# Patient Record
Sex: FEMALE | Race: BLACK OR AFRICAN AMERICAN | Employment: FULL TIME | URBAN - METROPOLITAN AREA
[De-identification: names, ages, dates, MRNs, and addresses within clinical notes are randomized per-mention and may not be internally consistent; named-entity substitution may affect disease eponyms.]

---

## 2017-01-23 ENCOUNTER — TELEPHONE (OUTPATIENT)
Dept: FAMILY MEDICINE CLINIC | Facility: CLINIC | Age: 52
End: 2017-01-23

## 2017-01-23 DIAGNOSIS — J45.909 UNCOMPLICATED ASTHMA, UNSPECIFIED ASTHMA SEVERITY: Primary | ICD-10-CM

## 2017-01-23 DIAGNOSIS — E11.9 TYPE 2 DIABETES MELLITUS WITHOUT COMPLICATION, UNSPECIFIED LONG TERM INSULIN USE STATUS: Primary | ICD-10-CM

## 2017-01-23 RX ORDER — MONTELUKAST SODIUM 10 MG/1
TABLET ORAL
Qty: 90 TABLET | Refills: 0 | Status: SHIPPED | OUTPATIENT
Start: 2017-01-23 | End: 2017-07-26

## 2017-01-23 RX ORDER — FLUTICASONE PROPIONATE AND SALMETEROL 50; 250 UG/1; UG/1
POWDER RESPIRATORY (INHALATION)
Qty: 2 EACH | Refills: 0 | Status: SHIPPED | OUTPATIENT
Start: 2017-01-23 | End: 2017-04-03

## 2017-02-03 ENCOUNTER — OFFICE VISIT (OUTPATIENT)
Dept: HEMATOLOGY/ONCOLOGY | Facility: HOSPITAL | Age: 52
End: 2017-02-03
Attending: SPECIALIST
Payer: COMMERCIAL

## 2017-02-03 VITALS
TEMPERATURE: 99 F | OXYGEN SATURATION: 100 % | SYSTOLIC BLOOD PRESSURE: 128 MMHG | WEIGHT: 173.38 LBS | RESPIRATION RATE: 18 BRPM | BODY MASS INDEX: 30.72 KG/M2 | HEART RATE: 86 BPM | HEIGHT: 62.99 IN | DIASTOLIC BLOOD PRESSURE: 76 MMHG

## 2017-02-03 DIAGNOSIS — Z91.89 AT HIGH RISK FOR BREAST CANCER: ICD-10-CM

## 2017-02-03 DIAGNOSIS — Z15.09 BRCA2 GENETIC CARRIER: ICD-10-CM

## 2017-02-03 DIAGNOSIS — C77.3 SECONDARY MALIGNANT NEOPLASM OF AXILLARY LYMPH NODES (HCC): ICD-10-CM

## 2017-02-03 DIAGNOSIS — R92.8 ABNORMAL MRI, BREAST: ICD-10-CM

## 2017-02-03 DIAGNOSIS — Z90.11 ACQUIRED ABSENCE OF RIGHT BREAST AND NIPPLE: ICD-10-CM

## 2017-02-03 DIAGNOSIS — C50.811 CANCER OF OVERLAPPING SITES OF RIGHT BREAST (HCC): ICD-10-CM

## 2017-02-03 DIAGNOSIS — D39.12: Primary | ICD-10-CM

## 2017-02-03 DIAGNOSIS — C50.811 MALIGNANT NEOPLASM OF OVERLAPPING SITES OF RIGHT FEMALE BREAST (HCC): ICD-10-CM

## 2017-02-03 DIAGNOSIS — Z15.01 BRCA2 GENETIC CARRIER: ICD-10-CM

## 2017-02-03 LAB
BREAST CARCINOMA AG (CA2729): 22.7 U/ML (ref ?–38)
CANCER AG 125 (CA125): 3.1 U/ML (ref ?–35)

## 2017-02-03 PROCEDURE — 99215 OFFICE O/P EST HI 40 MIN: CPT | Performed by: SPECIALIST

## 2017-02-03 NOTE — PROGRESS NOTES
Patient is here today for follow up with Alessandro Del Rio for breast cancer. Patient denies pain. Stated still has residual neuropathy. Feels good. Medication list and medical history were reviewed and updated.     Education Record    Learner:  Patient    Disease

## 2017-02-03 NOTE — PROGRESS NOTES
[facility] Progress Note      Patient Name: Mamie Current   YOB: 1965  Medical Record Number: [id number]  Attending Physician: Cyrus Hart M.D.      Date of Visit: 2/3/2017c      Chief Complaint  Breast and ovarian cancer an thrombocytopenia resulting in delay of C5D1 by one week. CT scans after cycle 4 showed a marked improvement in the breast/chest wall mass and axillary adenopathy. There was no significant change in the pelvic mass. Tumor markers markedly improved.     Cy All lymph nodes and pelvic washings were negative for malignancy. Patient's post-surgery course was complicated by severe peripheral neuropathy. In 07/2014 she began adjuvant right chest wall/axilla radiation.     She began endocrine therapy with anastr Take 10 mg by mouth daily. Disp:  Rfl:    insulin aspart (NOVOLOG FLEXPEN) 100 UNIT/ML Subcutaneous Solution Pen-injector Inject 1-68 Units into the skin 3 (three) times daily before meals.  Inject 1 unit of insulin for every 30 points blood glucose is grea Pulse 86  Temp(Src) 98.7 °F (37.1 °C) (Tympanic)  Resp 18  Ht 1.6 m (5' 2.99\")  Wt 78.654 kg (173 lb 6.4 oz)  BMI 30.72 kg/m2  SpO2 100%  LMP 09/01/2013 (LMP Unknown)    Physical Examination  Constitutional  Well developed and well nourished; in no appare Collection Time: 02/04/17 10:02 AM   Result Value Ref Range   Glucose 76 70-99 mg/dL   BUN 8 8-20 mg/dL   Creatinine 0.80 0.55-1.02 mg/dL   GFR 97 >=60   Calcium, Total 8.9 8.3-10.3 mg/dL   Alkaline Phosphatase 112 (H)  U/L   AST 16 15-41 U/L   Alt Planned Follow Up   Patient will return for follow up at the beginning of 05/2017. Risk Level: High - breast cancer; BRCA2 mutation carrier; ovarian cancer. Electronically signed by:    Carlos Swann M.D.   [facility]  [facility]

## 2017-02-04 ENCOUNTER — APPOINTMENT (OUTPATIENT)
Dept: LAB | Age: 52
End: 2017-02-04
Attending: FAMILY MEDICINE
Payer: COMMERCIAL

## 2017-02-04 DIAGNOSIS — IMO0001 UNCONTROLLED TYPE 2 DIABETES MELLITUS WITHOUT COMPLICATION, WITH LONG-TERM CURRENT USE OF INSULIN: ICD-10-CM

## 2017-02-04 LAB
ALBUMIN SERPL-MCNC: 3.4 G/DL (ref 3.5–4.8)
ALP LIVER SERPL-CCNC: 112 U/L (ref 41–108)
ALT SERPL-CCNC: 18 U/L (ref 14–54)
AST SERPL-CCNC: 16 U/L (ref 15–41)
BILIRUB SERPL-MCNC: 0.3 MG/DL (ref 0.1–2)
BUN BLD-MCNC: 8 MG/DL (ref 8–20)
CALCIUM BLD-MCNC: 8.9 MG/DL (ref 8.3–10.3)
CHLORIDE: 103 MMOL/L (ref 101–111)
CHOLEST SMN-MCNC: 160 MG/DL (ref ?–200)
CO2: 32 MMOL/L (ref 22–32)
CREAT BLD-MCNC: 0.8 MG/DL (ref 0.55–1.02)
CREAT UR-SCNC: 171 MG/DL
EST. AVERAGE GLUCOSE BLD GHB EST-MCNC: 166 MG/DL (ref 68–126)
GLUCOSE BLD-MCNC: 76 MG/DL (ref 70–99)
HBA1C MFR BLD HPLC: 7.4 % (ref ?–5.7)
HDLC SERPL-MCNC: 63 MG/DL (ref 45–?)
HDLC SERPL: 2.54 {RATIO} (ref ?–4.44)
LDLC SERPL CALC-MCNC: 86 MG/DL (ref ?–130)
M PROTEIN MFR SERPL ELPH: 8.3 G/DL (ref 6.1–8.3)
MICROALBUMIN UR-MCNC: <0.5 MG/DL
NONHDLC SERPL-MCNC: 97 MG/DL (ref ?–130)
POTASSIUM SERPL-SCNC: 3.9 MMOL/L (ref 3.6–5.1)
SODIUM SERPL-SCNC: 140 MMOL/L (ref 136–144)
TRIGLYCERIDES: 53 MG/DL (ref ?–150)
VLDL: 11 MG/DL (ref 5–40)

## 2017-02-04 PROCEDURE — 80053 COMPREHEN METABOLIC PANEL: CPT

## 2017-02-04 PROCEDURE — 82043 UR ALBUMIN QUANTITATIVE: CPT

## 2017-02-04 PROCEDURE — 82570 ASSAY OF URINE CREATININE: CPT

## 2017-02-04 PROCEDURE — 80061 LIPID PANEL: CPT

## 2017-02-04 PROCEDURE — 83036 HEMOGLOBIN GLYCOSYLATED A1C: CPT

## 2017-02-04 PROCEDURE — 36415 COLL VENOUS BLD VENIPUNCTURE: CPT

## 2017-02-05 PROBLEM — Z90.11 ACQUIRED ABSENCE OF RIGHT BREAST AND NIPPLE: Status: ACTIVE | Noted: 2017-02-05

## 2017-02-06 ENCOUNTER — TELEPHONE (OUTPATIENT)
Dept: HEMATOLOGY/ONCOLOGY | Facility: HOSPITAL | Age: 52
End: 2017-02-06

## 2017-02-07 ENCOUNTER — OFFICE VISIT (OUTPATIENT)
Dept: FAMILY MEDICINE CLINIC | Facility: CLINIC | Age: 52
End: 2017-02-07

## 2017-02-07 VITALS
BODY MASS INDEX: 31 KG/M2 | DIASTOLIC BLOOD PRESSURE: 84 MMHG | RESPIRATION RATE: 20 BRPM | HEART RATE: 78 BPM | TEMPERATURE: 98 F | SYSTOLIC BLOOD PRESSURE: 120 MMHG | WEIGHT: 173 LBS | OXYGEN SATURATION: 98 %

## 2017-02-07 DIAGNOSIS — J45.20 MILD INTERMITTENT ASTHMA WITHOUT COMPLICATION: ICD-10-CM

## 2017-02-07 DIAGNOSIS — Z85.3 HISTORY OF BREAST CANCER: ICD-10-CM

## 2017-02-07 DIAGNOSIS — Z79.4 TYPE 2 DIABETES MELLITUS WITHOUT COMPLICATION, WITH LONG-TERM CURRENT USE OF INSULIN (HCC): Primary | ICD-10-CM

## 2017-02-07 DIAGNOSIS — Z87.01 HISTORY OF PNEUMONIA: ICD-10-CM

## 2017-02-07 DIAGNOSIS — E11.9 TYPE 2 DIABETES MELLITUS WITHOUT COMPLICATION, WITH LONG-TERM CURRENT USE OF INSULIN (HCC): Primary | ICD-10-CM

## 2017-02-07 PROCEDURE — 90471 IMMUNIZATION ADMIN: CPT | Performed by: FAMILY MEDICINE

## 2017-02-07 PROCEDURE — 99214 OFFICE O/P EST MOD 30 MIN: CPT | Performed by: FAMILY MEDICINE

## 2017-02-07 PROCEDURE — 90670 PCV13 VACCINE IM: CPT | Performed by: FAMILY MEDICINE

## 2017-02-07 NOTE — PROGRESS NOTES
HPI:   Adina Russell is a 45year old female who presents for recheck of her diabetes and asthma       Patient’s FBS have been in the 80's   Working out regularly   Eating healthy   Taking insulin.     Sen Robles has been checking her feet on a regular basis Tab Take 10 mg by mouth daily. Disp:  Rfl:    insulin aspart (NOVOLOG FLEXPEN) 100 UNIT/ML Subcutaneous Solution Pen-injector Inject 1-68 Units into the skin 3 (three) times daily before meals.  Inject 1 unit of insulin for every 30 points blood glucose is • Asthma    • History of blood transfusion    • Pneumonia, organism unspecified           Past Surgical History    D & C      BREAST BIOPSY  9/12/2013    Comment Procedure: BREAST BIOPSY;  Surgeon: Wilfredo Wilkinson MD;  Location: [address] [place] [address] OR    [facility] intact to monofilament bilaterally   PSYCH: judgement and insight are appropriate & intact    ASSESSMENT AND PLAN:   Lino Ayoub is a 45year old female who presents for a recheck of her diabetes.    Discussed importance of medication, diet adherence, s

## 2017-03-22 ENCOUNTER — TELEPHONE (OUTPATIENT)
Dept: FAMILY MEDICINE CLINIC | Facility: CLINIC | Age: 52
End: 2017-03-22

## 2017-03-22 DIAGNOSIS — IMO0001 UNCONTROLLED TYPE 2 DIABETES MELLITUS WITHOUT COMPLICATION, WITH LONG-TERM CURRENT USE OF INSULIN: Primary | ICD-10-CM

## 2017-03-23 ENCOUNTER — TELEPHONE (OUTPATIENT)
Dept: FAMILY MEDICINE CLINIC | Facility: CLINIC | Age: 52
End: 2017-03-23

## 2017-04-03 RX ORDER — FLUTICASONE PROPIONATE AND SALMETEROL 50; 250 UG/1; UG/1
POWDER RESPIRATORY (INHALATION)
Qty: 120 EACH | Refills: 0 | Status: ON HOLD | OUTPATIENT
Start: 2017-04-03 | End: 2018-06-05

## 2017-04-07 ENCOUNTER — TELEPHONE (OUTPATIENT)
Dept: FAMILY MEDICINE CLINIC | Facility: CLINIC | Age: 52
End: 2017-04-07

## 2017-04-21 ENCOUNTER — OFFICE VISIT (OUTPATIENT)
Dept: NEUROLOGY | Facility: CLINIC | Age: 52
End: 2017-04-21

## 2017-04-21 VITALS
RESPIRATION RATE: 18 BRPM | WEIGHT: 166 LBS | BODY MASS INDEX: 29 KG/M2 | HEART RATE: 72 BPM | DIASTOLIC BLOOD PRESSURE: 66 MMHG | SYSTOLIC BLOOD PRESSURE: 134 MMHG

## 2017-04-21 DIAGNOSIS — G62.0 CHEMOTHERAPY-INDUCED PERIPHERAL NEUROPATHY (HCC): Primary | ICD-10-CM

## 2017-04-21 DIAGNOSIS — T45.1X5A CHEMOTHERAPY-INDUCED PERIPHERAL NEUROPATHY (HCC): Primary | ICD-10-CM

## 2017-04-21 PROCEDURE — 99213 OFFICE O/P EST LOW 20 MIN: CPT | Performed by: OTHER

## 2017-04-21 RX ORDER — GABAPENTIN 600 MG/1
TABLET ORAL
Qty: 120 TABLET | Refills: 5 | Status: SHIPPED | OUTPATIENT
Start: 2017-04-21 | End: 2017-06-28

## 2017-04-21 NOTE — PROGRESS NOTES
HPI:    Patient ID: Shanae Mckeon is a 45year old female. HPI    Patient presented for follow-up for chemotherapy induced peripheral neuropathy. Symptoms are stable with Gabapentin. She states as far she takes the medications the pain is tolerable.  Dorian Carney Status: Never Used                        Alcohol Use: No                       Review of Systems   Musculoskeletal: Negative for myalgias and arthralgias. Neurological: Negative for weakness and numbness. All other systems reviewed and are negative. Misc USE AT BEDTIME Disp: 100 each Rfl: 0     Allergies:  Fish                    Anaphylaxis, Hives    Comment: All fish, Wheezing, short of breath  Seasonal                Wheezing, Runny nose    Comment:Ragweed, pollen   PHYSICAL EXAM:   Physical Exam Imaging & Referrals:  None     AD#[id number]

## 2017-04-21 NOTE — PATIENT INSTRUCTIONS
Refill policies:    • Allow 2 business days for refills; controlled substances may take longer.   • Contact your pharmacy at least 5 days prior to running out of medication and have them send an electronic request or submit request through the “request re insurance carrier to obtain pre-certification or prior authorization. Unfortunately, [facility] has seen an increase in denial of payment even though the procedure/test has been pre-certified.   You are strongly encouraged to contact your insurance carrier to v

## 2017-05-25 ENCOUNTER — SOCIAL WORK SERVICES (OUTPATIENT)
Dept: HEMATOLOGY/ONCOLOGY | Facility: HOSPITAL | Age: 52
End: 2017-05-25

## 2017-05-25 NOTE — PROGRESS NOTES
Applications for a permanent and temporary disabled parking placard completed and placed at 2nd floor desk; SW left message with patient's advising.

## 2017-06-16 ENCOUNTER — HOSPITAL ENCOUNTER (OUTPATIENT)
Dept: MAMMOGRAPHY | Facility: HOSPITAL | Age: 52
Discharge: HOME | End: 2017-06-16
Attending: SPECIALIST
Payer: COMMERCIAL

## 2017-06-16 DIAGNOSIS — C50.811 MALIGNANT NEOPLASM OF OVERLAPPING SITES OF RIGHT FEMALE BREAST (HCC): ICD-10-CM

## 2017-06-16 DIAGNOSIS — Z15.01 BRCA2 GENETIC CARRIER: ICD-10-CM

## 2017-06-16 DIAGNOSIS — Z15.09 BRCA2 GENETIC CARRIER: ICD-10-CM

## 2017-06-16 PROCEDURE — 77061 BREAST TOMOSYNTHESIS UNI: CPT | Performed by: SPECIALIST

## 2017-06-16 PROCEDURE — 77065 DX MAMMO INCL CAD UNI: CPT | Performed by: SPECIALIST

## 2017-06-26 ENCOUNTER — TELEPHONE (OUTPATIENT)
Dept: FAMILY MEDICINE CLINIC | Facility: CLINIC | Age: 52
End: 2017-06-26

## 2017-06-26 RX ORDER — PEN NEEDLE, DIABETIC 31 GX5/16"
NEEDLE, DISPOSABLE MISCELLANEOUS
Refills: 0 | OUTPATIENT
Start: 2017-06-26

## 2017-06-26 NOTE — TELEPHONE ENCOUNTER
Please call patient to get copy of recent diabetic eye exam. [employer] message was sent but not read.

## 2017-06-27 DIAGNOSIS — G62.0 DRUG-INDUCED PERIPHERAL NEUROPATHY (HCC): Primary | ICD-10-CM

## 2017-06-27 RX ORDER — GABAPENTIN 600 MG/1
TABLET ORAL
Qty: 120 TABLET | Refills: 0 | OUTPATIENT
Start: 2017-06-27

## 2017-06-28 RX ORDER — GABAPENTIN 600 MG/1
TABLET ORAL
Qty: 120 TABLET | Refills: 3 | Status: SHIPPED | OUTPATIENT
Start: 2017-06-28 | End: 2017-12-13

## 2017-06-28 NOTE — TELEPHONE ENCOUNTER
Medication: Gabapentin 600 mg    Date of last refill: 4/21/17  [place]    Last office visit: 4/21/2017  Due back to clinic per last office note: 6 months   Date next office visit scheduled:  No future appointments.

## 2017-07-26 ENCOUNTER — TELEPHONE (OUTPATIENT)
Dept: FAMILY MEDICINE CLINIC | Facility: CLINIC | Age: 52
End: 2017-07-26

## 2017-07-26 DIAGNOSIS — J45.909 UNCOMPLICATED ASTHMA, UNSPECIFIED ASTHMA SEVERITY: ICD-10-CM

## 2017-07-26 RX ORDER — MONTELUKAST SODIUM 10 MG/1
TABLET ORAL
Qty: 90 TABLET | Refills: 0 | Status: SHIPPED | OUTPATIENT
Start: 2017-07-26 | End: 2017-10-19

## 2017-07-26 NOTE — TELEPHONE ENCOUNTER
Patient needs a new RX called in for Singular to [facility] on [address]. Insurance has changed    Please see attached message     Approve/ deny Singulair ?   Last OV with Philip Hart was 02/07/17  Medication last authorized 01/23/17   #90

## 2017-07-26 NOTE — TELEPHONE ENCOUNTER
Patient needs a new RX called in for Singular to [place] on [address]. Insurance has changed.

## 2017-07-26 NOTE — TELEPHONE ENCOUNTER
Sent RX to [facility] for pt with this information and to get lab for A1C now. Task completed.

## 2017-09-16 DIAGNOSIS — IMO0001 UNCONTROLLED TYPE 2 DIABETES MELLITUS WITHOUT COMPLICATION, WITH LONG-TERM CURRENT USE OF INSULIN: ICD-10-CM

## 2017-09-18 RX ORDER — PEN NEEDLE, DIABETIC 32GX 5/32"
NEEDLE, DISPOSABLE MISCELLANEOUS
Qty: 100 EACH | Refills: 1 | Status: ON HOLD | OUTPATIENT
Start: 2017-09-18 | End: 2018-06-05

## 2017-09-19 ENCOUNTER — TELEPHONE (OUTPATIENT)
Dept: FAMILY MEDICINE CLINIC | Facility: CLINIC | Age: 52
End: 2017-09-19

## 2017-10-19 DIAGNOSIS — J45.909 UNCOMPLICATED ASTHMA: ICD-10-CM

## 2017-10-20 RX ORDER — MONTELUKAST SODIUM 10 MG/1
TABLET ORAL
Qty: 30 TABLET | Refills: 0 | Status: SHIPPED | OUTPATIENT
Start: 2017-10-20 | End: 2017-11-17

## 2017-11-17 ENCOUNTER — LAB ENCOUNTER (OUTPATIENT)
Dept: LAB | Age: 52
End: 2017-11-17
Attending: FAMILY MEDICINE
Payer: COMMERCIAL

## 2017-11-17 ENCOUNTER — OFFICE VISIT (OUTPATIENT)
Dept: FAMILY MEDICINE CLINIC | Facility: CLINIC | Age: 52
End: 2017-11-17

## 2017-11-17 VITALS
BODY MASS INDEX: 29.77 KG/M2 | SYSTOLIC BLOOD PRESSURE: 124 MMHG | WEIGHT: 168 LBS | HEART RATE: 82 BPM | HEIGHT: 63 IN | TEMPERATURE: 99 F | OXYGEN SATURATION: 99 % | DIASTOLIC BLOOD PRESSURE: 80 MMHG | RESPIRATION RATE: 18 BRPM

## 2017-11-17 DIAGNOSIS — Z80.3 FAMILY HISTORY OF BREAST CANCER: ICD-10-CM

## 2017-11-17 DIAGNOSIS — E11.9 DIABETES MELLITUS WITH NO COMPLICATION (HCC): Primary | ICD-10-CM

## 2017-11-17 DIAGNOSIS — J45.20 MILD INTERMITTENT ASTHMA WITHOUT COMPLICATION: ICD-10-CM

## 2017-11-17 DIAGNOSIS — E11.9 DIABETES MELLITUS WITH NO COMPLICATION (HCC): ICD-10-CM

## 2017-11-17 PROCEDURE — 99214 OFFICE O/P EST MOD 30 MIN: CPT | Performed by: FAMILY MEDICINE

## 2017-11-17 PROCEDURE — 85025 COMPLETE CBC W/AUTO DIFF WBC: CPT | Performed by: FAMILY MEDICINE

## 2017-11-17 PROCEDURE — 83036 HEMOGLOBIN GLYCOSYLATED A1C: CPT | Performed by: FAMILY MEDICINE

## 2017-11-17 PROCEDURE — 80061 LIPID PANEL: CPT | Performed by: FAMILY MEDICINE

## 2017-11-17 PROCEDURE — 80053 COMPREHEN METABOLIC PANEL: CPT | Performed by: FAMILY MEDICINE

## 2017-11-17 PROCEDURE — 90686 IIV4 VACC NO PRSV 0.5 ML IM: CPT | Performed by: FAMILY MEDICINE

## 2017-11-17 PROCEDURE — 90471 IMMUNIZATION ADMIN: CPT | Performed by: FAMILY MEDICINE

## 2017-11-17 PROCEDURE — 36415 COLL VENOUS BLD VENIPUNCTURE: CPT | Performed by: FAMILY MEDICINE

## 2017-11-17 RX ORDER — MONTELUKAST SODIUM 10 MG/1
TABLET ORAL
Qty: 90 TABLET | Refills: 1 | Status: ON HOLD | OUTPATIENT
Start: 2017-11-17 | End: 2018-06-05

## 2017-11-17 RX ORDER — INSULIN GLARGINE 100 [IU]/ML
20 INJECTION, SOLUTION SUBCUTANEOUS NIGHTLY
COMMUNITY
Start: 2017-09-14 | End: 2019-02-06

## 2017-11-17 NOTE — PROGRESS NOTES
HPI:   Iwona Knapp is a 45year old female who presents for recheck of her diabetes and asthma     Patient’s FBS have been in the 90's ; occasional high spikes with stress  Working out regularly   Eating healthy   Taking insulin.     Pamella Haskins has been ch Date Value Ref Range Status   02/04/2017  <=30.0 ug/mg Final   Comment:   Unable to calculate due to Urine Microalbumin <0.5 mg/dL       10/29/2016 7.9 <=30.0 ug/mg Final   09/26/2015 11.4 <=30.0 ug/mg Final   ----------      Current Outpatient Prescript bedtime Disp:  Rfl:       Past Medical History:   Diagnosis Date   • Anemia     transfusions 9/13   • Asthma    • Breast cancer [facility])     right breast 9/13   • Cancer [facility])     breast   • Heart murmur    • History of blood transfusion    • Murmur     orly dizziness  PSYCH: denies depression or anxiety  NUTRITION:follows diabetic diet    EXAM:   /80   Pulse 82   Temp 98.7 °F (37.1 °C) (Oral)   Resp 18   Ht 63\"   Wt 168 lb   LMP 09/01/2013 (LMP Unknown)   SpO2 99%   BMI 29.76 kg/m²    Body mass index i

## 2017-11-20 DIAGNOSIS — E78.00 PURE HYPERCHOLESTEROLEMIA: ICD-10-CM

## 2017-11-20 DIAGNOSIS — E11.9 TYPE 2 DIABETES MELLITUS WITHOUT COMPLICATION, UNSPECIFIED LONG TERM INSULIN USE STATUS: Primary | ICD-10-CM

## 2017-11-22 ENCOUNTER — TELEPHONE (OUTPATIENT)
Dept: FAMILY MEDICINE CLINIC | Facility: CLINIC | Age: 52
End: 2017-11-22

## 2017-11-22 NOTE — TELEPHONE ENCOUNTER
----- Message from Kim Bedolla DO sent at 11/22/2017  1:18 PM CST -----  Reduce lantus to 20 ; call next week with glucose readings

## 2017-11-30 ENCOUNTER — SOCIAL WORK SERVICES (OUTPATIENT)
Dept: HEMATOLOGY/ONCOLOGY | Facility: HOSPITAL | Age: 52
End: 2017-11-30

## 2017-11-30 NOTE — PROGRESS NOTES
Application for disabled parking placard completed and placed at 1st floor desk for patient pick-up.

## 2017-12-13 DIAGNOSIS — G62.0 DRUG-INDUCED PERIPHERAL NEUROPATHY (HCC): ICD-10-CM

## 2017-12-14 NOTE — TELEPHONE ENCOUNTER
Medication: Gabapentin 600mg     Date of last refill: 6/28/17  [place]     Last office visit: 4/21/17  Due back to clinic per last office note:  6 months  Date next office visit scheduled:  1/12/18    Last OV note recommendation: per Dr. Kindra Shah- Samy overall  Plan: Continue Gabapentin 600 mg TID. Take extra tablet as needed.   Follow up in 6 months

## 2017-12-15 RX ORDER — GABAPENTIN 600 MG/1
TABLET ORAL
Qty: 120 TABLET | Refills: 0 | Status: SHIPPED | OUTPATIENT
Start: 2017-12-15 | End: 2018-01-12

## 2018-01-12 ENCOUNTER — OFFICE VISIT (OUTPATIENT)
Dept: NEUROLOGY | Facility: CLINIC | Age: 53
End: 2018-01-12

## 2018-01-12 VITALS
BODY MASS INDEX: 31 KG/M2 | RESPIRATION RATE: 16 BRPM | HEART RATE: 70 BPM | SYSTOLIC BLOOD PRESSURE: 128 MMHG | DIASTOLIC BLOOD PRESSURE: 84 MMHG | WEIGHT: 173 LBS

## 2018-01-12 DIAGNOSIS — G62.0 CHEMOTHERAPY-INDUCED PERIPHERAL NEUROPATHY (HCC): Primary | ICD-10-CM

## 2018-01-12 DIAGNOSIS — G62.0 DRUG-INDUCED PERIPHERAL NEUROPATHY (HCC): ICD-10-CM

## 2018-01-12 DIAGNOSIS — T45.1X5A CHEMOTHERAPY-INDUCED PERIPHERAL NEUROPATHY (HCC): Primary | ICD-10-CM

## 2018-01-12 PROCEDURE — 99213 OFFICE O/P EST LOW 20 MIN: CPT | Performed by: OTHER

## 2018-01-12 RX ORDER — GABAPENTIN 600 MG/1
TABLET ORAL
Qty: 120 TABLET | Refills: 5 | Status: ON HOLD | OUTPATIENT
Start: 2018-01-12 | End: 2018-06-05

## 2018-01-12 NOTE — PATIENT INSTRUCTIONS
Refill policies:    • Allow 2-3 business days for refills; controlled substances may take longer.   • Contact your pharmacy at least 5 days prior to running out of medication and have them send an electronic request or submit request through the [facility] recommended that you have a procedure or additional testing performed. [facility]) will contact your insurance carrier to obtain pre-certification or prior authorization.     Unfortunately, [facility] has seen an increase in denial of paym

## 2018-01-12 NOTE — PROGRESS NOTES
HPI:    Patient ID: Allyson Foster is a 52year old female. HPI    Patient is a 52year old female who presented for follow-up for chemotherapy induced peripheral neuropathy. Symptoms are stable with Gabapentin and pain is under control.  She sometime t • Cancer Maternal Aunt      breast cancer   • Breast Cancer Self 49      Smoking status: Never Smoker                                                              Smokeless tobacco: Never Used                      Alcohol use:  No                       Re Does not apply Misc  Disp:  Rfl:    BD PEN NEEDLE SHORT U/F 31G X 8 MM Does not apply Misc USE AT BEDTIME Disp: 100 each Rfl: 0   Respiratory Therapy Supplies (NEBULIZER/ADULT MASK) Does not apply Kit Use with nebulizer machine as directed Disp: 1 kit Rfl: with the plan. No orders of the defined types were placed in this encounter.       Meds This Visit:  Signed Prescriptions Disp Refills    gabapentin 600 MG Oral Tab 120 tablet 5      Sig: TAKE 1 TABLET BY MOUTH THREE TIMES DAILY, TAKE EXTRA TABLET AS N

## 2018-01-12 NOTE — PROGRESS NOTES
Patient here to follow up regarding neuropathy. States she has been doing well, here to discuss the next steps.

## 2018-02-20 ENCOUNTER — SOCIAL WORK SERVICES (OUTPATIENT)
Dept: HEMATOLOGY/ONCOLOGY | Facility: HOSPITAL | Age: 53
End: 2018-02-20

## 2018-02-20 ENCOUNTER — TELEPHONE (OUTPATIENT)
Dept: SURGERY | Facility: CLINIC | Age: 53
End: 2018-02-20

## 2018-02-20 NOTE — PROGRESS NOTES
Medical records form 2/1/2017 to present faxed to [place] [employer], Disability Determination, [phone].

## 2018-02-22 ENCOUNTER — TELEPHONE (OUTPATIENT)
Dept: FAMILY MEDICINE CLINIC | Facility: CLINIC | Age: 53
End: 2018-02-22

## 2018-02-22 NOTE — TELEPHONE ENCOUNTER
Medical Records request for patient.     [address] [facility]    Claim #[id number]    Sent to Medical Records  Sent to Scan

## 2018-04-11 ENCOUNTER — TELEPHONE (OUTPATIENT)
Dept: NEUROLOGY | Facility: CLINIC | Age: 53
End: 2018-04-11

## 2018-04-11 ENCOUNTER — HOSPITAL ENCOUNTER (EMERGENCY)
Facility: HOSPITAL | Age: 53
Discharge: HOME | End: 2018-04-11
Attending: EMERGENCY MEDICINE
Payer: COMMERCIAL

## 2018-04-11 VITALS
RESPIRATION RATE: 18 BRPM | SYSTOLIC BLOOD PRESSURE: 145 MMHG | DIASTOLIC BLOOD PRESSURE: 88 MMHG | WEIGHT: 173 LBS | HEIGHT: 63 IN | OXYGEN SATURATION: 99 % | HEART RATE: 88 BPM | TEMPERATURE: 98 F | BODY MASS INDEX: 30.65 KG/M2

## 2018-04-11 DIAGNOSIS — G51.0 BELL'S PALSY: Primary | ICD-10-CM

## 2018-04-11 PROCEDURE — 99283 EMERGENCY DEPT VISIT LOW MDM: CPT

## 2018-04-11 RX ORDER — PREDNISONE 20 MG/1
60 TABLET ORAL DAILY
Qty: 15 TABLET | Refills: 0 | Status: SHIPPED | OUTPATIENT
Start: 2018-04-11 | End: 2018-04-19

## 2018-04-11 RX ORDER — VALACYCLOVIR HYDROCHLORIDE 1 G/1
1 TABLET, FILM COATED ORAL 3 TIMES DAILY
Qty: 21 TABLET | Refills: 0 | Status: SHIPPED | OUTPATIENT
Start: 2018-04-11 | End: 2018-04-18

## 2018-04-11 NOTE — TELEPHONE ENCOUNTER
Patient calling to report she has some difficulty blinking and closing her eye this morning. No reports of dizziness, headaches or weakness     Also reports her right eye not blinking or closing , right eye very watery. left eye is fine, reports pain behind right ear , she stated her friend noticed right side lip is not moving in symmetry with left side. No reports of weakness in arms or legs. Dr Gretel Irizarry notified of patient symptoms above and advised patient to proceed to the ER for further evaluation and treatment. Understanding verbalized.

## 2018-04-11 NOTE — TELEPHONE ENCOUNTER
Possible Bell's palsy but go to the ED for evaluation given background cancer history  Would need steroids and antivirals once diagnosis is confirmed

## 2018-04-11 NOTE — ED PROVIDER NOTES
Patient Seen in: [facility] Emergency Department    History   Patient presents with:   Eye Visual Problem (opthalmic)    Stated Complaint: unable to close R eye since last noc, denies numbness or weakness anywhere    HPI    Patient is a 57-year-old w RADIATION RIGHT  5/13/2014: TOTAL ABDOMINAL HYSTERECTOMY      Comment: Procedure: ABDOMINAL HYSTERECTOMY TOTAL BSO                STAGING;  Surgeon: Jo Zelaya MD;                 Location: [address] [place] [address] MAIN OR        Smoking status: Never Smoker Discussed with patient. Will start Valtrex/prednisone. Tape her eye shut at night. Artificial tears. Follow-up with neurology. Return if worsening symptoms, new complaints.             Disposition and Plan     Clinical Impression:  Bell's palsy  (prima

## 2018-04-11 NOTE — ED INITIAL ASSESSMENT (HPI)
Pt reports inability to blink right eye since last night. Hx of bells palsy on same side. Denies recent illness. Pt ambulatory and A&OX4.

## 2018-04-12 ENCOUNTER — TELEPHONE (OUTPATIENT)
Dept: NEUROLOGY | Facility: CLINIC | Age: 53
End: 2018-04-12

## 2018-04-18 PROCEDURE — 82043 UR ALBUMIN QUANTITATIVE: CPT | Performed by: FAMILY MEDICINE

## 2018-04-18 PROCEDURE — 82570 ASSAY OF URINE CREATININE: CPT | Performed by: FAMILY MEDICINE

## 2018-04-19 ENCOUNTER — OFFICE VISIT (OUTPATIENT)
Dept: FAMILY MEDICINE CLINIC | Facility: CLINIC | Age: 53
End: 2018-04-19

## 2018-04-19 VITALS
WEIGHT: 172 LBS | BODY MASS INDEX: 30.48 KG/M2 | DIASTOLIC BLOOD PRESSURE: 70 MMHG | OXYGEN SATURATION: 98 % | RESPIRATION RATE: 18 BRPM | HEART RATE: 98 BPM | TEMPERATURE: 99 F | SYSTOLIC BLOOD PRESSURE: 122 MMHG | HEIGHT: 63 IN

## 2018-04-19 DIAGNOSIS — E11.9 TYPE 2 DIABETES MELLITUS WITHOUT COMPLICATION, WITH LONG-TERM CURRENT USE OF INSULIN (HCC): Primary | ICD-10-CM

## 2018-04-19 DIAGNOSIS — Z79.4 TYPE 2 DIABETES MELLITUS WITHOUT COMPLICATION, WITH LONG-TERM CURRENT USE OF INSULIN (HCC): Primary | ICD-10-CM

## 2018-04-19 DIAGNOSIS — J45.20 MILD INTERMITTENT ASTHMA WITHOUT COMPLICATION: ICD-10-CM

## 2018-04-19 DIAGNOSIS — G51.0 BELL'S PALSY: ICD-10-CM

## 2018-04-19 PROCEDURE — 99214 OFFICE O/P EST MOD 30 MIN: CPT | Performed by: FAMILY MEDICINE

## 2018-04-19 NOTE — PROGRESS NOTES
HPI:   William Kramer is a 52year old female who presents for recheck of her diabetes and asthma     Patient’s FBS have been in the 90's ; recent labs looked good   No lows after reducing the lantus   Working out regularly   Eating healthy     Swathi jennings ----------  AST (U/L)   Date Value   04/18/2018 15   11/17/2017 15   02/04/2017 16   10/29/2016 15   07/16/2014 10 (L)   07/07/2014 20   04/17/2014 11 (L)   11/11/2011 16   12/30/2010 24   ----------  ALT (U/L)   Date Value   04/18/2018 21   07/16/2014 1 anastrozole 1 MG Oral Tab tab Take 1 tablet (1 mg total) by mouth daily.  Disp: 90 tablet Rfl: 3   Respiratory Therapy Supplies (NEBULIZER/ADULT MASK) Does not apply Kit Use with nebulizer machine as directed Disp: 1 kit Rfl: 0   Albuterol Sulfate (VENTOL Social History: Smoking status: Never Smoker                                                              Smokeless tobacco: Never Used                      Alcohol use:  No              Exercise: minimal.  Diet: watches sugar closely     REVIEW OF SYSTEM complication, with long-term current use of insulin (HCC)  Repeat labs in 3 months   - HEMOGLOBIN A1C; Future  - LIPID PANEL; Future  - COMP METABOLIC PANEL (14); Future    3.  Bell's palsy  cpm ; monitor; already better       Follow up 3 months after repea

## 2018-05-01 DIAGNOSIS — J45.909 UNCOMPLICATED ASTHMA: ICD-10-CM

## 2018-05-01 RX ORDER — MONTELUKAST SODIUM 10 MG/1
TABLET ORAL
Qty: 90 TABLET | Refills: 0 | Status: ON HOLD | OUTPATIENT
Start: 2018-05-01 | End: 2018-06-05

## 2018-05-29 ENCOUNTER — OFFICE VISIT (OUTPATIENT)
Dept: FAMILY MEDICINE CLINIC | Facility: CLINIC | Age: 53
End: 2018-05-29

## 2018-05-29 VITALS
SYSTOLIC BLOOD PRESSURE: 128 MMHG | HEART RATE: 98 BPM | RESPIRATION RATE: 16 BRPM | TEMPERATURE: 100 F | OXYGEN SATURATION: 98 % | DIASTOLIC BLOOD PRESSURE: 76 MMHG

## 2018-05-29 DIAGNOSIS — J40 BRONCHITIS: Primary | ICD-10-CM

## 2018-05-29 PROCEDURE — 99213 OFFICE O/P EST LOW 20 MIN: CPT | Performed by: PHYSICIAN ASSISTANT

## 2018-05-29 RX ORDER — AZITHROMYCIN 250 MG/1
TABLET, FILM COATED ORAL
Qty: 6 TABLET | Refills: 0 | Status: ON HOLD | OUTPATIENT
Start: 2018-05-29 | End: 2018-06-05

## 2018-05-29 NOTE — PROGRESS NOTES
CHIEF COMPLAINT:   Patient presents with:  URI: cough, low grade fever 100.1  2 days duration        HPI:   Jerzy Connors is a 52year old female who presents for cough for  2  days.    Due to the fevers and achy feeling along with cough she was worried s BEDTIME Disp: 100 each Rfl: 0   MONTELUKAST SODIUM 10 MG Oral Tab TAKE 1 TABLET(10 MG) BY MOUTH DAILY Disp: 90 tablet Rfl: 0   ADVAIR DISKUS 250-50 MCG/DOSE Inhalation Aerosol Powder, Breath Activated INHALE 1 PUFF INTO THE LUNGS 2 (TWO) TIMES DAILY Disp: normocephalic. TM's clear bilaterally. Nares patent, nasal mucosa pink mildly congested. Throat non erythematous. NECK: supple, non-tender. LUNGS: sporatic expiratory wheezes, no ronchi, no crackles noted.   CARDIO: S1/S2  RRR   LYMPH: No cervical or s

## 2018-06-04 ENCOUNTER — HOSPITAL ENCOUNTER (INPATIENT)
Facility: HOSPITAL | Age: 53
LOS: 3 days | Discharge: HOME | DRG: 202 | End: 2018-06-07
Attending: EMERGENCY MEDICINE | Admitting: INTERNAL MEDICINE
Payer: COMMERCIAL

## 2018-06-04 ENCOUNTER — APPOINTMENT (OUTPATIENT)
Dept: CT IMAGING | Age: 53
DRG: 202 | End: 2018-06-04
Attending: EMERGENCY MEDICINE
Payer: COMMERCIAL

## 2018-06-04 ENCOUNTER — APPOINTMENT (OUTPATIENT)
Dept: GENERAL RADIOLOGY | Age: 53
DRG: 202 | End: 2018-06-04
Attending: EMERGENCY MEDICINE
Payer: COMMERCIAL

## 2018-06-04 DIAGNOSIS — R09.02 HYPOXIA: ICD-10-CM

## 2018-06-04 DIAGNOSIS — J18.9 COMMUNITY ACQUIRED PNEUMONIA, UNSPECIFIED LATERALITY: Primary | ICD-10-CM

## 2018-06-04 DIAGNOSIS — J45.902 SEVERE ASTHMA WITH STATUS ASTHMATICUS, UNSPECIFIED WHETHER PERSISTENT: ICD-10-CM

## 2018-06-04 PROBLEM — E87.1 HYPONATREMIA: Status: ACTIVE | Noted: 2018-06-04

## 2018-06-04 PROBLEM — R73.9 HYPERGLYCEMIA: Status: ACTIVE | Noted: 2018-06-04

## 2018-06-04 PROBLEM — D64.9 ANEMIA: Status: ACTIVE | Noted: 2018-06-04

## 2018-06-04 PROCEDURE — 71275 CT ANGIOGRAPHY CHEST: CPT | Performed by: EMERGENCY MEDICINE

## 2018-06-04 PROCEDURE — 71045 X-RAY EXAM CHEST 1 VIEW: CPT | Performed by: EMERGENCY MEDICINE

## 2018-06-04 PROCEDURE — 99223 1ST HOSP IP/OBS HIGH 75: CPT | Performed by: HOSPITALIST

## 2018-06-04 RX ORDER — ACETAMINOPHEN 325 MG/1
650 TABLET ORAL EVERY 6 HOURS PRN
Status: DISCONTINUED | OUTPATIENT
Start: 2018-06-04 | End: 2018-06-07

## 2018-06-04 RX ORDER — TRAZODONE HYDROCHLORIDE 50 MG/1
50 TABLET ORAL NIGHTLY PRN
Status: DISCONTINUED | OUTPATIENT
Start: 2018-06-04 | End: 2018-06-07

## 2018-06-04 RX ORDER — CETIRIZINE HYDROCHLORIDE 10 MG/1
10 TABLET ORAL DAILY
Status: DISCONTINUED | OUTPATIENT
Start: 2018-06-04 | End: 2018-06-07

## 2018-06-04 RX ORDER — DEXTROSE MONOHYDRATE 25 G/50ML
50 INJECTION, SOLUTION INTRAVENOUS
Status: DISCONTINUED | OUTPATIENT
Start: 2018-06-04 | End: 2018-06-07

## 2018-06-04 RX ORDER — METHYLPREDNISOLONE SODIUM SUCCINATE 125 MG/2ML
60 INJECTION, POWDER, LYOPHILIZED, FOR SOLUTION INTRAMUSCULAR; INTRAVENOUS EVERY 8 HOURS
Status: DISCONTINUED | OUTPATIENT
Start: 2018-06-05 | End: 2018-06-05

## 2018-06-04 RX ORDER — MONTELUKAST SODIUM 10 MG/1
10 TABLET ORAL NIGHTLY
Status: DISCONTINUED | OUTPATIENT
Start: 2018-06-05 | End: 2018-06-05

## 2018-06-04 RX ORDER — ENOXAPARIN SODIUM 100 MG/ML
40 INJECTION SUBCUTANEOUS DAILY
Status: DISCONTINUED | OUTPATIENT
Start: 2018-06-05 | End: 2018-06-07

## 2018-06-04 RX ORDER — ACETAMINOPHEN 500 MG
1000 TABLET ORAL ONCE
Status: COMPLETED | OUTPATIENT
Start: 2018-06-04 | End: 2018-06-04

## 2018-06-04 RX ORDER — ONDANSETRON 2 MG/ML
4 INJECTION INTRAMUSCULAR; INTRAVENOUS EVERY 6 HOURS PRN
Status: DISCONTINUED | OUTPATIENT
Start: 2018-06-04 | End: 2018-06-07

## 2018-06-04 RX ORDER — ALBUTEROL SULFATE 2.5 MG/3ML
2.5 SOLUTION RESPIRATORY (INHALATION) EVERY 4 HOURS PRN
Status: DISCONTINUED | OUTPATIENT
Start: 2018-06-04 | End: 2018-06-07

## 2018-06-04 RX ORDER — ANASTROZOLE 1 MG/1
1 TABLET ORAL DAILY
Status: DISCONTINUED | OUTPATIENT
Start: 2018-06-04 | End: 2018-06-07

## 2018-06-04 RX ORDER — GABAPENTIN 600 MG/1
600 TABLET ORAL 3 TIMES DAILY
Status: DISCONTINUED | OUTPATIENT
Start: 2018-06-04 | End: 2018-06-07

## 2018-06-04 RX ORDER — LEVOFLOXACIN 5 MG/ML
500 INJECTION, SOLUTION INTRAVENOUS ONCE
Status: COMPLETED | OUTPATIENT
Start: 2018-06-04 | End: 2018-06-04

## 2018-06-04 RX ORDER — IPRATROPIUM BROMIDE AND ALBUTEROL SULFATE 2.5; .5 MG/3ML; MG/3ML
3 SOLUTION RESPIRATORY (INHALATION) ONCE
Status: COMPLETED | OUTPATIENT
Start: 2018-06-04 | End: 2018-06-04

## 2018-06-04 RX ORDER — METHYLPREDNISOLONE SODIUM SUCCINATE 125 MG/2ML
125 INJECTION, POWDER, LYOPHILIZED, FOR SOLUTION INTRAMUSCULAR; INTRAVENOUS ONCE
Status: COMPLETED | OUTPATIENT
Start: 2018-06-04 | End: 2018-06-04

## 2018-06-04 NOTE — ED PROVIDER NOTES
Patient Seen in: [facility] Emergency Department In [place]    History   Patient presents with:  Dyspnea NICK SOB (respiratory)    Stated Complaint: sob, cough, back pain    HPI    Patient presents with a cough and fever.   The patient states that she start MASTECTOMY MODIFIED RADICAL      Comment: Procedure: BREAST MODIFIED RADICAL MASTECTOMY;               Surgeon: Jason Altman MD;  Location: [facility] MAIN                OR  No date: MASTECTOMY RIGHT      Comment: 3/18/14  No date: NEEDLE BIOPSY RIGHT  4/30/201 Protein 8.4 (*)     Sodium 134 (*)     All other components within normal limits   URINALYSIS WITH CULTURE REFLEX - Abnormal; Notable for the following:     Blood Urine Trace-lysed (*)     All other components within normal limits   D-DIMER - Abnormal; Not PORTABLE  (CPT=71010), 10/09/2013, 23:15. [facility], XR CHEST PA + LAT CHEST (CPT=71020), 2/28/2016, 11:47. [facility] , XR CHEST PA + LAT CHEST (CPT=71020), 2/29/2016, 11:14.   [facility], XR CHEST PA + LAT CHEST (CPT=71020), 6/28/2016, 1 WALL:    Changes of previous right mastectomy and right axillary dissection. LIMITED ABDOMEN:    Stable left adrenal  nodule measuring up to 0.9 cm that could represent an adenoma.  BONES:    Degenerative changes in the spine OTHER:    None      CONCLUSION: Meeta Gordon MD on 6/04/2018 at 18:03     Approved by: Gustavo Fregoso MD          ED Course as of Jun 04 2108  ------------------------------------------------------------  Medications   sodium chloride 0.9% IV bolus 1,572 mL (1,000 mL Intravenous New Bag 6 agreement with the plan.     Disposition and Plan     Clinical Impression:  Community acquired pneumonia, unspecified laterality  (primary encounter diagnosis)  Hypoxia  Severe asthma with status asthmaticus, unspecified whether persistent    Disposition:

## 2018-06-04 NOTE — ED INITIAL ASSESSMENT (HPI)
PT STARTED HAVING SYMPTOMS OF COUGH FEVER THAT STARTED ON Wednesday. PT IS FEELING SOB, HAVING LEFT BACK PAIN.

## 2018-06-05 PROCEDURE — 99232 SBSQ HOSP IP/OBS MODERATE 35: CPT | Performed by: HOSPITALIST

## 2018-06-05 RX ORDER — GABAPENTIN 600 MG/1
600 TABLET ORAL 3 TIMES DAILY
COMMUNITY
End: 2018-09-25

## 2018-06-05 RX ORDER — METHYLPREDNISOLONE SODIUM SUCCINATE 40 MG/ML
40 INJECTION, POWDER, LYOPHILIZED, FOR SOLUTION INTRAMUSCULAR; INTRAVENOUS EVERY 12 HOURS
Status: DISCONTINUED | OUTPATIENT
Start: 2018-06-05 | End: 2018-06-06

## 2018-06-05 RX ORDER — METHYLPREDNISOLONE SODIUM SUCCINATE 125 MG/2ML
60 INJECTION, POWDER, LYOPHILIZED, FOR SOLUTION INTRAMUSCULAR; INTRAVENOUS EVERY 8 HOURS
Status: DISCONTINUED | OUTPATIENT
Start: 2018-06-05 | End: 2018-06-05

## 2018-06-05 RX ORDER — MONTELUKAST SODIUM 10 MG/1
10 TABLET ORAL NIGHTLY
Status: DISCONTINUED | OUTPATIENT
Start: 2018-06-05 | End: 2018-06-07

## 2018-06-05 RX ORDER — FLUTICASONE PROPIONATE AND SALMETEROL 250; 50 UG/1; UG/1
1 POWDER RESPIRATORY (INHALATION) 2 TIMES DAILY
COMMUNITY
End: 2020-05-27

## 2018-06-05 RX ORDER — MONTELUKAST SODIUM 10 MG/1
10 TABLET ORAL NIGHTLY
COMMUNITY
End: 2018-08-16

## 2018-06-05 RX ORDER — ALBUTEROL SULFATE 90 UG/1
1 AEROSOL, METERED RESPIRATORY (INHALATION) EVERY 4 HOURS PRN
COMMUNITY
End: 2018-12-13

## 2018-06-05 NOTE — CM/SW NOTE
06/05/18 1000   CM/SW Screening   Referral Source    Information Source Chart review;Nursing rounds   Patient's Mental Status Alert;Oriented   Patient's [address]   Patient lives with Alone   Patient Status Prior to Admission

## 2018-06-05 NOTE — PROGRESS NOTES
Blood sugar of 258. Dr Ezequiel Davis aware and ordered one time NPH 10 units. And will cover carb count.  Will continue to monitor

## 2018-06-05 NOTE — PLAN OF CARE
Diabetes/Glucose Control    • Glucose maintained within prescribed range Progressing        Patient/Family Goals    • Patient/Family Long Term Goal Progressing    • Patient/Family Short Term Goal Progressing        Pt alert and oriented. On 2 lilters o2.  P

## 2018-06-05 NOTE — PROGRESS NOTES
[facility] H[facility]PITAL[facility]  Progress Note     Anettematilde Rubalcavalillian Patient Status:  Inpatient    1965 MRN [id number]   [facility] 5NW-A Attending Severino Carver MD   Hosp Day # 1 PCP Emile Sher DO     Chief Complaint: cough and SOB    S: Patien Intravenous Q24H   • azithromycin (ZITHROMAX) IVPB  500 mg Intravenous Q24H   • enoxaparin  40 mg Subcutaneous Daily   • Insulin Aspart Pen  1-68 Units Subcutaneous TID CC   • Insulin Aspart Pen  2-10 Units Subcutaneous TID CC and HS       ASSESSMENT / KENRICK

## 2018-06-05 NOTE — H&P
[facility] HOSPITALIST  History and Physical     Jake Duty Patient Status:  Emergency    1965 MRN [id number]   Location [facility] Attending No att. providers found   Hosp Day # 0 PCP Yu Woodward DO     Chief Com Surgeon: Amanda Dubois MD;                 Location: [address] [place] [address] MAIN OR    Social History:  reports that she has never smoked. She has never used smokeless tobacco. She reports that she does not drink alcohol or use drugs.     Family History:   Family History   Pr Inhalation Aero Soln INHALE 1 PUFF INTO THE LUNGS EVERY 4 (FOUR) HOURS AS NEEDED FOR WHEEZING. Disp: 8.5 Inhaler Rfl: 0   anastrozole 1 MG Oral Tab tab Take 1 tablet (1 mg total) by mouth daily.  Disp: 90 tablet Rfl: 3   Albuterol Sulfate (VENTOLIN) (2.5 MG affect.       Diagnostic Data:      Labs:  Recent Labs   Lab  06/04/18 1902   WBC  7.0   HGB  10.9*   MCV  85.9   PLT  241.0       Recent Labs   Lab  06/04/18 1902   GLU  236*   BUN  6*   CREATSERUM  0.87   GFRAA  87   GFRNAA  75   CA  8.4   ALB  3.5

## 2018-06-05 NOTE — PROGRESS NOTES
NURSING ADMISSION NOTE      Patient admitted via Ambulance  Oriented to room. Safety precautions initiated. Bed in low position. Call light in reach.     Navigator complete  Pt resting comfortably  Admission orders followed thru

## 2018-06-06 PROCEDURE — 99232 SBSQ HOSP IP/OBS MODERATE 35: CPT | Performed by: INTERNAL MEDICINE

## 2018-06-06 RX ORDER — PREDNISONE 20 MG/1
40 TABLET ORAL
Status: DISCONTINUED | OUTPATIENT
Start: 2018-06-06 | End: 2018-06-07

## 2018-06-06 NOTE — PROGRESS NOTES
[facility] HOSPITAL[facility]  Progress Note     Avery Little Patient Status:  Inpatient    1965 MRN [id number]   [facility] 5NW-A Attending Penny Zabala MD   [facility] Day # 2 PCP Jahaira Leone DO     Chief Complaint: cough and SOB    S: Patien Intravenous Q24H   • azithromycin (ZITHROMAX) IVPB  500 mg Intravenous Q24H   • enoxaparin  40 mg Subcutaneous Daily   • Insulin Aspart Pen  1-68 Units Subcutaneous TID CC   • Insulin Aspart Pen  2-10 Units Subcutaneous TID CC and HS       ASSESSMENT / KENRICK

## 2018-06-06 NOTE — PAYOR COMM NOTE
--------------  CONTINUED STAY REVIEW    Payor: [facility] PPO  Subscriber #:  [id number]  Authorization Number: [id number]    Admit date: 6/4/18  Admit time: 2336    Admitting Physician: Jhoan Glass MD  Attending Physician:  Deana Fam MD  Primary Care Марина Castro, RN    6/5/2018 1853 Given 3 Units Subcutaneous (Left Upper Arm) Miki Griffin RN      insulin glargine (BASAGLAR) 100 UNIT/ML injection 25 Units     Date Action Dose Route User    6/5/2018 2215 Given 25 Units Subcutaneous (Left Upper A Clearance: 60.4 mL/min (based on SCr of 0.87 mg/dL).     No results for input(s): PTP, INR in the last 168 hours.     No results for input(s): TROP, CK in the last 168 hours.        Imaging: Imaging data reviewed in Epic.     Medications:   • Montelukast S Left arm)   Pulse 58   Temp 98.2 °F (36.8 °C) (Oral)   Resp 18   Ht 160 cm (5' 3\")   Wt 173 lb 6.4 oz (78.7 kg)   LMP 09/01/2013 (LMP Unknown)   SpO2 98%   BMI 30.72 kg/m²   CV: RRR  PULM: + wheezes                        Electronically signed by Akin Tobias

## 2018-06-06 NOTE — PLAN OF CARE
Diabetes/Glucose Control    • Glucose maintained within prescribed range Progressing        Patient/Family Goals    • Patient/Family Short Term Goal Progressing            Patient received alert and oriented x4, vss, denies any pain.   Breath sounds are dim

## 2018-06-06 NOTE — PLAN OF CARE
Diabetes/Glucose Control    • Glucose maintained within prescribed range Progressing        METABOLIC/FLUID AND ELECTROLYTES - ADULT    • Glucose maintained within prescribed range Progressing        Patient/Family Goals    • Patient/Family Long Term Goal discharge date:  Tomorrow    Current discharge plan: Home    Back up discharge plan: Home

## 2018-06-07 VITALS
HEART RATE: 62 BPM | HEIGHT: 63 IN | OXYGEN SATURATION: 93 % | SYSTOLIC BLOOD PRESSURE: 118 MMHG | DIASTOLIC BLOOD PRESSURE: 71 MMHG | BODY MASS INDEX: 30.72 KG/M2 | TEMPERATURE: 98 F | RESPIRATION RATE: 16 BRPM | WEIGHT: 173.38 LBS

## 2018-06-07 PROCEDURE — 99239 HOSP IP/OBS DSCHRG MGMT >30: CPT | Performed by: INTERNAL MEDICINE

## 2018-06-07 RX ORDER — PREDNISONE 20 MG/1
TABLET ORAL
Qty: 10 TABLET | Refills: 0 | Status: SHIPPED | OUTPATIENT
Start: 2018-06-07 | End: 2018-08-16

## 2018-06-07 NOTE — PAYOR COMM NOTE
--------------  CONTINUED STAY REVIEW    Payor: [facility] PPO  Subscriber #:  [id number]  Authorization Number: [id number]    Admit date: 6/4/18  Admit time: 2336    Admitting Physician: Jeromy Mina MD  Attending Physician:  Grace Burk MD  Primary Care User    6/6/2018 2113 Given 10 mg Oral Karley Corado, RN      predniSONE (DELTASONE) tab 40 mg     Date Action Dose Route User    6/7/2018 0840 Given 40 mg Oral Yanet Red RN    6/6/2018 1358 Given 40 mg Oral Rusty Quiroz, DILAN        Chi Inhalation Daily   • gabapentin  600 mg Oral TID   • enoxaparin  40 mg Subcutaneous Daily   • Insulin Aspart Pen  1-68 Units Subcutaneous TID CC   • Insulin Aspart Pen  2-10 Units Subcutaneous TID CC and HS         ASSESSMENT / PLAN:      1.  Acute bronchit

## 2018-06-07 NOTE — PROGRESS NOTES
NURSING DISCHARGE NOTE    Discharged Home via Ambulatory. Accompanied by Family member  Belongings Taken by patient/family. Patient discharged home with daughter.  All discharge instructions, medications and follow up care discussed in detail, jv

## 2018-06-07 NOTE — PROGRESS NOTES
Pharmacy Progress Note: Discharge Medication Counseling    Willy Griffin has been counseled on 10 medications. Of these medications, the following are new to the patient:    1.  Prednisone taper      The indication and common side effects of the dischar

## 2018-06-07 NOTE — PROGRESS NOTES
SUBH[facility] HOSPITALIST  Progress Note     Rowdyrobinmarck Richey Patient Status:  Inpatient    1965 MRN [id number]   [facility] 5NW-A Attending Cem Escalante MD   [address] Day # 3 PCP Dinah Aviles DO     Chief Complaint: cough and SOB    S: Patien enoxaparin  40 mg Subcutaneous Daily   • Insulin Aspart Pen  1-68 Units Subcutaneous TID CC   • Insulin Aspart Pen  2-10 Units Subcutaneous TID CC and HS       ASSESSMENT / PLAN:     1. Acute bronchitis and bronchopneumonia-   Improving   1. RVP  Neg   2.

## 2018-06-07 NOTE — PLAN OF CARE
Diabetes/Glucose Control    • Glucose maintained within prescribed range Progressing        METABOLIC/FLUID AND ELECTROLYTES - ADULT    • Glucose maintained within prescribed range Progressing        Patient/Family Goals    • Patient/Family Long Term Goal

## 2018-06-08 ENCOUNTER — PATIENT OUTREACH (OUTPATIENT)
Dept: CASE MANAGEMENT | Age: 53
End: 2018-06-08

## 2018-06-08 NOTE — DISCHARGE SUMMARY
[facility] [place] HOSPITAL[facility]  DISCHARGE SUMMARY     Yudith Elizabeth Patient Status:  Inpatient    1965 MRN [id number]   [facility] 5NW-A Attending Lucas Jorge MD   [address] PCP Mar Michaels DO     Date of Admission: 2018  Date of Richard Simpson initially on oxygen. Had transition to oral steroids wheezing is significantly decreased hypoxia is resolved patient is tolerating ambulation without need for oxygen is feeling better cultures were negative respiratory swab was negative for virus.   At [facility] · None    Consultants:  • None    Discharge Medication List:     Discharge Medications      START taking these medications      Instructions Prescription details   predniSONE 20 MG Tabs  Commonly known as:  DELTASONE      40 MG X2 DAYS THEN 30 MG X2 DAYS mg by mouth nightly. Refills:  0     Montelukast Sodium 10 MG Tabs  Commonly known as:  SINGULAIR      Take 10 mg by mouth nightly.    Refills:  0           Where to Get Your Medications      Please  your prescriptions at the location directed by nely

## 2018-08-01 ENCOUNTER — TELEPHONE (OUTPATIENT)
Dept: FAMILY MEDICINE CLINIC | Facility: CLINIC | Age: 53
End: 2018-08-01

## 2018-08-16 ENCOUNTER — OFFICE VISIT (OUTPATIENT)
Dept: FAMILY MEDICINE CLINIC | Facility: CLINIC | Age: 53
End: 2018-08-16
Payer: COMMERCIAL

## 2018-08-16 VITALS
RESPIRATION RATE: 18 BRPM | SYSTOLIC BLOOD PRESSURE: 136 MMHG | HEIGHT: 63 IN | OXYGEN SATURATION: 98 % | BODY MASS INDEX: 30.65 KG/M2 | HEART RATE: 87 BPM | DIASTOLIC BLOOD PRESSURE: 84 MMHG | WEIGHT: 173 LBS

## 2018-08-16 DIAGNOSIS — E11.9 TYPE 2 DIABETES MELLITUS WITHOUT COMPLICATION, WITH LONG-TERM CURRENT USE OF INSULIN (HCC): Primary | ICD-10-CM

## 2018-08-16 DIAGNOSIS — Z12.31 ENCOUNTER FOR SCREENING MAMMOGRAM FOR HIGH-RISK PATIENT: ICD-10-CM

## 2018-08-16 DIAGNOSIS — J45.909 UNCOMPLICATED ASTHMA: ICD-10-CM

## 2018-08-16 DIAGNOSIS — Z85.3 HISTORY OF BREAST CANCER: ICD-10-CM

## 2018-08-16 DIAGNOSIS — Z12.11 COLON CANCER SCREENING: ICD-10-CM

## 2018-08-16 DIAGNOSIS — Z79.4 TYPE 2 DIABETES MELLITUS WITHOUT COMPLICATION, WITH LONG-TERM CURRENT USE OF INSULIN (HCC): Primary | ICD-10-CM

## 2018-08-16 PROCEDURE — 99214 OFFICE O/P EST MOD 30 MIN: CPT | Performed by: FAMILY MEDICINE

## 2018-08-16 RX ORDER — MONTELUKAST SODIUM 10 MG/1
10 TABLET ORAL NIGHTLY
Qty: 90 TABLET | Refills: 1 | Status: SHIPPED | OUTPATIENT
Start: 2018-08-16 | End: 2019-02-22

## 2018-08-16 RX ORDER — MONTELUKAST SODIUM 10 MG/1
TABLET ORAL
Qty: 90 TABLET | Refills: 0 | OUTPATIENT
Start: 2018-08-16

## 2018-09-25 ENCOUNTER — OFFICE VISIT (OUTPATIENT)
Dept: NEUROLOGY | Facility: CLINIC | Age: 53
End: 2018-09-25
Payer: COMMERCIAL

## 2018-09-25 VITALS
HEIGHT: 63 IN | RESPIRATION RATE: 12 BRPM | WEIGHT: 176 LBS | BODY MASS INDEX: 31.18 KG/M2 | DIASTOLIC BLOOD PRESSURE: 78 MMHG | HEART RATE: 70 BPM | SYSTOLIC BLOOD PRESSURE: 116 MMHG

## 2018-09-25 DIAGNOSIS — G62.0 CHEMOTHERAPY-INDUCED PERIPHERAL NEUROPATHY (HCC): Primary | ICD-10-CM

## 2018-09-25 DIAGNOSIS — T45.1X5A CHEMOTHERAPY-INDUCED PERIPHERAL NEUROPATHY (HCC): Primary | ICD-10-CM

## 2018-09-25 PROCEDURE — 99213 OFFICE O/P EST LOW 20 MIN: CPT | Performed by: OTHER

## 2018-09-25 RX ORDER — GABAPENTIN 600 MG/1
600 TABLET ORAL 3 TIMES DAILY
Qty: 90 TABLET | Refills: 5 | Status: SHIPPED | OUTPATIENT
Start: 2018-09-25 | End: 2018-09-27

## 2018-09-25 NOTE — PROGRESS NOTES
HPI:    Patient ID: Willy Griffin is a 52year old female. HPI    Patient is a 52year old female who presented for follow-up for chemotherapy induced peripheral neuropathy. She also has a history of diabetes.  Symptoms are stable with Gabapentin and p Comment:  Procedure: BREAST MODIFIED RADICAL MASTECTOMY;  Surgeon:               Hetal Monk MD;  Location: [facility] MAIN OR  No date: MASTECTOMY RIGHT      Comment:  3/18/14  No date: NEEDLE BIOPSY RIGHT  4/30/2015: OTHER SURGICAL HISTORY      Comment:  medi nightly. Disp:  Rfl:    insulin aspart (NOVOLOG FLEXPEN) 100 UNIT/ML Subcutaneous Solution Pen-injector Inject 1-68 Units into the skin 3 (three) times daily before meals.  Inject 1 unit of insulin for every 30 points blood glucose is greater than 140 mg/ Chemotherapy-induced peripheral neuropathy (hcc)  (primary encounter diagnosis)    Stable overall   Continue Gabapentin 600 mg TID. Take extra tablet as needed. Advised gait exercises.  May repeat EMG/NCS to assess for interval changes if symptoms worsen

## 2018-09-25 NOTE — PATIENT INSTRUCTIONS
Refill policies:    • Allow 2-3 business days for refills; controlled substances may take longer.   • Contact your pharmacy at least 5 days prior to running out of medication and have them send an electronic request or submit request through the “request re entire amount billed. Precertification and Prior Authorizations: If your physician has recommended that you have a procedure or additional testing performed.   [facility]) will contact your insurance carrier to obtain pre-certi

## 2018-09-27 DIAGNOSIS — M79.2 NEUROPATHIC PAIN: Primary | ICD-10-CM

## 2018-09-27 DIAGNOSIS — G62.0 DRUG-INDUCED PERIPHERAL NEUROPATHY (HCC): ICD-10-CM

## 2018-09-27 RX ORDER — GABAPENTIN 600 MG/1
600 TABLET ORAL 3 TIMES DAILY
Qty: 120 TABLET | Refills: 5 | Status: SHIPPED | OUTPATIENT
Start: 2018-09-27 | End: 2019-05-29

## 2018-09-27 NOTE — TELEPHONE ENCOUNTER
Received fax from [address] requesting #120 tabs for Rx Gabapentin 600 mg. Per directions on Rx- Take 1 tablet (600 mg total) by mouth 3 (three) times daily.  Take an extra 600mg as needed for severe pain    New Rx with updated quantity of #120

## 2018-10-10 ENCOUNTER — TELEPHONE (OUTPATIENT)
Dept: FAMILY MEDICINE CLINIC | Facility: CLINIC | Age: 53
End: 2018-10-10

## 2018-10-10 NOTE — TELEPHONE ENCOUNTER
NEEDS INSULIN SAMPLES  LANTUS    NOVALOG    STATES DR CERVANTES JUST TOLD HER TO STOP IN    SHE IS WAITING.

## 2018-11-13 ENCOUNTER — APPOINTMENT (OUTPATIENT)
Dept: LAB | Age: 53
End: 2018-11-13
Attending: FAMILY MEDICINE
Payer: COMMERCIAL

## 2018-11-13 DIAGNOSIS — Z79.4 TYPE 2 DIABETES MELLITUS WITHOUT COMPLICATION, WITH LONG-TERM CURRENT USE OF INSULIN (HCC): ICD-10-CM

## 2018-11-13 DIAGNOSIS — E11.9 TYPE 2 DIABETES MELLITUS WITHOUT COMPLICATION, WITH LONG-TERM CURRENT USE OF INSULIN (HCC): ICD-10-CM

## 2018-11-13 PROCEDURE — 80061 LIPID PANEL: CPT | Performed by: FAMILY MEDICINE

## 2018-11-13 PROCEDURE — 80053 COMPREHEN METABOLIC PANEL: CPT | Performed by: FAMILY MEDICINE

## 2018-11-13 PROCEDURE — 83036 HEMOGLOBIN GLYCOSYLATED A1C: CPT | Performed by: FAMILY MEDICINE

## 2018-11-13 PROCEDURE — 36415 COLL VENOUS BLD VENIPUNCTURE: CPT | Performed by: FAMILY MEDICINE

## 2018-11-16 ENCOUNTER — TELEPHONE (OUTPATIENT)
Dept: FAMILY MEDICINE CLINIC | Facility: CLINIC | Age: 53
End: 2018-11-16

## 2018-12-13 ENCOUNTER — HOSPITAL ENCOUNTER (OUTPATIENT)
Age: 53
Discharge: HOME | End: 2018-12-13
Attending: FAMILY MEDICINE
Payer: COMMERCIAL

## 2018-12-13 VITALS
HEART RATE: 109 BPM | OXYGEN SATURATION: 98 % | BODY MASS INDEX: 29.41 KG/M2 | RESPIRATION RATE: 20 BRPM | DIASTOLIC BLOOD PRESSURE: 98 MMHG | HEIGHT: 63 IN | TEMPERATURE: 100 F | WEIGHT: 166 LBS | SYSTOLIC BLOOD PRESSURE: 132 MMHG

## 2018-12-13 DIAGNOSIS — J45.41 MODERATE PERSISTENT ASTHMA WITH EXACERBATION: Primary | ICD-10-CM

## 2018-12-13 PROCEDURE — 94640 AIRWAY INHALATION TREATMENT: CPT

## 2018-12-13 PROCEDURE — 99214 OFFICE O/P EST MOD 30 MIN: CPT

## 2018-12-13 RX ORDER — AZITHROMYCIN 250 MG/1
TABLET, FILM COATED ORAL
Qty: 1 PACKAGE | Refills: 0 | Status: SHIPPED | OUTPATIENT
Start: 2018-12-13 | End: 2019-03-25 | Stop reason: ALTCHOICE

## 2018-12-13 RX ORDER — ALBUTEROL SULFATE 2.5 MG/3ML
2.5 SOLUTION RESPIRATORY (INHALATION) EVERY 4 HOURS PRN
Qty: 30 AMPULE | Refills: 0 | Status: SHIPPED | OUTPATIENT
Start: 2018-12-13 | End: 2019-10-26

## 2018-12-13 RX ORDER — PREDNISONE 20 MG/1
60 TABLET ORAL ONCE
Status: COMPLETED | OUTPATIENT
Start: 2018-12-13 | End: 2018-12-13

## 2018-12-13 RX ORDER — ALBUTEROL SULFATE 90 UG/1
1 AEROSOL, METERED RESPIRATORY (INHALATION) EVERY 4 HOURS PRN
Qty: 1 INHALER | Refills: 2 | Status: SHIPPED | OUTPATIENT
Start: 2018-12-13 | End: 2019-12-16

## 2018-12-13 RX ORDER — PREDNISONE 20 MG/1
40 TABLET ORAL DAILY
Qty: 12 TABLET | Refills: 0 | Status: SHIPPED | OUTPATIENT
Start: 2018-12-14 | End: 2018-12-20

## 2018-12-13 RX ORDER — IPRATROPIUM BROMIDE AND ALBUTEROL SULFATE 2.5; .5 MG/3ML; MG/3ML
3 SOLUTION RESPIRATORY (INHALATION) ONCE
Status: COMPLETED | OUTPATIENT
Start: 2018-12-13 | End: 2018-12-13

## 2018-12-13 NOTE — TELEPHONE ENCOUNTER
Having some wheezing - requesting albuterol refill for her inhaler.   Pharmacy is [facility]

## 2018-12-14 NOTE — ED PROVIDER NOTES
Patient Seen in: [facility]    History   Patient presents with:  Cough    Stated Complaint: cough and sob    HPI    49-year-old female with a history of anemia, asthma, breast CA, diabetes type 2 presents the IC secondary to ast RIGHT     • OTHER SURGICAL HISTORY  4/30/2015    mediport removal   • RADIATION RIGHT         Family history reviewed and is not pertinent to presenting problem.     Social History    Tobacco Use      Smoking status: Never Smoker      Smokeless tobacco: Nev provided. Also continue prednisone. Azithromycin was also provided secondary to anti-inflammatory/history of this helping with her exacerbation the past.  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             Disposition and Plan     Cli

## 2019-01-07 ENCOUNTER — PATIENT OUTREACH (OUTPATIENT)
Dept: FAMILY MEDICINE CLINIC | Facility: CLINIC | Age: 54
End: 2019-01-07

## 2019-02-06 ENCOUNTER — TELEPHONE (OUTPATIENT)
Dept: FAMILY MEDICINE CLINIC | Facility: CLINIC | Age: 54
End: 2019-02-06

## 2019-02-06 RX ORDER — INSULIN GLARGINE 100 [IU]/ML
20 INJECTION, SOLUTION SUBCUTANEOUS NIGHTLY
Qty: 10 PEN | Refills: 0 | Status: SHIPPED | OUTPATIENT
Start: 2019-02-06 | End: 2019-02-08

## 2019-02-07 ENCOUNTER — TELEPHONE (OUTPATIENT)
Dept: FAMILY MEDICINE CLINIC | Facility: CLINIC | Age: 54
End: 2019-02-07

## 2019-02-07 NOTE — TELEPHONE ENCOUNTER
Called pt, Informed that mail order needed verification on insulin dosage. I sent fax back to Mailorder verifying dosage. Also informed patient to schedule follow up after getting labs done.

## 2019-02-07 NOTE — TELEPHONE ENCOUNTER
Pt stated the [facility] mail order stated there was something wrong with the way the script or prescription for the lantus was sent/written.  Pt wants to know if this was corrected and or wants to know what is the problem, the pharmacy did not elaborate to the pt

## 2019-02-08 ENCOUNTER — TELEPHONE (OUTPATIENT)
Dept: FAMILY MEDICINE CLINIC | Facility: CLINIC | Age: 54
End: 2019-02-08

## 2019-02-08 RX ORDER — INSULIN GLARGINE 100 [IU]/ML
20 INJECTION, SOLUTION SUBCUTANEOUS NIGHTLY
Qty: 1 PEN | Refills: 0 | Status: SHIPPED | OUTPATIENT
Start: 2019-02-08

## 2019-02-08 NOTE — TELEPHONE ENCOUNTER
PT IS GETTING AN RX OF HER INSULIN SENT THROUGH THE MAIL ORDER  THERE IS GOING TO BE A DELAY SO SHE WANTS TO KNOW IF SHE CAN GET SOME SENT TO [facility] ON [address] UNTIL SHE GETS IT  PLEASE ADVISE

## 2019-02-08 NOTE — TELEPHONE ENCOUNTER
Fax sent 2-7-19 to Mail Order clarified dose at 20Units nightly confirmed by fax saved by KAREEN PEÑA. Sent 1 pen to local pharmacy. Task completed.

## 2019-02-18 ENCOUNTER — LAB ENCOUNTER (OUTPATIENT)
Dept: LAB | Age: 54
End: 2019-02-18
Attending: FAMILY MEDICINE
Payer: COMMERCIAL

## 2019-02-18 DIAGNOSIS — Z79.4 INSULIN-REQUIRING OR DEPENDENT TYPE II DIABETES MELLITUS (HCC): ICD-10-CM

## 2019-02-18 DIAGNOSIS — E11.9 INSULIN-REQUIRING OR DEPENDENT TYPE II DIABETES MELLITUS (HCC): ICD-10-CM

## 2019-02-18 DIAGNOSIS — E78.00 ELEVATED CHOLESTEROL: ICD-10-CM

## 2019-02-23 RX ORDER — MONTELUKAST SODIUM 10 MG/1
TABLET ORAL
Qty: 90 TABLET | Refills: 0 | Status: SHIPPED | OUTPATIENT
Start: 2019-02-23 | End: 2019-05-27

## 2019-02-25 NOTE — TELEPHONE ENCOUNTER
Patient attempted labs last week and [facility] could not take her.   She will do labs and then schedule with LE>

## 2019-02-27 ENCOUNTER — PATIENT OUTREACH (OUTPATIENT)
Dept: FAMILY MEDICINE CLINIC | Facility: CLINIC | Age: 54
End: 2019-02-27

## 2019-02-28 ENCOUNTER — TELEPHONE (OUTPATIENT)
Dept: FAMILY MEDICINE CLINIC | Facility: CLINIC | Age: 54
End: 2019-02-28

## 2019-02-28 NOTE — TELEPHONE ENCOUNTER
Pt returned call for results - nurse was not available. Message that pt needed appt to discuss results was relayed. Pt said she will call back to schedule appt as she was not able to at this time.

## 2019-03-25 ENCOUNTER — OFFICE VISIT (OUTPATIENT)
Dept: FAMILY MEDICINE CLINIC | Facility: CLINIC | Age: 54
End: 2019-03-25
Payer: COMMERCIAL

## 2019-03-25 VITALS
OXYGEN SATURATION: 99 % | DIASTOLIC BLOOD PRESSURE: 72 MMHG | BODY MASS INDEX: 30.68 KG/M2 | SYSTOLIC BLOOD PRESSURE: 132 MMHG | HEART RATE: 92 BPM | RESPIRATION RATE: 18 BRPM | HEIGHT: 63 IN | TEMPERATURE: 98 F | WEIGHT: 173.13 LBS

## 2019-03-25 DIAGNOSIS — IMO0001 UNCONTROLLED TYPE 2 DIABETES MELLITUS WITHOUT COMPLICATION, WITH LONG-TERM CURRENT USE OF INSULIN: Primary | ICD-10-CM

## 2019-03-25 DIAGNOSIS — J45.20 MILD INTERMITTENT ASTHMA WITHOUT COMPLICATION: ICD-10-CM

## 2019-03-25 DIAGNOSIS — Z12.11 COLON CANCER SCREENING: ICD-10-CM

## 2019-03-25 PROCEDURE — 99214 OFFICE O/P EST MOD 30 MIN: CPT | Performed by: FAMILY MEDICINE

## 2019-03-25 NOTE — PROGRESS NOTES
HPI:   Dylon Richey is a 62year old female who presents for recheck of her diabetes     Patient’s FBS have been in the 80-90's   Labs - stable    No low blood sugars  Working out regularly   Eating healthy     Satya Garcia has been checking her feet on a re 07/07/2014 23   04/17/2014 16   11/11/2011 12   12/30/2010 33      Malb/Cre Calc   Date Value Ref Range Status   02/26/2019  <=30.0 ug/mg Final     Comment:       Unable to calculate due to Urine Microalbumin <0.5 mg/dL     04/18/2018  <=30.0 ug/mg Final transfusion    • Murmur     benign murmur, unknown what type   • Neuropathy    • Personal history of antineoplastic chemotherapy 2/21/14    last chemo treatment   • Pneumonia, organism unspecified(486)    • Seasonal allergies    • Type II or unspecified ty Ht 63\"   Wt 173 lb 2 oz   LMP 09/01/2013 (LMP Unknown)   SpO2 99%   BMI 30.67 kg/m²    Body mass index is 30.67 kg/m².   GENERAL: well developed, well nourished, in no apparent distress  SKIN: no rashes,no suspicious lesions  NECK: supple,no adenopathy, no

## 2019-05-28 RX ORDER — MONTELUKAST SODIUM 10 MG/1
TABLET ORAL
Qty: 90 TABLET | Refills: 0 | Status: SHIPPED | OUTPATIENT
Start: 2019-05-28 | End: 2019-08-27

## 2019-05-29 ENCOUNTER — TELEPHONE (OUTPATIENT)
Dept: FAMILY MEDICINE CLINIC | Facility: CLINIC | Age: 54
End: 2019-05-29

## 2019-05-29 DIAGNOSIS — G62.0 DRUG-INDUCED PERIPHERAL NEUROPATHY (HCC): ICD-10-CM

## 2019-05-29 DIAGNOSIS — M79.2 NEUROPATHIC PAIN: ICD-10-CM

## 2019-05-29 RX ORDER — GABAPENTIN 600 MG/1
600 TABLET ORAL 3 TIMES DAILY
Qty: 270 TABLET | Refills: 0 | Status: SHIPPED | OUTPATIENT
Start: 2019-05-29 | End: 2019-08-27

## 2019-05-29 NOTE — TELEPHONE ENCOUNTER
Pt would like to discuss her gabapentin with a nurse  Her neurologist currently prescribes it to her   They told her Dr. Gerard Sites can follow her for that and prescribe it  Please advise

## 2019-08-08 ENCOUNTER — TELEPHONE (OUTPATIENT)
Dept: FAMILY MEDICINE CLINIC | Facility: CLINIC | Age: 54
End: 2019-08-08

## 2019-08-09 RX ORDER — INSULIN GLARGINE 100 [IU]/ML
INJECTION, SOLUTION SUBCUTANEOUS
Qty: 30 ML | Refills: 0 | Status: SHIPPED | OUTPATIENT
Start: 2019-08-09 | End: 2019-12-23

## 2019-08-09 NOTE — TELEPHONE ENCOUNTER
Pt checking status on request - if no samples she would like a short script for local pharmacy.   pls advise today

## 2019-08-09 NOTE — TELEPHONE ENCOUNTER
Spoke with patient, she plans on getting it done within the next couple weeks. Lantus sample being held in fridge for pt to hold her until mail order arrives.

## 2019-08-09 NOTE — TELEPHONE ENCOUNTER
Left VM for pt to call back - how often is she testing? How long of a supply does she need? What pharmacy to send to?

## 2019-08-09 NOTE — TELEPHONE ENCOUNTER
Pt called back. Tests 2 times a day. She gets 3 month supply from [facility]. She needs sample pens or a 30 day script sent to [facility] also of Lantus pens. She is completely out. [employer] has tried to fax us 3 times.

## 2019-08-09 NOTE — TELEPHONE ENCOUNTER
Rx Request  LANTUS SOLOSTAR 100 UNIT/ML Subcutaneous Solution Pen-injector    Disp:     30 mL              R: 0    Last Visit: 03/25/2019    Last Refilled: 02/08/2019

## 2019-08-27 DIAGNOSIS — M79.2 NEUROPATHIC PAIN: ICD-10-CM

## 2019-08-27 DIAGNOSIS — G62.0 DRUG-INDUCED PERIPHERAL NEUROPATHY (HCC): ICD-10-CM

## 2019-08-27 NOTE — TELEPHONE ENCOUNTER
Last Visit: 03/25/2019    Rx Request  - Gabapentin 600 MG Oral Tab   -Disp:   270                 R: 0   -Last Refilled: 05/29/2019   -Associated Dx: Drug-induced  peripheral neuropathy ([facility]  Neuropathic pain    Rx Request  MONTELUKAST SODIUM 10 MG Oral

## 2019-08-28 RX ORDER — MONTELUKAST SODIUM 10 MG/1
TABLET ORAL
Qty: 90 TABLET | Refills: 0 | Status: SHIPPED | OUTPATIENT
Start: 2019-08-28 | End: 2019-11-29

## 2019-08-28 RX ORDER — GABAPENTIN 600 MG/1
TABLET ORAL
Qty: 270 TABLET | Refills: 0 | Status: SHIPPED | OUTPATIENT
Start: 2019-08-28 | End: 2019-11-29

## 2019-10-25 NOTE — TELEPHONE ENCOUNTER
Pt requesting refill of Albuterol Neb solution. Pt states allergies are flared up. Pt c/o intermittent coughing episodes. Please advise.

## 2019-10-25 NOTE — TELEPHONE ENCOUNTER
PT IS RETURNING PHONE CALL.  THE MEDICATION IS THE DUO NEB, THAT SHE SAID THAT HER AND DR VILLALOBOS HAD DISCUSSED LE TAKING OVER

## 2019-10-25 NOTE — TELEPHONE ENCOUNTER
PT NEEDS TO HAVE NEBULIZER TREATMENT SENT TO [facility] PHARMACY IN [place] ON FILE. THEY HAD DISCUSSED DR VILLALOBOS  TAKING OVER.  ONCE MEDICATION IS SENT TO PHARMACY, OR ANY QUESTIONS, FEEL FREE TO CALL PT .

## 2019-10-26 RX ORDER — ALBUTEROL SULFATE 2.5 MG/3ML
2.5 SOLUTION RESPIRATORY (INHALATION) EVERY 4 HOURS PRN
Qty: 30 AMPULE | Refills: 0 | Status: SHIPPED | OUTPATIENT
Start: 2019-10-26 | End: 2020-01-29

## 2019-11-06 NOTE — TELEPHONE ENCOUNTER
Left detailed voicemail for pt informing her she is overdue for appt with LE and lab work. Pt advised to call back to schedule. Labs have previously been ordered.

## 2019-11-29 DIAGNOSIS — M79.2 NEUROPATHIC PAIN: ICD-10-CM

## 2019-11-29 DIAGNOSIS — G62.0 DRUG-INDUCED PERIPHERAL NEUROPATHY (HCC): ICD-10-CM

## 2019-11-29 RX ORDER — MONTELUKAST SODIUM 10 MG/1
TABLET ORAL
Qty: 90 TABLET | Refills: 0 | Status: SHIPPED | OUTPATIENT
Start: 2019-11-29 | End: 2020-02-26

## 2019-11-29 RX ORDER — GABAPENTIN 600 MG/1
TABLET ORAL
Qty: 270 TABLET | Refills: 0 | Status: SHIPPED | OUTPATIENT
Start: 2019-11-29 | End: 2020-02-26

## 2019-11-29 NOTE — TELEPHONE ENCOUNTER
Rx Request  GABAPENTIN 600 MG Oral Tab   Disp:      270              R: 0  MONTELUKAST SODIUM 10 MG Oral Tab   Disp:      90              R: 0    Last Visit: 03/25/2019    Last Refilled: 08/28/2019    Protocol Passed?  Yes[ x ]       No[  ]

## 2019-12-16 ENCOUNTER — OFFICE VISIT (OUTPATIENT)
Dept: FAMILY MEDICINE CLINIC | Facility: CLINIC | Age: 54
End: 2019-12-16
Payer: COMMERCIAL

## 2019-12-16 VITALS
HEIGHT: 63 IN | WEIGHT: 172 LBS | TEMPERATURE: 98 F | BODY MASS INDEX: 30.48 KG/M2 | RESPIRATION RATE: 18 BRPM | DIASTOLIC BLOOD PRESSURE: 74 MMHG | SYSTOLIC BLOOD PRESSURE: 120 MMHG | OXYGEN SATURATION: 98 % | HEART RATE: 70 BPM

## 2019-12-16 DIAGNOSIS — J45.20 MILD INTERMITTENT ASTHMA WITHOUT COMPLICATION: ICD-10-CM

## 2019-12-16 DIAGNOSIS — Z85.3 HISTORY OF BREAST CANCER: ICD-10-CM

## 2019-12-16 DIAGNOSIS — Z12.11 COLON CANCER SCREENING: ICD-10-CM

## 2019-12-16 DIAGNOSIS — Z00.00 GENERAL MEDICAL EXAM: ICD-10-CM

## 2019-12-16 DIAGNOSIS — G62.0 DRUG-INDUCED PERIPHERAL NEUROPATHY (HCC): ICD-10-CM

## 2019-12-16 DIAGNOSIS — Z79.4 TYPE 2 DIABETES MELLITUS WITHOUT COMPLICATION, WITH LONG-TERM CURRENT USE OF INSULIN (HCC): Primary | ICD-10-CM

## 2019-12-16 DIAGNOSIS — E11.9 TYPE 2 DIABETES MELLITUS WITHOUT COMPLICATION, WITH LONG-TERM CURRENT USE OF INSULIN (HCC): Primary | ICD-10-CM

## 2019-12-16 DIAGNOSIS — Z12.31 ENCOUNTER FOR SCREENING MAMMOGRAM FOR HIGH-RISK PATIENT: ICD-10-CM

## 2019-12-16 PROCEDURE — 99214 OFFICE O/P EST MOD 30 MIN: CPT | Performed by: FAMILY MEDICINE

## 2019-12-16 RX ORDER — ALBUTEROL SULFATE 90 UG/1
1 AEROSOL, METERED RESPIRATORY (INHALATION) EVERY 4 HOURS PRN
Qty: 1 INHALER | Refills: 2 | Status: SHIPPED | OUTPATIENT
Start: 2019-12-16 | End: 2020-04-03

## 2019-12-16 NOTE — PROGRESS NOTES
HPI:   Dylon Richey is a 62year old female who presents for recheck of her diabetes     Patient’s FBS have been in the 's   2 hour PP- 220   Labs -  Labs pending, done at [facility]   No low blood sugars  Working out regularly   Eating healthy     [place] 06/04/2018 20   04/18/2018 21   07/16/2014 18   07/07/2014 23   04/17/2014 16   11/11/2011 12   12/30/2010 33      Malb/Cre Calc   Date Value Ref Range Status   02/26/2019  <=30.0 ug/mg Final     Comment:       Unable to calculate due to Urine Microalbum cancer [facility])     right breast 9/13   • Cancer [facility])     breast   • Heart murmur    • History of blood transfusion    • Murmur     benign murmur, unknown what type   • Neuropathy    • Personal history of antineoplastic chemotherapy 2/21/14    last chemo avel anxiety  NUTRITION:follows diabetic diet    EXAM:   /74   Pulse 70   Temp 98.3 °F (36.8 °C) (Oral)   Resp 18   Ht 63\"   Wt 172 lb (78 kg)   LMP 09/01/2013 (LMP Unknown)   SpO2 98%   BMI 30.47 kg/m²    Body mass index is 30.47 kg/m².   GENERAL: well d Future  - CBC WITH DIFFERENTIAL WITH PLATELET; Future  - VITAMIN B12; Future  - VITAMIN D, 25-HYDROXY; Future  - COMP METABOLIC PANEL (14); Future  - MICROALB/CREAT RATIO, RANDOM URINE;  Future  - HEMOGLOBIN A1C; Future        Pt given reminders to call onc

## 2019-12-23 RX ORDER — INSULIN GLARGINE 100 [IU]/ML
INJECTION, SOLUTION SUBCUTANEOUS
Qty: 30 ML | Refills: 0 | Status: SHIPPED | OUTPATIENT
Start: 2019-12-23 | End: 2020-05-20

## 2019-12-23 NOTE — TELEPHONE ENCOUNTER
Leaving [facility] requesting immidiate    Rx Request  LANTUS SOLOSTAR 100 UNIT/ML Subcutaneous Solution Pen-injector    Disp:     30               R:  0    Last Visit: 12/16/2019    Last Refilled: 08/09/2019

## 2020-01-08 ENCOUNTER — TELEPHONE (OUTPATIENT)
Dept: FAMILY MEDICINE CLINIC | Facility: CLINIC | Age: 55
End: 2020-01-08

## 2020-01-08 DIAGNOSIS — Z79.4 TYPE 2 DIABETES MELLITUS WITHOUT COMPLICATION, WITH LONG-TERM CURRENT USE OF INSULIN (HCC): Primary | ICD-10-CM

## 2020-01-08 DIAGNOSIS — E11.9 TYPE 2 DIABETES MELLITUS WITHOUT COMPLICATION, WITH LONG-TERM CURRENT USE OF INSULIN (HCC): Primary | ICD-10-CM

## 2020-01-29 ENCOUNTER — TELEPHONE (OUTPATIENT)
Dept: FAMILY MEDICINE CLINIC | Facility: CLINIC | Age: 55
End: 2020-01-29

## 2020-01-29 RX ORDER — ALBUTEROL SULFATE 2.5 MG/3ML
SOLUTION RESPIRATORY (INHALATION)
Qty: 90 ML | Refills: 0 | Status: SHIPPED | OUTPATIENT
Start: 2020-01-29

## 2020-01-29 NOTE — TELEPHONE ENCOUNTER
Needs albuterol for nebulizer (Vials). [facility]  On file. They are faxing the request,but told patient to call us so we can expedite this for her.

## 2020-02-09 ENCOUNTER — APPOINTMENT (OUTPATIENT)
Dept: GENERAL RADIOLOGY | Age: 55
End: 2020-02-09
Attending: FAMILY MEDICINE
Payer: COMMERCIAL

## 2020-02-09 ENCOUNTER — HOSPITAL ENCOUNTER (OUTPATIENT)
Age: 55
Discharge: HOME | End: 2020-02-09
Attending: FAMILY MEDICINE
Payer: COMMERCIAL

## 2020-02-09 ENCOUNTER — HOSPITAL ENCOUNTER (EMERGENCY)
Age: 55
Discharge: LEFT BEFORE DISCHARGE | End: 2020-02-09
Payer: COMMERCIAL

## 2020-02-09 VITALS
BODY MASS INDEX: 26.22 KG/M2 | RESPIRATION RATE: 18 BRPM | WEIGHT: 148 LBS | SYSTOLIC BLOOD PRESSURE: 129 MMHG | HEART RATE: 74 BPM | OXYGEN SATURATION: 98 % | HEIGHT: 63 IN | DIASTOLIC BLOOD PRESSURE: 88 MMHG | TEMPERATURE: 98 F

## 2020-02-09 DIAGNOSIS — J45.901 EXACERBATION OF ASTHMA, UNSPECIFIED ASTHMA SEVERITY, UNSPECIFIED WHETHER PERSISTENT: Primary | ICD-10-CM

## 2020-02-09 LAB — GLUCOSE BLD-MCNC: 114 MG/DL (ref 70–99)

## 2020-02-09 PROCEDURE — 82962 GLUCOSE BLOOD TEST: CPT

## 2020-02-09 PROCEDURE — 94640 AIRWAY INHALATION TREATMENT: CPT

## 2020-02-09 PROCEDURE — 71046 X-RAY EXAM CHEST 2 VIEWS: CPT | Performed by: FAMILY MEDICINE

## 2020-02-09 PROCEDURE — 99214 OFFICE O/P EST MOD 30 MIN: CPT

## 2020-02-09 RX ORDER — IPRATROPIUM BROMIDE AND ALBUTEROL SULFATE 2.5; .5 MG/3ML; MG/3ML
3 SOLUTION RESPIRATORY (INHALATION) ONCE
Status: COMPLETED | OUTPATIENT
Start: 2020-02-09 | End: 2020-02-09

## 2020-02-09 RX ORDER — PREDNISONE 20 MG/1
40 TABLET ORAL DAILY
Qty: 8 TABLET | Refills: 0 | Status: SHIPPED | OUTPATIENT
Start: 2020-02-09 | End: 2020-02-13

## 2020-02-09 RX ORDER — IPRATROPIUM BROMIDE AND ALBUTEROL SULFATE 2.5; .5 MG/3ML; MG/3ML
3 SOLUTION RESPIRATORY (INHALATION) EVERY 6 HOURS PRN
Qty: 1 CONTAINER | Refills: 0 | Status: SHIPPED | OUTPATIENT
Start: 2020-02-09 | End: 2020-02-14

## 2020-02-09 RX ORDER — PREDNISONE 20 MG/1
40 TABLET ORAL ONCE
Status: COMPLETED | OUTPATIENT
Start: 2020-02-09 | End: 2020-02-09

## 2020-02-09 NOTE — ED PROVIDER NOTES
Patient Seen in: [facility]      History   Patient presents with:  Dyspnea NICK SOB    Stated Complaint: wheezing x1 week    HPI    69-year-old female presents with complaints of wheezing for the past 1 week.   Patient states Maldonado Tyler MD at [facility] MAIN OR   • MASTECTOMY RIGHT      3/18/14   • NEEDLE BIOPSY RIGHT     • OTHER SURGICAL HISTORY  4/30/2015    mediport removal   • RADIATION RIGHT                  Family history reviewed with patient/caregiver and is not pertinent t PORTABLE  (CPT=71045), 6/04/2018, 17:50.  TECHNIQUE:  PA and lateral chest radiographs were obtained.   PATIENT STATED HISTORY: (As transcribed by Technologist)  Pt c/o asthma exacerbation over the past week and states she isn't able to keep it under [employer] soon as possible for a visit in 3 days          Medications Prescribed:  Discharge Medication List as of 2/9/2020  1:41 PM    START taking these medications    predniSONE 20 MG Oral Tab  Take 2 tablets (40 mg total) by mouth daily for 4 days. , Normal, Disp

## 2020-02-09 NOTE — ED INITIAL ASSESSMENT (HPI)
Pt c/o asthma exacerbation over the past week and states she isn't able to keep it under control with her rescue inhaler and evening neb treatments at home. Denies other s/s.

## 2020-02-10 ENCOUNTER — TELEPHONE (OUTPATIENT)
Dept: FAMILY MEDICINE CLINIC | Facility: CLINIC | Age: 55
End: 2020-02-10

## 2020-02-25 DIAGNOSIS — R92.8 FOLLOW-UP EXAMINATION OF ABNORMAL MAMMOGRAM: Primary | ICD-10-CM

## 2020-02-25 DIAGNOSIS — E78.00 PURE HYPERCHOLESTEROLEMIA: ICD-10-CM

## 2020-02-25 DIAGNOSIS — M79.2 NEUROPATHIC PAIN: ICD-10-CM

## 2020-02-25 DIAGNOSIS — Z12.11 SCREENING FOR COLON CANCER: ICD-10-CM

## 2020-02-25 DIAGNOSIS — E11.9 TYPE 2 DIABETES MELLITUS WITHOUT COMPLICATION, UNSPECIFIED WHETHER LONG TERM INSULIN USE (HCC): ICD-10-CM

## 2020-02-25 DIAGNOSIS — G62.0 DRUG-INDUCED PERIPHERAL NEUROPATHY (HCC): ICD-10-CM

## 2020-02-26 RX ORDER — GABAPENTIN 600 MG/1
TABLET ORAL
Qty: 270 TABLET | Refills: 0 | Status: SHIPPED | OUTPATIENT
Start: 2020-02-26 | End: 2020-05-27

## 2020-02-26 RX ORDER — MONTELUKAST SODIUM 10 MG/1
TABLET ORAL
Qty: 90 TABLET | Refills: 0 | Status: SHIPPED | OUTPATIENT
Start: 2020-02-26 | End: 2020-05-27

## 2020-02-27 NOTE — TELEPHONE ENCOUNTER
Pt needs labs and f/u in April (cmp lipids HgA1c and urine micro)  Pt needs left sided mammogram   Pt needs to do stool cards

## 2020-04-02 RX ORDER — ALBUTEROL SULFATE 90 UG/1
1 AEROSOL, METERED RESPIRATORY (INHALATION) EVERY 4 HOURS PRN
Qty: 1 INHALER | Refills: 2 | Status: CANCELLED | OUTPATIENT
Start: 2020-04-02

## 2020-04-03 RX ORDER — ALBUTEROL SULFATE 90 UG/1
1 AEROSOL, METERED RESPIRATORY (INHALATION) EVERY 4 HOURS PRN
Qty: 1 INHALER | Refills: 2 | Status: SHIPPED | OUTPATIENT
Start: 2020-04-03 | End: 2021-03-30

## 2020-05-20 ENCOUNTER — TELEPHONE (OUTPATIENT)
Dept: FAMILY MEDICINE CLINIC | Facility: CLINIC | Age: 55
End: 2020-05-20

## 2020-05-20 RX ORDER — INSULIN GLARGINE 100 [IU]/ML
20 INJECTION, SOLUTION SUBCUTANEOUS NIGHTLY
Qty: 30 ML | Refills: 0 | Status: SHIPPED | OUTPATIENT
Start: 2020-05-20 | End: 2020-05-20

## 2020-05-20 RX ORDER — INSULIN GLARGINE 100 [IU]/ML
20 INJECTION, SOLUTION SUBCUTANEOUS NIGHTLY
Qty: 30 ML | Refills: 0 | Status: SHIPPED | OUTPATIENT
Start: 2020-05-20 | End: 2020-10-02

## 2020-05-20 NOTE — TELEPHONE ENCOUNTER
Need to refill Lantus Pen till her mail order comes. Send 1 mth to [facility] .

## 2020-05-20 NOTE — TELEPHONE ENCOUNTER
The amount of lantus ordered to local pharmacy is too much and ins won't pay.   Pls call to discuss with patient

## 2020-05-20 NOTE — TELEPHONE ENCOUNTER
Last Office Visit: 12-16-19 with Gildardo Greenwood for medication follow up   Last Rx Filled: 12-23-19 30ml with no refills   Last Labs: 2-9-20 glucose.  12-14-19 cmp/lipid/hga1c  Future Appointment: none    Per protocol to provider

## 2020-05-21 NOTE — TELEPHONE ENCOUNTER
Pt states lantus was ordered thru mailorder and unable to get thru local pharmacy,   Pt states her mail order should come in today, no need to resent to local pharmacy.

## 2020-05-22 ENCOUNTER — LAB ENCOUNTER (OUTPATIENT)
Dept: LAB | Age: 55
End: 2020-05-22
Attending: FAMILY MEDICINE
Payer: COMMERCIAL

## 2020-05-22 DIAGNOSIS — E78.00 PURE HYPERCHOLESTEROLEMIA: ICD-10-CM

## 2020-05-22 DIAGNOSIS — E11.9 TYPE 2 DIABETES MELLITUS WITHOUT COMPLICATION, WITH LONG-TERM CURRENT USE OF INSULIN (HCC): ICD-10-CM

## 2020-05-22 DIAGNOSIS — Z00.00 GENERAL MEDICAL EXAM: ICD-10-CM

## 2020-05-22 DIAGNOSIS — Z79.4 TYPE 2 DIABETES MELLITUS WITHOUT COMPLICATION, WITH LONG-TERM CURRENT USE OF INSULIN (HCC): ICD-10-CM

## 2020-05-22 DIAGNOSIS — E11.9 TYPE 2 DIABETES MELLITUS WITHOUT COMPLICATION, UNSPECIFIED WHETHER LONG TERM INSULIN USE (HCC): ICD-10-CM

## 2020-05-22 PROCEDURE — 82570 ASSAY OF URINE CREATININE: CPT

## 2020-05-22 PROCEDURE — 83036 HEMOGLOBIN GLYCOSYLATED A1C: CPT

## 2020-05-22 PROCEDURE — 36415 COLL VENOUS BLD VENIPUNCTURE: CPT

## 2020-05-22 PROCEDURE — 82306 VITAMIN D 25 HYDROXY: CPT

## 2020-05-22 PROCEDURE — 84443 ASSAY THYROID STIM HORMONE: CPT

## 2020-05-22 PROCEDURE — 80053 COMPREHEN METABOLIC PANEL: CPT

## 2020-05-22 PROCEDURE — 82607 VITAMIN B-12: CPT

## 2020-05-22 PROCEDURE — 85025 COMPLETE CBC W/AUTO DIFF WBC: CPT

## 2020-05-22 PROCEDURE — 80061 LIPID PANEL: CPT

## 2020-05-22 PROCEDURE — 82043 UR ALBUMIN QUANTITATIVE: CPT

## 2020-05-22 PROCEDURE — 84439 ASSAY OF FREE THYROXINE: CPT

## 2020-05-27 ENCOUNTER — TELEPHONE (OUTPATIENT)
Dept: FAMILY MEDICINE CLINIC | Facility: CLINIC | Age: 55
End: 2020-05-27

## 2020-05-27 ENCOUNTER — VIRTUAL PHONE E/M (OUTPATIENT)
Dept: FAMILY MEDICINE CLINIC | Facility: CLINIC | Age: 55
End: 2020-05-27
Payer: COMMERCIAL

## 2020-05-27 VITALS — SYSTOLIC BLOOD PRESSURE: 122 MMHG | DIASTOLIC BLOOD PRESSURE: 80 MMHG

## 2020-05-27 DIAGNOSIS — D72.819 LEUKOPENIA, UNSPECIFIED TYPE: ICD-10-CM

## 2020-05-27 DIAGNOSIS — E11.9 TYPE 2 DIABETES MELLITUS WITHOUT COMPLICATION, UNSPECIFIED WHETHER LONG TERM INSULIN USE (HCC): Primary | ICD-10-CM

## 2020-05-27 DIAGNOSIS — G62.0 DRUG-INDUCED PERIPHERAL NEUROPATHY (HCC): ICD-10-CM

## 2020-05-27 DIAGNOSIS — M79.2 NEUROPATHIC PAIN: ICD-10-CM

## 2020-05-27 DIAGNOSIS — J45.20 MILD INTERMITTENT ASTHMA WITHOUT COMPLICATION: ICD-10-CM

## 2020-05-27 PROCEDURE — 99214 OFFICE O/P EST MOD 30 MIN: CPT | Performed by: FAMILY MEDICINE

## 2020-05-27 RX ORDER — BUDESONIDE AND FORMOTEROL FUMARATE DIHYDRATE 160; 4.5 UG/1; UG/1
2 AEROSOL RESPIRATORY (INHALATION) 2 TIMES DAILY
Qty: 3 INHALER | Refills: 0 | Status: SHIPPED | OUTPATIENT
Start: 2020-05-27 | End: 2020-08-26

## 2020-05-27 RX ORDER — GABAPENTIN 600 MG/1
TABLET ORAL
Qty: 270 TABLET | Refills: 0 | Status: SHIPPED | OUTPATIENT
Start: 2020-05-27 | End: 2020-10-02

## 2020-05-27 RX ORDER — MONTELUKAST SODIUM 10 MG/1
10 TABLET ORAL NIGHTLY
Qty: 90 TABLET | Refills: 0 | Status: SHIPPED | OUTPATIENT
Start: 2020-05-27 | End: 2020-08-26

## 2020-05-27 NOTE — PROGRESS NOTES
Virtual Telephone Check-In    Misa Fritz verbally consents to a Virtual/Telephone Check-In visit on 05/27/20. Patient has been referred to the [facility] website at [email or web address] to review the yearly Consent to Treat document.     Patient underst controlled     Last eye exam: [facility] [place] / report needed    Low WBC - pt feels she is struggling with allergies and asthma   + anemia - colon cancer screening overdue   Low vit d - discussed supplement      HEMOGLOBIN A1c (% of total Hgb)   Date Value Comment:     <30 ug/mg creatinine       Normal     ug/mg creatinine   Microalbuminuria   >300 ug/mg creatinine      Albuminuria       02/26/2019  <=30.0 ug/mg Final     Comment:       Unable to calculate due to Urine Microalbumin <0.5 mg/dL     04/1 Asthma    • Breast cancer [facility])     right breast 9/13   • Cancer [facility])     breast   • Heart murmur    • History of blood transfusion    • Murmur     benign murmur, unknown what type   • Neuropathy    • Personal history of antineoplastic chemotherapy 2/21/1 depression or anxiety  NUTRITION:follows diabetic diet    EXAM:   EXAM:   GENERAL: clear speech / no increased work of breathing / speaking in full sentences/ alert and oriented X 3  PSYCH: judgement and insight are appropriate & intact

## 2020-06-02 DIAGNOSIS — E11.9 TYPE 2 DIABETES MELLITUS WITHOUT COMPLICATION, UNSPECIFIED WHETHER LONG TERM INSULIN USE (HCC): Primary | ICD-10-CM

## 2020-06-02 DIAGNOSIS — IMO0001 UNCONTROLLED TYPE 2 DIABETES MELLITUS WITHOUT COMPLICATION, WITH LONG-TERM CURRENT USE OF INSULIN: ICD-10-CM

## 2020-08-26 DIAGNOSIS — J45.20 MILD INTERMITTENT ASTHMA WITHOUT COMPLICATION: ICD-10-CM

## 2020-08-26 RX ORDER — BUDESONIDE AND FORMOTEROL FUMARATE DIHYDRATE 160; 4.5 UG/1; UG/1
2 AEROSOL RESPIRATORY (INHALATION) 2 TIMES DAILY
Qty: 3 INHALER | Refills: 0 | Status: SHIPPED | OUTPATIENT
Start: 2020-08-26 | End: 2020-11-25

## 2020-08-26 RX ORDER — MONTELUKAST SODIUM 10 MG/1
10 TABLET ORAL NIGHTLY
Qty: 90 TABLET | Refills: 0 | Status: SHIPPED | OUTPATIENT
Start: 2020-08-26 | End: 2020-11-25

## 2020-10-02 DIAGNOSIS — G62.0 DRUG-INDUCED PERIPHERAL NEUROPATHY (HCC): ICD-10-CM

## 2020-10-02 DIAGNOSIS — M79.2 NEUROPATHIC PAIN: ICD-10-CM

## 2020-10-02 DIAGNOSIS — E11.9 TYPE 2 DIABETES MELLITUS WITHOUT COMPLICATION, UNSPECIFIED WHETHER LONG TERM INSULIN USE (HCC): ICD-10-CM

## 2020-10-02 RX ORDER — INSULIN GLARGINE 100 [IU]/ML
20 INJECTION, SOLUTION SUBCUTANEOUS NIGHTLY
Qty: 30 ML | Refills: 0 | Status: SHIPPED | OUTPATIENT
Start: 2020-10-02

## 2020-10-02 RX ORDER — PEN NEEDLE, DIABETIC 32GX 5/32"
NEEDLE, DISPOSABLE MISCELLANEOUS
Qty: 200 EACH | Refills: 1 | Status: CANCELLED | OUTPATIENT
Start: 2020-10-02

## 2020-10-02 RX ORDER — INSULIN GLARGINE 100 [IU]/ML
INJECTION, SOLUTION SUBCUTANEOUS
Qty: 30 ML | Refills: 0 | Status: SHIPPED | OUTPATIENT
Start: 2020-10-02 | End: 2021-02-26

## 2020-10-02 RX ORDER — GABAPENTIN 600 MG/1
TABLET ORAL
Qty: 270 TABLET | Refills: 0 | Status: SHIPPED | OUTPATIENT
Start: 2020-10-02 | End: 2020-12-22

## 2020-10-02 NOTE — TELEPHONE ENCOUNTER
Rx Request  LANTUS SOLOSTAR 100 UNIT/ML Subcutaneous Solution Pen-injector    Disp:     30 ml               R: 0    Last Visit: 05/27/2020    Last Refilled: 05/20/2020

## 2020-10-02 NOTE — TELEPHONE ENCOUNTER
Patient is needing refill of gabapentin. She also requesting if one insulin pen can be sent to her local pharmacy until her order from the mail order comes in. She had accidentally rolled over the pen.     Anu [address] [facility]

## 2020-11-02 LAB — AMB EXT COVID-19 RESULT: DETECTED

## 2020-11-13 ENCOUNTER — TELEPHONE (OUTPATIENT)
Dept: FAMILY MEDICINE CLINIC | Facility: CLINIC | Age: 55
End: 2020-11-13

## 2020-11-13 NOTE — TELEPHONE ENCOUNTER
Pt COVID + - she has a virtual appt sched for Monday but requesting nurse to call with advise on sx relief

## 2020-11-13 NOTE — TELEPHONE ENCOUNTER
Called and spoke to patient regarding self care. Encouraged rest, fluids, tylenol, check temp. patient is asthmatic - if she becomes short of breath that is not manageable instructed to go to nearest ER. Has video visit Monday with NEENA.

## 2020-11-16 ENCOUNTER — TELEMEDICINE (OUTPATIENT)
Dept: FAMILY MEDICINE CLINIC | Facility: CLINIC | Age: 55
End: 2020-11-16

## 2020-11-16 DIAGNOSIS — U07.1 COVID-19: Primary | ICD-10-CM

## 2020-11-16 PROCEDURE — 99213 OFFICE O/P EST LOW 20 MIN: CPT | Performed by: INTERNAL MEDICINE

## 2020-11-16 NOTE — PROGRESS NOTES
Video Visit  Adina Russell is a 60year old female. Patient presents with:  Sick Call:  video visit      HPI:   Pt requests a virtual visit due to the Covid pandemic to discuss covid-19 infection. Fever of 99s last Thursday Nov 5th.   Tested + Covid on 100 UNIT/ML Subcutaneous Solution Pen-injector Inject 1-68 Units into the skin 3 (three) times daily before meals.  Inject 1 unit of insulin for every 30 points blood glucose is greater than 140 mg/dL qac 5 pen 3   • anastrozole 1 MG Oral Tab tab Take 1 tab had a fever for the past 48 hours  HEENT: Denies vision changes, eye redness, itching, or drainage; denies hearing loss, ear pain, nasal congestion, sinus pain, or sore throat; + intact smell and taste  SKIN: Denies rash  LUNGS: Denies shortness of breath, time. This billing was spent on reviewing labs, medications, radiology tests and decision making. Appropriate medical decision-making and tests are ordered as detailed in the plan of care above.

## 2020-11-17 ENCOUNTER — VIRTUAL PHONE E/M (OUTPATIENT)
Dept: FAMILY MEDICINE CLINIC | Facility: CLINIC | Age: 55
End: 2020-11-17
Payer: COMMERCIAL

## 2020-11-17 DIAGNOSIS — U07.1 COVID-19: Primary | ICD-10-CM

## 2020-11-17 PROCEDURE — 99213 OFFICE O/P EST LOW 20 MIN: CPT | Performed by: INTERNAL MEDICINE

## 2020-11-17 NOTE — PROGRESS NOTES
Virtual Telephone Check-In    Alireza Pruitt verbally consents to a Virtual/Telephone Check-In visit on 11/17/20. Patient has been referred to the [facility] website at [email or web address] to review the yearly Consent to Treat document.     Patient underst Units into the skin nightly. 1 pen 0   • loratadine 10 MG Oral Tab Take 10 mg by mouth nightly. • insulin aspart (NOVOLOG FLEXPEN) 100 UNIT/ML Subcutaneous Solution Pen-injector Inject 1-68 Units into the skin 3 (three) times daily before meals.  [facility] work note for this week off to rest, I don't feel Ondina Timo has the energy yet to complete a full day of work  - tentative plan to RTW next week Monday    The patient indicates understanding of these issues and agrees to the plan.   Follow up Monday with any

## 2020-11-23 ENCOUNTER — TELEPHONE (OUTPATIENT)
Dept: FAMILY MEDICINE CLINIC | Facility: CLINIC | Age: 55
End: 2020-11-23

## 2020-11-23 NOTE — TELEPHONE ENCOUNTER
Lucy Gonzalez,   Would you like standard letter sent? Does not give actual authorization to go back to work. Please advise. Thank you.  Sheba KONG

## 2020-11-23 NOTE — TELEPHONE ENCOUNTER
Pt calling for return to work note  Last time she spoke to [employer] she was going to put a note in my chart to clear her to go back to work today    Please advise

## 2020-11-24 DIAGNOSIS — E11.9 TYPE 2 DIABETES MELLITUS WITHOUT COMPLICATION, WITH LONG-TERM CURRENT USE OF INSULIN (HCC): Primary | ICD-10-CM

## 2020-11-24 DIAGNOSIS — J45.20 MILD INTERMITTENT ASTHMA WITHOUT COMPLICATION: ICD-10-CM

## 2020-11-24 DIAGNOSIS — Z79.4 TYPE 2 DIABETES MELLITUS WITHOUT COMPLICATION, WITH LONG-TERM CURRENT USE OF INSULIN (HCC): Primary | ICD-10-CM

## 2020-11-25 RX ORDER — MONTELUKAST SODIUM 10 MG/1
TABLET ORAL
Qty: 90 TABLET | Refills: 0 | Status: SHIPPED | OUTPATIENT
Start: 2020-11-25 | End: 2021-05-25

## 2020-11-25 RX ORDER — BUDESONIDE AND FORMOTEROL FUMARATE DIHYDRATE 160; 4.5 UG/1; UG/1
2 AEROSOL RESPIRATORY (INHALATION) 2 TIMES DAILY
Qty: 1 INHALER | Refills: 0 | Status: SHIPPED
Start: 2020-11-25 | End: 2021-02-02

## 2020-12-21 ENCOUNTER — PATIENT MESSAGE (OUTPATIENT)
Dept: FAMILY MEDICINE CLINIC | Facility: CLINIC | Age: 55
End: 2020-12-21

## 2020-12-21 DIAGNOSIS — E11.9 TYPE 2 DIABETES MELLITUS WITHOUT COMPLICATION, UNSPECIFIED WHETHER LONG TERM INSULIN USE (HCC): ICD-10-CM

## 2020-12-21 DIAGNOSIS — M79.2 NEUROPATHIC PAIN: ICD-10-CM

## 2020-12-21 DIAGNOSIS — G62.0 DRUG-INDUCED PERIPHERAL NEUROPATHY (HCC): ICD-10-CM

## 2020-12-22 ENCOUNTER — TELEPHONE (OUTPATIENT)
Dept: FAMILY MEDICINE CLINIC | Facility: CLINIC | Age: 55
End: 2020-12-22

## 2020-12-22 RX ORDER — GABAPENTIN 600 MG/1
TABLET ORAL
Qty: 270 TABLET | Refills: 0 | Status: SHIPPED | OUTPATIENT
Start: 2020-12-22 | End: 2021-03-23

## 2020-12-22 RX ORDER — PEN NEEDLE, DIABETIC 32GX 5/32"
NEEDLE, DISPOSABLE MISCELLANEOUS
Qty: 200 EACH | Refills: 1 | Status: SHIPPED | OUTPATIENT
Start: 2020-12-22 | End: 2021-01-15

## 2020-12-22 NOTE — TELEPHONE ENCOUNTER
From: Lino Ayoub  To: Cynthia Israel DO  Sent: 12/21/2020 7:58 PM CST  Subject: Prescription Question    Hi Dr. Maribel Ward,    I have requested a refill for Gabapentin and 32G insulin pen needles via [facility] [place] on [facility].   They have faxed the re

## 2021-01-15 ENCOUNTER — TELEPHONE (OUTPATIENT)
Dept: FAMILY MEDICINE CLINIC | Facility: CLINIC | Age: 56
End: 2021-01-15

## 2021-01-15 DIAGNOSIS — E11.9 TYPE 2 DIABETES MELLITUS WITHOUT COMPLICATION, UNSPECIFIED WHETHER LONG TERM INSULIN USE (HCC): ICD-10-CM

## 2021-01-15 RX ORDER — PEN NEEDLE, DIABETIC 32GX 5/32"
NEEDLE, DISPOSABLE MISCELLANEOUS
Qty: 200 EACH | Refills: 1 | Status: SHIPPED | OUTPATIENT
Start: 2021-01-15

## 2021-02-02 DIAGNOSIS — J45.20 MILD INTERMITTENT ASTHMA WITHOUT COMPLICATION: ICD-10-CM

## 2021-02-02 RX ORDER — BUDESONIDE AND FORMOTEROL FUMARATE DIHYDRATE 160; 4.5 UG/1; UG/1
2 AEROSOL RESPIRATORY (INHALATION) 2 TIMES DAILY
Qty: 1 INHALER | Refills: 0 | Status: SHIPPED | OUTPATIENT
Start: 2021-02-02 | End: 2021-03-04

## 2021-02-26 RX ORDER — INSULIN GLARGINE 100 [IU]/ML
20 INJECTION, SOLUTION SUBCUTANEOUS NIGHTLY
Qty: 3 ML | Refills: 0 | Status: SHIPPED | OUTPATIENT
Start: 2021-02-26 | End: 2022-01-12

## 2021-02-26 NOTE — TELEPHONE ENCOUNTER
Patient is wondering if we have any sample lancets. She had left her behind on a trip. Please advise asap so she could have some one .

## 2021-02-26 NOTE — TELEPHONE ENCOUNTER
Pt looking for lantus insulin samples, not lancets    Approve/deny?  Last filled 10/2/20  Last ov 11/17/20- covid  5/27/20-DM

## 2021-03-04 DIAGNOSIS — J45.20 MILD INTERMITTENT ASTHMA WITHOUT COMPLICATION: ICD-10-CM

## 2021-03-04 RX ORDER — BUDESONIDE AND FORMOTEROL FUMARATE DIHYDRATE 160; 4.5 UG/1; UG/1
2 AEROSOL RESPIRATORY (INHALATION) 2 TIMES DAILY
Qty: 1 INHALER | Refills: 0 | Status: SHIPPED | OUTPATIENT
Start: 2021-03-04 | End: 2021-03-30

## 2021-03-06 ENCOUNTER — LAB ENCOUNTER (OUTPATIENT)
Dept: LAB | Facility: HOSPITAL | Age: 56
End: 2021-03-06
Attending: FAMILY MEDICINE
Payer: COMMERCIAL

## 2021-03-06 DIAGNOSIS — Z79.4 TYPE 2 DIABETES MELLITUS WITHOUT COMPLICATION, WITH LONG-TERM CURRENT USE OF INSULIN (HCC): ICD-10-CM

## 2021-03-06 DIAGNOSIS — E11.9 TYPE 2 DIABETES MELLITUS WITHOUT COMPLICATION, WITH LONG-TERM CURRENT USE OF INSULIN (HCC): ICD-10-CM

## 2021-03-06 DIAGNOSIS — R73.09 ELEVATED GLUCOSE: ICD-10-CM

## 2021-03-06 DIAGNOSIS — D72.819 LEUKOPENIA, UNSPECIFIED TYPE: ICD-10-CM

## 2021-03-06 LAB
ALBUMIN SERPL-MCNC: 3.5 G/DL (ref 3.4–5)
ALBUMIN/GLOB SERPL: 0.8 {RATIO} (ref 1–2)
ALP LIVER SERPL-CCNC: 114 U/L
ALT SERPL-CCNC: 24 U/L
ANION GAP SERPL CALC-SCNC: 4 MMOL/L (ref 0–18)
AST SERPL-CCNC: 16 U/L (ref 15–37)
BASOPHILS # BLD AUTO: 0.01 X10(3) UL (ref 0–0.2)
BASOPHILS NFR BLD AUTO: 0.3 %
BILIRUB SERPL-MCNC: 0.4 MG/DL (ref 0.1–2)
BUN BLD-MCNC: 12 MG/DL (ref 7–18)
BUN/CREAT SERPL: 18.8 (ref 10–20)
CALCIUM BLD-MCNC: 9.4 MG/DL (ref 8.5–10.1)
CHLORIDE SERPL-SCNC: 103 MMOL/L (ref 98–112)
CHOLEST SMN-MCNC: 183 MG/DL (ref ?–200)
CO2 SERPL-SCNC: 31 MMOL/L (ref 21–32)
CREAT BLD-MCNC: 0.64 MG/DL
DEPRECATED RDW RBC AUTO: 46.2 FL (ref 35.1–46.3)
EOSINOPHIL # BLD AUTO: 0.06 X10(3) UL (ref 0–0.7)
EOSINOPHIL NFR BLD AUTO: 1.7 %
ERYTHROCYTE [DISTWIDTH] IN BLOOD BY AUTOMATED COUNT: 14.3 % (ref 11–15)
GLOBULIN PLAS-MCNC: 4.4 G/DL (ref 2.8–4.4)
GLUCOSE BLD-MCNC: 79 MG/DL (ref 70–99)
HCT VFR BLD AUTO: 36.3 %
HDLC SERPL-MCNC: 79 MG/DL (ref 40–59)
HGB BLD-MCNC: 11.6 G/DL
IMM GRANULOCYTES # BLD AUTO: 0.01 X10(3) UL (ref 0–1)
IMM GRANULOCYTES NFR BLD: 0.3 %
LDLC SERPL CALC-MCNC: 92 MG/DL (ref ?–100)
LYMPHOCYTES # BLD AUTO: 0.92 X10(3) UL (ref 1–4)
LYMPHOCYTES NFR BLD AUTO: 26.7 %
M PROTEIN MFR SERPL ELPH: 7.9 G/DL (ref 6.4–8.2)
MCH RBC QN AUTO: 28.4 PG (ref 26–34)
MCHC RBC AUTO-ENTMCNC: 32 G/DL (ref 31–37)
MCV RBC AUTO: 89 FL
MONOCYTES # BLD AUTO: 0.31 X10(3) UL (ref 0.1–1)
MONOCYTES NFR BLD AUTO: 9 %
NEUTROPHILS # BLD AUTO: 2.14 X10 (3) UL (ref 1.5–7.7)
NEUTROPHILS # BLD AUTO: 2.14 X10(3) UL (ref 1.5–7.7)
NEUTROPHILS NFR BLD AUTO: 62 %
NONHDLC SERPL-MCNC: 104 MG/DL (ref ?–130)
OSMOLALITY SERPL CALC.SUM OF ELEC: 285 MOSM/KG (ref 275–295)
PATIENT FASTING Y/N/NP: YES
PATIENT FASTING Y/N/NP: YES
PLATELET # BLD AUTO: 192 10(3)UL (ref 150–450)
POTASSIUM SERPL-SCNC: 3.9 MMOL/L (ref 3.5–5.1)
RBC # BLD AUTO: 4.08 X10(6)UL
SODIUM SERPL-SCNC: 138 MMOL/L (ref 136–145)
TRIGL SERPL-MCNC: 61 MG/DL (ref 30–149)
VLDLC SERPL CALC-MCNC: 12 MG/DL (ref 0–30)
WBC # BLD AUTO: 3.5 X10(3) UL (ref 4–11)

## 2021-03-06 PROCEDURE — 80061 LIPID PANEL: CPT

## 2021-03-06 PROCEDURE — 83036 HEMOGLOBIN GLYCOSYLATED A1C: CPT

## 2021-03-06 PROCEDURE — 80053 COMPREHEN METABOLIC PANEL: CPT

## 2021-03-06 PROCEDURE — 85025 COMPLETE CBC W/AUTO DIFF WBC: CPT

## 2021-03-06 PROCEDURE — 36415 COLL VENOUS BLD VENIPUNCTURE: CPT

## 2021-03-08 LAB
EST. AVERAGE GLUCOSE BLD GHB EST-MCNC: 157 MG/DL (ref 68–126)
HBA1C MFR BLD HPLC: 7.1 % (ref ?–5.7)

## 2021-03-10 RX ORDER — INSULIN GLARGINE 100 [IU]/ML
INJECTION, SOLUTION SUBCUTANEOUS
Qty: 30 ML | Refills: 0 | Status: SHIPPED | OUTPATIENT
Start: 2021-03-10 | End: 2021-07-15

## 2021-03-10 NOTE — TELEPHONE ENCOUNTER
Next Appt:    With [facility] Dinah Aviles DO)  03/30/2021 at 5:00 PM     11-17-20    LR 2-26-21

## 2021-03-18 DIAGNOSIS — Z23 NEED FOR VACCINATION: ICD-10-CM

## 2021-03-23 DIAGNOSIS — G62.0 DRUG-INDUCED PERIPHERAL NEUROPATHY (HCC): ICD-10-CM

## 2021-03-23 DIAGNOSIS — M79.2 NEUROPATHIC PAIN: ICD-10-CM

## 2021-03-23 RX ORDER — GABAPENTIN 600 MG/1
TABLET ORAL
Qty: 270 TABLET | Refills: 0 | Status: SHIPPED | OUTPATIENT
Start: 2021-03-23 | End: 2021-07-06

## 2021-03-30 ENCOUNTER — OFFICE VISIT (OUTPATIENT)
Dept: FAMILY MEDICINE CLINIC | Facility: CLINIC | Age: 56
End: 2021-03-30
Payer: COMMERCIAL

## 2021-03-30 VITALS
WEIGHT: 172 LBS | BODY MASS INDEX: 30.48 KG/M2 | TEMPERATURE: 98 F | HEART RATE: 80 BPM | RESPIRATION RATE: 18 BRPM | OXYGEN SATURATION: 98 % | SYSTOLIC BLOOD PRESSURE: 126 MMHG | HEIGHT: 63 IN | DIASTOLIC BLOOD PRESSURE: 76 MMHG

## 2021-03-30 DIAGNOSIS — J45.20 MILD INTERMITTENT ASTHMA WITHOUT COMPLICATION: ICD-10-CM

## 2021-03-30 DIAGNOSIS — G62.0 DRUG-INDUCED PERIPHERAL NEUROPATHY (HCC): ICD-10-CM

## 2021-03-30 DIAGNOSIS — Z13.820 SCREENING FOR OSTEOPOROSIS: ICD-10-CM

## 2021-03-30 DIAGNOSIS — E04.9 GOITER: ICD-10-CM

## 2021-03-30 DIAGNOSIS — E11.9 TYPE 2 DIABETES MELLITUS WITHOUT COMPLICATION, UNSPECIFIED WHETHER LONG TERM INSULIN USE (HCC): ICD-10-CM

## 2021-03-30 DIAGNOSIS — Z00.00 ANNUAL PHYSICAL EXAM: Primary | ICD-10-CM

## 2021-03-30 DIAGNOSIS — Z85.3 HISTORY OF BREAST CANCER: ICD-10-CM

## 2021-03-30 DIAGNOSIS — Z12.11 COLON CANCER SCREENING: ICD-10-CM

## 2021-03-30 LAB
CREAT UR-SCNC: 197 MG/DL
MICROALBUMIN UR-MCNC: 1.12 MG/DL
MICROALBUMIN/CREAT 24H UR-RTO: 5.7 UG/MG (ref ?–30)

## 2021-03-30 PROCEDURE — 3078F DIAST BP <80 MM HG: CPT | Performed by: FAMILY MEDICINE

## 2021-03-30 PROCEDURE — 3008F BODY MASS INDEX DOCD: CPT | Performed by: FAMILY MEDICINE

## 2021-03-30 PROCEDURE — 82043 UR ALBUMIN QUANTITATIVE: CPT | Performed by: FAMILY MEDICINE

## 2021-03-30 PROCEDURE — 99396 PREV VISIT EST AGE 40-64: CPT | Performed by: FAMILY MEDICINE

## 2021-03-30 PROCEDURE — 3074F SYST BP LT 130 MM HG: CPT | Performed by: FAMILY MEDICINE

## 2021-03-30 PROCEDURE — 82570 ASSAY OF URINE CREATININE: CPT | Performed by: FAMILY MEDICINE

## 2021-03-30 PROCEDURE — 99213 OFFICE O/P EST LOW 20 MIN: CPT | Performed by: FAMILY MEDICINE

## 2021-03-30 RX ORDER — BUDESONIDE AND FORMOTEROL FUMARATE DIHYDRATE 160; 4.5 UG/1; UG/1
2 AEROSOL RESPIRATORY (INHALATION) 2 TIMES DAILY
Qty: 3 INHALER | Refills: 1 | Status: SHIPPED | OUTPATIENT
Start: 2021-03-30

## 2021-03-30 RX ORDER — BUDESONIDE AND FORMOTEROL FUMARATE DIHYDRATE 160; 4.5 UG/1; UG/1
2 AEROSOL RESPIRATORY (INHALATION) 2 TIMES DAILY
Qty: 1 INHALER | Refills: 3 | Status: SHIPPED | OUTPATIENT
Start: 2021-03-30 | End: 2021-03-30

## 2021-03-30 RX ORDER — ALBUTEROL SULFATE 90 UG/1
1 AEROSOL, METERED RESPIRATORY (INHALATION) EVERY 4 HOURS PRN
Qty: 3 INHALER | Refills: 1 | Status: SHIPPED | OUTPATIENT
Start: 2021-03-30

## 2021-03-30 NOTE — PROGRESS NOTES
HPI:   Gorge Kidd is a 60year old female who presents for a complete physical exam.       Wt Readings from Last 6 Encounters:  03/30/21 : 172 lb (78 kg)  02/09/20 : 148 lb (67.1 kg)  12/16/19 : 172 lb (78 kg)  03/25/19 : 173 lb 2 oz (78.5 kg)  12/13/ density  Never had - last colonoscopy - willing to do stool cards   No calcium and vit D supplementation  No family history of breast colon or ovarian cancer  + exercise    + covid in November     Current Outpatient Medications   Medication Sig Dispense Re Past Medical History:   Diagnosis Date   • Anemia     transfusions 9/13   • Asthma    • Breast cancer [facility])     right breast 9/13   • Cancer [facility])     breast   • COVID-19 11/16/2020    Tested 11/9/20   • Heart murmur    • History of blood transfusion Never Used    Vaping Use      Vaping Use: Never used    Alcohol use: No      Alcohol/week: 0.0 standard drinks    Drug use: No    Occ:  . Children:   Working again as RN.    Exercise: minimal.  Diet: watches minimally     REVIEW OF SYSTEMS:   GENERAL are grossly intact    ASSESSMENT AND PLAN:   Iwona Knapp is a 60year old female who presents for annual physical.      1. Mild intermittent asthma without complication    - Budesonide-Formoterol Fumarate 160-4.5 MCG/ACT Inhalation Aerosol;  Inhale 2 pu 12/30/2010 11.5 (H)     HGBA1C (%)   Date Value   07/16/2014 6.1 (H)   11/24/2013 7.3 (H)   09/10/2013 6.6 (H)     HbA1c (%)   Date Value   04/18/2018 6.5 (H)     HgbA1C (%)   Date Value   03/06/2021 7.1 (H)   05/22/2020 7.4 (H)   12/14/2019 6.8   12/13/ Final     Comment:       Unable to calculate due to Urine Microalbumin <0.5 mg/dL         Current Outpatient Medications   Medication Sig Dispense Refill   • Budesonide-Formoterol Fumarate 160-4.5 MCG/ACT Inhalation Aerosol Inhale 2 puffs into the lungs 2 right breast 9/13   • Cancer [facility])     breast   • COVID-19 11/16/2020    Tested 11/9/20   • Heart murmur    • History of blood transfusion    • Murmur     benign murmur, unknown what type   • Neuropathy    • Personal history of antineoplastic chemothera constipation  NEURO: denies headaches or dizziness  PSYCH: denies depression or anxiety  NUTRITION:follows diabetic diet    EXAM:   /76   Pulse 80   Temp 98.1 °F (36.7 °C) (Temporal)   Resp 18   Ht 5' 3\" (1.6 m)   Wt 172 lb (78 kg)   LMP 09/01/2013

## 2021-05-13 NOTE — TELEPHONE ENCOUNTER
Lantus    [facility] Mailorder on file    Phone or Fax    Please let patient know when it is sent as she would like to follow up on it    Mary [phone] No

## 2021-05-25 DIAGNOSIS — J45.20 MILD INTERMITTENT ASTHMA WITHOUT COMPLICATION: ICD-10-CM

## 2021-05-25 RX ORDER — MONTELUKAST SODIUM 10 MG/1
10 TABLET ORAL NIGHTLY
Qty: 90 TABLET | Refills: 0 | Status: SHIPPED | OUTPATIENT
Start: 2021-05-25 | End: 2021-08-26

## 2021-07-05 DIAGNOSIS — G62.0 DRUG-INDUCED PERIPHERAL NEUROPATHY (HCC): ICD-10-CM

## 2021-07-05 DIAGNOSIS — M79.2 NEUROPATHIC PAIN: ICD-10-CM

## 2021-07-06 RX ORDER — GABAPENTIN 600 MG/1
TABLET ORAL
Qty: 270 TABLET | Refills: 0 | Status: SHIPPED | OUTPATIENT
Start: 2021-07-06 | End: 2021-10-29

## 2021-07-15 ENCOUNTER — PATIENT MESSAGE (OUTPATIENT)
Dept: FAMILY MEDICINE CLINIC | Facility: CLINIC | Age: 56
End: 2021-07-15

## 2021-07-15 NOTE — TELEPHONE ENCOUNTER
From: Sharen Fleischer  To: Stephanie Payment, DO  Sent: 7/15/2021 10:05 AM CDT  Subject: Other    Good morning,    Please send a prescription request to [facility] , for a partial supply of Lantus insulin.  My mail order supply will not arrive before

## 2021-07-16 RX ORDER — INSULIN GLARGINE 100 [IU]/ML
20 INJECTION, SOLUTION SUBCUTANEOUS NIGHTLY
Qty: 10 ML | Refills: 0 | Status: SHIPPED | OUTPATIENT
Start: 2021-07-16 | End: 2022-01-03

## 2021-07-28 ENCOUNTER — PATIENT MESSAGE (OUTPATIENT)
Dept: FAMILY MEDICINE CLINIC | Facility: CLINIC | Age: 56
End: 2021-07-28

## 2021-07-29 NOTE — TELEPHONE ENCOUNTER
From: Jamal Blanco  To: Meme Perez DO  Sent: 7/28/2021 7:18 PM CDT  Subject: Other    Please be on the look out for a request for a   new script for my mail order prescription of Lantus insulin. It will come from [address] [place] [address].     Thank you

## 2021-07-30 RX ORDER — INSULIN GLARGINE 100 [IU]/ML
INJECTION, SOLUTION SUBCUTANEOUS
Qty: 30 ML | Refills: 0 | Status: SHIPPED | OUTPATIENT
Start: 2021-07-30

## 2021-08-26 DIAGNOSIS — J45.20 MILD INTERMITTENT ASTHMA WITHOUT COMPLICATION: ICD-10-CM

## 2021-08-26 RX ORDER — MONTELUKAST SODIUM 10 MG/1
TABLET ORAL
Qty: 90 TABLET | Refills: 0 | Status: SHIPPED | OUTPATIENT
Start: 2021-08-26 | End: 2021-11-22

## 2021-10-29 DIAGNOSIS — G62.0 DRUG-INDUCED PERIPHERAL NEUROPATHY (HCC): ICD-10-CM

## 2021-10-29 DIAGNOSIS — M79.2 NEUROPATHIC PAIN: ICD-10-CM

## 2021-10-29 RX ORDER — GABAPENTIN 600 MG/1
TABLET ORAL
Qty: 270 TABLET | Refills: 0 | Status: SHIPPED | OUTPATIENT
Start: 2021-10-29 | End: 2022-01-12

## 2021-11-22 DIAGNOSIS — J45.20 MILD INTERMITTENT ASTHMA WITHOUT COMPLICATION: ICD-10-CM

## 2021-11-22 RX ORDER — MONTELUKAST SODIUM 10 MG/1
TABLET ORAL
Qty: 90 TABLET | Refills: 0 | Status: SHIPPED | OUTPATIENT
Start: 2021-11-22 | End: 2022-01-12

## 2021-11-22 NOTE — TELEPHONE ENCOUNTER
Rx Request  MONTELUKAST 10 MG Oral Tab    Disp:     90               R: 0    Associated Dx: MIld asthma    Last Refilled: 08/26/2021    Last Visit: 03/30/2021    Protocol Passed?  Yes[  ]       No[ x ]

## 2022-01-03 ENCOUNTER — LAB ENCOUNTER (OUTPATIENT)
Dept: LAB | Age: 57
End: 2022-01-03
Attending: FAMILY MEDICINE
Payer: COMMERCIAL

## 2022-01-03 DIAGNOSIS — Z00.00 ANNUAL PHYSICAL EXAM: ICD-10-CM

## 2022-01-03 LAB
ALBUMIN SERPL-MCNC: 3.6 G/DL (ref 3.4–5)
ALBUMIN/GLOB SERPL: 1 {RATIO} (ref 1–2)
ALP LIVER SERPL-CCNC: 118 U/L
ALT SERPL-CCNC: 32 U/L
ANION GAP SERPL CALC-SCNC: 5 MMOL/L (ref 0–18)
AST SERPL-CCNC: 23 U/L (ref 15–37)
BASOPHILS # BLD AUTO: 0.02 X10(3) UL (ref 0–0.2)
BASOPHILS NFR BLD AUTO: 0.5 %
BILIRUB SERPL-MCNC: 0.4 MG/DL (ref 0.1–2)
BUN BLD-MCNC: 7 MG/DL (ref 7–18)
CALCIUM BLD-MCNC: 9.3 MG/DL (ref 8.5–10.1)
CHLORIDE SERPL-SCNC: 106 MMOL/L (ref 98–112)
CHOLEST SERPL-MCNC: 193 MG/DL (ref ?–200)
CO2 SERPL-SCNC: 28 MMOL/L (ref 21–32)
CREAT BLD-MCNC: 0.79 MG/DL
EOSINOPHIL # BLD AUTO: 0.06 X10(3) UL (ref 0–0.7)
EOSINOPHIL NFR BLD AUTO: 1.6 %
ERYTHROCYTE [DISTWIDTH] IN BLOOD BY AUTOMATED COUNT: 13.8 %
EST. AVERAGE GLUCOSE BLD GHB EST-MCNC: 171 MG/DL (ref 68–126)
FASTING PATIENT LIPID ANSWER: YES
FASTING STATUS PATIENT QL REPORTED: YES
GLOBULIN PLAS-MCNC: 3.7 G/DL (ref 2.8–4.4)
GLUCOSE BLD-MCNC: 125 MG/DL (ref 70–99)
HBA1C MFR BLD: 7.6 % (ref ?–5.7)
HCT VFR BLD AUTO: 40.4 %
HDLC SERPL-MCNC: 58 MG/DL (ref 40–59)
HGB BLD-MCNC: 12.3 G/DL
IMM GRANULOCYTES # BLD AUTO: 0.01 X10(3) UL (ref 0–1)
IMM GRANULOCYTES NFR BLD: 0.3 %
LDLC SERPL CALC-MCNC: 118 MG/DL (ref ?–100)
LYMPHOCYTES # BLD AUTO: 1.04 X10(3) UL (ref 1–4)
LYMPHOCYTES NFR BLD AUTO: 27.1 %
MCH RBC QN AUTO: 28 PG (ref 26–34)
MCHC RBC AUTO-ENTMCNC: 30.4 G/DL (ref 31–37)
MCV RBC AUTO: 91.8 FL
MONOCYTES # BLD AUTO: 0.28 X10(3) UL (ref 0.1–1)
MONOCYTES NFR BLD AUTO: 7.3 %
NEUTROPHILS # BLD AUTO: 2.43 X10 (3) UL (ref 1.5–7.7)
NEUTROPHILS # BLD AUTO: 2.43 X10(3) UL (ref 1.5–7.7)
NEUTROPHILS NFR BLD AUTO: 63.2 %
NONHDLC SERPL-MCNC: 135 MG/DL (ref ?–130)
OSMOLALITY SERPL CALC.SUM OF ELEC: 287 MOSM/KG (ref 275–295)
PLATELET # BLD AUTO: 206 10(3)UL (ref 150–450)
POTASSIUM SERPL-SCNC: 3.9 MMOL/L (ref 3.5–5.1)
PROT SERPL-MCNC: 7.3 G/DL (ref 6.4–8.2)
RBC # BLD AUTO: 4.4 X10(6)UL
SODIUM SERPL-SCNC: 139 MMOL/L (ref 136–145)
T4 FREE SERPL-MCNC: 1.2 NG/DL (ref 0.8–1.7)
TRIGL SERPL-MCNC: 96 MG/DL (ref 30–149)
TSI SER-ACNC: 0.83 MIU/ML (ref 0.36–3.74)
VIT B12 SERPL-MCNC: >2000 PG/ML (ref 193–986)
VIT D+METAB SERPL-MCNC: 22.1 NG/ML (ref 30–100)
VLDLC SERPL CALC-MCNC: 17 MG/DL (ref 0–30)
WBC # BLD AUTO: 3.8 X10(3) UL (ref 4–11)

## 2022-01-03 PROCEDURE — 3051F HG A1C>EQUAL 7.0%<8.0%: CPT | Performed by: FAMILY MEDICINE

## 2022-01-03 PROCEDURE — 80061 LIPID PANEL: CPT | Performed by: FAMILY MEDICINE

## 2022-01-03 PROCEDURE — 83036 HEMOGLOBIN GLYCOSYLATED A1C: CPT | Performed by: FAMILY MEDICINE

## 2022-01-03 PROCEDURE — 84439 ASSAY OF FREE THYROXINE: CPT | Performed by: FAMILY MEDICINE

## 2022-01-03 PROCEDURE — 82306 VITAMIN D 25 HYDROXY: CPT | Performed by: FAMILY MEDICINE

## 2022-01-03 PROCEDURE — 80050 GENERAL HEALTH PANEL: CPT | Performed by: FAMILY MEDICINE

## 2022-01-03 PROCEDURE — 82607 VITAMIN B-12: CPT | Performed by: FAMILY MEDICINE

## 2022-01-03 RX ORDER — INSULIN GLARGINE 100 [IU]/ML
20 INJECTION, SOLUTION SUBCUTANEOUS NIGHTLY
Qty: 10 ML | Refills: 0 | Status: SHIPPED | OUTPATIENT
Start: 2022-01-03

## 2022-01-03 NOTE — TELEPHONE ENCOUNTER
Pt requesting temporary supply of lantus    [facility] on [address] in [place]  Pt is out of medication   appt 01/12/22

## 2022-01-12 ENCOUNTER — OFFICE VISIT (OUTPATIENT)
Dept: FAMILY MEDICINE CLINIC | Facility: CLINIC | Age: 57
End: 2022-01-12
Payer: COMMERCIAL

## 2022-01-12 VITALS
HEIGHT: 63 IN | SYSTOLIC BLOOD PRESSURE: 132 MMHG | WEIGHT: 176 LBS | HEART RATE: 88 BPM | OXYGEN SATURATION: 99 % | BODY MASS INDEX: 31.18 KG/M2 | DIASTOLIC BLOOD PRESSURE: 84 MMHG | RESPIRATION RATE: 16 BRPM

## 2022-01-12 DIAGNOSIS — M79.2 NEUROPATHIC PAIN: ICD-10-CM

## 2022-01-12 DIAGNOSIS — E04.9 GOITER: ICD-10-CM

## 2022-01-12 DIAGNOSIS — G62.0 DRUG-INDUCED PERIPHERAL NEUROPATHY (HCC): ICD-10-CM

## 2022-01-12 DIAGNOSIS — J45.20 MILD INTERMITTENT ASTHMA WITHOUT COMPLICATION: ICD-10-CM

## 2022-01-12 DIAGNOSIS — E11.9 TYPE 2 DIABETES MELLITUS WITHOUT COMPLICATION, UNSPECIFIED WHETHER LONG TERM INSULIN USE (HCC): Primary | ICD-10-CM

## 2022-01-12 DIAGNOSIS — E78.00 PURE HYPERCHOLESTEROLEMIA: ICD-10-CM

## 2022-01-12 PROBLEM — E11.40 TYPE 2 DIABETES MELLITUS WITH DIABETIC NEUROPATHY (HCC): Status: ACTIVE | Noted: 2022-01-12

## 2022-01-12 LAB
CREAT UR-SCNC: 54.9 MG/DL
MICROALBUMIN UR-MCNC: 0.6 MG/DL
MICROALBUMIN/CREAT 24H UR-RTO: 10.9 UG/MG (ref ?–30)

## 2022-01-12 PROCEDURE — 99214 OFFICE O/P EST MOD 30 MIN: CPT | Performed by: FAMILY MEDICINE

## 2022-01-12 PROCEDURE — 3079F DIAST BP 80-89 MM HG: CPT | Performed by: FAMILY MEDICINE

## 2022-01-12 PROCEDURE — 3075F SYST BP GE 130 - 139MM HG: CPT | Performed by: FAMILY MEDICINE

## 2022-01-12 PROCEDURE — 82570 ASSAY OF URINE CREATININE: CPT | Performed by: FAMILY MEDICINE

## 2022-01-12 PROCEDURE — 3061F NEG MICROALBUMINURIA REV: CPT | Performed by: FAMILY MEDICINE

## 2022-01-12 PROCEDURE — 3008F BODY MASS INDEX DOCD: CPT | Performed by: FAMILY MEDICINE

## 2022-01-12 PROCEDURE — 82043 UR ALBUMIN QUANTITATIVE: CPT | Performed by: FAMILY MEDICINE

## 2022-01-12 RX ORDER — GABAPENTIN 600 MG/1
TABLET ORAL
Qty: 270 TABLET | Refills: 1 | Status: SHIPPED | OUTPATIENT
Start: 2022-01-12 | End: 2022-01-12

## 2022-01-12 RX ORDER — BLOOD-GLUCOSE SENSOR
1 EACH MISCELLANEOUS
Qty: 9 EACH | Refills: 3 | Status: SHIPPED | OUTPATIENT
Start: 2022-01-12

## 2022-01-12 RX ORDER — MONTELUKAST SODIUM 10 MG/1
10 TABLET ORAL NIGHTLY
Qty: 90 TABLET | Refills: 1 | Status: SHIPPED | OUTPATIENT
Start: 2022-01-12 | End: 2022-01-24

## 2022-01-12 RX ORDER — BLOOD-GLUCOSE TRANSMITTER
1 EACH MISCELLANEOUS
Qty: 1 EACH | Refills: 3 | Status: SHIPPED | OUTPATIENT
Start: 2022-01-12

## 2022-01-12 RX ORDER — BLOOD-GLUCOSE,RECEIVER,CONT
1 EACH MISCELLANEOUS ONCE
Qty: 1 EACH | Refills: 0 | Status: SHIPPED | OUTPATIENT
Start: 2022-01-12 | End: 2022-01-12

## 2022-01-12 RX ORDER — BLOOD-GLUCOSE SENSOR
1 EACH MISCELLANEOUS
Qty: 9 EACH | Refills: 3 | Status: SHIPPED | OUTPATIENT
Start: 2022-01-12 | End: 2022-01-12

## 2022-01-12 RX ORDER — BLOOD-GLUCOSE TRANSMITTER
1 EACH MISCELLANEOUS
Qty: 1 EACH | Refills: 3 | Status: SHIPPED | OUTPATIENT
Start: 2022-01-12 | End: 2022-01-12

## 2022-01-12 RX ORDER — INSULIN GLARGINE 100 [IU]/ML
20 INJECTION, SOLUTION SUBCUTANEOUS NIGHTLY
Qty: 30 ML | Refills: 0 | Status: SHIPPED | OUTPATIENT
Start: 2022-01-12

## 2022-01-12 RX ORDER — GABAPENTIN 600 MG/1
TABLET ORAL
Qty: 270 TABLET | Refills: 1 | Status: SHIPPED | OUTPATIENT
Start: 2022-01-12 | End: 2022-01-24

## 2022-01-12 RX ORDER — MONTELUKAST SODIUM 10 MG/1
10 TABLET ORAL NIGHTLY
Qty: 90 TABLET | Refills: 1 | Status: SHIPPED | OUTPATIENT
Start: 2022-01-12 | End: 2022-01-12

## 2022-01-12 NOTE — PROGRESS NOTES
HPI:   Alicia Sheikh is a 59year old female who presents for recheck of her diabetes and asthma and neuropathy     Pt has LE neuropathy  - EMG 2014   Needs handicapped placard     Patient’s FBS have been in the 90's ; occasional high spikes with stress 56 U/L 32   ALKALINE PHOSPHATASE      46 - 118 U/L 118   Total Bilirubin      0.1 - 2.0 mg/dL 0.4   PROTEIN, TOTAL      6.4 - 8.2 g/dL 7.3   Albumin      3.4 - 5.0 g/dL 3.6   Globulin      2.8 - 4.4 g/dL 3.7   A/G Ratio      1.0 - 2.0 1.0   Patient Fasting (calc))   Date Value   11/11/2011 113   12/30/2010 121     LDL CHOLESTROL (mg/dL)   Date Value   07/16/2014 152 (H)   04/10/2013 109     Calculated LDL (mg/dL)   Date Value   04/18/2018 115     AST (U/L)   Date Value   01/03/2022 23   03/06/2021 16   05/22 Inject 20 Units into the skin nightly. 3 mL 0   • Insulin Pen Needle (BD PEN NEEDLE DANIELA U/F) 32G X 4 MM Does not apply Misc Up to  each 1   • LANTUS SOLOSTAR 100 UNIT/ML Subcutaneous Solution Pen-injector Inject 20 Units into the skin nightly.  30 m HISTORY  4/30/2015    mediport removal   • RADIATION RIGHT     • TOTAL ABDOMINAL HYSTERECTOMY  5/13/2014    Procedure: ABDOMINAL HYSTERECTOMY TOTAL BSO STAGING;  Surgeon: Coleen Okeefe MD;  Location: [address] [place] [address] MAIN OR      Social History: Social History    Tobac indicates understanding of these issues and agrees to the plan. 1. Mild intermittent asthma without complication    - montelukast 10 MG Oral Tab; Take 1 tablet (10 mg total) by mouth nightly. Dispense: 90 tablet; Refill: 1    2.  Neuropathic pain    - edie

## 2022-01-24 ENCOUNTER — TELEPHONE (OUTPATIENT)
Dept: FAMILY MEDICINE CLINIC | Facility: CLINIC | Age: 57
End: 2022-01-24

## 2022-01-24 DIAGNOSIS — J45.20 MILD INTERMITTENT ASTHMA WITHOUT COMPLICATION: ICD-10-CM

## 2022-01-24 DIAGNOSIS — M79.2 NEUROPATHIC PAIN: ICD-10-CM

## 2022-01-24 DIAGNOSIS — G62.0 DRUG-INDUCED PERIPHERAL NEUROPATHY (HCC): ICD-10-CM

## 2022-01-24 RX ORDER — MONTELUKAST SODIUM 10 MG/1
10 TABLET ORAL NIGHTLY
Qty: 90 TABLET | Refills: 1 | Status: SHIPPED | OUTPATIENT
Start: 2022-01-24

## 2022-01-24 RX ORDER — GABAPENTIN 600 MG/1
TABLET ORAL
Qty: 270 TABLET | Refills: 1 | Status: SHIPPED | OUTPATIENT
Start: 2022-01-24

## 2022-01-24 NOTE — TELEPHONE ENCOUNTER
Patient calling in regards to her gabapentin 600 MG Oral Tab and montelukast 10 MG Oral Tab were sent to mail order pharmacy. She only needs these two medications sent to the local pharmacy.     Requesting that gabapentin be sent for future refill [facility]

## 2022-02-18 RX ORDER — MONTELUKAST SODIUM 10 MG/1
TABLET ORAL
Qty: 90 TABLET | Refills: 1 | Status: SHIPPED | OUTPATIENT
Start: 2022-02-18

## 2022-02-21 RX ORDER — BUDESONIDE AND FORMOTEROL FUMARATE DIHYDRATE 160; 4.5 UG/1; UG/1
2 AEROSOL RESPIRATORY (INHALATION) 2 TIMES DAILY
Qty: 3 EACH | Refills: 1 | Status: SHIPPED | OUTPATIENT
Start: 2022-02-21

## 2022-06-14 ENCOUNTER — PATIENT MESSAGE (OUTPATIENT)
Dept: FAMILY MEDICINE CLINIC | Facility: CLINIC | Age: 57
End: 2022-06-14

## 2022-06-14 DIAGNOSIS — E11.9 TYPE 2 DIABETES MELLITUS WITHOUT COMPLICATION, UNSPECIFIED WHETHER LONG TERM INSULIN USE (HCC): ICD-10-CM

## 2022-06-14 RX ORDER — INSULIN GLARGINE 100 [IU]/ML
20 INJECTION, SOLUTION SUBCUTANEOUS NIGHTLY
Qty: 10 ML | Refills: 0 | Status: SHIPPED | OUTPATIENT
Start: 2022-06-14

## 2022-06-14 NOTE — TELEPHONE ENCOUNTER
From: Vilma Prasad  To: Lui Nicholas DO  Sent: 6/14/2022 2:27 PM CDT  Subject: Requesting a partial supply of Lantus insulin     Good afternoon,      Please send a prescription request to [facility] , for a partial supply of Lantus insulin. My mail order supply will not arrive before I run out. Thank you.

## 2022-09-12 DIAGNOSIS — J45.20 MILD INTERMITTENT ASTHMA WITHOUT COMPLICATION: ICD-10-CM

## 2022-09-12 DIAGNOSIS — E11.9 TYPE 2 DIABETES MELLITUS WITHOUT COMPLICATION, UNSPECIFIED WHETHER LONG TERM INSULIN USE (HCC): ICD-10-CM

## 2022-09-14 RX ORDER — INSULIN GLARGINE 100 [IU]/ML
INJECTION, SOLUTION SUBCUTANEOUS
Qty: 9 ML | Refills: 0 | Status: SHIPPED | OUTPATIENT
Start: 2022-09-14

## 2022-09-14 RX ORDER — BUDESONIDE AND FORMOTEROL FUMARATE DIHYDRATE 160; 4.5 UG/1; UG/1
AEROSOL RESPIRATORY (INHALATION)
Qty: 30.6 G | Refills: 0 | Status: SHIPPED | OUTPATIENT
Start: 2022-09-14

## 2022-10-06 NOTE — PROGRESS NOTES
HPI:   Aidan Fernandez is a 52year old female who presents for recheck of her diabetes     Pt out of the lantus for a couple of days due to cost.  Almost out of novolog     Patient’s FBS have been in the 120s  Labs due in sept / October   No low blood sug Tried to reach patient, [facility] about medication and lab results. ----------  AST (U/L)   Date Value   06/04/2018 22   04/18/2018 15   11/17/2017 15   02/04/2017 16   07/16/2014 10 (L)   07/07/2014 20   04/17/2014 11 (L)   11/11/2011 16   12/30/2010 24   ----------  ALT (U/L)   Date Value   04/18/2018 21   07/16/2014 1 Take 3 mL by nebulization every 4 (four) hours as needed for Wheezing.  Disp: 1 Box Rfl: 3      Past Medical History:   Diagnosis Date   • Anemia     transfusions 9/13   • Asthma    • Breast cancer [facility])     right breast 9/13   • Cancer [facility])     breast   • diarrhea or constipation  NEURO: denies headaches or dizziness  PSYCH: denies depression or anxiety  NUTRITION:follows diabetic diet    EXAM:   /84   Pulse 87   Resp 18   Ht 63\"   Wt 173 lb   LMP 09/01/2013 (LMP Unknown)   SpO2 98%   BMI 30.65 kg/m²

## 2022-10-08 ENCOUNTER — TELEPHONE (OUTPATIENT)
Dept: FAMILY MEDICINE CLINIC | Facility: CLINIC | Age: 57
End: 2022-10-08

## 2022-11-03 DIAGNOSIS — E11.9 TYPE 2 DIABETES MELLITUS WITHOUT COMPLICATION, UNSPECIFIED WHETHER LONG TERM INSULIN USE (HCC): ICD-10-CM

## 2022-11-04 RX ORDER — INSULIN GLARGINE 100 [IU]/ML
INJECTION, SOLUTION SUBCUTANEOUS
Qty: 9 ML | Refills: 0 | Status: SHIPPED | OUTPATIENT
Start: 2022-11-04

## 2022-11-08 DIAGNOSIS — E11.9 TYPE 2 DIABETES MELLITUS WITHOUT COMPLICATION, UNSPECIFIED WHETHER LONG TERM INSULIN USE (HCC): ICD-10-CM

## 2022-11-08 RX ORDER — PEN NEEDLE, DIABETIC 32GX 5/32"
NEEDLE, DISPOSABLE MISCELLANEOUS
Qty: 200 EACH | Refills: 2 | Status: SHIPPED | OUTPATIENT
Start: 2022-11-08

## 2022-12-18 DIAGNOSIS — J45.20 MILD INTERMITTENT ASTHMA WITHOUT COMPLICATION: ICD-10-CM

## 2022-12-19 RX ORDER — BUDESONIDE AND FORMOTEROL FUMARATE DIHYDRATE 160; 4.5 UG/1; UG/1
AEROSOL RESPIRATORY (INHALATION)
Qty: 30.6 G | Refills: 0 | Status: SHIPPED | OUTPATIENT
Start: 2022-12-19

## 2022-12-21 ENCOUNTER — PATIENT MESSAGE (OUTPATIENT)
Dept: FAMILY MEDICINE CLINIC | Facility: CLINIC | Age: 57
End: 2022-12-21

## 2022-12-21 DIAGNOSIS — E11.9 TYPE 2 DIABETES MELLITUS WITHOUT COMPLICATION, UNSPECIFIED WHETHER LONG TERM INSULIN USE (HCC): ICD-10-CM

## 2022-12-22 RX ORDER — INSULIN GLARGINE 100 [IU]/ML
20 INJECTION, SOLUTION SUBCUTANEOUS NIGHTLY
Qty: 9 ML | Refills: 0 | Status: SHIPPED | OUTPATIENT
Start: 2022-12-22

## 2022-12-22 NOTE — TELEPHONE ENCOUNTER
From: Duncan Mensah  To: Vikki Linares DO  Sent: 12/21/2022 8:56 PM CST  Subject: Lantus insulin renewal     Please renew my prescription for Lantus Insulin, I believe [facility] on [address] sent the request a few days ago. Thank you.

## 2023-04-24 ENCOUNTER — PATIENT MESSAGE (OUTPATIENT)
Dept: FAMILY MEDICINE CLINIC | Facility: CLINIC | Age: 58
End: 2023-04-24

## 2023-04-24 DIAGNOSIS — J45.20 MILD INTERMITTENT ASTHMA WITHOUT COMPLICATION: ICD-10-CM

## 2023-04-24 RX ORDER — BUDESONIDE AND FORMOTEROL FUMARATE DIHYDRATE 160; 4.5 UG/1; UG/1
AEROSOL RESPIRATORY (INHALATION)
Qty: 30.6 G | Refills: 0 | Status: SHIPPED | OUTPATIENT
Start: 2023-04-24

## 2023-04-24 RX ORDER — BUDESONIDE AND FORMOTEROL FUMARATE DIHYDRATE 160; 4.5 UG/1; UG/1
2 AEROSOL RESPIRATORY (INHALATION) 2 TIMES DAILY
Qty: 30.6 G | Refills: 0 | Status: SHIPPED | OUTPATIENT
Start: 2023-04-24

## 2023-04-24 NOTE — TELEPHONE ENCOUNTER
From: Alison Watson  To: Chelsey Back DO  Sent: 4/24/2023 2:24 PM CDT  Subject: Budesonide inhaler    Good afternoon, I am having trouble with this inhaler and went to the pharmacy to notify them of a malfunction but they refused to replace it opting instead to run hot water on it. They tested it and discharged a number of doses, but when I arrived home to take my scheduled doses ,again it did not work. I went to the pharmacy again where they stated I could not receive a replacement for this malfunctioning one even though it indicates there are 120 doses left. I have requested a refill.     Thank you,    Swathi

## 2023-04-28 DIAGNOSIS — E11.9 TYPE 2 DIABETES MELLITUS WITHOUT COMPLICATION, UNSPECIFIED WHETHER LONG TERM INSULIN USE (HCC): ICD-10-CM

## 2023-04-28 RX ORDER — INSULIN GLARGINE-YFGN 100 [IU]/ML
INJECTION, SOLUTION SUBCUTANEOUS
Qty: 21 ML | Refills: 0 | Status: SHIPPED | OUTPATIENT
Start: 2023-04-28

## 2023-05-20 ENCOUNTER — TELEMEDICINE (OUTPATIENT)
Dept: FAMILY MEDICINE CLINIC | Facility: CLINIC | Age: 58
End: 2023-05-20

## 2023-05-20 DIAGNOSIS — J45.20 MILD INTERMITTENT ASTHMA WITHOUT COMPLICATION: ICD-10-CM

## 2023-05-20 DIAGNOSIS — E78.00 PURE HYPERCHOLESTEROLEMIA: ICD-10-CM

## 2023-05-20 DIAGNOSIS — G62.0 DRUG-INDUCED PERIPHERAL NEUROPATHY (HCC): ICD-10-CM

## 2023-05-20 DIAGNOSIS — E11.9 TYPE 2 DIABETES MELLITUS WITHOUT COMPLICATION, UNSPECIFIED WHETHER LONG TERM INSULIN USE (HCC): Primary | ICD-10-CM

## 2023-05-20 DIAGNOSIS — Z00.00 GENERAL MEDICAL EXAM: ICD-10-CM

## 2023-05-20 DIAGNOSIS — M79.2 NEUROPATHIC PAIN: ICD-10-CM

## 2023-05-20 PROCEDURE — 99214 OFFICE O/P EST MOD 30 MIN: CPT | Performed by: FAMILY MEDICINE

## 2023-05-20 RX ORDER — MONTELUKAST SODIUM 10 MG/1
10 TABLET ORAL NIGHTLY
Qty: 90 TABLET | Refills: 1 | Status: SHIPPED | OUTPATIENT
Start: 2023-05-20

## 2023-05-20 RX ORDER — BUDESONIDE AND FORMOTEROL FUMARATE DIHYDRATE 160; 4.5 UG/1; UG/1
2 AEROSOL RESPIRATORY (INHALATION) 2 TIMES DAILY
Qty: 30.6 G | Refills: 1 | Status: SHIPPED | OUTPATIENT
Start: 2023-05-20

## 2023-05-20 RX ORDER — GABAPENTIN 600 MG/1
TABLET ORAL
Qty: 270 TABLET | Refills: 1 | Status: SHIPPED | OUTPATIENT
Start: 2023-05-20

## 2023-05-20 RX ORDER — PEN NEEDLE, DIABETIC 32GX 5/32"
NEEDLE, DISPOSABLE MISCELLANEOUS
Qty: 200 EACH | Refills: 2 | Status: SHIPPED | OUTPATIENT
Start: 2023-05-20

## 2023-05-20 RX ORDER — ALBUTEROL SULFATE 2.5 MG/3ML
2.5 SOLUTION RESPIRATORY (INHALATION) EVERY 4 HOURS PRN
Qty: 90 ML | Refills: 0 | Status: SHIPPED | OUTPATIENT
Start: 2023-05-20

## 2023-07-10 NOTE — TELEPHONE ENCOUNTER
Overdue for DM and labs Patients last appointment 6/14/2023.   Patients next scheduled appointment   Future Appointments   Date Time Provider [address]   7/21/2023 10:40 AM Walker Sanchez MD [facility]   9/1/2023 10:50 AM [facility]

## 2023-10-12 DIAGNOSIS — E11.9 TYPE 2 DIABETES MELLITUS WITHOUT COMPLICATION, UNSPECIFIED WHETHER LONG TERM INSULIN USE (HCC): ICD-10-CM

## 2023-10-13 RX ORDER — INSULIN GLARGINE-YFGN 100 [IU]/ML
20 INJECTION, SOLUTION SUBCUTANEOUS NIGHTLY
Qty: 21 ML | Refills: 0 | Status: SHIPPED | OUTPATIENT
Start: 2023-10-13

## 2023-12-21 ENCOUNTER — LAB ENCOUNTER (OUTPATIENT)
Dept: LAB | Facility: HOSPITAL | Age: 58
End: 2023-12-21
Attending: FAMILY MEDICINE
Payer: COMMERCIAL

## 2023-12-21 DIAGNOSIS — Z00.00 GENERAL MEDICAL EXAM: ICD-10-CM

## 2023-12-21 DIAGNOSIS — E11.9 TYPE 2 DIABETES MELLITUS WITHOUT COMPLICATION, UNSPECIFIED WHETHER LONG TERM INSULIN USE (HCC): ICD-10-CM

## 2023-12-21 LAB
ALBUMIN SERPL-MCNC: 3.7 G/DL (ref 3.4–5)
ALBUMIN/GLOB SERPL: 1 {RATIO} (ref 1–2)
ALP LIVER SERPL-CCNC: 126 U/L
ALT SERPL-CCNC: 22 U/L
ANION GAP SERPL CALC-SCNC: 1 MMOL/L (ref 0–18)
AST SERPL-CCNC: 34 U/L (ref 15–37)
BASOPHILS # BLD AUTO: 0.01 X10(3) UL (ref 0–0.2)
BASOPHILS NFR BLD AUTO: 0.3 %
BILIRUB SERPL-MCNC: 0.4 MG/DL (ref 0.1–2)
BUN BLD-MCNC: 10 MG/DL (ref 9–23)
CALCIUM BLD-MCNC: 9.6 MG/DL (ref 8.5–10.1)
CHLORIDE SERPL-SCNC: 106 MMOL/L (ref 98–112)
CHOLEST SERPL-MCNC: 176 MG/DL (ref ?–200)
CO2 SERPL-SCNC: 32 MMOL/L (ref 21–32)
CREAT BLD-MCNC: 0.81 MG/DL
CREAT UR-SCNC: 117 MG/DL
EGFRCR SERPLBLD CKD-EPI 2021: 83 ML/MIN/1.73M2 (ref 60–?)
EOSINOPHIL # BLD AUTO: 0.04 X10(3) UL (ref 0–0.7)
EOSINOPHIL NFR BLD AUTO: 1.2 %
ERYTHROCYTE [DISTWIDTH] IN BLOOD BY AUTOMATED COUNT: 14.3 %
EST. AVERAGE GLUCOSE BLD GHB EST-MCNC: 143 MG/DL (ref 68–126)
FASTING PATIENT LIPID ANSWER: YES
FASTING STATUS PATIENT QL REPORTED: YES
GLOBULIN PLAS-MCNC: 3.8 G/DL (ref 2.8–4.4)
GLUCOSE BLD-MCNC: 97 MG/DL (ref 70–99)
HBA1C MFR BLD: 6.6 % (ref ?–5.7)
HCT VFR BLD AUTO: 36.3 %
HDLC SERPL-MCNC: 66 MG/DL (ref 40–59)
HGB BLD-MCNC: 11.9 G/DL
IMM GRANULOCYTES # BLD AUTO: 0.01 X10(3) UL (ref 0–1)
IMM GRANULOCYTES NFR BLD: 0.3 %
LDLC SERPL CALC-MCNC: 96 MG/DL (ref ?–100)
LYMPHOCYTES # BLD AUTO: 1.33 X10(3) UL (ref 1–4)
LYMPHOCYTES NFR BLD AUTO: 39.8 %
MCH RBC QN AUTO: 28.7 PG (ref 26–34)
MCHC RBC AUTO-ENTMCNC: 32.8 G/DL (ref 31–37)
MCV RBC AUTO: 87.7 FL
MICROALBUMIN UR-MCNC: 0.67 MG/DL
MICROALBUMIN/CREAT 24H UR-RTO: 5.7 UG/MG (ref ?–30)
MONOCYTES # BLD AUTO: 0.35 X10(3) UL (ref 0.1–1)
MONOCYTES NFR BLD AUTO: 10.5 %
NEUTROPHILS # BLD AUTO: 1.6 X10 (3) UL (ref 1.5–7.7)
NEUTROPHILS # BLD AUTO: 1.6 X10(3) UL (ref 1.5–7.7)
NEUTROPHILS NFR BLD AUTO: 47.9 %
NONHDLC SERPL-MCNC: 110 MG/DL (ref ?–130)
OSMOLALITY SERPL CALC.SUM OF ELEC: 287 MOSM/KG (ref 275–295)
PLATELET # BLD AUTO: 194 10(3)UL (ref 150–450)
POTASSIUM SERPL-SCNC: 3.7 MMOL/L (ref 3.5–5.1)
PROT SERPL-MCNC: 7.5 G/DL (ref 6.4–8.2)
RBC # BLD AUTO: 4.14 X10(6)UL
SODIUM SERPL-SCNC: 139 MMOL/L (ref 136–145)
T4 FREE SERPL-MCNC: 1.1 NG/DL (ref 0.8–1.7)
TRIGL SERPL-MCNC: 75 MG/DL (ref 30–149)
TSI SER-ACNC: 3.66 MIU/ML (ref 0.36–3.74)
VIT B12 SERPL-MCNC: 503 PG/ML (ref 193–986)
VIT D+METAB SERPL-MCNC: 26 NG/ML (ref 30–100)
VLDLC SERPL CALC-MCNC: 12 MG/DL (ref 0–30)
WBC # BLD AUTO: 3.3 X10(3) UL (ref 4–11)

## 2023-12-21 PROCEDURE — 84439 ASSAY OF FREE THYROXINE: CPT

## 2023-12-21 PROCEDURE — 85025 COMPLETE CBC W/AUTO DIFF WBC: CPT

## 2023-12-21 PROCEDURE — 80061 LIPID PANEL: CPT

## 2023-12-21 PROCEDURE — 82570 ASSAY OF URINE CREATININE: CPT

## 2023-12-21 PROCEDURE — 82043 UR ALBUMIN QUANTITATIVE: CPT

## 2023-12-21 PROCEDURE — 80053 COMPREHEN METABOLIC PANEL: CPT

## 2023-12-21 PROCEDURE — 83036 HEMOGLOBIN GLYCOSYLATED A1C: CPT

## 2023-12-21 PROCEDURE — 82306 VITAMIN D 25 HYDROXY: CPT

## 2023-12-21 PROCEDURE — 36415 COLL VENOUS BLD VENIPUNCTURE: CPT

## 2023-12-21 PROCEDURE — 84443 ASSAY THYROID STIM HORMONE: CPT

## 2023-12-21 PROCEDURE — 82607 VITAMIN B-12: CPT

## 2023-12-22 ENCOUNTER — OFFICE VISIT (OUTPATIENT)
Dept: FAMILY MEDICINE CLINIC | Facility: CLINIC | Age: 58
End: 2023-12-22
Payer: COMMERCIAL

## 2023-12-22 VITALS
WEIGHT: 182 LBS | SYSTOLIC BLOOD PRESSURE: 130 MMHG | BODY MASS INDEX: 32 KG/M2 | RESPIRATION RATE: 16 BRPM | DIASTOLIC BLOOD PRESSURE: 84 MMHG | HEART RATE: 80 BPM | OXYGEN SATURATION: 98 %

## 2023-12-22 DIAGNOSIS — E11.9 TYPE 2 DIABETES MELLITUS WITHOUT COMPLICATION, UNSPECIFIED WHETHER LONG TERM INSULIN USE (HCC): Primary | ICD-10-CM

## 2023-12-22 DIAGNOSIS — J45.20 MILD INTERMITTENT ASTHMA WITHOUT COMPLICATION: ICD-10-CM

## 2023-12-22 DIAGNOSIS — E04.9 GOITER: ICD-10-CM

## 2023-12-22 DIAGNOSIS — E78.00 PURE HYPERCHOLESTEROLEMIA: ICD-10-CM

## 2023-12-22 DIAGNOSIS — G62.0 DRUG-INDUCED PERIPHERAL NEUROPATHY (HCC): ICD-10-CM

## 2023-12-22 DIAGNOSIS — Z12.11 COLON CANCER SCREENING: ICD-10-CM

## 2023-12-22 DIAGNOSIS — D50.9 IRON DEFICIENCY ANEMIA, UNSPECIFIED IRON DEFICIENCY ANEMIA TYPE: ICD-10-CM

## 2023-12-22 DIAGNOSIS — M79.2 NEUROPATHIC PAIN: ICD-10-CM

## 2023-12-22 RX ORDER — PEN NEEDLE, DIABETIC 32GX 5/32"
NEEDLE, DISPOSABLE MISCELLANEOUS
Qty: 200 EACH | Refills: 2 | Status: SHIPPED | OUTPATIENT
Start: 2023-12-22

## 2023-12-22 RX ORDER — BUDESONIDE AND FORMOTEROL FUMARATE DIHYDRATE 160; 4.5 UG/1; UG/1
2 AEROSOL RESPIRATORY (INHALATION) 2 TIMES DAILY
Qty: 3 EACH | Refills: 1 | Status: SHIPPED | OUTPATIENT
Start: 2023-12-22

## 2023-12-22 RX ORDER — ALBUTEROL SULFATE 90 UG/1
1 AEROSOL, METERED RESPIRATORY (INHALATION) EVERY 4 HOURS PRN
Qty: 3 EACH | Refills: 1 | Status: SHIPPED | OUTPATIENT
Start: 2023-12-22

## 2023-12-22 RX ORDER — ALBUTEROL SULFATE 2.5 MG/3ML
2.5 SOLUTION RESPIRATORY (INHALATION) EVERY 4 HOURS PRN
Qty: 90 ML | Refills: 0 | Status: SHIPPED | OUTPATIENT
Start: 2023-12-22

## 2023-12-22 RX ORDER — GABAPENTIN 600 MG/1
TABLET ORAL
Qty: 270 TABLET | Refills: 1 | Status: SHIPPED | OUTPATIENT
Start: 2023-12-22

## 2023-12-22 RX ORDER — INSULIN GLARGINE-YFGN 100 [IU]/ML
20 INJECTION, SOLUTION SUBCUTANEOUS NIGHTLY
Qty: 21 ML | Refills: 0 | Status: SHIPPED | OUTPATIENT
Start: 2023-12-22

## 2023-12-22 RX ORDER — MONTELUKAST SODIUM 10 MG/1
10 TABLET ORAL NIGHTLY
Qty: 90 TABLET | Refills: 1 | Status: SHIPPED | OUTPATIENT
Start: 2023-12-22

## 2024-02-20 DIAGNOSIS — J45.20 MILD INTERMITTENT ASTHMA WITHOUT COMPLICATION (HCC): ICD-10-CM

## 2024-02-20 RX ORDER — BUDESONIDE AND FORMOTEROL FUMARATE DIHYDRATE 160; 4.5 UG/1; UG/1
2 AEROSOL RESPIRATORY (INHALATION) 2 TIMES DAILY
Qty: 3 EACH | Refills: 1 | Status: SHIPPED | OUTPATIENT
Start: 2024-02-20

## 2024-05-14 DIAGNOSIS — E11.9 TYPE 2 DIABETES MELLITUS WITHOUT COMPLICATION, UNSPECIFIED WHETHER LONG TERM INSULIN USE (HCC): ICD-10-CM

## 2024-05-15 RX ORDER — INSULIN GLARGINE-YFGN 100 [IU]/ML
20 INJECTION, SOLUTION SUBCUTANEOUS NIGHTLY
Qty: 21 ML | Refills: 0 | Status: SHIPPED | OUTPATIENT
Start: 2024-05-15

## 2024-05-20 ENCOUNTER — HOSPITAL ENCOUNTER (OUTPATIENT)
Age: 59
Discharge: HOME | End: 2024-05-20

## 2024-05-20 VITALS
TEMPERATURE: 98 F | HEART RATE: 99 BPM | DIASTOLIC BLOOD PRESSURE: 72 MMHG | OXYGEN SATURATION: 94 % | RESPIRATION RATE: 16 BRPM | SYSTOLIC BLOOD PRESSURE: 137 MMHG

## 2024-05-20 DIAGNOSIS — J45.901 MILD ASTHMA WITH EXACERBATION, UNSPECIFIED WHETHER PERSISTENT (HCC): Primary | ICD-10-CM

## 2024-05-20 PROCEDURE — 99204 OFFICE O/P NEW MOD 45 MIN: CPT | Performed by: NURSE PRACTITIONER

## 2024-05-20 PROCEDURE — 94640 AIRWAY INHALATION TREATMENT: CPT | Performed by: NURSE PRACTITIONER

## 2024-05-20 RX ORDER — PREDNISONE 20 MG/1
40 TABLET ORAL DAILY
Qty: 8 TABLET | Refills: 0 | Status: SHIPPED | OUTPATIENT
Start: 2024-05-21 | End: 2024-05-25

## 2024-05-20 RX ORDER — IPRATROPIUM BROMIDE AND ALBUTEROL SULFATE 2.5; .5 MG/3ML; MG/3ML
3 SOLUTION RESPIRATORY (INHALATION) ONCE
Status: COMPLETED | OUTPATIENT
Start: 2024-05-20 | End: 2024-05-20

## 2024-05-20 RX ORDER — PREDNISONE 20 MG/1
60 TABLET ORAL ONCE
Status: COMPLETED | OUTPATIENT
Start: 2024-05-20 | End: 2024-05-20

## 2024-05-20 NOTE — DISCHARGE INSTRUCTIONS
Use your albuterol inhaler as needed.  Take the prednisone as prescribed, start the first dose tomorrow.  Follow-up with your doctor.  Go to the ER for new or worsening symptoms.

## 2024-05-20 NOTE — ED PROVIDER NOTES
Patient Seen in: [facility]      History     Chief Complaint   Patient presents with    Shortness Of Breath     Stated Complaint: asthma symptoms    Subjective:   59-year-old female, who presents with chest noises, wheezing, cough that started yesterday.  No known fevers.  No hemoptysis.            Objective:   Past Medical History:    Anemia    transfusions 9/13    Asthma (HCC)    Breast cancer (HCC)    right breast 9/13    Cancer (HCC)    breast    COVID-19    Tested 11/9/20    Heart murmur    History of blood transfusion    Murmur    benign murmur, unknown what type    Neuropathy    Personal history of antineoplastic chemotherapy    last chemo treatment    Pneumonia, organism unspecified(486)    Seasonal allergies    Type II or unspecified type diabetes mellitus without mention of complication, not stated as uncontrolled    Wheezing    related seasonal and fish allergies, takes inhalers              Past Surgical History:   Procedure Laterality Date    Access port      left chest port a cath    Breast biopsy  9/12/2013    Procedure: BREAST BIOPSY;  Surgeon: Yasmany Goode MD;  Location:  MAIN OR    D & c      Mastectomy modified radical  3/18/2014    Procedure: BREAST MODIFIED RADICAL MASTECTOMY;  Surgeon: Jose Luis Adam MD;  Location:  MAIN OR    Mastectomy right      3/18/14    Needle biopsy right      Other surgical history  4/30/2015    mediport removal    Radiation right      Total abdominal hysterectomy  5/13/2014    Procedure: ABDOMINAL HYSTERECTOMY TOTAL BSO STAGING;  Surgeon: Jose Luis Adam MD;  Location:  MAIN OR                Social History     Socioeconomic History    Marital status: Single    Number of children: 1   Occupational History    Occupation: RN , on leave of absence     Employer: [employer]   Tobacco Use    Smoking status: Never    Smokeless tobacco: Never   Vaping Use    Vaping status: Never Used   Substance and Sexual Activity    Alcohol use: No      Alcohol/week: 0.0 standard drinks of alcohol    Drug use: No   Other Topics Concern    Blood Transfusions Yes    Caffeine Concern No     Comment: occassional    Exercise Yes     Comment: bike riding, walking, active   Social History Narrative    Lives in [place] with daughter and 3  grandchildren.               Review of Systems   Constitutional: Negative.    Respiratory:  Positive for cough, chest tightness, shortness of breath and wheezing.    All other systems reviewed and are negative.      Positive for stated complaint: asthma symptoms  Other systems are as noted in HPI.  Constitutional and vital signs reviewed.      All other systems reviewed and negative except as noted above.    Physical Exam     ED Triage Vitals [05/20/24 1758]   /82   Pulse 103   Resp 16   Temp 97.6 °F (36.4 °C)   Temp src Temporal   SpO2 94 %   O2 Device None (Room air)       Current Vitals:   Vital Signs  BP: 137/72  Pulse: 99  Resp: 16  Temp: 97.6 °F (36.4 °C)  Temp src: Temporal    Oxygen Therapy  SpO2: 94 %  O2 Device: None (Room air)            Physical Exam  Vitals and nursing note reviewed.   Constitutional:       Appearance: She is well-developed. She is not ill-appearing, toxic-appearing or diaphoretic.   HENT:      Mouth/Throat:      Lips: Pink.      Mouth: Mucous membranes are moist. No angioedema.      Pharynx: Oropharynx is clear. Uvula midline. No pharyngeal swelling or posterior oropharyngeal erythema.   Cardiovascular:      Rate and Rhythm: Normal rate and regular rhythm.      Pulses: Normal pulses.      Heart sounds: Normal heart sounds.   Pulmonary:      Breath sounds: Decreased air movement present.   Skin:     General: Skin is warm and dry.      Capillary Refill: Capillary refill takes less than 2 seconds.      Coloration: Skin is not pale.   Neurological:      General: No focal deficit present.      Mental Status: She is alert.   Psychiatric:         Mood and Affect: Mood normal.               ED Course   Labs  Reviewed - No data to display                   [facility]                                         Medical Decision Making  Differential diagnosis initially included but was not limited to: Pneumonia, asthma exacerbation    Nontoxic-appearing 59-year-old asthmatic female, with chest tightness, wheezing, cough, shortness of breath that started yesterday.  No systemic symptoms.  Not tachypneic or hypoxic.  No conversational dyspnea.  No hemoptysis.  Unlikely to be pneumonia.  Will give DuoNeb, and first dose of prednisone.  Post neb treatment, better aeration.    Supportive/home management of diagnosis/illness/injury discussed. Red flag symptoms discussed.  Signs and symptoms/criteria that would necessitate reevaluation, including ER evaluation discussed.  Patient and/or responsible adult verbalize and agree with management and plan of care.    Speech recognition software was used during this dictation.  There may be minor errors in transcription.      Amount and/or Complexity of Data Reviewed  ECG/medicine tests: ordered. Decision-making details documented in ED Course.    Risk  Prescription drug management.        Disposition and Plan     Clinical Impression:  1. Mild asthma with exacerbation, unspecified whether persistent ([facility])         Disposition:  Discharge  5/20/2024  6:35 pm    Follow-up:  Erendira Caceres DO  [address] [place] [id number]  [phone]    Call in 2 days            Medications Prescribed:  Current Discharge Medication List        START taking these medications    Details   predniSONE 20 MG Oral Tab Take 2 tablets (40 mg total) by mouth daily for 4 days.  Qty: 8 tablet, Refills: 0

## 2024-05-21 ENCOUNTER — TELEPHONE (OUTPATIENT)
Dept: FAMILY MEDICINE CLINIC | Facility: CLINIC | Age: 59
End: 2024-05-21

## 2024-05-21 RX ORDER — INSULIN LISPRO 100 [IU]/ML
10 INJECTION, SOLUTION INTRAVENOUS; SUBCUTANEOUS 3 TIMES DAILY
Qty: 3 ML | Refills: 0 | Status: SHIPPED | OUTPATIENT
Start: 2024-05-21

## 2024-05-21 NOTE — TELEPHONE ENCOUNTER
Blood sugar 217 this morning and has not taken the prednisone she was prescribed by IC as she is already starting out at a high number.    She asked at the IC if they could give her something (insulin) to help her through the spikes while she is taking the medication.    IC did not think she needed it.    Would Dr. Caceres be able to prescribe this for her.

## 2024-05-21 NOTE — TELEPHONE ENCOUNTER
Spoke to pt, takes lantus 20u at night, however looking for temp insulin during day to help control sugars while on prednisone.   Was given first dose of prednisone yesterday in UC, has not taken any today.  Still some SOB.   this morning.   Okay for insulin?

## 2024-05-21 NOTE — TELEPHONE ENCOUNTER
Pt given prednisone in ER for SOB, however causing her blood sugars to be elevated.  On med list: insulin Glargine 20u nightly,     LM to return call. Is pt already taking insulin?   How are her symptoms? Still SOB?

## 2024-06-12 ENCOUNTER — LAB ENCOUNTER (OUTPATIENT)
Dept: LAB | Facility: HOSPITAL | Age: 59
End: 2024-06-12
Attending: FAMILY MEDICINE
Payer: COMMERCIAL

## 2024-06-12 DIAGNOSIS — E11.9 TYPE 2 DIABETES MELLITUS WITHOUT COMPLICATION, UNSPECIFIED WHETHER LONG TERM INSULIN USE (HCC): ICD-10-CM

## 2024-06-12 DIAGNOSIS — D50.9 IRON DEFICIENCY ANEMIA, UNSPECIFIED IRON DEFICIENCY ANEMIA TYPE: ICD-10-CM

## 2024-06-12 LAB
ALBUMIN SERPL-MCNC: 3.4 G/DL (ref 3.4–5)
ALBUMIN/GLOB SERPL: 0.8 {RATIO} (ref 1–2)
ALP LIVER SERPL-CCNC: 126 U/L
ALT SERPL-CCNC: 19 U/L
ANION GAP SERPL CALC-SCNC: 5 MMOL/L (ref 0–18)
AST SERPL-CCNC: 50 U/L (ref 15–37)
BASOPHILS # BLD AUTO: 0.01 X10(3) UL (ref 0–0.2)
BASOPHILS NFR BLD AUTO: 0.3 %
BILIRUB SERPL-MCNC: 0.2 MG/DL (ref 0.1–2)
BUN BLD-MCNC: 9 MG/DL (ref 9–23)
CALCIUM BLD-MCNC: 9.4 MG/DL (ref 8.5–10.1)
CHLORIDE SERPL-SCNC: 108 MMOL/L (ref 98–112)
CHOLEST SERPL-MCNC: 176 MG/DL (ref ?–200)
CO2 SERPL-SCNC: 29 MMOL/L (ref 21–32)
CREAT BLD-MCNC: 0.74 MG/DL
CREAT UR-SCNC: 176 MG/DL
DEPRECATED HBV CORE AB SER IA-ACNC: 139.4 NG/ML
EGFRCR SERPLBLD CKD-EPI 2021: 92 ML/MIN/1.73M2 (ref 60–?)
EOSINOPHIL # BLD AUTO: 0.07 X10(3) UL (ref 0–0.7)
EOSINOPHIL NFR BLD AUTO: 1.9 %
ERYTHROCYTE [DISTWIDTH] IN BLOOD BY AUTOMATED COUNT: 13.6 %
EST. AVERAGE GLUCOSE BLD GHB EST-MCNC: 157 MG/DL (ref 68–126)
FASTING PATIENT LIPID ANSWER: YES
FASTING STATUS PATIENT QL REPORTED: YES
GLOBULIN PLAS-MCNC: 4.2 G/DL (ref 2.8–4.4)
GLUCOSE BLD-MCNC: 105 MG/DL (ref 70–99)
HBA1C MFR BLD: 7.1 % (ref ?–5.7)
HCT VFR BLD AUTO: 34.9 %
HDLC SERPL-MCNC: 56 MG/DL (ref 40–59)
HGB BLD-MCNC: 11.3 G/DL
IMM GRANULOCYTES # BLD AUTO: 0.01 X10(3) UL (ref 0–1)
IMM GRANULOCYTES NFR BLD: 0.3 %
LDLC SERPL CALC-MCNC: 106 MG/DL (ref ?–100)
LYMPHOCYTES # BLD AUTO: 1.42 X10(3) UL (ref 1–4)
LYMPHOCYTES NFR BLD AUTO: 38.5 %
MCH RBC QN AUTO: 28.7 PG (ref 26–34)
MCHC RBC AUTO-ENTMCNC: 32.4 G/DL (ref 31–37)
MCV RBC AUTO: 88.6 FL
MICROALBUMIN UR-MCNC: 1.26 MG/DL
MICROALBUMIN/CREAT 24H UR-RTO: 7.2 UG/MG (ref ?–30)
MONOCYTES # BLD AUTO: 0.45 X10(3) UL (ref 0.1–1)
MONOCYTES NFR BLD AUTO: 12.2 %
NEUTROPHILS # BLD AUTO: 1.73 X10 (3) UL (ref 1.5–7.7)
NEUTROPHILS # BLD AUTO: 1.73 X10(3) UL (ref 1.5–7.7)
NEUTROPHILS NFR BLD AUTO: 46.8 %
NONHDLC SERPL-MCNC: 120 MG/DL (ref ?–130)
OSMOLALITY SERPL CALC.SUM OF ELEC: 293 MOSM/KG (ref 275–295)
PLATELET # BLD AUTO: 221 10(3)UL (ref 150–450)
POTASSIUM SERPL-SCNC: 3.9 MMOL/L (ref 3.5–5.1)
PROT SERPL-MCNC: 7.6 G/DL (ref 6.4–8.2)
RBC # BLD AUTO: 3.94 X10(6)UL
SODIUM SERPL-SCNC: 142 MMOL/L (ref 136–145)
TRIGL SERPL-MCNC: 72 MG/DL (ref 30–149)
VLDLC SERPL CALC-MCNC: 12 MG/DL (ref 0–30)
WBC # BLD AUTO: 3.7 X10(3) UL (ref 4–11)

## 2024-06-12 PROCEDURE — 82570 ASSAY OF URINE CREATININE: CPT

## 2024-06-12 PROCEDURE — 82728 ASSAY OF FERRITIN: CPT

## 2024-06-12 PROCEDURE — 80061 LIPID PANEL: CPT

## 2024-06-12 PROCEDURE — 82043 UR ALBUMIN QUANTITATIVE: CPT

## 2024-06-12 PROCEDURE — 85025 COMPLETE CBC W/AUTO DIFF WBC: CPT

## 2024-06-12 PROCEDURE — 83036 HEMOGLOBIN GLYCOSYLATED A1C: CPT

## 2024-06-12 PROCEDURE — 80053 COMPREHEN METABOLIC PANEL: CPT

## 2024-06-12 PROCEDURE — 36415 COLL VENOUS BLD VENIPUNCTURE: CPT

## 2024-06-14 ENCOUNTER — OFFICE VISIT (OUTPATIENT)
Dept: FAMILY MEDICINE CLINIC | Facility: CLINIC | Age: 59
End: 2024-06-14
Payer: COMMERCIAL

## 2024-06-14 VITALS
OXYGEN SATURATION: 98 % | RESPIRATION RATE: 16 BRPM | BODY MASS INDEX: 29.59 KG/M2 | SYSTOLIC BLOOD PRESSURE: 136 MMHG | HEART RATE: 80 BPM | HEIGHT: 63 IN | DIASTOLIC BLOOD PRESSURE: 76 MMHG | WEIGHT: 167 LBS

## 2024-06-14 DIAGNOSIS — D50.9 IRON DEFICIENCY ANEMIA, UNSPECIFIED IRON DEFICIENCY ANEMIA TYPE: ICD-10-CM

## 2024-06-14 DIAGNOSIS — E11.9 TYPE 2 DIABETES MELLITUS WITHOUT COMPLICATION, UNSPECIFIED WHETHER LONG TERM INSULIN USE (HCC): Primary | ICD-10-CM

## 2024-06-14 DIAGNOSIS — Z12.11 COLON CANCER SCREENING: ICD-10-CM

## 2024-06-14 DIAGNOSIS — Z85.3 HISTORY OF BREAST CANCER: ICD-10-CM

## 2024-06-14 PROCEDURE — 3078F DIAST BP <80 MM HG: CPT | Performed by: FAMILY MEDICINE

## 2024-06-14 PROCEDURE — 3061F NEG MICROALBUMINURIA REV: CPT | Performed by: FAMILY MEDICINE

## 2024-06-14 PROCEDURE — 3075F SYST BP GE 130 - 139MM HG: CPT | Performed by: FAMILY MEDICINE

## 2024-06-14 PROCEDURE — 3008F BODY MASS INDEX DOCD: CPT | Performed by: FAMILY MEDICINE

## 2024-06-14 PROCEDURE — 99214 OFFICE O/P EST MOD 30 MIN: CPT | Performed by: FAMILY MEDICINE

## 2024-06-14 PROCEDURE — 3051F HG A1C>EQUAL 7.0%<8.0%: CPT | Performed by: FAMILY MEDICINE

## 2024-06-14 NOTE — PROGRESS NOTES
HPI:   Swathi Arguelles is a 59 year old female who presents for recheck of her diabetes and asthma and neuropathy     Pt has LE neuropathy  - EMG 2014     Patient’s FBS were high on prednisone now off and back to baseline / only briefly added short acting insulin  On lantus    Working out regularly   Eating healthy     Swathi has been checking her feet on a regular basis  Swathi denies any tingling of the feet. Pt complains of recent pneumonia.    Asthma well controlled     Last eye exam: due / Marga     [facility]     History of [facility] cancer - sees oncology at [facility]   Discussed anemia and colon cancer screening     Not seeing any specialists         Component      Latest Ref Rng 1/3/2022 1/12/2022 12/21/2023 6/12/2024   WBC      4.0 - 11.0 x10(3) uL 3.8 (L)   3.3 (L)  3.7 (L)    RBC      3.80 - 5.30 x10(6)uL 4.40   4.14  3.94    Hemoglobin      12.0 - 16.0 g/dL 12.3   11.9 (L)  11.3 (L)    Hematocrit      35.0 - 48.0 % 40.4   36.3  34.9 (L)    Platelet Count      150.0 - 450.0 10(3)uL 206.0   194.0  221.0    MCV      80.0 - 100.0 fL 91.8   87.7  88.6    MCH      26.0 - 34.0 pg 28.0   28.7  28.7    MCHC      31.0 - 37.0 g/dL 30.4 (L)   32.8  32.4    RDW      % 13.8   14.3  13.6    Prelim Neutrophil Abs      1.50 - 7.70 x10 (3) uL 2.43   1.60  1.73    Neutrophils Absolute      1.50 - 7.70 x10(3) uL 2.43   1.60  1.73    Lymphocytes Absolute      1.00 - 4.00 x10(3) uL 1.04   1.33  1.42    Monocytes Absolute      0.10 - 1.00 x10(3) uL 0.28   0.35  0.45    Eosinophils Absolute      0.00 - 0.70 x10(3) uL 0.06   0.04  0.07    Basophils Absolute      0.00 - 0.20 x10(3) uL 0.02   0.01  0.01    Immature Granulocyte Absolute      0.00 - 1.00 x10(3) uL 0.01   0.01  0.01    Neutrophils %      % 63.2   47.9  46.8    Lymphocytes %      % 27.1   39.8  38.5    Monocytes %      % 7.3   10.5  12.2    Eosinophils %      % 1.6   1.2  1.9    Basophils %      % 0.5   0.3  0.3    Immature Granulocyte %      % 0.3    0.3  0.3    Glucose      70 - 99 mg/dL 125 (H)   97  105 (H)    Sodium      136 - 145 mmol/L 139   139  142    Potassium      3.5 - 5.1 mmol/L 3.9   3.7  3.9    Chloride      98 - 112 mmol/L 106   106  108    Carbon Dioxide, Total      21.0 - 32.0 mmol/L 28.0   32.0  29.0    ANION GAP      0 - 18 mmol/L 5   1  5    BUN      9 - 23 mg/dL 7   10  9    CREATININE      0.55 - 1.02 mg/dL 0.79   0.81  0.74    CALCIUM      8.5 - 10.1 mg/dL 9.3   9.6  9.4    CALCULATED OSMOLALITY      275 - 295 mOsm/kg 287   287  293    eGFR NON-AFR. AMERICAN      >=60  83       eGFR       >=60  95       AST (SGOT)      15 - 37 U/L 23   34  50 (H)    ALT (SGPT)      13 - 56 U/L 32   22  19    ALKALINE PHOSPHATASE      50 - 130 U/L 118   126 (H)  126    Total Bilirubin      0.1 - 2.0 mg/dL 0.4   0.4  0.2    PROTEIN, TOTAL      6.4 - 8.2 g/dL 7.3   7.5  7.6    Albumin      3.4 - 5.0 g/dL 3.6   3.7  3.4    Globulin      2.8 - 4.4 g/dL 3.7   3.8  4.2    A/G Ratio      1.0 - 2.0  1.0   1.0  0.8 (L)    Patient Fasting for CMP? Yes   Yes  Yes    EGFR      >=60 mL/min/1.73m2   83  92    Cholesterol, Total      <200 mg/dL 193   176  176    HDL Cholesterol      40 - 59 mg/dL 58   66 (H)  56    Triglycerides      30 - 149 mg/dL 96   75  72    LDL Cholesterol Calc      <100 mg/dL 118 (H)   96  106 (H)    VLDL      0 - 30 mg/dL 17   12  12    NON-HDL CHOLESTEROL      <130 mg/dL 135 (H)   110  120    Patient Fasting for Lipid? Yes   Yes  Yes    MALB URINE      mg/dL  0.60  0.67  1.26    CREATININE UR RANDOM      mg/dL  54.90  117.00  176.00    MALB/CRE CALC      <=30.0 ug/mg  10.9  5.7  7.2    HEMOGLOBIN A1c      <5.7 % 7.6 (H)   6.6 (H)  7.1 (H)    ESTIMATED AVERAGE GLUCOSE      68 - 126 mg/dL 171 (H)   143 (H)  157 (H)    T4,Free (Direct)      0.8 - 1.7 ng/dL 1.2   1.1     TSH      0.358 - 3.740 mIU/mL 0.835   3.660     Vitamin B12      193 - 986 pg/mL >2,000 (H)   503     VITAMIN D, 25-OH, TOTAL      30.0 - 100.0 ng/mL 22.1 (L)   26.0  (L)     FERRITIN      18.0 - 340.0 ng/mL    139.4       Legend:  (L) Low  (H) High    Current Outpatient Medications   Medication Sig Dispense Refill    Insulin Lispro, 1 Unit Dial, 100 UNIT/ML Subcutaneous Solution Pen-injector Inject 10 Units into the skin in the morning, at noon, and at bedtime. 3 mL 0    Insulin Glargine-yfgn 100 UNIT/ML Subcutaneous Solution Pen-injector Inject 20 Units into the skin nightly. 21 mL 0    Budesonide-Formoterol Fumarate 160-4.5 MCG/ACT Inhalation Aerosol Inhale 2 puffs into the lungs 2 (two) times daily. 3 each 1    albuterol (2.5 MG/3ML) 0.083% Inhalation Nebu Soln Take 3 mL (2.5 mg total) by nebulization every 4 (four) hours as needed for Wheezing or Shortness of Breath. 90 mL 0    albuterol (PROAIR HFA) 108 (90 Base) MCG/ACT Inhalation Aero Soln Inhale 1 puff into the lungs every 4 (four) hours as needed for Wheezing. 3 each 1    gabapentin 600 MG Oral Tab TAKE 1 TABLET BY MOUTH THREE TIMES DAILY; TAKE AN EXTRA 600MG AS NEEDED FOR SEVERE PAIN 270 tablet 1    Insulin Pen Needle (BD PEN NEEDLE DANIELA U/F) 32G X 4 MM Does not apply Misc Up to  each 2    montelukast 10 MG Oral Tab Take 1 tablet (10 mg total) by mouth nightly. 90 tablet 1    loratadine 10 MG Oral Tab Take 1 tablet (10 mg total) by mouth nightly.        Past Medical History:    Anemia    transfusions 9/13    Asthma (HCC)    Breast cancer (HCC)    right breast 9/13    Cancer (HCC)    breast    COVID-19    Tested 11/9/20    Heart murmur    History of blood transfusion    Murmur    benign murmur, unknown what type    Neuropathy    Personal history of antineoplastic chemotherapy    last chemo treatment    Pneumonia, organism unspecified(486)    Seasonal allergies    Type II or unspecified type diabetes mellitus without mention of complication, not stated as uncontrolled    Wheezing    related seasonal and fish allergies, takes inhalers      Past Surgical History:   Procedure Laterality Date    Access port      left  chest port a cath    Breast biopsy  9/12/2013    Procedure: BREAST BIOPSY;  Surgeon: Yasmany Goode MD;  Location:  MAIN OR    D & c      Mastectomy modified radical  3/18/2014    Procedure: BREAST MODIFIED RADICAL MASTECTOMY;  Surgeon: Jose Luis Adam MD;  Location:  MAIN OR    Mastectomy right      3/18/14    Needle biopsy right      Other surgical history  4/30/2015    mediport removal    Radiation right      Total abdominal hysterectomy  5/13/2014    Procedure: ABDOMINAL HYSTERECTOMY TOTAL BSO STAGING;  Surgeon: Jose Luis Adam MD;  Location:  MAIN OR      Social History:   Social History     Socioeconomic History    Marital status: Single    Number of children: 1   Occupational History    Occupation: RN , on leave of absence     Employer: [employer]   Tobacco Use    Smoking status: Never    Smokeless tobacco: Never   Vaping Use    Vaping status: Never Used   Substance and Sexual Activity    Alcohol use: No     Alcohol/week: 0.0 standard drinks of alcohol    Drug use: No   Other Topics Concern    Blood Transfusions Yes    Caffeine Concern No     Comment: occassional    Exercise Yes     Comment: bike riding, walking, active   Social History Narrative    Lives in [place] with daughter and 3  grandchildren.      Exercise: minimal.  Diet: watches sugar closely     REVIEW OF SYSTEMS:   GENERAL: feels well otherwise  SKIN: denies any unusual skin lesions or rashes  PULMONARY: denies cough or shortness of breath  CV: denies chest pain or palpitations  GI: denies abdominal pain, heartburn, chronic diarrhea or constipation  NEURO: denies headaches or dizziness  PSYCH: denies depression or anxiety  NUTRITION:follows diabetic diet    EXAM:   /84   Pulse 80   Resp 16   Ht 5' 3\" (1.6 m)   Wt 167 lb (75.8 kg)   LMP 09/01/2013 (LMP Unknown)   SpO2 98%   BMI 29.58 kg/m²    Body mass index is 29.58 kg/m².  GENERAL: well developed, well nourished, in no apparent distress  SKIN: no rashes,no suspicious  lesions  NECK: supple,no adenopathy, + thyromegaly  LUNGS: clear to auscultation, easy breathing, no cough  CV: normal S1 S2, RRR without murmur  GI: good BS's, no masses, no HSM or tenderness  EXT: no cyanosis, clubbing or edema, bilateral foot exam unremarkable for open skin or lesions, bilateral 2+ DP, normal visual inspection bilaterally  NEURO: sensation is intact to monofilament bilaterally   PSYCH: judgement and insight are appropriate & intact    ASSESSMENT AND PLAN:   Swathi Arguelles is a 59 year old female who presents for a recheck of her diabetes.   Discussed importance of medication, diet adherence, skin care and routine exercise.   Reviewed good diabetic foot care.   Annual dilated eye exams reinforced.   Routine accuchecks and diabetic labwork as discussed  The patient indicates understanding of these issues and agrees to the plan.    1. Type 2 diabetes mellitus without complication, unspecified whether long term insulin use (HCC)    - Comp Metabolic Panel (14); Future  - Lipid Panel; Future  - Hemoglobin A1C; Future    2. History of breast cancer    - [facility] 2D+3D SCREENING LEFT (CPT=77067-52/56912); Future    3. Iron deficiency anemia, unspecified iron deficiency anemia type    - CBC With Differential With Platelet; Future    4. Colon cancer screening    - COLOGUARD COLON CANCER SCREENING (EXTERNAL)    Follow up 6 months or sooner if needed.

## 2024-08-13 RX ORDER — FLUTICASONE PROPIONATE AND SALMETEROL 100; 50 UG/1; UG/1
1 POWDER RESPIRATORY (INHALATION) 2 TIMES DAILY
Qty: 3 EACH | Refills: 0 | Status: SHIPPED | OUTPATIENT
Start: 2024-08-13

## 2024-08-13 RX ORDER — FLUTICASONE PROPIONATE AND SALMETEROL 250; 50 UG/1; UG/1
1 POWDER RESPIRATORY (INHALATION) 2 TIMES DAILY
Refills: 0 | OUTPATIENT
Start: 2024-08-13

## 2024-08-13 RX ORDER — FLUTICASONE PROPIONATE AND SALMETEROL 500; 50 UG/1; UG/1
1 POWDER RESPIRATORY (INHALATION) 2 TIMES DAILY
Qty: 60 EACH | Refills: 0 | OUTPATIENT
Start: 2024-08-13 | End: 2024-09-12

## 2024-08-13 NOTE — TELEPHONE ENCOUNTER
Pharmacy states insurance will not cover budesonide/form 160/4.5mcg  Budesonide last filled 2/20/2022      Alternative is Wixela

## 2024-09-02 DIAGNOSIS — G62.0 DRUG-INDUCED PERIPHERAL NEUROPATHY (HCC): ICD-10-CM

## 2024-09-02 DIAGNOSIS — M79.2 NEUROPATHIC PAIN: ICD-10-CM

## 2024-09-02 DIAGNOSIS — E11.9 TYPE 2 DIABETES MELLITUS WITHOUT COMPLICATION, UNSPECIFIED WHETHER LONG TERM INSULIN USE (HCC): ICD-10-CM

## 2024-09-03 RX ORDER — INSULIN GLARGINE-YFGN 100 [IU]/ML
20 INJECTION, SOLUTION SUBCUTANEOUS NIGHTLY
Qty: 24 ML | Refills: 0 | Status: SHIPPED | OUTPATIENT
Start: 2024-09-03

## 2024-09-03 RX ORDER — GABAPENTIN 600 MG/1
TABLET ORAL
Qty: 270 TABLET | Refills: 1 | Status: SHIPPED | OUTPATIENT
Start: 2024-09-03

## 2024-09-03 NOTE — TELEPHONE ENCOUNTER
.A refill request was received for:  Requested Prescriptions     Pending Prescriptions Disp Refills    gabapentin 600 MG Oral Tab 270 tablet 1     Sig: TAKE 1 TABLET BY MOUTH THREE TIMES DAILY; TAKE AN EXTRA 600MG AS NEEDED FOR SEVERE PAIN    Insulin Glargine-yfgn 100 UNIT/ML Subcutaneous Solution Pen-injector 21 mL 0     Sig: Inject 20 Units into the skin nightly.       Last refill date:   insulin glargine 5/15/2024  Gabapentin 12/24/2023     Last office visit: 6/14/2024    Follow up due:  Future Appointments   Date Time Provider Department Center   12/6/2024  8:30 AM Erendira Caceres DO [facility] [address]      Patient Comment: Please prescribe 90 day supply, as there was a conflict with the order previously on file allowing for only one insulin pen.

## 2024-11-30 ENCOUNTER — HOSPITAL ENCOUNTER (INPATIENT)
Facility: HOSPITAL | Age: 59
LOS: 3 days | Discharge: HOME | End: 2024-12-03
Attending: EMERGENCY MEDICINE | Admitting: STUDENT IN AN ORGANIZED HEALTH CARE EDUCATION/TRAINING PROGRAM
Payer: COMMERCIAL

## 2024-11-30 ENCOUNTER — APPOINTMENT (OUTPATIENT)
Dept: CT IMAGING | Facility: HOSPITAL | Age: 59
End: 2024-11-30
Attending: EMERGENCY MEDICINE
Payer: COMMERCIAL

## 2024-11-30 ENCOUNTER — APPOINTMENT (OUTPATIENT)
Dept: GENERAL RADIOLOGY | Facility: HOSPITAL | Age: 59
End: 2024-11-30
Attending: EMERGENCY MEDICINE
Payer: COMMERCIAL

## 2024-11-30 DIAGNOSIS — R79.89 ELEVATED LACTIC ACID LEVEL: ICD-10-CM

## 2024-11-30 DIAGNOSIS — N63.20 MASS OF LEFT BREAST, UNSPECIFIED QUADRANT: ICD-10-CM

## 2024-11-30 DIAGNOSIS — N63.0 BREAST MASS IN FEMALE: ICD-10-CM

## 2024-11-30 DIAGNOSIS — R06.00 DYSPNEA, UNSPECIFIED TYPE: Primary | ICD-10-CM

## 2024-11-30 DIAGNOSIS — E86.0 DEHYDRATION: ICD-10-CM

## 2024-11-30 PROBLEM — D64.9 NORMOCYTIC ANEMIA: Status: ACTIVE | Noted: 2024-11-30

## 2024-11-30 PROBLEM — D50.0 IRON DEFICIENCY ANEMIA SECONDARY TO BLOOD LOSS (CHRONIC): Status: ACTIVE | Noted: 2024-11-30

## 2024-11-30 PROBLEM — Z85.3 HISTORY OF CANCER OF RIGHT BREAST: Status: ACTIVE | Noted: 2024-11-30

## 2024-11-30 LAB
ALBUMIN SERPL-MCNC: 3.3 G/DL (ref 3.2–4.8)
ALBUMIN/GLOB SERPL: 0.8 {RATIO} (ref 1–2)
ALP LIVER SERPL-CCNC: 100 U/L
ALT SERPL-CCNC: 22 U/L
ANION GAP SERPL CALC-SCNC: 9 MMOL/L (ref 0–18)
ANTIBODY SCREEN: NEGATIVE
AST SERPL-CCNC: 43 U/L (ref ?–34)
BASOPHILS # BLD AUTO: 0.02 X10(3) UL (ref 0–0.2)
BASOPHILS NFR BLD AUTO: 0.1 %
BILIRUB SERPL-MCNC: 0.4 MG/DL (ref 0.2–1.1)
BILIRUB UR QL CFM: POSITIVE
BUN BLD-MCNC: 19 MG/DL (ref 9–23)
CALCIUM BLD-MCNC: 9.4 MG/DL (ref 8.7–10.4)
CHLORIDE SERPL-SCNC: 96 MMOL/L (ref 98–112)
CLARITY UR REFRACT.AUTO: CLEAR
CO2 SERPL-SCNC: 25 MMOL/L (ref 21–32)
COLOR UR AUTO: YELLOW
CREAT BLD-MCNC: 1.17 MG/DL
D DIMER PPP FEU-MCNC: 2.27 UG/ML FEU (ref ?–0.61)
DEPRECATED HBV CORE AB SER IA-ACNC: 221 NG/ML
EGFRCR SERPLBLD CKD-EPI 2021: 53 ML/MIN/1.73M2 (ref 60–?)
EOSINOPHIL # BLD AUTO: 0 X10(3) UL (ref 0–0.7)
EOSINOPHIL NFR BLD AUTO: 0 %
ERYTHROCYTE [DISTWIDTH] IN BLOOD BY AUTOMATED COUNT: 15.9 %
EST. AVERAGE GLUCOSE BLD GHB EST-MCNC: 140 MG/DL (ref 68–126)
FLUAV + FLUBV RNA SPEC NAA+PROBE: NEGATIVE
FLUAV + FLUBV RNA SPEC NAA+PROBE: NEGATIVE
GLOBULIN PLAS-MCNC: 4.2 G/DL (ref 2–3.5)
GLUCOSE BLD-MCNC: 192 MG/DL (ref 70–99)
GLUCOSE BLD-MCNC: 198 MG/DL (ref 70–99)
GLUCOSE BLD-MCNC: 237 MG/DL (ref 70–99)
GLUCOSE BLD-MCNC: 311 MG/DL (ref 70–99)
GLUCOSE BLD-MCNC: 312 MG/DL (ref 70–99)
GLUCOSE UR STRIP.AUTO-MCNC: NEGATIVE MG/DL
HBA1C MFR BLD: 6.5 % (ref ?–5.7)
HCT VFR BLD AUTO: 26.2 %
HGB BLD-MCNC: 8.1 G/DL
HGB RETIC QN AUTO: 25.5 PG (ref 28.2–36.6)
HYALINE CASTS #/AREA URNS AUTO: PRESENT /LPF
HYALINE CASTS #/AREA URNS AUTO: PRESENT /LPF
IMM GRANULOCYTES # BLD AUTO: 0.15 X10(3) UL (ref 0–1)
IMM GRANULOCYTES NFR BLD: 0.9 %
IMM RETICS NFR: 0.24 RATIO (ref 0.1–0.3)
IRON SATN MFR SERPL: 7 %
IRON SERPL-MCNC: 13 UG/DL
KETONES UR STRIP.AUTO-MCNC: 15 MG/DL
LACTATE SERPL-SCNC: 0.9 MMOL/L (ref 0.5–2)
LACTATE SERPL-SCNC: 2.2 MMOL/L (ref 0.5–2)
LEUKOCYTE ESTERASE UR QL STRIP.AUTO: NEGATIVE
LYMPHOCYTES # BLD AUTO: 0.73 X10(3) UL (ref 1–4)
LYMPHOCYTES NFR BLD AUTO: 4.5 %
MCH RBC QN AUTO: 25.2 PG (ref 26–34)
MCHC RBC AUTO-ENTMCNC: 30.9 G/DL (ref 31–37)
MCV RBC AUTO: 81.6 FL
MONOCYTES # BLD AUTO: 1.04 X10(3) UL (ref 0.1–1)
MONOCYTES NFR BLD AUTO: 6.3 %
NEUTROPHILS # BLD AUTO: 14.46 X10 (3) UL (ref 1.5–7.7)
NEUTROPHILS # BLD AUTO: 14.46 X10(3) UL (ref 1.5–7.7)
NEUTROPHILS NFR BLD AUTO: 88.2 %
NITRITE UR QL STRIP.AUTO: NEGATIVE
NT-PROBNP SERPL-MCNC: 599 PG/ML (ref ?–125)
OSMOLALITY SERPL CALC.SUM OF ELEC: 280 MOSM/KG (ref 275–295)
PH UR STRIP.AUTO: 5.5 [PH] (ref 5–8)
PLATELET # BLD AUTO: 393 10(3)UL (ref 150–450)
POTASSIUM SERPL-SCNC: 5.3 MMOL/L (ref 3.5–5.1)
PROT SERPL-MCNC: 7.5 G/DL (ref 5.7–8.2)
RBC # BLD AUTO: 3.21 X10(6)UL
RBC UR QL AUTO: NEGATIVE
RETICS # AUTO: 59.7 X10(3) UL (ref 22.5–147.5)
RETICS/RBC NFR AUTO: 1.9 %
RH BLOOD TYPE: POSITIVE
RH BLOOD TYPE: POSITIVE
RSV RNA SPEC NAA+PROBE: NEGATIVE
SARS-COV-2 RNA RESP QL NAA+PROBE: NOT DETECTED
SODIUM SERPL-SCNC: 130 MMOL/L (ref 136–145)
SP GR UR STRIP.AUTO: >=1.03 (ref 1–1.03)
TOTAL IRON BINDING CAPACITY: 179 UG/DL (ref 250–425)
TRANSFERRIN SERPL-MCNC: 131 MG/DL (ref 250–380)
TROPONIN I SERPL HS-MCNC: 3 NG/L
TSI SER-ACNC: 2.17 UIU/ML (ref 0.55–4.78)
UROBILINOGEN UR STRIP.AUTO-MCNC: 4 MG/DL
WBC # BLD AUTO: 16.4 X10(3) UL (ref 4–11)

## 2024-11-30 PROCEDURE — 99223 1ST HOSP IP/OBS HIGH 75: CPT | Performed by: STUDENT IN AN ORGANIZED HEALTH CARE EDUCATION/TRAINING PROGRAM

## 2024-11-30 PROCEDURE — 99223 1ST HOSP IP/OBS HIGH 75: CPT | Performed by: INTERNAL MEDICINE

## 2024-11-30 PROCEDURE — 71275 CT ANGIOGRAPHY CHEST: CPT | Performed by: EMERGENCY MEDICINE

## 2024-11-30 RX ORDER — ONDANSETRON 2 MG/ML
4 INJECTION INTRAMUSCULAR; INTRAVENOUS EVERY 6 HOURS PRN
Status: DISCONTINUED | OUTPATIENT
Start: 2024-11-30 | End: 2024-12-03

## 2024-11-30 RX ORDER — SODIUM CHLORIDE 9 MG/ML
INJECTION, SOLUTION INTRAVENOUS CONTINUOUS
Status: DISCONTINUED | OUTPATIENT
Start: 2024-11-30 | End: 2024-12-02

## 2024-11-30 RX ORDER — BENZONATATE 200 MG/1
200 CAPSULE ORAL 3 TIMES DAILY PRN
Status: DISCONTINUED | OUTPATIENT
Start: 2024-11-30 | End: 2024-12-03

## 2024-11-30 RX ORDER — NICOTINE POLACRILEX 4 MG
30 LOZENGE BUCCAL
Status: DISCONTINUED | OUTPATIENT
Start: 2024-11-30 | End: 2024-12-03

## 2024-11-30 RX ORDER — FLUTICASONE PROPIONATE AND SALMETEROL 250; 50 UG/1; UG/1
1 POWDER RESPIRATORY (INHALATION) 2 TIMES DAILY
Status: DISCONTINUED | OUTPATIENT
Start: 2024-11-30 | End: 2024-12-03

## 2024-11-30 RX ORDER — ALBUTEROL SULFATE 0.83 MG/ML
2.5 SOLUTION RESPIRATORY (INHALATION) EVERY 4 HOURS PRN
Status: DISCONTINUED | OUTPATIENT
Start: 2024-11-30 | End: 2024-12-03

## 2024-11-30 RX ORDER — ACETAMINOPHEN 500 MG
500 TABLET ORAL EVERY 4 HOURS PRN
Status: DISCONTINUED | OUTPATIENT
Start: 2024-11-30 | End: 2024-12-03

## 2024-11-30 RX ORDER — PROCHLORPERAZINE EDISYLATE 5 MG/ML
5 INJECTION INTRAMUSCULAR; INTRAVENOUS EVERY 8 HOURS PRN
Status: DISCONTINUED | OUTPATIENT
Start: 2024-11-30 | End: 2024-12-03

## 2024-11-30 RX ORDER — ENOXAPARIN SODIUM 100 MG/ML
40 INJECTION SUBCUTANEOUS DAILY
Status: DISCONTINUED | OUTPATIENT
Start: 2024-11-30 | End: 2024-12-03

## 2024-11-30 RX ORDER — PREDNISONE 20 MG/1
40 TABLET ORAL
Status: DISCONTINUED | OUTPATIENT
Start: 2024-11-30 | End: 2024-11-30

## 2024-11-30 RX ORDER — SODIUM PHOSPHATE, DIBASIC AND SODIUM PHOSPHATE, MONOBASIC 7; 19 G/230ML; G/230ML
1 ENEMA RECTAL ONCE AS NEEDED
Status: DISCONTINUED | OUTPATIENT
Start: 2024-11-30 | End: 2024-12-03

## 2024-11-30 RX ORDER — NICOTINE POLACRILEX 4 MG
15 LOZENGE BUCCAL
Status: DISCONTINUED | OUTPATIENT
Start: 2024-11-30 | End: 2024-12-03

## 2024-11-30 RX ORDER — MONTELUKAST SODIUM 10 MG/1
10 TABLET ORAL NIGHTLY
Status: DISCONTINUED | OUTPATIENT
Start: 2024-11-30 | End: 2024-12-03

## 2024-11-30 RX ORDER — SENNOSIDES 8.6 MG
17.2 TABLET ORAL NIGHTLY PRN
Status: DISCONTINUED | OUTPATIENT
Start: 2024-11-30 | End: 2024-12-03

## 2024-11-30 RX ORDER — POLYETHYLENE GLYCOL 3350 17 G/17G
17 POWDER, FOR SOLUTION ORAL DAILY PRN
Status: DISCONTINUED | OUTPATIENT
Start: 2024-11-30 | End: 2024-12-03

## 2024-11-30 RX ORDER — ALBUTEROL SULFATE 0.83 MG/ML
2.5 SOLUTION RESPIRATORY (INHALATION) EVERY 4 HOURS PRN
Status: DISCONTINUED | OUTPATIENT
Start: 2024-11-30 | End: 2024-11-30

## 2024-11-30 RX ORDER — ECHINACEA PURPUREA EXTRACT 125 MG
1 TABLET ORAL
Status: DISCONTINUED | OUTPATIENT
Start: 2024-11-30 | End: 2024-12-03

## 2024-11-30 RX ORDER — INSULIN DEGLUDEC 100 U/ML
18 INJECTION, SOLUTION SUBCUTANEOUS NIGHTLY
Status: DISCONTINUED | OUTPATIENT
Start: 2024-11-30 | End: 2024-12-01

## 2024-11-30 RX ORDER — DEXTROSE MONOHYDRATE 25 G/50ML
50 INJECTION, SOLUTION INTRAVENOUS
Status: DISCONTINUED | OUTPATIENT
Start: 2024-11-30 | End: 2024-12-03

## 2024-11-30 RX ORDER — BISACODYL 10 MG
10 SUPPOSITORY, RECTAL RECTAL
Status: DISCONTINUED | OUTPATIENT
Start: 2024-11-30 | End: 2024-12-03

## 2024-11-30 RX ORDER — GABAPENTIN 600 MG/1
600 TABLET ORAL 3 TIMES DAILY
Status: DISCONTINUED | OUTPATIENT
Start: 2024-11-30 | End: 2024-12-03

## 2024-11-30 NOTE — PROGRESS NOTES
NURSING ADMISSION NOTE      Patient admitted via Cart  Oriented to room.  Safety precautions initiated.  Bed in low position.  Call light in reach.    Pt received A&Ox4. Afebrile. VSS. Sepsis BPA firing - bolus given in ED. Repeat lactic 0.9. C/o 4/10 left breast pain, large mass w/ copious malodorous drainage. Dressing c/d/i - wound consult placed. Heme/onc to see. Admission navigator and med rec complete. Dr. Huffman notified. IVF infusing @ 80ml/hr. Call light in reach.

## 2024-11-30 NOTE — H&P
[facility]     Swathi Arguelles Patient Status:  Emergency    1965 MRN [id number]   Location [facility] EMERGENCY DEPARTMENT Attending Christopher Resendiz MD   Hosp Day # 0 PCP Erendira Caceres DO     Chief Complaint: weakness, exertional dyspnea    Subjective:    History of Present Illness:     Swathi Arguelles is a 59 year old female with PMHx Breast ca (BRCA2 +, s/p mastectomy, chemo)/ asthma/ HLD/ DM who presented to the hospital for multiple symptoms. She reports fatigue for the past 1.5 months and then over the past 2 weeks she developed night sweats with weight loss and new exertional dyspnea when going up and down steps with palpitations. She noticed inreased swelling of her left breast with a malodorous drainage and was covering it with a dressing for the past 6 weeks.  She denied any fever, cough, chest pain.    History/Other:    Past Medical History:  Past Medical History:    Anemia    transfusions     Asthma (HCC)    Breast cancer (HCC)    right breast     Cancer (HCC)    breast    COVID-19    Tested 20    Heart murmur    History of blood transfusion    Murmur    benign murmur, unknown what type    Neuropathy    Personal history of antineoplastic chemotherapy    last chemo treatment    Pneumonia, organism unspecified(486)    Seasonal allergies    Type II or unspecified type diabetes mellitus without mention of complication, not stated as uncontrolled    Wheezing    related seasonal and fish allergies, takes inhalers     Past Surgical History:   Past Surgical History:   Procedure Laterality Date    Access port      left chest port a cath    Breast biopsy  2013    Procedure: BREAST BIOPSY;  Surgeon: Yasmany Goode MD;  Location:  MAIN OR    D & c      Mastectomy modified radical  3/18/2014    Procedure: BREAST MODIFIED RADICAL MASTECTOMY;  Surgeon: Jose Luis Adam MD;  Location:  MAIN OR    Mastectomy right      3/18/14    Needle biopsy right       Other surgical history  4/30/2015    mediport removal    Radiation right      Total abdominal hysterectomy  5/13/2014    Procedure: ABDOMINAL HYSTERECTOMY TOTAL BSO STAGING;  Surgeon: Jose Luis Adam MD;  Location:  MAIN OR      Family History:   Family History   Problem Relation Age of Onset    Cancer Father     Heart Attack Other         fam hx    Other (COPD) Other         fam hx    Other (CHF) Other         fam hx    Diabetes Other         fam hx    Other (leukemia) Other         fam hx    Cancer Maternal Aunt         breast cancer    Breast Cancer Self 49     Social History:    reports that she has never smoked. She has never used smokeless tobacco. She reports that she does not drink alcohol and does not use drugs.     Allergies: Allergies[1]    Medications:  Medications Ordered Prior to Encounter[2]    Review of Systems:   A comprehensive review of systems was completed.    Pertinent positives and negatives noted in the HPI.    Objective:   Physical Exam:    /58   Pulse 109   Temp 98.9 °F (37.2 °C) (Oral)   Resp 23   Ht 5' 3\" (1.6 m)   Wt 146 lb (66.2 kg)   LMP 09/01/2013 (LMP Unknown)   SpO2 98%   BMI 25.86 kg/m²   General: No acute distress, Alert  Respiratory: No rhonchi, no wheezes, decreased breath sounds on left  Cardiovascular: S1, S2. Regular rate and rhythm, tachycardic  Abdomen: Soft, Non-tender, non-distended, positive bowel sounds  Neuro: No new focal deficits  Extremities: No edema  Skin: left breast enlarged in size and firm with open area that is draining on lateral edge with serosanguinous/ maldorodous drainage    Results:    Labs:      Labs Last 24 Hours:    Recent Labs   Lab 11/30/24  0314   RBC 3.21*   HGB 8.1*   HCT 26.2*   MCV 81.6   MCH 25.2*   MCHC 30.9*   RDW 15.9   NEPRELIM 14.46*   WBC 16.4*   .0       Recent Labs   Lab 11/30/24  0314   *   BUN 19   CREATSERUM 1.17*   EGFRCR 53*   CA 9.4   ALB 3.3   *   K 5.3*   CL 96*   CO2 25.0   ALKPHO 100   AST  43*   ALT 22   BILT 0.4   TP 7.5       Lab Results   Component Value Date    PT 14.4 09/11/2013    PT 15.2 (H) 09/10/2013    INR 1.08 12/04/2014    INR 1.16 09/11/2013    INR 1.24 (H) 09/10/2013       Recent Labs   Lab 11/30/24  0314   TROPHS 3       Recent Labs   Lab 11/30/24  0314   PBNP 599*       No results for input(s): \"PCT\" in the last 168 hours.    Imaging: Imaging data reviewed in Epic.    Assessment & Plan:      #fungating breast mass  #weakness  #HX breast ca, BRCA2 mutation  -CT showing large fungating left breast mass 22.3 x14.7 cm extending to level of intercostal muscle with suspected invasion, prominent axillary lymph nodes  -likely recurrence of breast ca  -PT/OT  -oncology c/s    #Dyspnea  -CTA chest negative for PE, consolidation long anterior right pleural surface and lung apex likely from prior radiation  -suspect dyspnea 2/2 mild asthma ex  -prednisone 40 mg daily, albuterol q4H prn  -cont home montelukast    #Hyponatremia  -sodium corrects to 133 accounting for glucose  -can allow to correct to normal over next 24 hrs  -cont to monitor BMP    #NILO  #hyperkalemia  #Lactic acidosis  -Cr 1.17 with BUN 19: baseline 0.74 with GFR 92  -K 5.3  -lactate 2.2  -BUN: Cr ratio <20 suggestive of intra-renal vs post-renal, may be 2/2 low BP at home as BP here is borderline low  -NSS @80 mls/h  -cont to monitor BMP, lactate  -strict I/o, avoid nephrotoxins  -if no improvement send urine studies and obtain renal US    #normocytic anemia  -hgb 8.1: baseline around 11-12  -no signs active bleeding  -send iron studies, retic count  -cont to monitor CBC and transfuse for hgb <7    #leukocytosis  -WBC 16.4  -cont to monitor CBC    #contaminated UA  -UA with moderate epithelial cells  -no urinary symptoms    #DM with hyperglycemia  -glucose 237  -SSI, QID checks, hypoglycemia protocol  -sub home glargine with tresiba  -cont home gabapentin      Plan of care discussed with ED physician    Kelsie Estrada,  DO    Supplementary Documentation:     The 21st Century Cures Act makes medical notes like these available to patients in the interest of transparency. Please be advised this is a medical document. Medical documents are intended to carry relevant information, facts as evident, and the clinical opinion of the practitioner. The medical note is intended as peer to peer communication and may appear blunt or direct. It is written in medical language and may contain abbreviations or verbiage that are unfamiliar.                                       [1]   Allergies  Allergen Reactions    Fish ANAPHYLAXIS and HIVES     All fish, Wheezing, short of breath    Levemir HIVES and ITCHING    Seasonal WHEEZING and Runny nose     Ragweed, pollen   [2]   No current facility-administered medications on file prior to encounter.     Current Outpatient Medications on File Prior to Encounter   Medication Sig Dispense Refill    gabapentin 600 MG Oral Tab TAKE 1 TABLET BY MOUTH THREE TIMES DAILY; TAKE AN EXTRA 600MG AS NEEDED FOR SEVERE PAIN 270 tablet 1    Insulin Glargine-yfgn 100 UNIT/ML Subcutaneous Solution Pen-injector Inject 20 Units into the skin nightly. 24 mL 0    fluticasone-salmeterol (WIXELA INHUB) 100-50 MCG/ACT Inhalation Aerosol Powder, Breath Activated Inhale 1 puff into the lungs 2 (two) times daily. 3 each 0    Insulin Lispro, 1 Unit Dial, 100 UNIT/ML Subcutaneous Solution Pen-injector Inject 10 Units into the skin in the morning, at noon, and at bedtime. 3 mL 0    Budesonide-Formoterol Fumarate 160-4.5 MCG/ACT Inhalation Aerosol Inhale 2 puffs into the lungs 2 (two) times daily. 3 each 1    albuterol (2.5 MG/3ML) 0.083% Inhalation Nebu Soln Take 3 mL (2.5 mg total) by nebulization every 4 (four) hours as needed for Wheezing or Shortness of Breath. 90 mL 0    Insulin Pen Needle (BD PEN NEEDLE DANIELA U/F) 32G X 4 MM Does not apply Misc Up to  each 2    montelukast 10 MG Oral Tab Take 1 tablet (10 mg total) by mouth  nightly. 90 tablet 1    loratadine 10 MG Oral Tab Take 1 tablet (10 mg total) by mouth nightly.

## 2024-11-30 NOTE — ED QUICK NOTES
Orders for admission, patient is aware of plan and ready to go upstairs. Any questions, please call ED RN Radha at extension [phone].     Patient Covid vaccination status: Unvaccinated     COVID Test Ordered in ED: SARS-CoV-2/Flu A and B/RSV by PCR (GeneXpert)    COVID Suspicion at Admission: N/A    Running Infusions:    sodium chloride 1,000 mL (11/30/24 0527)        Mental Status/LOC at time of transport: A&OX3, large mass/wound noted to left breast.    Other pertinent information:   CIWA score: N/A   NIH score:  N/A

## 2024-11-30 NOTE — ED INITIAL ASSESSMENT (HPI)
Pt c/o generalized weakness for the last month. Pt states she can't can't walk far without getting winded. Pt reports decreased appetite. Denies fevers, N/V.

## 2024-11-30 NOTE — CONSULTS
Hematology/Oncology Initial Consultation Note    Patient Name: Swathi Arguelles  Medical Record Number: [id number]    YOB: 1965   Date of Consultation: 11/30/2024   Physician requesting consultation: Kelsie Estrada DO     Reason for Consultation:  Swathi Arguelles was seen today for the diagnosis of breast cancer    History of Present Illness: 59-year-old female with history of BRCA2 mutation carrier and prior history of right-sided breast cancer presents with a fungating LEFT neglected breast mass.  Left breast changes started multiple months ago but became more apparent about a month ago per patient.    She previously followed with Dr. Lopez for a previously diagnosed RIGHT breast cancer that was diagnosed at clinical stage T4N2M0 in 9/2013 (ER+/PRneg/TDS8dkm) s/p neoadjuvant ddAC->T followed by right mastectomy with ALND on 3/18/14.  1.4 cm residual cancer noted in within the breast but 0/12 lymph nodes involved.  She completed adjuvant chest wall/axillary radiation summer 2014.  Started endocrine therapy with anastrozole 8/2014.  Of note she also underwent BSO, bilateral pelvic lymphadenectomy omentectomy, appendectomy on 5/13/14 which showed serous borderline tumor.    She last saw Dr. Lopez 2/3/2017, at that time she was still taking anastrozole and undergoing routine monitoring but was then lost to follow-up.  Today she states that she thought that she was done with further follow-up and did not need to keep coming.  It is unclear when she stopped taking anastrozole.  She did not undergo any further screening left mammograms since she was lost to follow-up in 2017.    She reports having some left sided breast mass pain.  She denies any other new aches or pains.  No abdominal or pelvic changes.  She denies any vaginal discharge.    Past Medical History:  Past Medical History:    Anemia    transfusions 9/13    Asthma (HCC)    Breast cancer (HCC)    right breast 9/13    Cancer (HCC)    breast     COVID-19    Tested 11/9/20    Heart murmur    History of blood transfusion    Murmur    benign murmur, unknown what type    Neuropathy    Personal history of antineoplastic chemotherapy    last chemo treatment    Pneumonia, organism unspecified(486)    Seasonal allergies    Type II or unspecified type diabetes mellitus without mention of complication, not stated as uncontrolled    Wheezing    related seasonal and fish allergies, takes inhalers     Past Surgical History:   Procedure Laterality Date    Access port      left chest port a cath    Breast biopsy  9/12/2013    Procedure: BREAST BIOPSY;  Surgeon: Yasmany Goode MD;  Location:  MAIN OR    D & c      Mastectomy modified radical  3/18/2014    Procedure: BREAST MODIFIED RADICAL MASTECTOMY;  Surgeon: Jose Luis Adam MD;  Location:  MAIN OR    Mastectomy right      3/18/14    Needle biopsy right      Other surgical history  4/30/2015    mediport removal    Radiation right      Total abdominal hysterectomy  5/13/2014    Procedure: ABDOMINAL HYSTERECTOMY TOTAL BSO STAGING;  Surgeon: Jose Luis Adam MD;  Location:  MAIN OR     Home Medications:  Medications Ordered Prior to Encounter[1]  Current Inpatient Medications:  Inpatient Meds:   enoxaparin  40 mg Subcutaneous Daily    gabapentin  600 mg Oral TID    piperacillin-tazobactam  4.5 g Intravenous Q8H    insulin degludec  18 Units Subcutaneous Nightly    fluticasone-salmeterol  1 puff Inhalation BID    montelukast  10 mg Oral Nightly    insulin aspart  1-68 Units Subcutaneous TID CC    insulin aspart  1-10 Units Subcutaneous TID AC and HS      sodium chloride 100 mL/hr at 11/30/24 1303     PRN Meds:    glucose **OR** glucose **OR** glucose-vitamin C **OR** dextrose **OR** glucose **OR** glucose **OR** glucose-vitamin C    acetaminophen    melatonin    polyethylene glycol (PEG 3350)    sennosides    bisacodyl    fleet enema    ondansetron    prochlorperazine    benzonatate    glycerin-hypromellose-     sodium chloride    albuterol    Allergies:   Allergies[2]    Psychosocial History:  Social History     Social History Narrative    Lives alone in [place]     Social History     Socioeconomic History    Marital status:     Number of children: 1   Occupational History    Occupation: RN , on leave of absence     Employer: [employer]   Tobacco Use    Smoking status: Never    Smokeless tobacco: Never   Vaping Use    Vaping status: Never Used   Substance and Sexual Activity    Alcohol use: No     Alcohol/week: 0.0 standard drinks of alcohol    Drug use: No   Other Topics Concern    Blood Transfusions Yes    Caffeine Concern No     Comment: occassional    Exercise Yes     Comment: bike riding, walking, active   Social History Narrative    Lives alone in [place]     Social Drivers of Health     Food Insecurity: No Food Insecurity (11/30/2024)    Food Insecurity     Food Insecurity: Never true   Transportation Needs: No Transportation Needs (11/30/2024)    Transportation Needs     Lack of Transportation: No   Housing Stability: Low Risk  (11/30/2024)    Housing Stability     Housing Instability: No     Family Medical History:  Family History   Problem Relation Age of Onset    Cancer Father     Heart Attack Other         fam hx    Other (COPD) Other         fam hx    Other (CHF) Other         fam hx    Diabetes Other         fam hx    Other (leukemia) Other         fam hx    Cancer Maternal Aunt         breast cancer    Breast Cancer Self 49     Review of Systems:  A 10-point ROS was done with pertinent positives and negative per the HPI    Vital Signs:  Height: 160 cm (5' 3\") (11/30 0133)  Weight: 66.5 kg (146 lb 9.6 oz) (11/30 0812)  BSA (Calculated - sq m): 1.69 sq meters (11/30 0133)  Pulse: 93 (11/30 1127)  BP: 118/46 (11/30 1127)  Temp: 98.1 °F (36.7 °C) (11/30 1127)  Do Not Use - Resp Rate: --  SpO2: 97 % (11/30 1127)    Wt Readings from Last 6 Encounters:   11/30/24 66.5 kg (146 lb 9.6 oz)   06/14/24 75.8  kg (167 lb)   12/22/23 82.6 kg (182 lb)   01/12/22 79.8 kg (176 lb)   03/30/21 78 kg (172 lb)   02/09/20 67.1 kg (148 lb)     Physical Examination:  General: Patient is alert and oriented, not in acute distress  Psych: Mood and affect are appropriate  Eyes: EOMI  ENT: Oropharynx is clear  CV: Regular rate and rhythm, no murmurs  Respiratory: Lungs clear to auscultation bilaterally  GI/Abd: Soft, non-tender with normoactive bowel sounds, no hepatosplenomegaly  Neurological: Grossly intact   Lymphatics: No palpable lymphadenopathy  Skin: no rashes or petechiae  Breast: Large firm left breast mass with hardened skin changes.  Dressing in place, patient did not want me to remove dressing for a complete breast exam at this time. S/p right mastectomy.  Ext: Mild symmetric BLE edema    Laboratory:  Recent Labs   Lab 11/30/24 0314   WBC 16.4*   HGB 8.1*   HCT 26.2*   .0   MCV 81.6   RDW 15.9   NEPRELIM 14.46*     Recent Labs   Lab 11/30/24 0314   *   K 5.3*   CL 96*   CO2 25.0   BUN 19   CREATSERUM 1.17*   *   CA 9.4   TP 7.5   ALB 3.3   ALKPHO 100   AST 43*   ALT 22   BILT 0.4     No results for input(s): \"PT\", \"INR\", \"PTT\", \"FIB\" in the last 168 hours.    Lab Results   Component Value Date    REITCPERCENT 1.9 11/30/2024     Lab Results   Component Value Date    RETHE 25.5 (L) 11/30/2024     Lab Results   Component Value Date    GALI 221 11/30/2024    GALI 139.4 06/12/2024    GALI 81.4 06/04/2018    GALI 300.6 (H) 04/17/2014    GALI 49.0 09/10/2013     Lab Results   Component Value Date    SAT 7 (L) 11/30/2024    SAT 7 (L) 06/04/2018     Soluble transferrin receptor  No results found for: \"STFR\"  No results found for: \"STFRNR\"  Lab Results   Component Value Date    B12 503 12/21/2023    B12 >2,000 (H) 01/03/2022    B12 909 05/22/2020    B12 1,155 (H) 09/26/2015    B12 600 07/16/2014    B12 602 04/17/2014     No results found for: \"MMA\"  Lab Results   Component Value Date    FOLIC 13.1 04/17/2014     No  results found for: \"COPPER\"  No results found for: \"ESRML\"  No results found for: \"CRP\"  No results found for: \"LDH\"  No results found for: \"HAPT\"  No results found for: \"ELECTMS1\"  No results found for: \"KAPPALTCHN\"   No results found for: \"LAMBDALTCH\"  No results found for: \"KAPLAMRATIO\"  Lab Results   Component Value Date    TSH 2.169 11/30/2024    TSH 3.660 12/21/2023    TSH 0.835 01/03/2022    TSH 1.280 05/22/2020    TSH 0.769 09/26/2015    TSH 0.957 11/24/2013    TSH 1.240 10/04/2013    TSH 1.490 04/10/2013    TSH 1.40 12/30/2010     Lab Results   Component Value Date    T4F 1.1 12/21/2023    T4F 1.2 01/03/2022    T4F 1.0 05/22/2020    T4F 1.1 09/26/2015    T4F 1.1 12/30/2010     No results found for: \"T3TOT\"      Imaging:    CTA chest 11/30/24: CONCLUSION:     Very large fungating mass left breast/chest wall.  In this patient with previous right mastectomy, most likely a very large malignant breast lesion, differential also includes sarcoma.  Concern for chest muscular invasion.  Suspicious left axillary lymph nodes. No sign of acute pulmonary embolism.  Post radiation parenchymal changes are stable right apex.     Impression & Plan:     *suspected left breast cancer/neglected breast mass, +known BRCA2 carrier  -Mass is apparently fungating and infected  -Will need biopsy, surgery consultation, and further imaging for staging.  Already had CTA chest, will obtain CT a/p now.  Will likely also need a PET scan or bone scan.  -Will check CA 27.29 and   -Wound care and antibiotics for suspected infection     *hx of right breast cancer  -dx'd in 9/2013 [id number] (ER+/PRneg/MDA3ioq) s/p neoadjuvant ddAC->T followed by right mastectomy with ALND on 3/18/14.  Completed adjuvant chest wall/axillary radiation summer 2014.  Started endocrine therapy with anastrozole 8/2014, though discontinued sometime after 2/2017 when patient was lost to follow-up.  She previously followed with Dr. Lopez    *hx of serous  borderline ovarian tumor  -s/p BSO, bilateral pelvic lymphadenectomy omentectomy, appendectomy on 5/13/14.  CT a/p planned as above.  -Will check CA 27.29 and     *Normocytic anemia   -Has a component of iron deficiency anemia  -Start Ferrlecit 125mg IV daily  -Check B12, folic acid, LDH, ESR, CRP  -Follow CBC    Min Henry MD  Hematology/Medical Oncology  [facility]           [1]   No current facility-administered medications on file prior to encounter.     Current Outpatient Medications on File Prior to Encounter   Medication Sig Dispense Refill    gabapentin 600 MG Oral Tab TAKE 1 TABLET BY MOUTH THREE TIMES DAILY; TAKE AN EXTRA 600MG AS NEEDED FOR SEVERE PAIN 270 tablet 1    Insulin Glargine-yfgn 100 UNIT/ML Subcutaneous Solution Pen-injector Inject 20 Units into the skin nightly. 24 mL 0    fluticasone-salmeterol (WIXELA INHUB) 100-50 MCG/ACT Inhalation Aerosol Powder, Breath Activated Inhale 1 puff into the lungs 2 (two) times daily. 3 each 0    albuterol (2.5 MG/3ML) 0.083% Inhalation Nebu Soln Take 3 mL (2.5 mg total) by nebulization every 4 (four) hours as needed for Wheezing or Shortness of Breath. 90 mL 0    Insulin Pen Needle (BD PEN NEEDLE DANIELA U/F) 32G X 4 MM Does not apply Misc Up to  each 2    montelukast 10 MG Oral Tab Take 1 tablet (10 mg total) by mouth nightly. 90 tablet 1    Insulin Lispro, 1 Unit Dial, 100 UNIT/ML Subcutaneous Solution Pen-injector Inject 10 Units into the skin in the morning, at noon, and at bedtime. (Patient not taking: Reported on 11/30/2024) 3 mL 0    Budesonide-Formoterol Fumarate 160-4.5 MCG/ACT Inhalation Aerosol Inhale 2 puffs into the lungs 2 (two) times daily. (Patient taking differently: Inhale 2 puffs into the lungs 2 (two) times daily. Pt states she would like to go back to budesonide) 3 each 1    loratadine 10 MG Oral Tab Take 1 tablet (10 mg total) by mouth nightly. (Patient not taking: Reported on 11/30/2024)     [2]    Allergies  Allergen Reactions    Fish ANAPHYLAXIS and HIVES     All fish, Wheezing, short of breath    Levemir HIVES and ITCHING    Seasonal WHEEZING and Runny nose     Ragweed, pollen

## 2024-11-30 NOTE — PROGRESS NOTES
[facility]   part of [facility]     Hospitalist Progress Note     Swathi Arguelles Patient Status:  Emergency    1965 MRN [id number]   Location [facility] EMERGENCY DEPARTMENT Attending Christopher Resendiz MD   Hosp Day # 0 PCP Erendira Caceres DO     Chief Complaint: Breast mass    Subjective:   Patient with left breast mass, draining. Dyspnea on exertion. No chest pain. No nausea, vomiting, diarrhea.     Current medications:   enoxaparin  40 mg Subcutaneous Daily    gabapentin  600 mg Oral TID    piperacillin-tazobactam  4.5 g Intravenous Q8H    insulin degludec  18 Units Subcutaneous Nightly    fluticasone-salmeterol  1 puff Inhalation BID    montelukast  10 mg Oral Nightly    insulin aspart  1-68 Units Subcutaneous TID CC    insulin aspart  1-10 Units Subcutaneous TID AC and HS       Objective:    Review of Systems:   10 point ROS completed and was negative, except for pertinent positive and negatives stated in subjective.    Vital signs:  Temp:  [98.1 °F (36.7 °C)-98.9 °F (37.2 °C)] 98.1 °F (36.7 °C)  Pulse:  [] 93  Resp:  [18-25] 18  BP: ()/(46-70) 118/46  SpO2:  [95 %-98 %] 97 %  Patient Weight for the past 72 hrs:   Weight   24 0133 146 lb (66.2 kg)     Physical Exam:    General: No acute distress.   Respiratory: Diminished  Chest: Fungating left breast mass, malodorous  Cardiovascular: S1, S2. Regular rate and rhythm.   Abdomen: Soft, nontender, nondistended.  Positive bowel sounds.  Extremities: No edema.  Neuro: AAOx3    Diagnostic Data:    Labs:  Recent Labs   Lab 24   WBC 16.4*   HGB 8.1*   MCV 81.6   .0       Recent Labs   Lab 24   *   BUN 19   CREATSERUM 1.17*   CA 9.4   ALB 3.3   *   K 5.3*   CL 96*   CO2 25.0   ALKPHO 100   AST 43*   ALT 22   BILT 0.4   TP 7.5       Estimated Creatinine Clearance: 41.8 mL/min (A) (based on SCr of 1.17 mg/dL (H)).    No results for input(s): \"PTP\", \"INR\" in the last 168 hours.    Lab  Results   Component Value Date    TSH 2.169 11/30/2024       COVID-19 Lab Results    COVID-19  Lab Results   Component Value Date    COVID19 Not Detected 11/30/2024    COVID19 Detected (A) 11/02/2020       Pro-Calcitonin  No results for input(s): \"PCT\" in the last 168 hours.    Cardiac  Recent Labs   Lab 11/30/24 0314   PBNP 599*       Creatinine Kinase  No results for input(s): \"CK\" in the last 168 hours.    Inflammatory Markers  Recent Labs   Lab 11/30/24 0314   GALI 221   DDIMER 2.27*       Recent Labs   Lab 11/30/24 0314   TROPHS 3       Imaging: Imaging data reviewed in Epic.    Medications:    enoxaparin  40 mg Subcutaneous Daily    gabapentin  600 mg Oral TID    piperacillin-tazobactam  4.5 g Intravenous Q8H    insulin degludec  18 Units Subcutaneous Nightly    fluticasone-salmeterol  1 puff Inhalation BID    montelukast  10 mg Oral Nightly    insulin aspart  1-68 Units Subcutaneous TID CC    insulin aspart  1-10 Units Subcutaneous TID AC and HS       Assessment & Plan:    Fungating breast mass with possible superimposed infection   History of breast cancer sp mastectomy and chemo  IV abx  Check cultures  SurgOnc consult - patient seen by Dr. Adam in the past for mastectomy, but wishes for another surgeon > will consult Dr. Moyer  Medical Oncology consult   Dyspnea on exertion  Check ECHO  Asthma exacerbation ruled out  Stop Prednisone  Resume BD  Nebs PRN  Hyponatremia  Hyperkalemia  IVF increased   Monitor UOP, creatinine and electrolytes  Leukocytosis  Monitor   Anemia  Check iron, ferritin, retic   Elevated AST  Monitor LFTs  Diabetes mellitus  Tresiba  Correctional  Carb  Lactic acidosis, resolved   Elevated d-dimer, CTA neg for PE  Dyslipidemia  Abnormal UA    DVT Px: Lovenox     At this point Ms. Arguelles is expected to be discharge to: Home    Plan of care discussed with patient and RN.    Hua Huffman MD        Supplementary Documentation:   DVT Mechanical Prophylaxis:   SCDs,    DVT  Pharmacologic Prophylaxis   Medication    enoxaparin (Lovenox) 40 MG/0.4ML SUBQ injection 40 mg                Code Status: Full Code  Bonner: No urinary catheter in place  Bonner Duration (in days):   Central line:    ABDELRAHMAN:

## 2024-11-30 NOTE — ED PROVIDER NOTES
Patient Seen in: [facility] Emergency Department      History     Chief Complaint   Patient presents with    Fatigue    Weight Loss Gain     Weight loss (unintentional)     Stated Complaint: Increased weakness within the last week, unintentional weight loss, unable to e*    Subjective:   HPI      Patient is a 59-year-old female history of breast cancer in 2013 presents to ED for evaluation of shortness of breath.  Patient complains of shortness of breath for couple weeks with exertion.  Denies chest pain.  She complains of decreased oral intake.  She complains of a breast mass for the last 6 weeks.  She has seen  in the past.  Patient denies fever, cough.  She denies urinary symptoms such as frequency, hematuria or dysuria.  She denies abdominal pain.    Objective:     Past Medical History:    Anemia    transfusions 9/13    Asthma (HCC)    Breast cancer (HCC)    right breast 9/13    Cancer (HCC)    breast    COVID-19    Tested 11/9/20    Heart murmur    History of blood transfusion    Murmur    benign murmur, unknown what type    Neuropathy    Personal history of antineoplastic chemotherapy    last chemo treatment    Pneumonia, organism unspecified(486)    Seasonal allergies    Type II or unspecified type diabetes mellitus without mention of complication, not stated as uncontrolled    Wheezing    related seasonal and fish allergies, takes inhalers              Past Surgical History:   Procedure Laterality Date    Access port      left chest port a cath    Breast biopsy  9/12/2013    Procedure: BREAST BIOPSY;  Surgeon: Yasmany Goode MD;  Location:  MAIN OR    D & c      Mastectomy modified radical  3/18/2014    Procedure: BREAST MODIFIED RADICAL MASTECTOMY;  Surgeon: Jose Luis Adam MD;  Location:  MAIN OR    Mastectomy right      3/18/14    Needle biopsy right      Other surgical history  4/30/2015    mediport removal    Radiation right      Total abdominal hysterectomy  5/13/2014     Procedure: ABDOMINAL HYSTERECTOMY TOTAL BSO STAGING;  Surgeon: Jose Luis Adam MD;  Location:  MAIN OR                Social History     Socioeconomic History    Marital status:     Number of children: 1   Occupational History    Occupation: RN , on leave of absence     Employer: [employer]   Tobacco Use    Smoking status: Never    Smokeless tobacco: Never   Vaping Use    Vaping status: Never Used   Substance and Sexual Activity    Alcohol use: No     Alcohol/week: 0.0 standard drinks of alcohol    Drug use: No   Other Topics Concern    Blood Transfusions Yes    Caffeine Concern No     Comment: occassional    Exercise Yes     Comment: bike riding, walking, active   Social History Narrative    Lives in [place] with daughter and 3  grandchildren.                   Physical Exam     ED Triage Vitals   BP 11/30/24 0133 98/62   Pulse 11/30/24 0133 (!) 128   Resp 11/30/24 0133 20   Temp 11/30/24 0133 98.9 °F (37.2 °C)   Temp src 11/30/24 0133 Oral   SpO2 11/30/24 0133 96 %   O2 Device 11/30/24 0327 None (Room air)       Current Vitals:   Vital Signs  BP: 107/58  Pulse: 105  Resp: 21  Temp: 98.9 °F (37.2 °C)  Temp src: Oral  MAP (mmHg): 74    Oxygen Therapy  SpO2: 95 %  O2 Device: None (Room air)        Physical Exam  GENERAL: No acute distress, well appearing and non-toxic, Alert and oriented X 3   HEENT: Normocephalic, atraumatic.  Moist mucous membranes.  Pupils equal round reactive to light and accommodation, extraocular motion is intact, sclerae white, conjunctiva is pink.  Oropharynx is unremarkable, no exudate.  NECK: Supple, trachea midline, no lymphadenopathy.   LUNG:  Lung sounds diminished.  No wheezing, rales or rhonchi  CARDIOVASCULAR: Tachycardic.  Regular rate and rhythm.  Normal S1S2.  No S3S4 or murmur.  Breast: Patient has a large fungating breast mass within extremely swollen left breast.  ABDOMEN: Bowel sounds are present. Soft. nondistended, no pulsatile masses.  nontender  MUSCULOSKELETAL: No calf tenderness.  Dorsalis and Posterior Tibial pulses present. No clubbing. No cyanosis.  No edema.   SKIN EXAMINATIoN: Warm and dry with normal appearance.  No rashes or lesions.  NEUROLOGICAL:  Motor strength intact all groups.  normal sensation, speech intact    ED Course     Labs Reviewed   COMP METABOLIC PANEL (14) - Abnormal; Notable for the following components:       Result Value    Glucose 237 (*)     Sodium 130 (*)     Potassium 5.3 (*)     Chloride 96 (*)     Creatinine 1.17 (*)     eGFR-Cr 53 (*)     AST 43 (*)     Globulin  4.2 (*)     A/G Ratio 0.8 (*)     All other components within normal limits   CBC WITH DIFFERENTIAL WITH PLATELET - Abnormal; Notable for the following components:    WBC 16.4 (*)     RBC 3.21 (*)     HGB 8.1 (*)     HCT 26.2 (*)     MCH 25.2 (*)     MCHC 30.9 (*)     Neutrophil Absolute Prelim 14.46 (*)     Neutrophil Absolute 14.46 (*)     Lymphocyte Absolute 0.73 (*)     Monocyte Absolute 1.04 (*)     All other components within normal limits   PRO BETA NATRIURETIC PEPTIDE - Abnormal; Notable for the following components:    Pro-Beta Natriuretic Peptide 599 (*)     All other components within normal limits   D-DIMER - Abnormal; Notable for the following components:    D-Dimer 2.27 (*)     All other components within normal limits   URINALYSIS WITH CULTURE REFLEX - Abnormal; Notable for the following components:    Ketones Urine 15 (*)     Protein Urine 100 mg/dL (*)     Urobilinogen Urine 4.0 (*)     WBC Urine 6-10 (*)     RBC Urine 3-5 (*)     Bacteria Urine 1+ (*)     Squamous Epi. Cells Moderate (*)     Hyaline Casts Present (*)     All other components within normal limits   LACTIC ACID, PLASMA - Abnormal; Notable for the following components:    Lactic Acid 2.2 (*)     All other components within normal limits   UA MICROSCOPIC ONLY, URINE - Abnormal; Notable for the following components:    WBC Urine 6-10 (*)     RBC Urine 3-5 (*)     Bacteria  Urine 1+ (*)     Squamous Epi. Cells Moderate (*)     Hyaline Casts Present (*)     All other components within normal limits   ICTOTEST - Abnormal; Notable for the following components:    Ictotest Positive (*)     All other components within normal limits    Narrative:     LOT #: [id number] EXP: 2025-09-30   TROPONIN I HIGH SENSITIVITY - Normal   TSH W REFLEX TO FREE T4 - Normal   SARS-COV-2/FLU A AND B/RSV BY PCR (GENEXPERT) - Normal    Narrative:     This test is intended for the qualitative detection and differentiation of SARS-CoV-2, influenza A, influenza B, and respiratory syncytial virus (RSV) viral RNA in nasopharyngeal or nares swabs from individuals suspected of respiratory viral infection consistent with COVID-19 by their healthcare provider. Signs and symptoms of respiratory viral infection due to SARS-CoV-2, influenza, and RSV can be similar.    Test performed using the Xpert Xpress SARS-CoV-2/FLU/RSV (real time RT-PCR)  assay on the [employer]tagpert instrument, [employer], [place] [address].   This test is being used under the Food and Drug Administration's Emergency Use Authorization.    The authorized Fact Sheet for Healthcare Providers for this assay is available upon request from the laboratory.   LACTIC ACID REFLEX POST POSTIVE   TYPE AND SCREEN    Narrative:     The following orders were created for panel order Type and screen.  Procedure                               Abnormality         Status                     ---------                               -----------         ------                     ABORH (Blood Type)[[id number]]                               Final result               Antibody Screen[[id number]]                                  Final result                 Please view results for these tests on the individual orders.   ABORH (BLOOD TYPE)   ANTIBODY SCREEN   ABORH CONFIRMATION   BLOOD CULTURE   BLOOD CULTURE   Sodium 130.  Potassium 5.3.  White blood cell count 16.4.  Urinalysis 6-10 white  cells.  D-dimer elevated.  Lactic acid 2.2.  EKG    Rate, intervals and axes as noted on EKG Report.  Rate: 116  Rhythm: Sinus Rhythm  Reading: Sinus tachycardia.  No acute changes                I personally reviewd CT images of chest and independent interpretation shows no obvious pulmonary embolism.  Large left breast mass.  I also viewed formal radiology report as read by radiology with findings below:    CT of chest read by [facility] rad radiology shows large fungating left breast mass.  Measures 22.3 x 14.7 cm.  No evidence for pulmonary embolism.    Medications   sodium chloride 0.9 % IV bolus 1,986 mL (1,986 mL Intravenous Handoff 11/30/24 0549)   sodium chloride 0.9 % IV bolus 1,000 mL (0 mL Intravenous Stopped 11/30/24 0512)   iopamidol 76% (ISOVUE-370) injection for power injector (100 mL Intravenous Given 11/30/24 2166)   piperacillin-tazobactam (Zosyn) 4.5 g in dextrose 5% 100 mL IVPB-ADDV (0 g Intravenous Stopped 11/30/24 0527)          MDM      Patient is a 59-year-old female presents to ED for evaluation of shortness of breath, breast mass.  Patient hypotensive, tachycardic.  Differential pneumonia, pulmonary embolism, pericardial effusion, new breast mass, sepsis.  Patient underwent laboratory testing.  Blood and urine culture sent.  Lactic acid 2.2 which could be consistent with infectious process.  Hemoglobin 8.1 down from hemoglobin 11.  Patient denies black or bloody stool.  Follow hemoglobin.  Normal saline 30 cc/kg bolus was ordered.  Patient given IV Zosyn.  Urinalysis is not convincing for infection.  Patient with hyponatremia, hyperkalemia, elevated creatinine which could be consistent with dehydration.  Given shortness of breath, elevated D-dimer, CTA of the chest was obtained showing no evidence for pulmonary embolism or pneumonia.  EKG showing sinus tachycardia.  Normal troponin.  TSH normal.  Patient blood pressure improved status post IV fluids but still tachycardic.  Patient case was  discussed with hospitalist.  Patient will be admitted to medical telemetry.  Will need oncology to see.  Critical care time was 45 minutes. This critical care time is exclusive of procedures critical care time includes monitoring of patient's cardiopulmonary and hemodynamic status, interpretation of laboratory values, and discussion of case with physician and consultants.    Admission disposition: 11/30/2024  4:46 AM           Medical Decision Making      Disposition and Plan     Clinical Impression:  1. Dyspnea, unspecified type    2. Elevated lactic acid level    3. Mass of left breast, unspecified quadrant    4. Dehydration    5.  Anemia    Disposition:  Admit  11/30/2024  4:46 am    Follow-up:  No follow-up provider specified.        Medications Prescribed:  Current Discharge Medication List              Supplementary Documentation:     [facility]   part of [facility]      Sepsis Reassessment Note    /58   Pulse 105   Temp 98.9 °F (37.2 °C) (Oral)   Resp 21   Ht 160 cm (5' 3\")   Wt 66.2 kg   LMP 09/01/2013 (LMP Unknown)   SpO2 95%   BMI 25.86 kg/m²      I completed the sepsis reassessment at 5 AM    Cardiac:  Regularity: Regular  Rate: Tachycardic  Heart Sounds: No adventitious heart sounds    Lungs:   Right: Diminished  Left: Diminished    Peripheral Pulses:  Radial: Left 1+      Capillary Refill:  <3 Secs    Skin:  Temp/Moisture: Dry  Color: Normal       Christopher Resendiz MD  11/30/2024  5:38 AM             Hospital Problems       Present on Admission  Date Reviewed: 6/16/2024            ICD-10-CM Noted POA    * (Principal) Dyspnea, unspecified type R06.00 11/30/2024 Unknown

## 2024-12-01 ENCOUNTER — APPOINTMENT (OUTPATIENT)
Dept: CT IMAGING | Facility: HOSPITAL | Age: 59
End: 2024-12-01
Attending: INTERNAL MEDICINE
Payer: COMMERCIAL

## 2024-12-01 ENCOUNTER — APPOINTMENT (OUTPATIENT)
Dept: CV DIAGNOSTICS | Facility: HOSPITAL | Age: 59
End: 2024-12-01
Attending: HOSPITALIST
Payer: COMMERCIAL

## 2024-12-01 LAB
ALBUMIN SERPL-MCNC: 2.5 G/DL (ref 3.2–4.8)
ALBUMIN/GLOB SERPL: 0.9 {RATIO} (ref 1–2)
ALP LIVER SERPL-CCNC: 62 U/L
ALT SERPL-CCNC: 12 U/L
ANION GAP SERPL CALC-SCNC: 3 MMOL/L (ref 0–18)
AST SERPL-CCNC: 21 U/L (ref ?–34)
BASOPHILS # BLD AUTO: 0.03 X10(3) UL (ref 0–0.2)
BASOPHILS NFR BLD AUTO: 0.2 %
BILIRUB SERPL-MCNC: 0.2 MG/DL (ref 0.2–1.1)
BRCA1 C.185DELAG BLD/T QL: 25.4 U/ML (ref ?–38)
BUN BLD-MCNC: 12 MG/DL (ref 9–23)
CALCIUM BLD-MCNC: 8.2 MG/DL (ref 8.7–10.4)
CANCER AG125 SERPL-ACNC: 11 U/ML (ref ?–30.2)
CHLORIDE SERPL-SCNC: 110 MMOL/L (ref 98–112)
CO2 SERPL-SCNC: 25 MMOL/L (ref 21–32)
CREAT BLD-MCNC: 0.72 MG/DL
CRP SERPL-MCNC: 14.1 MG/DL (ref ?–0.5)
EGFRCR SERPLBLD CKD-EPI 2021: 95 ML/MIN/1.73M2 (ref 60–?)
EOSINOPHIL # BLD AUTO: 0.08 X10(3) UL (ref 0–0.7)
EOSINOPHIL NFR BLD AUTO: 0.6 %
ERYTHROCYTE [DISTWIDTH] IN BLOOD BY AUTOMATED COUNT: 16.1 %
ERYTHROCYTE [SEDIMENTATION RATE] IN BLOOD: 61 MM/HR
FOLATE SERPL-MCNC: 7.8 NG/ML (ref 5.4–?)
GLOBULIN PLAS-MCNC: 2.7 G/DL (ref 2–3.5)
GLUCOSE BLD-MCNC: 139 MG/DL (ref 70–99)
GLUCOSE BLD-MCNC: 201 MG/DL (ref 70–99)
GLUCOSE BLD-MCNC: 221 MG/DL (ref 70–99)
GLUCOSE BLD-MCNC: 242 MG/DL (ref 70–99)
GLUCOSE BLD-MCNC: 272 MG/DL (ref 70–99)
HAPTOGLOB SERPL-MCNC: 286 MG/DL (ref 30–200)
HCT VFR BLD AUTO: 19.2 %
HGB BLD-MCNC: 6 G/DL
HGB BLD-MCNC: 8.4 G/DL
IMM GRANULOCYTES # BLD AUTO: 0.11 X10(3) UL (ref 0–1)
IMM GRANULOCYTES NFR BLD: 0.8 %
LDH SERPL L TO P-CCNC: 317 U/L
LYMPHOCYTES # BLD AUTO: 0.64 X10(3) UL (ref 1–4)
LYMPHOCYTES NFR BLD AUTO: 4.6 %
MCH RBC QN AUTO: 25.4 PG (ref 26–34)
MCHC RBC AUTO-ENTMCNC: 31.3 G/DL (ref 31–37)
MCV RBC AUTO: 81.4 FL
MONOCYTES # BLD AUTO: 0.69 X10(3) UL (ref 0.1–1)
MONOCYTES NFR BLD AUTO: 5 %
NEUTROPHILS # BLD AUTO: 12.25 X10 (3) UL (ref 1.5–7.7)
NEUTROPHILS # BLD AUTO: 12.25 X10(3) UL (ref 1.5–7.7)
NEUTROPHILS NFR BLD AUTO: 88.8 %
OSMOLALITY SERPL CALC.SUM OF ELEC: 294 MOSM/KG (ref 275–295)
PLATELET # BLD AUTO: 291 10(3)UL (ref 150–450)
POTASSIUM SERPL-SCNC: 4 MMOL/L (ref 3.5–5.1)
PROT SERPL-MCNC: 5.2 G/DL (ref 5.7–8.2)
RBC # BLD AUTO: 2.36 X10(6)UL
SODIUM SERPL-SCNC: 138 MMOL/L (ref 136–145)
VIT B12 SERPL-MCNC: 555 PG/ML (ref 211–911)
WBC # BLD AUTO: 13.8 X10(3) UL (ref 4–11)

## 2024-12-01 PROCEDURE — 93306 TTE W/DOPPLER COMPLETE: CPT | Performed by: HOSPITALIST

## 2024-12-01 PROCEDURE — 74177 CT ABD & PELVIS W/CONTRAST: CPT | Performed by: INTERNAL MEDICINE

## 2024-12-01 PROCEDURE — 99232 SBSQ HOSP IP/OBS MODERATE 35: CPT | Performed by: INTERNAL MEDICINE

## 2024-12-01 PROCEDURE — 30233N1 TRANSFUSION OF NONAUTOLOGOUS RED BLOOD CELLS INTO PERIPHERAL VEIN, PERCUTANEOUS APPROACH: ICD-10-PCS | Performed by: INTERNAL MEDICINE

## 2024-12-01 PROCEDURE — 99233 SBSQ HOSP IP/OBS HIGH 50: CPT | Performed by: HOSPITALIST

## 2024-12-01 RX ORDER — INSULIN DEGLUDEC 100 U/ML
20 INJECTION, SOLUTION SUBCUTANEOUS NIGHTLY
Status: DISCONTINUED | OUTPATIENT
Start: 2024-12-01 | End: 2024-12-03

## 2024-12-01 RX ORDER — DOXEPIN HYDROCHLORIDE 50 MG/1
1 CAPSULE ORAL DAILY
Status: DISCONTINUED | OUTPATIENT
Start: 2024-12-02 | End: 2024-12-03

## 2024-12-01 RX ORDER — SODIUM CHLORIDE 9 MG/ML
INJECTION, SOLUTION INTRAVENOUS ONCE
Status: COMPLETED | OUTPATIENT
Start: 2024-12-01 | End: 2024-12-01

## 2024-12-01 NOTE — PROGRESS NOTES
[facility]   part of [facility]     Hospitalist Progress Note     Sawthi Arguelles Patient Status:  Emergency    1965 MRN [id number]   Location [facility] EMERGENCY DEPARTMENT Attending Christopher Resendiz MD   Hosp Day # 1 PCP Erendira Caceres DO     Chief Complaint: Breast mass    Subjective:   Patient denies new complaints. No pain. No nausea, vomiting, diarrhea.   On room air.     Current medications:   insulin degludec  20 Units Subcutaneous Nightly    sodium chloride   Intravenous Once    enoxaparin  40 mg Subcutaneous Daily    gabapentin  600 mg Oral TID    piperacillin-tazobactam  4.5 g Intravenous Q8H    fluticasone-salmeterol  1 puff Inhalation BID    montelukast  10 mg Oral Nightly    insulin aspart  1-68 Units Subcutaneous TID CC    insulin aspart  1-10 Units Subcutaneous TID AC and HS    sodium ferric gluconate  125 mg Intravenous Daily       Objective:    Review of Systems:   10 point ROS completed and was negative, except for pertinent positive and negatives stated in subjective.    Vital signs:  Temp:  [97 °F (36.1 °C)-98.7 °F (37.1 °C)] 97.6 °F (36.4 °C)  Pulse:  [76-93] 88  Resp:  [18] 18  BP: ()/(46-66) 101/47  SpO2:  [95 %-100 %] 100 %  Patient Weight for the past 72 hrs:   Weight   24 0133 146 lb (66.2 kg)     Physical Exam:    General: No acute distress.   Respiratory: Diminished  Chest: Fungating left breast mass, malodorous   Cardiovascular: S1, S2. Regular rate and rhythm.   Abdomen: Soft, nontender, nondistended.  Positive bowel sounds.  Extremities: No edema.  Neuro: AAOx3    Diagnostic Data:    Labs:  Recent Labs   Lab 244 24  0722   WBC 16.4* 13.8*   HGB 8.1* 6.0*   MCV 81.6 81.4   .0 291.0       Recent Labs   Lab 244 24  0722   * 242*   BUN 19 12   CREATSERUM 1.17* 0.72   CA 9.4 8.2*   ALB 3.3 2.5*   * 138   K 5.3* 4.0   CL 96* 110   CO2 25.0 25.0   ALKPHO 100 62   AST 43* 21   ALT 22 12   BILT 0.4 0.2    TP 7.5 5.2*       Estimated Creatinine Clearance: 67.9 mL/min (based on SCr of 0.72 mg/dL).    No results for input(s): \"PTP\", \"INR\" in the last 168 hours.           COVID-19 Lab Results    COVID-19  Lab Results   Component Value Date    COVID19 Not Detected 11/30/2024    COVID19 Detected (A) 11/02/2020       Pro-Calcitonin  No results for input(s): \"PCT\" in the last 168 hours.    Cardiac  Recent Labs   Lab 11/30/24 0314   PBNP 599*       Creatinine Kinase  No results for input(s): \"CK\" in the last 168 hours.    Inflammatory Markers  Recent Labs   Lab 11/30/24 0314 12/01/24  0722   CRP  --  14.10*   GALI 221  --    LDH  --  317*   DDIMER 2.27*  --        Recent Labs   Lab 11/30/24 0314   TROPHS 3       Imaging: Imaging data reviewed in Epic.    Medications:    insulin degludec  20 Units Subcutaneous Nightly    sodium chloride   Intravenous Once    enoxaparin  40 mg Subcutaneous Daily    gabapentin  600 mg Oral TID    piperacillin-tazobactam  4.5 g Intravenous Q8H    fluticasone-salmeterol  1 puff Inhalation BID    montelukast  10 mg Oral Nightly    insulin aspart  1-68 Units Subcutaneous TID CC    insulin aspart  1-10 Units Subcutaneous TID AC and HS    sodium ferric gluconate  125 mg Intravenous Daily       Assessment & Plan:    Fungating breast mass with superimposed infection   History of breast cancer sp mastectomy and chemo  IV abx, follow-up cultures, ID consult  SurgOnc consult - patient has seen Dr. Adam in the past for mastectomy, but wishes for another surgeon > consult Dr. Moyer  Medical Oncology consult - CT a/p  Dyspnea on exertion  ECHO   Asthma exacerbation ruled out  BD  Nebs PRN  Leukocytosis, improving   Monitor    Anemia, iron deficiency   IV iron  PRBC x 1  Diabetes mellitus, A1c 6.5  Tresiba - increase to 20/n  Correctional  Carb  Hyponatremia, resolved   Hyperkalemia, resolved   Elevated AST, resolved  History of serous borderline ovarian tumor sp resection  Lactic acidosis, resolved    Elevated d-dimer, CTA neg for PE  Dyslipidemia  Abnormal UA    DVT Px: Lovenox     At this point Ms. Arguelles is expected to be discharge to: Home    Plan of care discussed with patient, RN and oncologist.     Hua Huffman MD        Supplementary Documentation:   DVT Mechanical Prophylaxis:   SCDs,    DVT Pharmacologic Prophylaxis   Medication    enoxaparin (Lovenox) 40 MG/0.4ML SUBQ injection 40 mg                Code Status: Full Code  Bonner: No urinary catheter in place  Bonner Duration (in days):   Central line:    ABDELRAHMAN:

## 2024-12-01 NOTE — DIETARY MALNUTRITION NOTE
[facility]   part of [facility]  NUTRITION ASSESSMENT    Pt meets severe malnutrition criteria at this time.    CRITERIA FOR MALNUTRITION DIAGNOSIS:  Criteria for severe malnutrition diagnosis: chronic illness related to wt loss greater than 10% in 6 months and energy intake less than 75% for greater than 1 month    NUTRITION INTERVENTION:    RD nutrition Care Plan- Initiated ONS (oral nutritional supplements) and Ordered multivitamin  Meal and Snacks - Continue CHO Controlled diet as tolerated; monitor patient po intake. Encourage adequate po of appropriate diet.  Medical Food Supplements - RD added [employer] Standard 1.0 chocolate daily. Rationale/use for oral supplements discussed.  Vitamin and Mineral Supplements - Recommend adding Multivitamin with minerals    PATIENT STATUS: 59 year old female admitted on 11/30 presents with new L breast mass/infection concerning for breast cancer. Pt screened d/t MST score 3. Visited pt at bedside. Pt reports decreased appetite/PO intake over the past 1.5 months from unknown reason. Does report appetite during this admit has been good and eating 3 meals/day. Denies GI symptoms at this time with last BM today. No chewing or swallowing difficulties. Confirms allergy to fish and also reports not eating meat. She reports her UBW ~3 months ago was 163 lbs and currently weighs 146 lbs. Makes green smoothies at home but mostly fruits/vegetables with no protein source. Discussed importance of protein to preserve LBM and encouraged adding protein into smoothies or buying ONS at home. She reports not liking eggs or dairy either. Offered ONS during admit and pt agreeable to try chocolate [employer]. All questions answered at this time.    PMH:  Breast ca (BRCA2 +, s/p mastectomy, chemo), asthma, HLD, DM     ANTHROPOMETRICS:  Ht: 160 cm (5' 3\")  Wt: 66.5 kg (146 lb 9.6 oz).   BMI: Body mass index is 25.97 kg/m².  IBW: 52.3 kg    WEIGHT HISTORY: Per chart, pt with ~21 lb wt  loss x 5 months (12.5%, significant per standards).  Patient Weight(s) for the past 336 hrs:   Weight   11/30/24 0812 66.5 kg (146 lb 9.6 oz)   11/30/24 0133 66.2 kg (146 lb)       Wt Readings from Last 10 Encounters:   11/30/24 66.5 kg (146 lb 9.6 oz)   06/14/24 75.8 kg (167 lb)   12/22/23 82.6 kg (182 lb)   01/12/22 79.8 kg (176 lb)   03/30/21 78 kg (172 lb)   02/09/20 67.1 kg (148 lb)   12/16/19 78 kg (172 lb)   03/25/19 78.5 kg (173 lb 2 oz)   12/13/18 75.3 kg (166 lb)   09/25/18 79.8 kg (176 lb)        NUTRITION:  Diet:       Procedures    Carbohydrate controlled diet 1800 kcal/60 grams; Is Patient on Accuchecks? No      Food Allergies:  fish  Cultural/Ethnic/Jewish Preferences Addressed: Yes    Percent Meals Eaten (last 3 days)       None            GI SYSTEM REVIEW: WNL; last BM 11/30  Skin/Wounds: L breast wound    NUTRITION RELATED PHYSICAL FINDINGS:     1. Body Fat/Muscle Mass: no wasting noted / well nourished     2. Fluid Accumulation: lower extremity edema 2+    NUTRITION PRESCRIPTION:  66.5 kg Actual Body Weight  Calories: 1646-4573 calories/day (25-30 kcal/kg)  Protein:  grams protein/day (1.0-1.5 gm/kg)  Fluid: ~1 ml/kcal or per MD discretion    NUTRITION DIAGNOSIS/PROBLEM:  Malnutrition related to physiological causes and insufficient appetite resulting in inadequate nutrition intake as evidenced by documented/reported insufficient oral intake and documented/reported unintentional weight loss    MONITOR AND EVALUATE/NUTRITION GOALS:  PO intake of 75% of meals TID - New  PO intake of 75% of oral nutrition supplement/s - New  Weight stable within 1 to 2 lbs during admission - New      MEDICATIONS:  Novolog, Tresiba 20 units, abx, ferrlecit  Gtt: NS at 100 ml/hr    LABS:  , POC glucose: 192-312 mg/dl, A1c = 6.5%    Pt is at High nutrition risk    Maria Dee, RD, LDN, [facility]  Clinical Dietitian  Spectra: [phone]

## 2024-12-01 NOTE — CONSULTS
INFECTIOUS DISEASE CONSULTATION    Swathi Arguelles Patient Status:  Inpatient    1965 MRN [id number]   Location [facility] 4NW-A Attending Hua Huffman MD   Hosp Day # 1 PCP Erendira Caceres DO       Requested by Dr. Huffman    Reason for Consultation:  Antibiotics for necrotic tumor infection    History of Present Illness:  Swathi Arguelles is a a(n) 59 year old female with history of right sided breast cancer admitted with fungating left breast mass, and drainage.  Oncology has evaluated and recommended surgery consultation.       History:  Past Medical History:    Anemia    transfusions     Asthma (HCC)    Breast cancer (HCC)    right breast     Cancer (HCC)    breast    COVID-19    Tested 20    Heart murmur    History of blood transfusion    Murmur    benign murmur, unknown what type    Neuropathy    Personal history of antineoplastic chemotherapy    last chemo treatment    Pneumonia, organism unspecified(486)    Seasonal allergies    Type II or unspecified type diabetes mellitus without mention of complication, not stated as uncontrolled    Wheezing    related seasonal and fish allergies, takes inhalers     Past Surgical History:   Procedure Laterality Date    Access port      left chest port a cath    Breast biopsy  2013    Procedure: BREAST BIOPSY;  Surgeon: Yasmany Goode MD;  Location:  MAIN OR    D & c      Mastectomy modified radical  3/18/2014    Procedure: BREAST MODIFIED RADICAL MASTECTOMY;  Surgeon: Jose Luis Adam MD;  Location:  MAIN OR    Mastectomy right      3/18/14    Needle biopsy right      Other surgical history  2015    mediport removal    Radiation right      Total abdominal hysterectomy  2014    Procedure: ABDOMINAL HYSTERECTOMY TOTAL BSO STAGING;  Surgeon: Jose Luis Adam MD;  Location:  MAIN OR     Family History   Problem Relation Age of Onset    Cancer Father     Heart Attack Other          fam hx    Other (COPD) Other         fam hx    Other (CHF) Other         fam hx    Diabetes Other         fam hx    Other (leukemia) Other         fam hx    Cancer Maternal Aunt         breast cancer    Breast Cancer Self 49      reports that she has never smoked. She has never used smokeless tobacco. She reports that she does not drink alcohol and does not use drugs.      Allergies:  Allergies[1]    Medications:    Current Facility-Administered Medications:     insulin degludec (Tresiba) 100 units/mL flextouch 20 Units, 20 Units, Subcutaneous, Nightly    glucose (Dex4) 15 GM/59ML oral liquid 15 g, 15 g, Oral, Q15 Min PRN **OR** glucose (Glutose) 40% oral gel 15 g, 15 g, Oral, Q15 Min PRN **OR** glucose-vitamin C (Dex-4) chewable tab 4 tablet, 4 tablet, Oral, Q15 Min PRN **OR** dextrose 50% injection 50 mL, 50 mL, Intravenous, Q15 Min PRN **OR** glucose (Dex4) 15 GM/59ML oral liquid 30 g, 30 g, Oral, Q15 Min PRN **OR** glucose (Glutose) 40% oral gel 30 g, 30 g, Oral, Q15 Min PRN **OR** glucose-vitamin C (Dex-4) chewable tab 8 tablet, 8 tablet, Oral, Q15 Min PRN    sodium chloride 0.9% infusion, , Intravenous, Continuous    enoxaparin (Lovenox) 40 MG/0.4ML SUBQ injection 40 mg, 40 mg, Subcutaneous, Daily    acetaminophen (Tylenol Extra Strength) tab 500 mg, 500 mg, Oral, Q4H PRN    melatonin tab 3 mg, 3 mg, Oral, Nightly PRN    polyethylene glycol (PEG 3350) (Miralax) 17 g oral packet 17 g, 17 g, Oral, Daily PRN    sennosides (Senokot) tab 17.2 mg, 17.2 mg, Oral, Nightly PRN    bisacodyl (Dulcolax) 10 MG rectal suppository 10 mg, 10 mg, Rectal, Daily PRN    fleet enema (Fleet) rectal enema 133 mL, 1 enema, Rectal, Once PRN    ondansetron (Zofran) 4 MG/2ML injection 4 mg, 4 mg, Intravenous, Q6H PRN    prochlorperazine (Compazine) 10 MG/2ML injection 5 mg, 5 mg, Intravenous, Q8H PRN    benzonatate (Tessalon) cap 200 mg, 200 mg, Oral, TID PRN    glycerin-hypromellose- (Artificial Tears) 0.2-0.2-1 % ophthalmic  solution 1 drop, 1 drop, Both Eyes, QID PRN    sodium chloride (Saline Mist) 0.65 % nasal solution 1 spray, 1 spray, Each Nare, Q3H PRN    gabapentin (Neurontin) tab 600 mg, 600 mg, Oral, TID    piperacillin-tazobactam (Zosyn) 4.5 g in dextrose 5% 100 mL IVPB-ADDV, 4.5 g, Intravenous, Q8H    albuterol (Ventolin) (2.5 MG/3ML) 0.083% nebulizer solution 2.5 mg, 2.5 mg, Nebulization, Q4H PRN    fluticasone-salmeterol (Advair Diskus) 250-50 MCG/ACT inhaler 1 puff, 1 puff, Inhalation, BID    montelukast (Singulair) tab 10 mg, 10 mg, Oral, Nightly    insulin aspart (NovoLOG) 100 Units/mL FlexPen 1-68 Units, 1-68 Units, Subcutaneous, TID CC    insulin aspart (NovoLOG) 100 Units/mL FlexPen 1-10 Units, 1-10 Units, Subcutaneous, TID AC and HS    sodium ferric gluconate (Ferrlecit) 125 mg in sodium chloride 0.9% 100mL IVPB premix, 125 mg, Intravenous, Daily  Medications Ordered Prior to Encounter[2]    Review of Systems:    A comprehensive 10 point review of systems was completed.  Pertinent positives and negatives noted in the the HPI.      Physical Exam:    General: No acute distress. Alert and oriented x 3.  Vital signs: Temp:  [97 °F (36.1 °C)-98.7 °F (37.1 °C)] 97.6 °F (36.4 °C)  Pulse:  [76-93] 88  Resp:  [18] 18  BP: ()/(46-66) 101/47  SpO2:  [95 %-100 %] 100 %  Body mass index is 25.97 kg/m².  HEENT: Moist mucous membranes. Extraocular muscles are intact.  Neck: No swelling, no masses  Respiratory: Non labored, symmetric exursion  Left breast dressing in place  Abdomen: Soft, nontender, nondistended.    Musculoskeletal: Full range of motion of all extremities.  No swelling noted.  Joints: no effusions  Skin: No lesions. No erythema, no open wounds    Laboratory Data:  Laboratory data reviewed      Recent Labs   Lab 12/01/24  0722   RBC 2.36*   HGB 6.0*   HCT 19.2*   MCV 81.4   MCH 25.4*   MCHC 31.3   RDW 16.1   NEPRELIM 12.25*   WBC 13.8*   .0       Recent Labs   Lab 11/30/24  0314   *   BUN 19    CREATSERUM 1.17*   CA 9.4   ALB 3.3   *   K 5.3*   CL 96*   CO2 25.0   ALKPHO 100   AST 43*   ALT 22   BILT 0.4   TP 7.5         Lab Results   Component Value Date    PGLU 272 12/01/2024       Established Problem list:  Patient Active Problem List   Diagnosis    Exacerbation of asthma (HCC)    Pure hypercholesterolemia    Goiter, unspecified    DM2 (diabetes mellitus, type 2) (HCC)    Family history of breast cancer    BRCA2 genetic carrier    Neuropathic pain    Left ovarian tumor of borderline malignancy    Cancer of overlapping sites of right breast (HCC)    Drug-induced peripheral neuropathy (HCC)    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)    Community acquired pneumonia    Human metapneumovirus (hMPV) pneumonia    Mild intermittent asthma without complication (HCC)    History of pneumonia    Abnormal MRI, breast    Acquired absence of right breast and nipple    Type 2 diabetes mellitus without complication, with long-term current use of insulin (HCC)    Bell's palsy    Hyponatremia    Anemia    Hyperglycemia    Community acquired pneumonia, unspecified laterality    Hypoxia    Severe asthma with status asthmaticus, unspecified whether persistent (HCC)    COVID-19    Type 2 diabetes mellitus with diabetic neuropathy (HCC)    Dyspnea, unspecified type    Elevated lactic acid level    Mass of left breast, unspecified quadrant    Dehydration    History of cancer of right breast    Iron deficiency anemia secondary to blood loss (chronic)    Normocytic anemia       ASSESSMENT/PLAN:  1.  Fungating left breast mass with secondary infection  History of right sided breast cancer  -oncology following  -surgery consulted  Can continue zosyn pending micro updates          Hans Pepper MD, MD  [facility]  [phone]         [1]   Allergies  Allergen Reactions    Fish ANAPHYLAXIS and HIVES     All fish, Wheezing, short of breath    Levemir HIVES and ITCHING     Seasonal WHEEZING and Runny nose     Ragweed, pollen   [2]   No current facility-administered medications on file prior to encounter.     Current Outpatient Medications on File Prior to Encounter   Medication Sig Dispense Refill    gabapentin 600 MG Oral Tab TAKE 1 TABLET BY MOUTH THREE TIMES DAILY; TAKE AN EXTRA 600MG AS NEEDED FOR SEVERE PAIN 270 tablet 1    Insulin Glargine-yfgn 100 UNIT/ML Subcutaneous Solution Pen-injector Inject 20 Units into the skin nightly. 24 mL 0    fluticasone-salmeterol (WIXELA INHUB) 100-50 MCG/ACT Inhalation Aerosol Powder, Breath Activated Inhale 1 puff into the lungs 2 (two) times daily. 3 each 0    albuterol (2.5 MG/3ML) 0.083% Inhalation Nebu Soln Take 3 mL (2.5 mg total) by nebulization every 4 (four) hours as needed for Wheezing or Shortness of Breath. 90 mL 0    Insulin Pen Needle (BD PEN NEEDLE DANIELA U/F) 32G X 4 MM Does not apply Misc Up to  each 2    montelukast 10 MG Oral Tab Take 1 tablet (10 mg total) by mouth nightly. 90 tablet 1    Insulin Lispro, 1 Unit Dial, 100 UNIT/ML Subcutaneous Solution Pen-injector Inject 10 Units into the skin in the morning, at noon, and at bedtime. (Patient not taking: Reported on 11/30/2024) 3 mL 0    Budesonide-Formoterol Fumarate 160-4.5 MCG/ACT Inhalation Aerosol Inhale 2 puffs into the lungs 2 (two) times daily. (Patient taking differently: Inhale 2 puffs into the lungs 2 (two) times daily. Pt states she would like to go back to budesonide) 3 each 1    loratadine 10 MG Oral Tab Take 1 tablet (10 mg total) by mouth nightly. (Patient not taking: Reported on 11/30/2024)

## 2024-12-01 NOTE — PROGRESS NOTES
Hematology/Oncology Progress Note    Patient Name: Swathi Arguelles  Medical Record Number: [id number]    YOB: 1965     Reason for Consultation:  Swathi Arguelles was seen today for the diagnosis of breast cancer    Interval events: Hemoglobin down to 6.0.  She denies any bleeding or dark stools.    Current Inpatient Medications:  Inpatient Meds:   insulin degludec  20 Units Subcutaneous Nightly    sodium chloride   Intravenous Once    enoxaparin  40 mg Subcutaneous Daily    gabapentin  600 mg Oral TID    piperacillin-tazobactam  4.5 g Intravenous Q8H    fluticasone-salmeterol  1 puff Inhalation BID    montelukast  10 mg Oral Nightly    insulin aspart  1-68 Units Subcutaneous TID CC    insulin aspart  1-10 Units Subcutaneous TID AC and HS    sodium ferric gluconate  125 mg Intravenous Daily      sodium chloride 100 mL/hr at 12/01/24 0459     PRN Meds:    glucose **OR** glucose **OR** glucose-vitamin C **OR** dextrose **OR** glucose **OR** glucose **OR** glucose-vitamin C    acetaminophen    melatonin    polyethylene glycol (PEG 3350)    sennosides    bisacodyl    fleet enema    ondansetron    prochlorperazine    benzonatate    glycerin-hypromellose-    sodium chloride    albuterol    Allergies:   Allergies[1]    Vital Signs:  Height: --  Weight: --  BSA (Calculated - sq m): --  Pulse: 88 (12/01 0428)  BP: 101/47 (12/01 0428)  Temp: 97.6 °F (36.4 °C) (12/01 0428)  Do Not Use - Resp Rate: --  SpO2: 100 % (12/01 0428)    Wt Readings from Last 6 Encounters:   11/30/24 66.5 kg (146 lb 9.6 oz)   06/14/24 75.8 kg (167 lb)   12/22/23 82.6 kg (182 lb)   01/12/22 79.8 kg (176 lb)   03/30/21 78 kg (172 lb)   02/09/20 67.1 kg (148 lb)     Physical Examination:  General: Patient is alert and oriented, not in acute distress  CV: Regular rate and rhythm, no murmurs  Respiratory: Lungs clear to auscultation bilaterally  GI/Abd: Soft, non-tender   Breast: Large firm left breast mass with hardened skin changes.   Dressing in place, patient did not want me to examine  Ext: Mild symmetric BLE edema    Laboratory:  Recent Labs   Lab 11/30/24 0314 12/01/24 0722   WBC 16.4* 13.8*   HGB 8.1* 6.0*   HCT 26.2* 19.2*   .0 291.0   MCV 81.6 81.4   RDW 15.9 16.1   NEPRELIM 14.46* 12.25*     Recent Labs   Lab 11/30/24 0314 12/01/24 0722   * 138   K 5.3* 4.0   CL 96* 110   CO2 25.0 25.0   BUN 19 12   CREATSERUM 1.17* 0.72   * 242*   CA 9.4 8.2*   TP 7.5 5.2*   ALB 3.3 2.5*   ALKPHO 100 62   AST 43* 21   ALT 22 12   BILT 0.4 0.2     No results for input(s): \"PT\", \"INR\", \"PTT\", \"FIB\" in the last 168 hours.    Lab Results   Component Value Date    REITCPERCENT 1.9 11/30/2024     Lab Results   Component Value Date    RETHE 25.5 (L) 11/30/2024     Lab Results   Component Value Date    GALI 221 11/30/2024    GALI 139.4 06/12/2024    GALI 81.4 06/04/2018    GALI 300.6 (H) 04/17/2014    GALI 49.0 09/10/2013     Lab Results   Component Value Date    SAT 7 (L) 11/30/2024    SAT 7 (L) 06/04/2018     Soluble transferrin receptor  No results found for: \"STFR\"  No results found for: \"STFRNR\"  Lab Results   Component Value Date    B12 555 12/01/2024    B12 503 12/21/2023    B12 >2,000 (H) 01/03/2022    B12 909 05/22/2020    B12 1,155 (H) 09/26/2015    B12 600 07/16/2014    B12 602 04/17/2014     No results found for: \"MMA\"  Lab Results   Component Value Date    FOLIC 7.8 12/01/2024    FOLIC 13.1 04/17/2014     No results found for: \"COPPER\"  No results found for: \"ESRML\"  Lab Results   Component Value Date    CRP 14.10 (H) 12/01/2024     Lab Results   Component Value Date     (H) 12/01/2024     No results found for: \"HAPT\"  No results found for: \"ELECTMS1\"  No results found for: \"KAPPALTCHN\"   No results found for: \"LAMBDALTCH\"  No results found for: \"KAPLAMRATIO\"  Lab Results   Component Value Date    TSH 2.169 11/30/2024    TSH 3.660 12/21/2023    TSH 0.835 01/03/2022    TSH 1.280 05/22/2020    TSH 0.769 09/26/2015    TSH  0.957 11/24/2013    TSH 1.240 10/04/2013    TSH 1.490 04/10/2013    TSH 1.40 12/30/2010     Lab Results   Component Value Date    T4F 1.1 12/21/2023    T4F 1.2 01/03/2022    T4F 1.0 05/22/2020    T4F 1.1 09/26/2015    T4F 1.1 12/30/2010     No results found for: \"T3TOT\"    Lab Results   Component Value Date     11.0 12/01/2024     3.1 02/03/2017     3.4 10/31/2016     <2.0 07/18/2016     2.8 04/27/2016     3.5 01/11/2016     <2.0 10/02/2015     3.0 05/29/2015     2.3 01/29/2015     2.2 08/22/2014     3.3 07/07/2014     43.4 (H) 02/07/2014     35.8 (H) 11/15/2013     171.5 (H) 09/20/2013     Lab Results   Component Value Date    UC6360 25.4 12/01/2024    RF2135 22.7 02/03/2017    JB7001 18.2 10/31/2016    [id number] 18.3 07/18/2016    [id number] 12.0 08/22/2014    SD0617 12.5 07/07/2014    QI9610 19.4 02/07/2014    FP7296 23.2 12/27/2013    [id number] 48.0 (H) 11/15/2013    [id number] 91.7 (H) 09/11/2013     Imaging:    CTA chest 11/30/24: CONCLUSION:     Very large fungating mass left breast/chest wall.  In this patient with previous right mastectomy, most likely a very large malignant breast lesion, differential also includes sarcoma.  Concern for chest muscular invasion.  Suspicious left axillary lymph nodes. No sign of acute pulmonary embolism.  Post radiation parenchymal changes are stable right apex.     Impression & Plan:     *suspected left breast cancer/neglected breast mass, +known BRCA2 carrier  -Mass is apparently fungating and infected  -Will need biopsy, surgery consultation, and further imaging for staging.  Already had CTA chest, will obtain CT a/p now.  Will likely also need a PET scan or bone scan.  -CA 27.29 and  are normal but LDH is elevated.  Awaiting haptoglobin  -Wound care and antibiotics for suspected infection     *hx of right breast cancer  -dx'd in 9/2013 [id number] (ER+/PRneg/BLW7myr) s/p neoadjuvant ddAC->T followed by right  mastectomy with ALND on 3/18/14.  Completed adjuvant chest wall/axillary radiation summer 2014.  Started endocrine therapy with anastrozole 8/2014, though discontinued sometime after 2/2017 when patient was lost to follow-up.  She previously followed with Dr. Lopez    *hx of serous borderline ovarian tumor  -s/p BSO, bilateral pelvic lymphadenectomy omentectomy, appendectomy on 5/13/14.  CT a/p planned as above.  -Will check CA 27.29 and     *Normocytic anemia   -Has a component of iron deficiency anemia and AoCD (elevated CRP likely due to infection)  -Continue Ferrlecit 125mg IV daily  -LDH is elevated, this may be related to malignancy versus hemolysis.  Awaiting haptoglobin.  Normal B12 and folic acid.    -Hemoglobin lower today to 6.0, transfusing 1 unit of PRBCs.  -Repeat CBC tomorrow    Dr. Lopez will be back to round tomorrow    Min Henry MD  Hematology/Medical Oncology  [facility]           [1]   Allergies  Allergen Reactions    Fish ANAPHYLAXIS and HIVES     All fish, Wheezing, short of breath    Levemir HIVES and ITCHING    Seasonal WHEEZING and Runny nose     Ragweed, pollen

## 2024-12-01 NOTE — SPIRITUAL CARE NOTE
Spiritual Care Visit Note    Patient Name: Swathi Arguelles Date of Spiritual Care Visit: 24   : 1965 Primary Dx: Dyspnea, unspecified type       Referred By: Referral From: Other (Comment)    Spiritual Care Taxonomy:    Intended Effects: Establish rapport and connectedness    Methods: Offer support    Interventions: Acknowledge current situation;Active listening;Ask guided questions    Visit Type/Summary:     - Spiritual Care: Patient and family expressed appreciation for  visit.  remains available as needed for follow up.    Spiritual Care support can be requested via an Epic consult. For urgent/immediate needs, please contact the On Call  at: [place]: ext [phone]

## 2024-12-01 NOTE — PHYSICAL THERAPY NOTE
PHYSICAL THERAPY EVALUATION - INPATIENT     Room Number: 418/418-A  Evaluation Date: 2024  Type of Evaluation: Initial  Physician Order: PT Eval and Treat    Presenting Problem: decreased appetite, wound on L breast with pain and drainage  Co-Morbidities : R breast CA s/p mastectomy, asthma, HLD, DM  Reason for Therapy: Mobility Dysfunction and Discharge Planning    PHYSICAL THERAPY ASSESSMENT   Patient is a 59 year old female admitted 2024 for decreased appetite, generalized weakness and pain with drainage from L breast wound.   Patient is currently functioning at baseline with bed mobility, transfers, gait, maintaining seated position, and standing prolonged periods. Prior to admission, patient's baseline is independent.     PLAN  Patient has been evaluated and presents with no skilled Physical Therapy needs at this time.  Patient discharged from Physical Therapy services.  Please re-order if a new functional limitation presents during this admission.    PT Device Recommendation: None    GOALS  Patient was able to achieve the following goals ...    Patient was able to transfer At previous, functional level  Safely and independently   Patient able to ambulate on level surfaces At previous, functional level  Safely and independently     HOME SITUATION  Type of Home: Townhouse  Home Layout: Two level  Stairs to Enter : 1   Railing: Yes    Stairs to Bedroom: 12    Railing: Yes    Lives With: Alone    Drives: Yes   Patient Regularly Uses: None     Prior Level of [place]: Pt reports living alone in a 2 story townhouse, driving, and working, fully independent.    SUBJECTIVE  \" I am not dizzy at all\"    OBJECTIVE  Precautions: None  Fall Risk: Standard fall risk    WEIGHT BEARING RESTRICTION     PAIN ASSESSMENT  Ratin  Location: L breast  Management Techniques: Breathing techniques;Relaxation;Repositioning    COGNITION  Overall Cognitive Status:  WFL - within functional limits    RANGE OF MOTION AND  STRENGTH ASSESSMENT  Upper extremity ROM and strength are within functional limits     Lower extremity ROM is within functional limits     Lower extremity strength is within functional limits     BALANCE  Static Sitting: Good  Dynamic Sitting: Good  Static Standing: Fair +  Dynamic Standing: Fair +      AM-PAC '6-Clicks' INPATIENT SHORT FORM - BASIC MOBILITY  How much difficulty does the patient currently have...  Patient Difficulty: Turning over in bed (including adjusting bedclothes, sheets and blankets)?: None   Patient Difficulty: Sitting down on and standing up from a chair with arms (e.g., wheelchair, bedside commode, etc.): None   Patient Difficulty: Moving from lying on back to sitting on the side of the bed?: None   How much help from another person does the patient currently need...   Help from Another: Moving to and from a bed to a chair (including a wheelchair)?: None   Help from Another: Need to walk in hospital room?: None   Help from Another: Climbing 3-5 steps with a railing?: A Little       AM-PAC Score:  Raw Score: 23   Approx Degree of Impairment: 11.2%   Standardized Score (AM-PAC Scale): 56.93   CMS Modifier (G-Code): CI    FUNCTIONAL ABILITY STATUS  Gait Assessment   Functional Mobility/Gait Assessment  Gait Assistance: Supervision  Distance (ft): 50  Assistive Device: None  Pattern: Within Functional Limits    Skilled Therapy Provided     Transfer Mobility:  Sit to stand: supervision   Stand to sit: supervision  Gait = supervision    Therapist's comments:RN in room and cleared pt for session. Pt received seated on bedside chair and reported that she performed bed mobility earlier with no assist needed. Per RN, Hgb is 6.0 and pt will be needing blood transfusion at this time. Pt asymptomatic and reporting no SOB or dizziness. Evaluation performed in room only due to this. Pt performed above functional mobility. Pt performed marching in place while standing x 12 reps and holding on to RW with no  LOB and no SOB. Educated pt that after blood transfusion and lab values normalized, pt can ask PCT or RN to have her ambulate around the hallway, keep sitting up in chair and to ambulate to bathroom for toilet needs. Pt verbalized understanding and agreement. Pt left in chair with all needs met and safety measures in place. RN made aware of recommendations and session.     Exercise/Education Provided:  Energy conservation  Functional activity tolerated  Gait training  ROM  Lower therapeutic exercise:  Alternating marching  Transfer training    Patient End of Session: Up in chair;Needs met;Call light within reach;RN aware of session/findings;All patient questions and concerns addressed;Hospital anti-slip socks    Patient Evaluation Complexity Level:  History Moderate - 1 or 2 personal factors and/or co-morbidities   Examination of body systems Low -  addressing 1-2 elements   Clinical Presentation Low- Stable   Clinical Decision Making Low Complexity       PT Session Time: 25 minutes  Gait Training:  minutes  Therapeutic Activity: 25 minutes  Neuromuscular Re-education:  minutes  Therapeutic Exercise:  minutes

## 2024-12-01 NOTE — SPIRITUAL CARE NOTE
Spiritual Care Visit Note    Patient Name: Swathi Arguelles Date of Spiritual Care Visit: 24   : 1965 Primary Dx: Dyspnea, unspecified type       Referred By: Referral From: Other (Comment)    Spiritual Care Taxonomy:    Intended Effects: Demonstrate caring and concern    Methods: Collaborate with care team member;Explore spiritual/Mosque beliefs    Interventions: Active listening;Ask guided questions;Explain  role    Visit Type/Summary:     - Spiritual Care: Consulted with RN prior to visit. Offered empathic listening and emotional support. Provided information regarding how to contact Spiritual Care and left a Spiritual Care information card.  remains available as needed for follow up.  - PoA: Other: Provided education regarding PoA for Healthcare. Left PoA information with patient for review.  Left PoA information and Spiritual Care contact information. Patient stated that she is devoice and has one adult child. Patient also stated that she prays at home and attend [employer] [place] Jehov[employer]'s witness. Patient is supported by her daughter.     Spiritual Care support can be requested via an Epic consult. For urgent/immediate needs, please contact the On Call  at: [place]: ext [phone]         Chaplain Resident Marguerite Claros MA

## 2024-12-01 NOTE — PLAN OF CARE
Problem: Diabetes/Glucose Control  Goal: Glucose maintained within prescribed range  Description: INTERVENTIONS:  - Monitor Blood Glucose as ordered  - Assess for signs and symptoms of hyperglycemia and hypoglycemia  - Administer ordered medications to maintain glucose within target range  - Assess barriers to adequate nutritional intake and initiate nutrition consult as needed  - Instruct patient on self management of diabetes  Outcome: Progressing     Problem: SKIN/TISSUE INTEGRITY - ADULT  Goal: Skin integrity remains intact  Description: INTERVENTIONS  - Assess and document risk factors for pressure ulcer development  - Assess and document skin integrity  - Monitor for areas of redness and/or skin breakdown  - Initiate interventions, skin care algorithm/standards of care as needed  Outcome: Progressing  Goal: Incision(s), wounds(s) or drain site(s) healing without S/S of infection  Description: INTERVENTIONS:  - Assess and document risk factors for pressure ulcer development  - Assess and document skin integrity  - Assess and document dressing/incision, wound bed, drain sites and surrounding tissue  - Implement wound care per orders  - Initiate isolation precautions as appropriate  - Initiate Pressure Ulcer prevention bundle as indicated  Outcome: Progressing     Problem: SKIN/TISSUE INTEGRITY - ADULT  Goal: Incision(s), wounds(s) or drain site(s) healing without S/S of infection  Description: INTERVENTIONS:  - Assess and document risk factors for pressure ulcer development  - Assess and document skin integrity  - Assess and document dressing/incision, wound bed, drain sites and surrounding tissue  - Implement wound care per orders  - Initiate isolation precautions as appropriate  - Initiate Pressure Ulcer prevention bundle as indicated  Outcome: Progressing     Received patient aox4, sitting up in chair at bedside, left breast wound dressing reinforced per writer. Patient c/o pain 5/10, tylenol given along  with hs meds, ivf and iv abt. Maintained, patient  remains afebrile. Awaiting wound care consult.

## 2024-12-01 NOTE — PLAN OF CARE
Pt received A&Ox4. Afebrile. VSS. Gabapentin and prn tylenol given for c/o left breast pain. ID and surg onc consulted per orders. Anaerobic and aerobic cx of left breast drainage collected. New dressing applied. Seen by heme/onc. Plan for CT a/p. Started on ferrlecit, serum iron level 13. 2D echo pending. IVF infusing @ 100ml/hr. IV zosyn per MAR. Call light in reach.

## 2024-12-02 ENCOUNTER — APPOINTMENT (OUTPATIENT)
Dept: ULTRASOUND IMAGING | Facility: HOSPITAL | Age: 59
End: 2024-12-02
Attending: SPECIALIST
Payer: COMMERCIAL

## 2024-12-02 PROBLEM — N63.0 BREAST MASS IN FEMALE: Status: ACTIVE | Noted: 2024-12-02

## 2024-12-02 LAB
ANION GAP SERPL CALC-SCNC: 10 MMOL/L (ref 0–18)
APTT PPP: 26.9 SECONDS (ref 23–36)
ATRIAL RATE: 116 BPM
BASOPHILS # BLD AUTO: 0.04 X10(3) UL (ref 0–0.2)
BASOPHILS NFR BLD AUTO: 0.2 %
BLOOD TYPE BARCODE: 600
BUN BLD-MCNC: <5 MG/DL (ref 9–23)
C DIFF TOX B STL QL: NEGATIVE
CALCIUM BLD-MCNC: 8.3 MG/DL (ref 8.7–10.4)
CANCER AG15-3 SERPL-ACNC: 18.5 U/ML (ref ?–32.4)
CEA SERPL-MCNC: <0.5 NG/ML (ref ?–5)
CHLORIDE SERPL-SCNC: 111 MMOL/L (ref 98–112)
CO2 SERPL-SCNC: 20 MMOL/L (ref 21–32)
CREAT BLD-MCNC: 0.66 MG/DL
EGFRCR SERPLBLD CKD-EPI 2021: 100 ML/MIN/1.73M2 (ref 60–?)
EOSINOPHIL # BLD AUTO: 0.08 X10(3) UL (ref 0–0.7)
EOSINOPHIL NFR BLD AUTO: 0.4 %
ERYTHROCYTE [DISTWIDTH] IN BLOOD BY AUTOMATED COUNT: 16.7 %
GLUCOSE BLD-MCNC: 111 MG/DL (ref 70–99)
GLUCOSE BLD-MCNC: 150 MG/DL (ref 70–99)
GLUCOSE BLD-MCNC: 157 MG/DL (ref 70–99)
GLUCOSE BLD-MCNC: 178 MG/DL (ref 70–99)
GLUCOSE BLD-MCNC: 204 MG/DL (ref 70–99)
GLUCOSE BLD-MCNC: 70 MG/DL (ref 70–99)
GLUCOSE BLD-MCNC: 71 MG/DL (ref 70–99)
HCT VFR BLD AUTO: 29.1 %
HGB BLD-MCNC: 8.9 G/DL
IMM GRANULOCYTES # BLD AUTO: 0.25 X10(3) UL (ref 0–1)
IMM GRANULOCYTES NFR BLD: 1.3 %
INR BLD: 1.22 (ref 0.8–1.2)
LYMPHOCYTES # BLD AUTO: 1.22 X10(3) UL (ref 1–4)
LYMPHOCYTES NFR BLD AUTO: 6.3 %
MCH RBC QN AUTO: 25.6 PG (ref 26–34)
MCHC RBC AUTO-ENTMCNC: 30.6 G/DL (ref 31–37)
MCV RBC AUTO: 83.9 FL
MONOCYTES # BLD AUTO: 1.11 X10(3) UL (ref 0.1–1)
MONOCYTES NFR BLD AUTO: 5.8 %
NEUTROPHILS # BLD AUTO: 16.52 X10 (3) UL (ref 1.5–7.7)
NEUTROPHILS # BLD AUTO: 16.52 X10(3) UL (ref 1.5–7.7)
NEUTROPHILS NFR BLD AUTO: 86 %
P AXIS: 74 DEGREES
P-R INTERVAL: 130 MS
PLATELET # BLD AUTO: 330 10(3)UL (ref 150–450)
POTASSIUM SERPL-SCNC: 3.6 MMOL/L (ref 3.5–5.1)
PROTHROMBIN TIME: 15.5 SECONDS (ref 11.6–14.8)
Q-T INTERVAL: 304 MS
QRS DURATION: 58 MS
QTC CALCULATION (BEZET): 422 MS
R AXIS: 50 DEGREES
RBC # BLD AUTO: 3.47 X10(6)UL
SODIUM SERPL-SCNC: 141 MMOL/L (ref 136–145)
T AXIS: 45 DEGREES
UNIT VOLUME: 350 ML
VENTRICULAR RATE: 116 BPM
WBC # BLD AUTO: 19.2 X10(3) UL (ref 4–11)

## 2024-12-02 PROCEDURE — 99233 SBSQ HOSP IP/OBS HIGH 50: CPT | Performed by: SPECIALIST

## 2024-12-02 PROCEDURE — 0HBU3ZX EXCISION OF LEFT BREAST, PERCUTANEOUS APPROACH, DIAGNOSTIC: ICD-10-PCS | Performed by: RADIOLOGY

## 2024-12-02 PROCEDURE — 20206 BIOPSY MUSCLE PERQ NEEDLE: CPT | Performed by: SPECIALIST

## 2024-12-02 PROCEDURE — 76942 ECHO GUIDE FOR BIOPSY: CPT | Performed by: SPECIALIST

## 2024-12-02 PROCEDURE — 99233 SBSQ HOSP IP/OBS HIGH 50: CPT | Performed by: HOSPITALIST

## 2024-12-02 RX ORDER — KETOROLAC TROMETHAMINE 15 MG/ML
15 INJECTION, SOLUTION INTRAMUSCULAR; INTRAVENOUS EVERY 6 HOURS PRN
Status: DISCONTINUED | OUTPATIENT
Start: 2024-12-02 | End: 2024-12-03

## 2024-12-02 RX ORDER — KETOROLAC TROMETHAMINE 30 MG/ML
30 INJECTION, SOLUTION INTRAMUSCULAR; INTRAVENOUS EVERY 6 HOURS PRN
Status: DISCONTINUED | OUTPATIENT
Start: 2024-12-02 | End: 2024-12-03

## 2024-12-02 RX ORDER — MORPHINE SULFATE 2 MG/ML
2 INJECTION, SOLUTION INTRAMUSCULAR; INTRAVENOUS EVERY 2 HOUR PRN
Status: DISCONTINUED | OUTPATIENT
Start: 2024-12-02 | End: 2024-12-03

## 2024-12-02 RX ORDER — TRAMADOL HYDROCHLORIDE 50 MG/1
50 TABLET ORAL EVERY 6 HOURS PRN
Status: DISCONTINUED | OUTPATIENT
Start: 2024-12-02 | End: 2024-12-03

## 2024-12-02 RX ORDER — MORPHINE SULFATE 2 MG/ML
1 INJECTION, SOLUTION INTRAMUSCULAR; INTRAVENOUS EVERY 2 HOUR PRN
Status: DISCONTINUED | OUTPATIENT
Start: 2024-12-02 | End: 2024-12-03

## 2024-12-02 RX ORDER — METRONIDAZOLE 500 MG/1
500 TABLET ORAL EVERY 12 HOURS SCHEDULED
Status: DISCONTINUED | OUTPATIENT
Start: 2024-12-03 | End: 2024-12-03

## 2024-12-02 RX ORDER — MORPHINE SULFATE 2 MG/ML
0.5 INJECTION, SOLUTION INTRAMUSCULAR; INTRAVENOUS EVERY 2 HOUR PRN
Status: DISCONTINUED | OUTPATIENT
Start: 2024-12-02 | End: 2024-12-03

## 2024-12-02 NOTE — CM/SW NOTE
SW noted consult order placed for Home Health services due to large fungating breast mass needing dressing changes.     SW sent referral for Home Health services via [employer] and will meet with patient to discuss once choice list is received.     SW will continue to follow for plan of care changes and remain available for any additional DC needs or concerns.     Pooja Posada MSW, LSW  Discharge Planner   [id number]

## 2024-12-02 NOTE — PROGRESS NOTES
INFECTIOUS DISEASE  PROGRESS NOTE            [facility] Patient Status:  Inpatient    1965 MRN [id number]   [facility] 4NW-A Attending Hua Huffman MD   Hosp Day # 2 PCP Erendira Caceres, DO     Antibiotics: zosyn #2    Subjective:  : resting    Objective:  Temp:  [97.8 °F (36.6 °C)-99 °F (37.2 °C)] 98.3 °F (36.8 °C)  Pulse:  [85-99] 92  Resp:  [16-20] 18  BP: (104-121)/(48-62) 121/61  SpO2:  [93 %-99 %] 99 %    General: awake laert  Vital signs:Temp:  [97.8 °F (36.6 °C)-99 °F (37.2 °C)] 98.3 °F (36.8 °C)  Pulse:  [85-99] 92  Resp:  [16-20] 18  BP: (104-121)/(48-62) 121/61  SpO2:  [93 %-99 %] 99 %  HEENT: Moist mucous membranes. Extraocular muscles are intact.  Neck: supple no masses  Respiratory: Non labored, symmetric excursion, normal respirations  Left breast covered  Musculoskeletal: joints: no swelling   Integument: No lesions. No erythema. No open wounds.  Labs:     COVID-19 Lab Results    COVID-19  Lab Results   Component Value Date    COVID19 Not Detected 2024    COVID19 Detected (A) 2020       Pro-Calcitonin  No results for input(s): \"PCT\" in the last 168 hours.    Cardiac  Recent Labs   Lab 24   PBNP 599*       Creatinine Kinase  No results for input(s): \"CK\" in the last 168 hours.    Inflammatory Markers  Recent Labs   Lab 24  0722   CRP  --  14.10*   GALI 221  --    LDH  --  317*   DDIMER 2.27*  --        Recent Labs   Lab 24  0526   RBC 3.47*   HGB 8.9*   HCT 29.1*   MCV 83.9   MCH 25.6*   MCHC 30.6*   RDW 16.7   NEPRELIM 16.52*   WBC 19.2*   .0         Recent Labs   Lab 2424  0722 24  0526   * 242* 70   BUN 19 12 <5*   CREATSERUM 1.17* 0.72 0.66   CA 9.4 8.2* 8.3*   ALB 3.3 2.5*  --    * 138 141   K 5.3* 4.0 3.6   CL 96* 110 111   CO2 25.0 25.0 20.0*   ALKPHO 100 62  --    AST 43* 21  --    ALT 22 12  --    BILT 0.4 0.2  --    TP 7.5 5.2*  --         VANCOMYCIN TROUGH (ug/mL)   Date Value   11/25/2013 16.5     Microbiology    Hospital Encounter on 11/30/24   1. Aerobic Bacterial Culture     Status: Abnormal (Preliminary result)    Collection Time: 11/30/24 12:11 PM    Specimen: Breast, left; Other   Result Value Ref Range    Aerobic Culture Result 2+ growth Enterobacter cloacae (A) N/A    Aerobic Culture Result 2+ growth Staphylococcus lugdunensis (A) N/A    Aerobic Smear No WBCs seen N/A    Aerobic Smear No organisms seen N/A       Susceptibility    Enterobacter cloacae -  (no method available)     Cefazolin  Resistant      Cefepime <=1 Sensitive      Ceftriaxone <=1 Sensitive      Ciprofloxacin <=0.25 Sensitive      Gentamicin <=1 Sensitive      Meropenem <=0.25 Sensitive      Levofloxacin <=0.12 Sensitive      Piperacillin + Tazobactam <=4 Sensitive      Trimethoprim/Sulfa <=20 Sensitive    2. Blood Culture     Status: None (Preliminary result)    Collection Time: 11/30/24  3:45 AM    Specimen: Blood,peripheral   Result Value Ref Range    Blood Culture Result No Growth 2 Days N/A         Problem list reviewed:  Patient Active Problem List   Diagnosis    Exacerbation of asthma (HCC)    Pure hypercholesterolemia    Goiter, unspecified    DM2 (diabetes mellitus, type 2) ([facility])    Family history of breast cancer    BRCA2 genetic carrier    Neuropathic pain    Left ovarian tumor of borderline malignancy    Cancer of overlapping sites of right breast (HCC)    Drug-induced peripheral neuropathy (HCC)    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)    Community acquired pneumonia    Human metapneumovirus (hMPV) pneumonia    Mild intermittent asthma without complication (HCC)    History of pneumonia    Abnormal MRI, breast    Acquired absence of right breast and nipple    Type 2 diabetes mellitus without complication, with long-term current use of insulin (HCC)    Bell's palsy    Hyponatremia    Anemia    Hyperglycemia    Community acquired  pneumonia, unspecified laterality    Hypoxia    Severe asthma with status asthmaticus, unspecified whether persistent (HCC)    COVID-19    Type 2 diabetes mellitus with diabetic neuropathy (HCC)    Dyspnea, unspecified type    Elevated lactic acid level    Mass of left breast, unspecified quadrant    Dehydration    History of cancer of right breast    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 Fungating breast mass with superimposed infection  Wound cultures Enterobacter and staph lug    Oncology following  Biopsy pending  Surgery planned in the future  Can replace zosyn with cefepime flagyl in am, PO when dc      Hans Pepper MD, MD [facility]  [phone]

## 2024-12-02 NOTE — OCCUPATIONAL THERAPY NOTE
OT orders received, pt chart reviewed. Per discussion with pt, no concerns with ADLs at this time, has been up in room IND. OT will sign off at this time. Please re-order if POC changes.

## 2024-12-02 NOTE — CONSULTS
[facility]  Report of Inpatient Wound Care Consultation    Swathi Arguelles Patient Status:  Inpatient    1965 MRN [id number]   Location [facility] 4NW-A Attending Hua Huffman MD   Hosp Day # 2 PCP Erendira Caceres DO     Reason for Consultation:  L breast    History of Present Illness:  Swathi Arguelles is a a(n) 59 year old female.  Patient with multiple comorbidities.    SUBJECTIVE:  No complaints, \"I use telfa at home so it does not stick,\"      History:  Past Medical History:    Anemia    transfusions     Asthma (HCC)    Breast cancer (HCC)    right breast     Cancer (HCC)    breast    COVID-19    Tested 20    Heart murmur    History of blood transfusion    Murmur    benign murmur, unknown what type    Neuropathy    Personal history of antineoplastic chemotherapy    last chemo treatment    Pneumonia, organism unspecified(486)    Seasonal allergies    Type II or unspecified type diabetes mellitus without mention of complication, not stated as uncontrolled    Wheezing    related seasonal and fish allergies, takes inhalers     Past Surgical History:   Procedure Laterality Date    Access port      left chest port a cath    Breast biopsy  2013    Procedure: BREAST BIOPSY;  Surgeon: Yasmany Goode MD;  Location:  MAIN OR    D & c      Mastectomy modified radical  3/18/2014    Procedure: BREAST MODIFIED RADICAL MASTECTOMY;  Surgeon: Jose Luis Adam MD;  Location:  MAIN OR    Mastectomy right      3/18/14    Needle biopsy right      Other surgical history  2015    mediport removal    Radiation right      Total abdominal hysterectomy  2014    Procedure: ABDOMINAL HYSTERECTOMY TOTAL BSO STAGING;  Surgeon: Jose Luis Adam MD;  Location:  MAIN OR      reports that she has never smoked. She has never used smokeless tobacco. She reports that she does not drink alcohol and does not use drugs.      Allergies:  @ALLERGY    Laboratory Data:    Recent Labs   Lab 24  3895  11/30/24  0810 12/01/24  0722 12/01/24  1145 12/01/24  1640 12/01/24  2102 12/02/24  0526 12/02/24  0532 12/02/24  0540 12/02/24  0801 12/02/24  1040   WBC 16.4*  --  13.8*  --   --   --  19.2*  --   --   --   --    HGB 8.1*  --  6.0*  --  8.4*  --  8.9*  --   --   --   --    HCT 26.2*  --  19.2*  --   --   --  29.1*  --   --   --   --    .0  --  291.0  --   --   --  330.0  --   --   --   --    CREATSERUM 1.17*  --  0.72  --   --   --  0.66  --   --   --   --    BUN 19  --  12  --   --   --  <5*  --   --   --   --    *  --  242*  --   --   --  70  --   --   --   --    CA 9.4  --  8.2*  --   --   --  8.3*  --   --   --   --    ALB 3.3  --  2.5*  --   --   --   --   --   --   --   --    TP 7.5  --  5.2*  --   --   --   --   --   --   --   --    PTT  --   --   --   --   --   --   --   --  26.9  --   --    INR  --   --   --   --   --   --   --   --  1.22*  --   --    DDIMER 2.27*  --   --   --   --   --   --   --   --   --   --    ESRML  --   --  61*  --   --   --   --   --   --   --   --    CRP  --   --  14.10*  --   --   --   --   --   --   --   --    B12  --   --  555  --   --   --   --   --   --   --   --    PGLU  --    < >  --    < >  --    < >  --  71  --  111* 178*    < > = values in this interval not displayed.         ASSESSMENT:  Wound 11/30/24 Breast Lateral;Left;Lower (Active)   Date First Assessed/Time First Assessed: 11/30/24 0341   Location: Breast  Wound Location Orientation: Lateral;Left;Lower  Wound Description (Comments): large wound to left lower breast with drainage      Assessments 12/2/2024 10:17 AM   Wound Image                   Extensive atypical appearing tissue on the L breast, high odor, area measures at (12.7cm x 25.6cm), applied silver alginate/border foam.   Recommend daily dressing change, order in computer for such.   Planned biopsy today per patient report.     Wound Cleaning and Dressings:  Showering directions: May shower and/or cleanse wound with mild soap and  water  Wound cleansing:  Cleanse with normal saline or wound cleanser  Wound cleaning frequency: Daily  Wound product: Silver alginate, Bordered foam, ABD pad, and Paper tape  Dressing change frequency:  Change dressing daily and/or PRN  Enzymatic agent:  Not applicable    Care Summary:  Care Summary: Discussed Plan of Care at beside with patient. Patient verbally acknowledges understanding of all instructions and all questions were answered.      Additional Notes:  Planned biopsy today per patient report.       Thank you for this consultation and for allowing me to participate in the care of your patient.      Time Spent 45 Minutes.    Thank you,  Marie Mera, PT, MPT  Wound Care Clinician  [facility] Wound Care Team  12/2/2024

## 2024-12-02 NOTE — PLAN OF CARE
Pt a/o x4. VSS on RA. C/o pain. Meds per MAR. IVF per order. Dressings to L breast reinforced. Wound care to see. Plan for MRI of breast & US bx soft tissue.     Call light within reach.

## 2024-12-02 NOTE — PROGRESS NOTES
[facility]   part of [facility]     Hospitalist Progress Note     Swathi Arguelles Patient Status:  Emergency    1965 MRN [id number]   [facility] EMERGENCY DEPARTMENT Attending Christopher Resendiz MD   Hosp Day # 2 PCP Erendira Caceres DO     Chief Complaint: Breast mass    Subjective:     Patient without complaints of pain. No nausea or vomiting. Soft stool.     Current medications:   insulin degludec  20 Units Subcutaneous Nightly    multivitamin  1 tablet Oral Daily    [Held by provider] enoxaparin  40 mg Subcutaneous Daily    gabapentin  600 mg Oral TID    piperacillin-tazobactam  4.5 g Intravenous Q8H    fluticasone-salmeterol  1 puff Inhalation BID    montelukast  10 mg Oral Nightly    insulin aspart  1-68 Units Subcutaneous TID CC    insulin aspart  1-10 Units Subcutaneous TID AC and HS    sodium ferric gluconate  125 mg Intravenous Daily       Objective:    Review of Systems:   10 point ROS completed and was negative, except for pertinent positive and negatives stated in subjective.    Vital signs:  Temp:  [97.8 °F (36.6 °C)-99 °F (37.2 °C)] 97.8 °F (36.6 °C)  Pulse:  [85-99] 91  Resp:  [16-20] 18  BP: ()/(48-62) 104/48  SpO2:  [93 %-100 %] 95 %  Patient Weight for the past 72 hrs:   Weight   24 0133 146 lb (66.2 kg)     Physical Exam:    General: No acute distress.   Respiratory: Diminished  Chest: Fungating left breast mass, dressing intact  Cardiovascular: S1, S2. Regular rate and rhythm.   Abdomen: Soft, nontender, nondistended.  Positive bowel sounds.  Extremities: + edema.  Neuro: AAOx3    Diagnostic Data:    Labs:  Recent Labs   Lab 24  0722 24  1640 24  0526 24  0540   WBC 16.4* 13.8*  --  19.2*  --    HGB 8.1* 6.0* 8.4* 8.9*  --    MCV 81.6 81.4  --  83.9  --    .0 291.0  --  330.0  --    INR  --   --   --   --  1.22*       Recent Labs   Lab 24  03124  0722 24  0526   * 242* 70   BUN 19 12  <5*   CREATSERUM 1.17* 0.72 0.66   CA 9.4 8.2* 8.3*   ALB 3.3 2.5*  --    * 138 141   K 5.3* 4.0 3.6   CL 96* 110 111   CO2 25.0 25.0 20.0*   ALKPHO 100 62  --    AST 43* 21  --    ALT 22 12  --    BILT 0.4 0.2  --    TP 7.5 5.2*  --        Estimated Creatinine Clearance: 74 mL/min (based on SCr of 0.66 mg/dL).    Recent Labs   Lab 12/02/24  0540   PTP 15.5*   INR 1.22*              COVID-19 Lab Results    COVID-19  Lab Results   Component Value Date    COVID19 Not Detected 11/30/2024    COVID19 Detected (A) 11/02/2020       Pro-Calcitonin  No results for input(s): \"PCT\" in the last 168 hours.    Cardiac  Recent Labs   Lab 11/30/24 0314   PBNP 599*       Creatinine Kinase  No results for input(s): \"CK\" in the last 168 hours.    Inflammatory Markers  Recent Labs   Lab 11/30/24 0314 12/01/24  0722   CRP  --  14.10*   GALI 221  --    LDH  --  317*   DDIMER 2.27*  --        Recent Labs   Lab 11/30/24 0314   TROPHS 3       Imaging: Imaging data reviewed in Epic.    Medications:    insulin degludec  20 Units Subcutaneous Nightly    multivitamin  1 tablet Oral Daily    [Held by provider] enoxaparin  40 mg Subcutaneous Daily    gabapentin  600 mg Oral TID    piperacillin-tazobactam  4.5 g Intravenous Q8H    fluticasone-salmeterol  1 puff Inhalation BID    montelukast  10 mg Oral Nightly    insulin aspart  1-68 Units Subcutaneous TID CC    insulin aspart  1-10 Units Subcutaneous TID AC and HS    sodium ferric gluconate  125 mg Intravenous Daily       Assessment & Plan:    Fungating breast mass (CA 27.29 and  normal) with superimposed infection (Cx Enterobacter cloacae and Staph lugdunensis)  History of breast cancer sp mastectomy and chemo  IV abx, follow-up cultures, ID consult  MRI   US biopsy   SurgOnc consult - patient has seen Dr. Adam in the past for mastectomy, but wishes for another surgeon > consult Dr. Moyer  Medical Oncology consult   Dyspnea on exertion, CTA noted, ECHO with preserved EF, on room  air  Asthma with out exacerbation   BD  Nebs PRN  Incentive spirometry   Monitor respiratory status  Ambulate   Leukocytosis  Monitor    Anemia, iron deficiency sp PRBC  IV iron  Monitor H&H   Diabetes mellitus, A1c 6.5  Tresiba  Correctional  Change Carb 1:12  Hyponatremia, resolved   Hyperkalemia, resolved   Elevated AST, resolved  History of serous borderline ovarian tumor sp resection  Lactic acidosis, resolved   Elevated d-dimer, CTA neg for PE  Dyslipidemia  Abnormal UA    DVT Px: Lovenox     At this point Ms. Arguelles is expected to be discharge to: Home    Plan of care discussed with patient and RN.    Hua Huffman MD        Supplementary Documentation:   DVT Mechanical Prophylaxis:   SCDs,    DVT Pharmacologic Prophylaxis   Medication    [Held by provider] enoxaparin (Lovenox) 40 MG/0.4ML SUBQ injection 40 mg                Code Status: Full Code  Bonner: No urinary catheter in place  Bonner Duration (in days):   Central line:    ABDELRAHMAN:               Dietitian Malnutrition Assessment    Evaluation for Malnutrition: Criteria for severe malnutrition diagnosis- chronic illness related to   Wt loss greater than 10% in 6 months., Energy intake less than 75% for greater than 1 month.               RD Malnutrition Care Plan: Intiated ONS (oral nutritional supplements)., Ordered multivitamin.    Body Fat/Muscle Mass:          Physician Assessment     Patient has a diagnosis of severe malnutrition

## 2024-12-02 NOTE — PLAN OF CARE
Pt A/O x4. VSS. Afebrile. Pt c/o pain during day. PRN toradol admin per MAR w/ relief. Medications admin per MAR. Pt had US guided biopsy of L breast per order, results pending. Pt seen by wound care. Pt ambulated safely in room. Fall and safety precautions in place. Call light within reach.

## 2024-12-02 NOTE — PROGRESS NOTES
[facility] Progress Note       Patient Name: Swathi Arguelles   YOB: 1965  Medical Record Number: [id number]  Attending Physician: Carl Lopez M.D.     The 21st Century Cures Act makes medical notes like these available to patients in the interest of transparency. Please be advised this is a medical document. Medical documents are intended to carry relevant information, facts as evident, and the clinical opinion of the practitioner. The medical note is intended as peer to peer communication and may appear blunt or direct. It is written in medical language and may contain abbreviations or verbiage that are unfamiliar.      Oncologic History  Swathi Arguelles is a 59 year old female admitted to the hospital on 09/10/2013 with symptomatic anemia.  On physical examination revealed a malodorous, draining ulcerating right breast mass.  Upon questioning, she admitted that she first noticed the mass in approximately 01/2013.  She states that her only mammogram was approximately at age 43 years.  Biopsy showed grade 3 infiltrating ductal carcinoma.  The tumor was estrogen receptor positive, progesterone receptor negative, and Her2/alberto negative.  CT scans of the chest, abdomen, pelvis showed multiple right axillary lymph nodes, two micronodules in the lungs of unknown significance, and a left sided large cystic adnexal mass.  At initial diagnosis CA 15-3 was 48.3 U/mL,  CA 27.29 was 91.7 U/mL, and  was 171.5 U/mL.  PET/CT showed abnormal FDG uptake in the ulcerating mass of the right breast/chest wall and in retropectoral and subclavicular lymph nodes.  There was no uptake in the cystic pelvic mass or in 3 subcentimeter pulmonary nodules.  Pelvic ultrasound showed a complex multiloculated left adnexal cyst with no separate identifiable ovarian tissue.  Noted was irregular thickening of one of the cyst walls that was worrisome for a cystic ovarian neoplasm.    Patient was  premenopausal at diagnosis    On 10/04/2013 she began dose dense doxorubicin and cyclophosphamide. Treatment was complicated by neutropenic fever, anemia requiring transfusion, dehydration, and prolonged thrombocytopenia. CT scans after cycle 4 showed a marked improvement in the breast/chest wall mass and axillary adenopathy. Tumor markers markedly improved. She then received weekly paclitaxel. Treatment was complicated by peripheral neuropathy and neutropenia.     During paclitaxel therapy, she consented to BRCA1/2 testing.  Results obtained on 02/11/2014 revealed the deleterious BRCA2 mutation [id number].    On 03/18/2014 she underwent right mastectomy with axillary node dissection.  Pathology showed 1.4 cm grade 3 invasive ductal carcinoma with 12/12 lymph nodes negative for malignancy.  She also underwent diagnostic laparoscopy which showed the cystic left ovarian mass but no evidence of metastatic disease.    On 05/13/2014 she underwent bilateral salpingo-oophorectomy, bilateral pelvic lymphadenectomy, limited periaortic lymphadenectomy, omentectomy, peritoneal washings, and appendectomy.  Pathology, reviewed at the [facility], showed serous borderline tumor, typical type.  All lymph nodes and pelvic washings were negative for malignancy.    In 07/2014 she began adjuvant right chest wall/axilla radiation.    She began endocrine therapy with anastrazole in mid 08/2014.     Patient was last seen on 02/03/2017. On that day, patient was advised to continue anastrozole, continue increased surveillance with alternating mammography and breast MRI, and return for follow up in 05/2017. On that day, she expressed an openness to prophylactic left mastectomy if she was a candidate for breast reconstruction. Patient failed to return for follow up in 05/2017 as recommended. She also failed to return a call from the breast navigator to arrange evaluation by plastic surgery. She did have a left sided mammogram as  previously ordered by me in 06/2017. Patient was on anastrozole at least through 02/2017 but it is not clear when she discontinued therapy.    Patient next presented to the ED on 11/30/2024 with complaints of generalized weakness, poor appetite, and dyspnea with exertion. Laboratory studies showed anemia, hyponatremia, and hypochlorhydria. CTA chest was negative for pulmonary embolism or metastatic disease but did show a 22.3 x 14.6 x 16.0 cm mass arising from the left breast with areas of necrosis, possible extension into the intracostal musculature, and mildly enlarged left axillary lymph nodes. Visualization of left breast showed a large firm breast which an open area laterally with malodorous drainage. CT abdomen/pelvis w contrast on 12/01/2024 was negative for metastatic disease.     Patient reports that she first noticed a \"rash\" 1.5 months prior to presentation. She stated that 3 months prior to presentation, she did not notice any changes in the left breast. Notably, patient's last breast imaging was in 06/2017.    History of Present Illness  Patient states that she feel much than on admission. She complains of only mild discomfort in the left breast. Appetite has improved. No chills. No dyspnea at rest.       Current Medications    insulin degludec (Tresiba) 100 units/mL flextouch 20 Units  20 Units Subcutaneous Nightly    [COMPLETED] sodium chloride 0.9% infusion   Intravenous Once    [COMPLETED] iopamidol 76% (ISOVUE-370) injection for power injector  80 mL Intravenous ONCE PRN    multivitamin (Tab-A-Daron/Beta Carotene) tab 1 tablet  1 tablet Oral Daily    [COMPLETED] sodium chloride 0.9 % IV bolus 1,000 mL  1,000 mL Intravenous Once    [COMPLETED] iopamidol 76% (ISOVUE-370) injection for power injector  100 mL Intravenous ONCE PRN    [COMPLETED] piperacillin-tazobactam (Zosyn) 4.5 g in dextrose 5% 100 mL IVPB-ADDV  4.5 g Intravenous Once    glucose (Dex4) 15 GM/59ML oral liquid 15 g  15 g Oral Q15 Min  PRN    Or    glucose (Glutose) 40% oral gel 15 g  15 g Oral Q15 Min PRN    Or    glucose-vitamin C (Dex-4) chewable tab 4 tablet  4 tablet Oral Q15 Min PRN    Or    dextrose 50% injection 50 mL  50 mL Intravenous Q15 Min PRN    Or    glucose (Dex4) 15 GM/59ML oral liquid 30 g  30 g Oral Q15 Min PRN    Or    glucose (Glutose) 40% oral gel 30 g  30 g Oral Q15 Min PRN    Or    glucose-vitamin C (Dex-4) chewable tab 8 tablet  8 tablet Oral Q15 Min PRN    [Held by provider] enoxaparin (Lovenox) 40 MG/0.4ML SUBQ injection 40 mg  40 mg Subcutaneous Daily    acetaminophen (Tylenol Extra Strength) tab 500 mg  500 mg Oral Q4H PRN    melatonin tab 3 mg  3 mg Oral Nightly PRN    polyethylene glycol (PEG 3350) (Miralax) 17 g oral packet 17 g  17 g Oral Daily PRN    sennosides (Senokot) tab 17.2 mg  17.2 mg Oral Nightly PRN    bisacodyl (Dulcolax) 10 MG rectal suppository 10 mg  10 mg Rectal Daily PRN    fleet enema (Fleet) rectal enema 133 mL  1 enema Rectal Once PRN    ondansetron (Zofran) 4 MG/2ML injection 4 mg  4 mg Intravenous Q6H PRN    prochlorperazine (Compazine) 10 MG/2ML injection 5 mg  5 mg Intravenous Q8H PRN    benzonatate (Tessalon) cap 200 mg  200 mg Oral TID PRN    glycerin-hypromellose- (Artificial Tears) 0.2-0.2-1 % ophthalmic solution 1 drop  1 drop Both Eyes QID PRN    sodium chloride (Saline Mist) 0.65 % nasal solution 1 spray  1 spray Each Nare Q3H PRN    gabapentin (Neurontin) tab 600 mg  600 mg Oral TID    piperacillin-tazobactam (Zosyn) 4.5 g in dextrose 5% 100 mL IVPB-ADDV  4.5 g Intravenous Q8H    albuterol (Ventolin) (2.5 MG/3ML) 0.083% nebulizer solution 2.5 mg  2.5 mg Nebulization Q4H PRN    fluticasone-salmeterol (Advair Diskus) 250-50 MCG/ACT inhaler 1 puff  1 puff Inhalation BID    montelukast (Singulair) tab 10 mg  10 mg Oral Nightly    insulin aspart (NovoLOG) 100 Units/mL FlexPen 1-68 Units  1-68 Units Subcutaneous TID CC    insulin aspart (NovoLOG) 100 Units/mL FlexPen 1-10 Units  1-10  Units Subcutaneous TID AC and HS    sodium ferric gluconate (Ferrlecit) 125 mg in sodium chloride 0.9% 100mL IVPB premix  125 mg Intravenous Daily      Allergies   Ms. Arguelles is allergic to fish, levemir, and seasonal.    Vital Signs   /48 (BP Location: Left arm)   Pulse 91   Temp 97.8 °F (36.6 °C) (Oral)   Resp 18   Ht 1.6 m (5' 3\")   Wt 66.5 kg (146 lb 9.6 oz)   LMP 09/01/2013 (LMP Unknown)   SpO2 95%   BMI 25.97 kg/m²     Physical Examination  Constitutional  Well developed and well nourished; in no apparent distress.  Head   Normocephalic and atraumatic.  Eyes   Conjunctiva clear; sclera anicteric.  ENMT   External nose normal; external ears normal.   Neck   Supple, no masses.  Breasts   Left breast enlarged and firm; nearly the entire breast is bandaged.   Hematologic/Lymphatic No cervical, supraclavicular, or axillary adenopathy.  Respiratory  Normal effort; no respiratory distress.  Cardiovascular  Regular rate and rhythm.  Abdomen  Not distended.  Neurologic  Motor and sensory grossly intact.  Psychiatric  Mood and affect appropriate.    Laboratory  Recent Results (from the past 72 hours)   EKG    Collection Time: 11/30/24  2:28 AM   Result Value Ref Range    Ventricular rate 116 BPM    Atrial rate 116 BPM    P-R Interval 130 ms    QRS Duration 58 ms    Q-T Interval 304 ms    QTC Calculation (Bezet) 422 ms    P Axis 74 degrees    R Axis 50 degrees    T Axis 45 degrees   Urinalysis with Culture Reflex    Collection Time: 11/30/24  2:55 AM    Specimen: Urine, clean catch   Result Value Ref Range    Urine Color Yellow Yellow    Clarity Urine Clear Clear    Spec Gravity >=1.030 1.005 - 1.030    Glucose Urine Negative Negative mg/dL    Bilirubin Urine      Ketones Urine 15 (A) Negative mg/dL    Blood Urine Negative Negative    pH Urine 5.5 5.0 - 8.0    Protein Urine 100 mg/dL (A) Negative mg/dL    Urobilinogen Urine 4.0 (A) <2.0 mg/dL    Nitrite Urine Negative Negative    Leukocyte Esterase Urine  Negative Negative    WBC Urine 6-10 (A) 0 - 5 /HPF    RBC Urine 3-5 (A) 0 - 2 /HPF    Bacteria Urine 1+ (A) None Seen /HPF    Squamous Epi. Cells Moderate (A) None Seen /HPF    Renal Tubular Epithelial Cells None Seen None Seen /HPF    Transitional Cells None Seen None Seen /HPF    Hyaline Casts Present (A) None Seen /LPF    Yeast Urine None Seen None Seen /HPF   SARS-CoV-2/Flu A and B/RSV by PCR (GeneXpert)    Collection Time: 11/30/24  2:55 AM    Specimen: Nares; Other   Result Value Ref Range    SARS-CoV-2 (COVID-19) - (GeneXpert) Not Detected Not Detected    Influenza A by PCR Negative Negative    Influenza B by PCR Negative Negative    RSV by PCR Negative Negative   UA Microscopic only, urine    Collection Time: 11/30/24  2:55 AM   Result Value Ref Range    WBC Urine 6-10 (A) 0 - 5 /HPF    RBC Urine 3-5 (A) 0 - 2 /HPF    Bacteria Urine 1+ (A) None Seen /HPF    Squamous Epi. Cells Moderate (A) None Seen /HPF    Renal Tubular Epithelial Cells None Seen None Seen /HPF    Transitional Cells None Seen None Seen /HPF    Hyaline Casts Present (A) None Seen /LPF    Yeast Urine None Seen None Seen /HPF   Ictotest    Collection Time: 11/30/24  2:55 AM   Result Value Ref Range    Ictotest Positive (A) Negative   Comp Metabolic Panel (14)    Collection Time: 11/30/24  3:14 AM   Result Value Ref Range    Glucose 237 (H) 70 - 99 mg/dL    Sodium 130 (L) 136 - 145 mmol/L    Potassium 5.3 (H) 3.5 - 5.1 mmol/L    Chloride 96 (L) 98 - 112 mmol/L    CO2 25.0 21.0 - 32.0 mmol/L    Anion Gap 9 0 - 18 mmol/L    BUN 19 9 - 23 mg/dL    Creatinine 1.17 (H) 0.55 - 1.02 mg/dL    Calcium, Total 9.4 8.7 - 10.4 mg/dL    Calculated Osmolality 280 275 - 295 mOsm/kg    eGFR-Cr 53 (L) >=60 mL/min/1.73m2    AST 43 (H) <34 U/L    ALT 22 10 - 49 U/L    Alkaline Phosphatase 100 50 - 130 U/L    Bilirubin, Total 0.4 0.2 - 1.1 mg/dL    Total Protein 7.5 5.7 - 8.2 g/dL    Albumin 3.3 3.2 - 4.8 g/dL    Globulin  4.2 (H) 2.0 - 3.5 g/dL    A/G Ratio 0.8  (L) 1.0 - 2.0   CBC With Differential With Platelet    Collection Time: 11/30/24  3:14 AM   Result Value Ref Range    WBC 16.4 (H) 4.0 - 11.0 x10(3) uL    RBC 3.21 (L) 3.80 - 5.30 x10(6)uL    HGB 8.1 (L) 12.0 - 16.0 g/dL    HCT 26.2 (L) 35.0 - 48.0 %    .0 150.0 - 450.0 10(3)uL    MCV 81.6 80.0 - 100.0 fL    MCH 25.2 (L) 26.0 - 34.0 pg    MCHC 30.9 (L) 31.0 - 37.0 g/dL    RDW 15.9 %    Neutrophil Absolute Prelim 14.46 (H) 1.50 - 7.70 x10 (3) uL    Neutrophil Absolute 14.46 (H) 1.50 - 7.70 x10(3) uL    Lymphocyte Absolute 0.73 (L) 1.00 - 4.00 x10(3) uL    Monocyte Absolute 1.04 (H) 0.10 - 1.00 x10(3) uL    Eosinophil Absolute 0.00 0.00 - 0.70 x10(3) uL    Basophil Absolute 0.02 0.00 - 0.20 x10(3) uL    Immature Granulocyte Absolute 0.15 0.00 - 1.00 x10(3) uL    Neutrophil % 88.2 %    Lymphocyte % 4.5 %    Monocyte % 6.3 %    Eosinophil % 0.0 %    Basophil % 0.1 %    Immature Granulocyte % 0.9 %   Troponin I (High Sensitivity)    Collection Time: 11/30/24  3:14 AM   Result Value Ref Range    Troponin I (High Sensitivity) 3 <=34 ng/L   Pro Beta Natriuretic Peptide    Collection Time: 11/30/24  3:14 AM   Result Value Ref Range    Pro-Beta Natriuretic Peptide 599 (H) <125 pg/mL   TSH W Reflex To Free T4    Collection Time: 11/30/24  3:14 AM   Result Value Ref Range    TSH 2.169 0.550 - 4.780 uIU/mL   D-Dimer    Collection Time: 11/30/24  3:14 AM   Result Value Ref Range    D-Dimer 2.27 (H) <0.61 ug/mL FEU   Blood Culture    Collection Time: 11/30/24  3:14 AM    Specimen: Blood,peripheral   Result Value Ref Range    Blood Culture Result No Growth 1 Day    Lactic Acid, Plasma    Collection Time: 11/30/24  3:14 AM   Result Value Ref Range    Lactic Acid 2.2 (H) 0.5 - 2.0 mmol/L   ABORH (Blood Type)    Collection Time: 11/30/24  3:14 AM   Result Value Ref Range    ABO BLOOD TYPE A     RH BLOOD TYPE Positive    Antibody Screen    Collection Time: 11/30/24  3:14 AM   Result Value Ref Range    Antibody Screen Negative     Ferritin    Collection Time: 11/30/24  3:14 AM   Result Value Ref Range    Ferritin 221 50 - 306 ng/mL   Reticulocyte Count    Collection Time: 11/30/24  3:14 AM   Result Value Ref Range    Retic% 1.9 0.5 - 2.5 %    Retic Absolute 59.7 22.5 - 147.5 x10(3) uL    Retic IRF 0.237 0.100 - 0.300 Ratio    Reticulocyte Hemoglobin Equivalent 25.5 (L) 28.2 - 36.6 pg   Hemoglobin A1C    Collection Time: 11/30/24  3:14 AM   Result Value Ref Range    HgbA1C 6.5 (H) <5.7 %    Estimated Average Glucose 140 (H) 68 - 126 mg/dL   Blood Culture    Collection Time: 11/30/24  3:45 AM    Specimen: Blood,peripheral   Result Value Ref Range    Blood Culture Result No Growth 1 Day    ABORH Confirmation    Collection Time: 11/30/24  3:45 AM   Result Value Ref Range    ABO BLOOD TYPE A     RH BLOOD TYPE Positive    Lactic Acid Reflex Post Positive    Collection Time: 11/30/24  6:40 AM   Result Value Ref Range    Lactic Acid 0.9 0.5 - 2.0 mmol/L   POCT Glucose    Collection Time: 11/30/24  8:10 AM   Result Value Ref Range    POC Glucose 198 (H) 70 - 99 mg/dL   Iron And Tibc    Collection Time: 11/30/24  9:19 AM   Result Value Ref Range    Iron 13 (L) 50 - 170 ug/dL    Transferrin 131 (L) 250 - 380 mg/dL    Total Iron Binding Capacity 179 (L) 250 - 425 ug/dL    % Saturation 7 (L) 15 - 50 %   Aerobic Bacterial Culture    Collection Time: 11/30/24 12:11 PM    Specimen: Breast, left; Other   Result Value Ref Range    Aerobic Culture Result 2+ growth Enterobacter cloacae (A)     Aerobic Culture Result 2+ growth Staphylococcus lugdunensis (A)     Aerobic Smear No WBCs seen     Aerobic Smear No organisms seen    POCT Glucose    Collection Time: 11/30/24 12:15 PM   Result Value Ref Range    POC Glucose 312 (H) 70 - 99 mg/dL   POCT Glucose    Collection Time: 11/30/24  4:55 PM   Result Value Ref Range    POC Glucose 192 (H) 70 - 99 mg/dL   POCT Glucose    Collection Time: 11/30/24 11:28 PM   Result Value Ref Range    POC Glucose 311 (H) 70 - 99  mg/dL   POCT Glucose    Collection Time: 12/01/24  6:57 AM   Result Value Ref Range    POC Glucose 272 (H) 70 - 99 mg/dL   CBC With Differential With Platelet    Collection Time: 12/01/24  7:22 AM   Result Value Ref Range    WBC 13.8 (H) 4.0 - 11.0 x10(3) uL    RBC 2.36 (L) 3.80 - 5.30 x10(6)uL    HGB 6.0 (LL) 12.0 - 16.0 g/dL    HCT 19.2 (L) 35.0 - 48.0 %    .0 150.0 - 450.0 10(3)uL    MCV 81.4 80.0 - 100.0 fL    MCH 25.4 (L) 26.0 - 34.0 pg    MCHC 31.3 31.0 - 37.0 g/dL    RDW 16.1 %    Neutrophil Absolute Prelim 12.25 (H) 1.50 - 7.70 x10 (3) uL    Neutrophil Absolute 12.25 (H) 1.50 - 7.70 x10(3) uL    Lymphocyte Absolute 0.64 (L) 1.00 - 4.00 x10(3) uL    Monocyte Absolute 0.69 0.10 - 1.00 x10(3) uL    Eosinophil Absolute 0.08 0.00 - 0.70 x10(3) uL    Basophil Absolute 0.03 0.00 - 0.20 x10(3) uL    Immature Granulocyte Absolute 0.11 0.00 - 1.00 x10(3) uL    Neutrophil % 88.8 %    Lymphocyte % 4.6 %    Monocyte % 5.0 %    Eosinophil % 0.6 %    Basophil % 0.2 %    Immature Granulocyte % 0.8 %   Comp Metabolic Panel (14)    Collection Time: 12/01/24  7:22 AM   Result Value Ref Range    Glucose 242 (H) 70 - 99 mg/dL    Sodium 138 136 - 145 mmol/L    Potassium 4.0 3.5 - 5.1 mmol/L    Chloride 110 98 - 112 mmol/L    CO2 25.0 21.0 - 32.0 mmol/L    Anion Gap 3 0 - 18 mmol/L    BUN 12 9 - 23 mg/dL    Creatinine 0.72 0.55 - 1.02 mg/dL    Calcium, Total 8.2 (L) 8.7 - 10.4 mg/dL    Calculated Osmolality 294 275 - 295 mOsm/kg    eGFR-Cr 95 >=60 mL/min/1.73m2    AST 21 <34 U/L    ALT 12 10 - 49 U/L    Alkaline Phosphatase 62 50 - 130 U/L    Bilirubin, Total 0.2 0.2 - 1.1 mg/dL    Total Protein 5.2 (L) 5.7 - 8.2 g/dL    Albumin 2.5 (L) 3.2 - 4.8 g/dL    Globulin  2.7 2.0 - 3.5 g/dL    A/G Ratio 0.9 (L) 1.0 - 2.0   -II    Collection Time: 12/01/24  7:22 AM   Result Value Ref Range    Cancer Ag 125  11.0 <=30.2 U/mL   CA 27.29    Collection Time: 12/01/24  7:22 AM   Result Value Ref Range    Breast Carcinoma Ag Ny9993  25.4 <38.0 u/mL   LDH    Collection Time: 12/01/24  7:22 AM   Result Value Ref Range     (H) 120 - 246 U/L   Folic Acid Serum (Folate)    Collection Time: 12/01/24  7:22 AM   Result Value Ref Range    Folate (Folic Acid) 7.8 >5.4 ng/mL   Sed Rate, Adryanergren (Automated)    Collection Time: 12/01/24  7:22 AM   Result Value Ref Range    Sed Rate 61 (H) 0 - 30 mm/Hr   C-Reactive Protein    Collection Time: 12/01/24  7:22 AM   Result Value Ref Range    C-Reactive Protein 14.10 (H) <=0.50 mg/dL   Vitamin B12 with reflex to MMA    Collection Time: 12/01/24  7:22 AM   Result Value Ref Range    Vitamin B12 555 211 - 911 pg/mL   HOLD MMA    Collection Time: 12/01/24  7:22 AM   Result Value Ref Range    HOLD MMA Auto Resulted    Haptoglobin    Collection Time: 12/01/24  7:22 AM   Result Value Ref Range    Haptoglobin 286.0 (H) 30.0 - 200.0 mg/dL   POCT Glucose    Collection Time: 12/01/24 11:45 AM   Result Value Ref Range    POC Glucose 221 (H) 70 - 99 mg/dL   POCT Glucose    Collection Time: 12/01/24  4:36 PM   Result Value Ref Range    POC Glucose 139 (H) 70 - 99 mg/dL   Hemoglobin    Collection Time: 12/01/24  4:40 PM   Result Value Ref Range    HGB 8.4 (L) 12.0 - 16.0 g/dL   POCT Glucose    Collection Time: 12/01/24  9:02 PM   Result Value Ref Range    POC Glucose 201 (H) 70 - 99 mg/dL   Prepare RBC Once    Collection Time: 12/02/24 12:30 AM   Result Value Ref Range    Blood Product [id number]     Unit Number [id number]     UNIT ABO A     UNIT RH NEG     Product Status Presumed Transfused     Expiration Date [id number]     Blood Type Barcode 0600     Unit Volume 350 ml   CBC With Differential With Platelet    Collection Time: 12/02/24  5:26 AM   Result Value Ref Range    WBC 19.2 (H) 4.0 - 11.0 x10(3) uL    RBC 3.47 (L) 3.80 - 5.30 x10(6)uL    HGB 8.9 (L) 12.0 - 16.0 g/dL    HCT 29.1 (L) 35.0 - 48.0 %    .0 150.0 - 450.0 10(3)uL    MCV 83.9 80.0 - 100.0 fL    MCH 25.6 (L) 26.0 - 34.0 pg    MCHC 30.6 (L)  31.0 - 37.0 g/dL    RDW 16.7 %    Neutrophil Absolute Prelim 16.52 (H) 1.50 - 7.70 x10 (3) uL    Neutrophil Absolute 16.52 (H) 1.50 - 7.70 x10(3) uL    Lymphocyte Absolute 1.22 1.00 - 4.00 x10(3) uL    Monocyte Absolute 1.11 (H) 0.10 - 1.00 x10(3) uL    Eosinophil Absolute 0.08 0.00 - 0.70 x10(3) uL    Basophil Absolute 0.04 0.00 - 0.20 x10(3) uL    Immature Granulocyte Absolute 0.25 0.00 - 1.00 x10(3) uL    Neutrophil % 86.0 %    Lymphocyte % 6.3 %    Monocyte % 5.8 %    Eosinophil % 0.4 %    Basophil % 0.2 %    Immature Granulocyte % 1.3 %   Basic Metabolic Panel (8)    Collection Time: 24  5:26 AM   Result Value Ref Range    Glucose 70 70 - 99 mg/dL    Sodium 141 136 - 145 mmol/L    Potassium 3.6 3.5 - 5.1 mmol/L    Chloride 111 98 - 112 mmol/L    CO2 20.0 (L) 21.0 - 32.0 mmol/L    Anion Gap 10 0 - 18 mmol/L    BUN <5 (L) 9 - 23 mg/dL    Creatinine 0.66 0.55 - 1.02 mg/dL    Calcium, Total 8.3 (L) 8.7 - 10.4 mg/dL    eGFR-Cr 100 >=60 mL/min/1.73m2   CEA    Collection Time: 24  5:26 AM   Result Value Ref Range    CEA  <0.5 <=5.0 ng/mL   Cancer Antigen (Ca) 15-3    Collection Time: 24  5:26 AM   Result Value Ref Range    Cancer Ag 15-3 18.5 <=32.4 U/mL   POCT Glucose    Collection Time: 24  5:32 AM   Result Value Ref Range    POC Glucose 71 70 - 99 mg/dL   Prothrombin Time (PT)    Collection Time: 24  5:40 AM   Result Value Ref Range    PT 15.5 (H) 11.6 - 14.8 seconds    INR 1.22 (H) 0.80 - 1.20   PTT, Activated    Collection Time: 24  5:40 AM   Result Value Ref Range    PTT 26.9 23.0 - 36.0 seconds      Radiology  [facility]  Department of Radiology  [address] [facility] [address] [facility] [address] [place] [id number] [phone]      Name: Swathi Arguelles Dr: Min Henry MD  : 1965    Age/Sex: 59 year old Female  Pt. Phone: [phone]  Exam Date: 2024  Procedure: CT ABDOMEN+PELVIS(CONTRAST ONLY)(CPT=74177)    -----------------------------------------------------------------------------------------------------------------------------------------------                  Min Henry MD  [address] [address]      Interpretation   PROCEDURE:  CT ABDOMEN+PELVIS (CONTRAST ONLY) (CPT=74177)     COMPARISON:  [facility], XR, XR CHEST PA + LAT CHEST (CPT=71020), 2/28/2016, 11:47 AM.  [place] , XR, XR CHEST PA + LAT CHEST (CPT=71020), 12/05/2014, 7:17 AM.  [place] , CT, CT CHEST+ABDOMEN+PELVIS(ALL CNTRST ONLY)(CPT=71260/26021),   2/18/2014, 4:32 PM.  [facility], XR, XR CHEST PA + LAT CHEST (CPT=71046), 2/09/2020, 12:24 PM.  [place], CT, CT ANGIOGRAPHY, CHEST (CPT=71275), 6/04/2018, 8:05 PM.  [place] , CT, CT ANGIOGRAPHY, CHEST (CPT=71275), 11/30/2024, 4:04 AM.  [facility] ,   CT, CT ABDOMEN+PELVIS(CONTRAST ONLY)(CPT=74177), 1/21/2015, 5:08 PM.     INDICATIONS:  Suspected breast cancer, staging imaging     TECHNIQUE:  CT scanning was performed from the dome of the diaphragm to the pubic symphysis with non-ionic intravenous contrast material. Post contrast coronal MPR imaging was performed.  Dose reduction techniques were used. Dose information is   transmitted to the ACR (American College of Radiology) NRDR (National Radiology Data Registry) which includes the Dose Index Registry.     PATIENT STATED HISTORY:(As transcribed by Technologist)  Finding on previous imaging. Largeleft breast wound. Staging.      CONTRAST USED:  80cc of Isovue 370     FINDINGS:    LIVER:  No enlargement, atrophy, abnormal density, or significant focal lesion.    BILIARY:  No visible dilatation or calcification.    PANCREAS:  No lesion, fluid collection, ductal dilatation, or atrophy.    SPLEEN:  No enlargement or focal lesion.    KIDNEYS:  No mass, obstruction, or calcification.  Minimal scarring posterior inferior left kidney.  Normal enhancement.  ADRENALS:  No mass or enlargement.     AORTA/VASCULAR:  No aneurysm or dissection.    RETROPERITONEUM:  No mass or adenopathy.    BOWEL/MESENTERY:  No visible mass, obstruction, or bowel wall thickening.    ABDOMINAL WALL:  No mass or hernia.    URINARY BLADDER:  Mild bladder wall thickening.  This may represent incomplete distention or cystitis.  PELVIC NODES:  No adenopathy.  Bilateral, lateral pelvic surgical clips.  PELVIC ORGANS:  Status post hysterectomy.  BONES:  No bony lesion or fracture.  Multiple level degenerative disc disease.  LUNG BASES:  There is partial visualization of the previously noted large heterogeneously enhancing solid and cystic mass with inferior and lateral ulceration/necrosis.  Approximate measurements 21.2 x 14.5 cm in the greatest AP and transverse   dimensions.  Please see the dedicated CT of the chest done yesterday for further evaluation.  The patient is status post right mastectomy.  Trace pericardial thickening.  OTHER:  Negative.                     =====  CONCLUSION:    1. Negative CT of the abdomen and pelvis.  No evidence of metastatic disease.  2. Partial visualization of a large necrotic ulcerating left chest wall mass.  Please see the dedicated CT angiogram of the chest done yesterday for further evaluation.          LOCATION:  [facility]        Dictated by (CST): Christopher Reis MD on 12/01/2024 at 1:23 PM       Finalized by (CST): Christopher Reis MD on 12/01/2024 at 1:31 PM            Impression and Plan  1.   Breast cancer, left sided: Clinical findings are consistent with a primary carcinoma of the left breast. CT imaging is negative for distant metastatic disease. Further staging to evaluate the left axillary lymph nodes, the primary breast mass/chest wall, and bones is recommended. To this end, a PET scan are recommended. PET scan must be performed as an outpatient. MRI of the breasts would be useful to absolutely define chest wall involvement but in discussion with radiology, patient's breast mass is  too large for such imaging.           Patient requires a breast biopsy. To this an an ultrasound guided core biopsy of the breast is ordered; this can be performed inpatient.           Primary service consulted breast surgery but I have cancelled this consultation. Acute evaluation by breast surgery not required. She needs to undergo the above workup after which I will arrange for evaluation by surgery as indicated.     2.   Infection of left breast mass: Currently on IV antibiotics and ID is managing. Once inpatient care for IV antibiotics are not needed, patient can be discharged.     3.   Hyponatremia/hypochlorhydria: Resolved.     4.   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  Tumor marker normal.      5.   History of right sided breast cancer: There is no evidence of locally recurrent or metastatic disease.     Electronically signed by:    Carl Lopez M.D.  System Medical Director of Oncology Services  [facility]

## 2024-12-02 NOTE — PROCEDURES
[facility]   part of [facility]  Procedure Note    [facility] Patient Status:  Inpatient    1965 MRN [id number]   Location [facility] 4NW-A Attending Hua Huffman MD   Hosp Day # 2 PCP Erendira Caceres DO     Procedure: Left breast mass biopsy    Pre-Procedure Diagnosis:  Left breast mass    Post-Procedure Diagnosis: Same    Anesthesia:  Local    Findings:  No complication    Specimens: 4 18 gauge samples    Blood Loss:  < 5 cc    Tourniquet Time: None  Complications:  None  Drains:  None    Secondary Diagnosis:  N/A    Jose Alejandro Buatista MD  2024

## 2024-12-02 NOTE — PLAN OF CARE
Pt received A&Ox4. Afebrile. VSS. C/o left breast pain - prn tylenol and gabapentin given w/ relief. Dressing reinforced, malodorous drainage. Wound care to see tomorrow. Seen by ID. IV zosyn per orders, cx pending. Hgb 6.0 this AM - 1 unit PRBCs transfused per orders. Consent in chart. Tolerated well, repeat hgb 8.4. IV ferrlecit per MAR. US guided bx and MRI breast ordered per heme/onc. MRI screening complete. IVF infusing @ 100ml/hr. Call light in reach.

## 2024-12-03 VITALS
TEMPERATURE: 98 F | DIASTOLIC BLOOD PRESSURE: 47 MMHG | BODY MASS INDEX: 25.98 KG/M2 | WEIGHT: 146.63 LBS | RESPIRATION RATE: 18 BRPM | HEIGHT: 63 IN | OXYGEN SATURATION: 100 % | SYSTOLIC BLOOD PRESSURE: 97 MMHG | HEART RATE: 98 BPM

## 2024-12-03 LAB
ANION GAP SERPL CALC-SCNC: 6 MMOL/L (ref 0–18)
BASOPHILS # BLD AUTO: 0.04 X10(3) UL (ref 0–0.2)
BASOPHILS NFR BLD AUTO: 0.2 %
BUN BLD-MCNC: 8 MG/DL (ref 9–23)
CALCIUM BLD-MCNC: 8.5 MG/DL (ref 8.7–10.4)
CHLORIDE SERPL-SCNC: 109 MMOL/L (ref 98–112)
CO2 SERPL-SCNC: 23 MMOL/L (ref 21–32)
CREAT BLD-MCNC: 0.66 MG/DL
EGFRCR SERPLBLD CKD-EPI 2021: 100 ML/MIN/1.73M2 (ref 60–?)
EOSINOPHIL # BLD AUTO: 0.05 X10(3) UL (ref 0–0.7)
EOSINOPHIL NFR BLD AUTO: 0.3 %
ERYTHROCYTE [DISTWIDTH] IN BLOOD BY AUTOMATED COUNT: 16.7 %
ERYTHROPOIETIN: 12.8 MIU/ML
GLUCOSE BLD-MCNC: 134 MG/DL (ref 70–99)
GLUCOSE BLD-MCNC: 136 MG/DL (ref 70–99)
GLUCOSE BLD-MCNC: 250 MG/DL (ref 70–99)
HCT VFR BLD AUTO: 24.4 %
HGB BLD-MCNC: 7.7 G/DL
IMM GRANULOCYTES # BLD AUTO: 0.11 X10(3) UL (ref 0–1)
IMM GRANULOCYTES NFR BLD: 0.7 %
LYMPHOCYTES # BLD AUTO: 0.76 X10(3) UL (ref 1–4)
LYMPHOCYTES NFR BLD AUTO: 4.7 %
MCH RBC QN AUTO: 26 PG (ref 26–34)
MCHC RBC AUTO-ENTMCNC: 31.6 G/DL (ref 31–37)
MCV RBC AUTO: 82.4 FL
MONOCYTES # BLD AUTO: 0.66 X10(3) UL (ref 0.1–1)
MONOCYTES NFR BLD AUTO: 4.1 %
NEUTROPHILS # BLD AUTO: 14.52 X10 (3) UL (ref 1.5–7.7)
NEUTROPHILS # BLD AUTO: 14.52 X10(3) UL (ref 1.5–7.7)
NEUTROPHILS NFR BLD AUTO: 90 %
OSMOLALITY SERPL CALC.SUM OF ELEC: 286 MOSM/KG (ref 275–295)
PLATELET # BLD AUTO: 326 10(3)UL (ref 150–450)
POTASSIUM SERPL-SCNC: 3.9 MMOL/L (ref 3.5–5.1)
RBC # BLD AUTO: 2.96 X10(6)UL
SODIUM SERPL-SCNC: 138 MMOL/L (ref 136–145)
WBC # BLD AUTO: 16.1 X10(3) UL (ref 4–11)

## 2024-12-03 PROCEDURE — 99233 SBSQ HOSP IP/OBS HIGH 50: CPT | Performed by: SPECIALIST

## 2024-12-03 PROCEDURE — 99239 HOSP IP/OBS DSCHRG MGMT >30: CPT | Performed by: INTERNAL MEDICINE

## 2024-12-03 RX ORDER — TRAMADOL HYDROCHLORIDE 50 MG/1
50 TABLET ORAL EVERY 6 HOURS PRN
Qty: 20 TABLET | Refills: 0 | Status: SHIPPED | OUTPATIENT
Start: 2024-12-03

## 2024-12-03 RX ORDER — METRONIDAZOLE 500 MG/1
500 TABLET ORAL EVERY 12 HOURS SCHEDULED
Qty: 14 TABLET | Refills: 0 | Status: SHIPPED | OUTPATIENT
Start: 2024-12-03 | End: 2024-12-10

## 2024-12-03 RX ORDER — LEVOFLOXACIN 750 MG/1
750 TABLET, FILM COATED ORAL DAILY
Qty: 7 TABLET | Refills: 0 | Status: SHIPPED | OUTPATIENT
Start: 2024-12-03 | End: 2024-12-10

## 2024-12-03 NOTE — PAYOR COMM NOTE
ADMISSION REVIEW     Payor: BLUE CROSS [facility] PPO  Subscriber #:  [id number]  Authorization Number: [id number]    Admit date: 11/30/24  Admit time:  7:49 AM       REVIEW DOCUMENTATION:     ED Provider Notes        ED Provider Notes signed by Christopher Resendiz MD at 11/30/2024  5:44 AM       Author: Christopher Resendiz MD Service: -- Author Type: Physician    Filed: 11/30/2024  5:44 AM Date of Service: 11/30/2024  5:38 AM Status: Addendum    : Christopher Resendiz MD (Physician)    Related Notes: Original Note by Christopher Resendiz MD (Physician) filed at 11/30/2024  5:43 AM           Patient Seen in: [facility] Emergency Department      History     Chief Complaint   Patient presents with    Fatigue    Weight Loss Gain     Weight loss (unintentional)     Stated Complaint: Increased weakness within the last week, unintentional weight loss, unable to e*    Subjective:   HPI      Patient is a 59-year-old female history of breast cancer in 2013 presents to ED for evaluation of shortness of breath.  Patient complains of shortness of breath for couple weeks with exertion.  Denies chest pain.  She complains of decreased oral intake.  She complains of a breast mass for the last 6 weeks.  She has seen  in the past.  Patient denies fever, cough.  She denies urinary symptoms such as frequency, hematuria or dysuria.  She denies abdominal pain.    Objective:     Past Medical History:    Anemia    transfusions 9/13    Asthma (HCC)    Breast cancer (HCC)    right breast 9/13    Cancer (HCC)    breast    COVID-19    Tested 11/9/20    Heart murmur    History of blood transfusion    Murmur    benign murmur, unknown what type    Neuropathy    Personal history of antineoplastic chemotherapy    last chemo treatment    Pneumonia, organism unspecified(486)    Seasonal allergies    Type II or unspecified type diabetes mellitus without mention of complication, not stated as uncontrolled    Wheezing    related seasonal and  fish allergies, takes inhalers              Past Surgical History:   Procedure Laterality Date    Access port      left chest port a cath    Breast biopsy  9/12/2013    Procedure: BREAST BIOPSY;  Surgeon: Yasmany Goode MD;  Location:  MAIN OR    D & c      Mastectomy modified radical  3/18/2014    Procedure: BREAST MODIFIED RADICAL MASTECTOMY;  Surgeon: Jose Luis Adam MD;  Location:  MAIN OR    Mastectomy right      3/18/14    Needle biopsy right      Other surgical history  4/30/2015    mediport removal    Radiation right      Total abdominal hysterectomy  5/13/2014    Procedure: ABDOMINAL HYSTERECTOMY TOTAL BSO STAGING;  Surgeon: Jose Luis Adam MD;  Location:  MAIN OR                Social History     Socioeconomic History    Marital status:     Number of children: 1   Occupational History    Occupation: RN , on leave of absence     Employer: [employer]   Tobacco Use    Smoking status: Never    Smokeless tobacco: Never   Vaping Use    Vaping status: Never Used   Substance and Sexual Activity    Alcohol use: No     Alcohol/week: 0.0 standard drinks of alcohol    Drug use: No   Other Topics Concern    Blood Transfusions Yes    Caffeine Concern No     Comment: occassional    Exercise Yes     Comment: bike riding, walking, active   Social History Narrative    Lives in [place] with daughter and 3  grandchildren.                   Physical Exam     ED Triage Vitals   BP 11/30/24 0133 98/62   Pulse 11/30/24 0133 (!) 128   Resp 11/30/24 0133 20   Temp 11/30/24 0133 98.9 °F (37.2 °C)   Temp src 11/30/24 0133 Oral   SpO2 11/30/24 0133 96 %   O2 Device 11/30/24 0327 None (Room air)       Current Vitals:   Vital Signs  BP: 107/58  Pulse: 105  Resp: 21  Temp: 98.9 °F (37.2 °C)  Temp src: Oral  MAP (mmHg): 74    Oxygen Therapy  SpO2: 95 %  O2 Device: None (Room air)        Physical Exam  GENERAL: No acute distress, well appearing and non-toxic, Alert and oriented X 3   HEENT: Normocephalic, atraumatic.   Moist mucous membranes.  Pupils equal round reactive to light and accommodation, extraocular motion is intact, sclerae white, conjunctiva is pink.  Oropharynx is unremarkable, no exudate.  NECK: Supple, trachea midline, no lymphadenopathy.   LUNG:  Lung sounds diminished.  No wheezing, rales or rhonchi  CARDIOVASCULAR: Tachycardic.  Regular rate and rhythm.  Normal S1S2.  No S3S4 or murmur.  Breast: Patient has a large fungating breast mass within extremely swollen left breast.  ABDOMEN: Bowel sounds are present. Soft. nondistended, no pulsatile masses. nontender  MUSCULOSKELETAL: No calf tenderness.  Dorsalis and Posterior Tibial pulses present. No clubbing. No cyanosis.  No edema.   SKIN EXAMINATIoN: Warm and dry with normal appearance.  No rashes or lesions.  NEUROLOGICAL:  Motor strength intact all groups.  normal sensation, speech intact    ED Course     Labs Reviewed   COMP METABOLIC PANEL (14) - Abnormal; Notable for the following components:       Result Value    Glucose 237 (*)     Sodium 130 (*)     Potassium 5.3 (*)     Chloride 96 (*)     Creatinine 1.17 (*)     eGFR-Cr 53 (*)     AST 43 (*)     Globulin  4.2 (*)     A/G Ratio 0.8 (*)     All other components within normal limits   CBC WITH DIFFERENTIAL WITH PLATELET - Abnormal; Notable for the following components:    WBC 16.4 (*)     RBC 3.21 (*)     HGB 8.1 (*)     HCT 26.2 (*)     MCH 25.2 (*)     MCHC 30.9 (*)     Neutrophil Absolute Prelim 14.46 (*)     Neutrophil Absolute 14.46 (*)     Lymphocyte Absolute 0.73 (*)     Monocyte Absolute 1.04 (*)     All other components within normal limits   PRO BETA NATRIURETIC PEPTIDE - Abnormal; Notable for the following components:    Pro-Beta Natriuretic Peptide 599 (*)     All other components within normal limits   D-DIMER - Abnormal; Notable for the following components:    D-Dimer 2.27 (*)     All other components within normal limits   URINALYSIS WITH CULTURE REFLEX - Abnormal; Notable for the following  components:    Ketones Urine 15 (*)     Protein Urine 100 mg/dL (*)     Urobilinogen Urine 4.0 (*)     WBC Urine 6-10 (*)     RBC Urine 3-5 (*)     Bacteria Urine 1+ (*)     Squamous Epi. Cells Moderate (*)     Hyaline Casts Present (*)     All other components within normal limits   LACTIC ACID, PLASMA - Abnormal; Notable for the following components:    Lactic Acid 2.2 (*)     All other components within normal limits   UA MICROSCOPIC ONLY, URINE - Abnormal; Notable for the following components:    WBC Urine 6-10 (*)     RBC Urine 3-5 (*)     Bacteria Urine 1+ (*)     Squamous Epi. Cells Moderate (*)     Hyaline Casts Present (*)     All other components within normal limits   ICTOTEST - Abnormal; Notable for the following components:    Ictotest Positive (*)     All other components within normal limits    Narrative:     LOT #: [id number] EXP: 2025-09-30   TROPONIN I HIGH SENSITIVITY - Normal   TSH W REFLEX TO FREE T4 - Normal   SARS-COV-2/FLU A AND B/RSV BY PCR (GENEXPERT) - Normal    Narrative:     This test is intended for the qualitative detection and differentiation of SARS-CoV-2, influenza A, influenza B, and respiratory syncytial virus (RSV) viral RNA in nasopharyngeal or nares swabs from individuals suspected of respiratory viral infection consistent with COVID-19 by their healthcare provider. Signs and symptoms of respiratory viral infection due to SARS-CoV-2, influenza, and RSV can be similar.    Test performed using the Xpert Xpress SARS-CoV-2/FLU/RSV (real time RT-PCR)  assay on the GeneXpert instrument, [employer], [place] [address].   This test is being used under the Food and Drug Administration's Emergency Use Authorization.    The authorized Fact Sheet for Healthcare Providers for this assay is available upon request from the laboratory.   LACTIC ACID REFLEX POST POSTIVE   TYPE AND SCREEN    Narrative:     The following orders were created for panel order Type and screen.  Procedure                                Abnormality         Status                     ---------                               -----------         ------                     ABORH (Blood Type)[[id number]]                               Final result               Antibody Screen[[id number]]                                  Final result                 Please view results for these tests on the individual orders.   ABORH (BLOOD TYPE)   ANTIBODY SCREEN   ABORH CONFIRMATION   BLOOD CULTURE   BLOOD CULTURE   Sodium 130.  Potassium 5.3.  White blood cell count 16.4.  Urinalysis 6-10 white cells.  D-dimer elevated.  Lactic acid 2.2.  EKG    Rate, intervals and axes as noted on EKG Report.  Rate: 116  Rhythm: Sinus Rhythm  Reading: Sinus tachycardia.  No acute changes                I personally reviewd CT images of chest and independent interpretation shows no obvious pulmonary embolism.  Large left breast mass.  I also viewed formal radiology report as read by radiology with findings below:    CT of chest read by [facility] rad radiology shows large fungating left breast mass.  Measures 22.3 x 14.7 cm.  No evidence for pulmonary embolism.    Medications   sodium chloride 0.9 % IV bolus 1,986 mL (1,986 mL Intravenous Handoff 11/30/24 4572)   sodium chloride 0.9 % IV bolus 1,000 mL (0 mL Intravenous Stopped 11/30/24 0512)   iopamidol 76% (ISOVUE-370) injection for power injector (100 mL Intravenous Given 11/30/24 0488)   piperacillin-tazobactam (Zosyn) 4.5 g in dextrose 5% 100 mL IVPB-ADDV (0 g Intravenous Stopped 11/30/24 0527)         MDM      Patient is a 59-year-old female presents to ED for evaluation of shortness of breath, breast mass.  Patient hypotensive, tachycardic.  Differential pneumonia, pulmonary embolism, pericardial effusion, new breast mass, sepsis.  Patient underwent laboratory testing.  Blood and urine culture sent.  Lactic acid 2.2 which could be consistent with infectious process.  Hemoglobin 8.1 down from hemoglobin 11.  Patient denies  black or bloody stool.  Follow hemoglobin.  Normal saline 30 cc/kg bolus was ordered.  Patient given IV Zosyn.  Urinalysis is not convincing for infection.  Patient with hyponatremia, hyperkalemia, elevated creatinine which could be consistent with dehydration.  Given shortness of breath, elevated D-dimer, CTA of the chest was obtained showing no evidence for pulmonary embolism or pneumonia.  EKG showing sinus tachycardia.  Normal troponin.  TSH normal.  Patient blood pressure improved status post IV fluids but still tachycardic.  Patient case was discussed with hospitalist.  Patient will be admitted to medical telemetry.  Will need oncology to see.  Critical care time was 45 minutes. This critical care time is exclusive of procedures critical care time includes monitoring of patient's cardiopulmonary and hemodynamic status, interpretation of laboratory values, and discussion of case with physician and consultants.    Admission disposition: 11/30/2024  4:46 AM           Medical Decision Making      Disposition and Plan     Clinical Impression:  1. Dyspnea, unspecified type    2. Elevated lactic acid level    3. Mass of left breast, unspecified quadrant    4. Dehydration    5.  Anemia    Disposition:  Admit  11/30/2024  4:46 am    Follow-up:  No follow-up provider specified.        Medications Prescribed:  Current Discharge Medication List              Supplementary Documentation:     [facility]   part of [facility]      Sepsis Reassessment Note    /58   Pulse 105   Temp 98.9 °F (37.2 °C) (Oral)   Resp 21   Ht 160 cm (5' 3\")   Wt 66.2 kg   LMP 09/01/2013 (LMP Unknown)   SpO2 95%   BMI 25.86 kg/m²      I completed the sepsis reassessment at 5 AM    Cardiac:  Regularity: Regular  Rate: Tachycardic  Heart Sounds: No adventitious heart sounds    Lungs:   Right: Diminished  Left: Diminished    Peripheral Pulses:  Radial: Left 1+      Capillary Refill:  <3 Secs    Skin:  Temp/Moisture: Dry  Color:  Normal       Christopher Resendiz MD  11/30/2024  5:38 AM             Hospital Problems       Present on Admission  Date Reviewed: 6/16/2024            ICD-10-CM Noted POA    * (Principal) Dyspnea, unspecified type R06.00 11/30/2024 Unknown             Signed by Christopher Resendiz MD on 11/30/2024  5:44 AM         MEDICATIONS ADMINISTERED IN LAST 1 DAY:  ceFEPIme (Maxpime) 2 g in sodium chloride 0.9% 100 mL IVPB-MBP       Date Action Dose Route User    12/3/2024 0625 New Bag 2 g Intravenous Michael Rodriguez          enoxaparin (Lovenox) 40 MG/0.4ML SUBQ injection 40 mg       Date Action Dose Route User    12/3/2024 0823 Given 40 mg Subcutaneous (Right Lower Abdomen) Shanita Jose RN          fluticasone-salmeterol (Advair Diskus) 250-50 MCG/ACT inhaler 1 puff       Date Action Dose Route User    12/3/2024 0823 Given 1 puff Inhalation Shanita Jose RN    12/2/2024 2030 Given 1 puff Inhalation Michael Rodriguez          gabapentin (Neurontin) tab 600 mg       Date Action Dose Route User    12/3/2024 0822 Given 600 mg Oral Shanita Jose RN    12/2/2024 2030 Given 600 mg Oral Michael Rodriguez    12/2/2024 1509 Given 600 mg Oral Cain Munoz RN          insulin aspart (NovoLOG) 100 Units/mL FlexPen 1-68 Units       Date Action Dose Route User    12/2/2024 2030 Given 1 Units Subcutaneous (Left Upper Arm) Michael Rodriguez    12/2/2024 1511 Given 6 Units Subcutaneous (Left Upper Arm) Cain Munoz RN    12/2/2024 1056 Given 1 Units Subcutaneous (Left Upper Arm) Cain Munoz RN          insulin aspart (NovoLOG) 100 Units/mL FlexPen 1-10 Units       Date Action Dose Route User    12/2/2024 2226 Given 2 Units Subcutaneous (Left Lower Abdomen) Michael Rodriguez          insulin degludec (Tresiba) 100 units/mL flextouch 20 Units       Date Action Dose Route User    12/2/2024 2226 Given 20 Units Subcutaneous (Left Lower Abdomen) Michael Rodriguez          ketorolac (Toradol) 15 MG/ML injection 15 mg        Date Action Dose Route User    12/2/2024 0952 Given 15 mg Intravenous Cain Munoz RN          ketorolac (Toradol) 30 MG/ML injection 30 mg       Date Action Dose Route User    12/2/2024 1724 Given 30 mg Intravenous Cain Munoz RN          metroNIDAZOLE (Flagyl) tab 500 mg       Date Action Dose Route User    12/3/2024 0824 Given 500 mg Oral Shanita Jose RN          montelukast (Singulair) tab 10 mg       Date Action Dose Route User    12/2/2024 2030 Given 10 mg Oral Michael Rodriguez          piperacillin-tazobactam (Zosyn) 4.5 g in dextrose 5% 100 mL IVPB-ADDV       Date Action Dose Route User    12/2/2024 2227 New Bag 4.5 g Intravenous Michael Rodriguez    12/2/2024 1509 New Bag 4.5 g Intravenous Cain Munoz RN          sodium ferric gluconate (Ferrlecit) 125 mg in sodium chloride 0.9% 100mL IVPB premix       Date Action Dose Route User    12/2/2024 0959 New Bag 125 mg Intravenous Cain Munoz RN          multivitamin (Tab-A-Daron/Beta Carotene) tab 1 tablet       Date Action Dose Route User    12/3/2024 0824 Given 1 tablet Oral Shanita Jose RN          traMADol (Ultram) tab 50 mg       Date Action Dose Route User    12/3/2024 0848 Given 50 mg Oral Shanita Jose RN            Vitals (last day)       Date/Time Temp Pulse Resp BP SpO2 Weight O2 Device O2 Flow Rate (L/min) Who    12/03/24 0555 98.4 °F (36.9 °C) 97 18 120/62 100 % -- None (Room air) -- EK    12/02/24 2016 97.8 °F (36.6 °C) 82 20 95/51 100 % -- None (Room air) -- EK    12/02/24 1234 97.9 °F (36.6 °C) 89 16 126/49 98 % -- None (Room air) -- TT    12/02/24 1043 98.3 °F (36.8 °C) 92 18 121/61 99 % -- None (Room air) -- TT    12/02/24 0533 97.8 °F (36.6 °C) 91 18 104/48 95 % -- None (Room air) -- EK          CIWA Scores (since admission)       None          Blood Transfusion Record       Product Unit Status Volume Start End            Transfuse RBC       24  [id number]  [id number] Completed 12/01/24 1506 393.75 mL  24 1151 24 1500                [facility]            Swathi Arguelles Patient Status:  Emergency    1965 MRN [id number]   Location [facility] EMERGENCY DEPARTMENT Attending Christopher Resendiz MD   Hosp Day # 0 PCP Erendira Caceres DO      Chief Complaint: weakness, exertional dyspnea        Subjective:  History of Present Illness:      Swathi Arguelles is a 59 year old female with PMHx Breast ca (BRCA2 +, s/p mastectomy, chemo)/ asthma/ HLD/ DM who presented to the hospital for multiple symptoms. She reports fatigue for the past 1.5 months and then over the past 2 weeks she developed night sweats with weight loss and new exertional dyspnea when going up and down steps with palpitations. She noticed inreased swelling of her left breast with a malodorous drainage and was covering it with a dressing for the past 6 weeks.  She denied any fever, cough, chest pain.        History/Other:  Past Medical History:  Past Medical History       Past Medical History:    Anemia     transfusions     Asthma (HCC)    Breast cancer (HCC)     right breast     Cancer (HCC)     breast    COVID-19     Tested 20    Heart murmur    History of blood transfusion    Murmur     benign murmur, unknown what type    Neuropathy    Personal history of antineoplastic chemotherapy     last chemo treatment    Pneumonia, organism unspecified(486)    Seasonal allergies    Type II or unspecified type diabetes mellitus without mention of complication, not stated as uncontrolled    Wheezing     related seasonal and fish allergies, takes inhalers        Past Surgical History:   Past Surgical History         Past Surgical History:   Procedure Laterality Date    Access port         left chest port a cath    Breast biopsy   2013     Procedure: BREAST BIOPSY;  Surgeon: Yasmany Goode MD;  Location: [facility] OR    D & c        Mastectomy modified radical   3/18/2014     Procedure:  BREAST MODIFIED RADICAL MASTECTOMY;  Surgeon: Jose Luis Adam MD;  Location:  MAIN OR    Mastectomy right         3/18/14    Needle biopsy right        Other surgical history   4/30/2015     mediport removal    Radiation right        Total abdominal hysterectomy   5/13/2014     Procedure: ABDOMINAL HYSTERECTOMY TOTAL BSO STAGING;  Surgeon: Jose Luis Adam MD;  Location:  MAIN OR         Family History:   Family History         Family History   Problem Relation Age of Onset    Cancer Father      Heart Attack Other           fam hx    Other (COPD) Other           fam hx    Other (CHF) Other           fam hx    Diabetes Other           fam hx    Other (leukemia) Other           fam hx    Cancer Maternal Aunt           breast cancer    Breast Cancer Self 49        Social History:    reports that she has never smoked. She has never used smokeless tobacco. She reports that she does not drink alcohol and does not use drugs.      Allergies: [Allergies]    [Allergies]        Allergen Reactions    Fish ANAPHYLAXIS and HIVES       All fish, Wheezing, short of breath    Levemir HIVES and ITCHING    Seasonal WHEEZING and Runny nose       Ragweed, pollen        Medications:  [Medications Ordered Prior to Encounter]    [Medications Ordered Prior to Encounter]  No current facility-administered medications on file prior to encounter.             Current Outpatient Medications on File Prior to Encounter   Medication Sig Dispense Refill    gabapentin 600 MG Oral Tab TAKE 1 TABLET BY MOUTH THREE TIMES DAILY; TAKE AN EXTRA 600MG AS NEEDED FOR SEVERE PAIN 270 tablet 1    Insulin Glargine-yfgn 100 UNIT/ML Subcutaneous Solution Pen-injector Inject 20 Units into the skin nightly. 24 mL 0    fluticasone-salmeterol (WIXELA INHUB) 100-50 MCG/ACT Inhalation Aerosol Powder, Breath Activated Inhale 1 puff into the lungs 2 (two) times daily. 3 each 0    Insulin Lispro, 1 Unit Dial, 100 UNIT/ML Subcutaneous Solution Pen-injector Inject 10 Units  into the skin in the morning, at noon, and at bedtime. 3 mL 0    Budesonide-Formoterol Fumarate 160-4.5 MCG/ACT Inhalation Aerosol Inhale 2 puffs into the lungs 2 (two) times daily. 3 each 1    albuterol (2.5 MG/3ML) 0.083% Inhalation Nebu Soln Take 3 mL (2.5 mg total) by nebulization every 4 (four) hours as needed for Wheezing or Shortness of Breath. 90 mL 0    Insulin Pen Needle (BD PEN NEEDLE DANIELA U/F) 32G X 4 MM Does not apply Misc Up to  each 2    montelukast 10 MG Oral Tab Take 1 tablet (10 mg total) by mouth nightly. 90 tablet 1    loratadine 10 MG Oral Tab Take 1 tablet (10 mg total) by mouth nightly.            Review of Systems:   A comprehensive review of systems was completed.    Pertinent positives and negatives noted in the HPI.        Objective:  Physical Exam:    /58   Pulse 109   Temp 98.9 °F (37.2 °C) (Oral)   Resp 23   Ht 5' 3\" (1.6 m)   Wt 146 lb (66.2 kg)   LMP 09/01/2013 (LMP Unknown)   SpO2 98%   BMI 25.86 kg/m²   General: No acute distress, Alert  Respiratory: No rhonchi, no wheezes, decreased breath sounds on left  Cardiovascular: S1, S2. Regular rate and rhythm, tachycardic  Abdomen: Soft, Non-tender, non-distended, positive bowel sounds  Neuro: No new focal deficits  Extremities: No edema  Skin: left breast enlarged in size and firm with open area that is draining on lateral edge with serosanguinous/ maldorodous drainage        Results:  Labs:        Labs Last 24 Hours:         Recent Labs   Lab 11/30/24 0314   RBC 3.21*   HGB 8.1*   HCT 26.2*   MCV 81.6   MCH 25.2*   MCHC 30.9*   RDW 15.9   NEPRELIM 14.46*   WBC 16.4*   .0             Recent Labs   Lab 11/30/24 0314   *   BUN 19   CREATSERUM 1.17*   EGFRCR 53*   CA 9.4   ALB 3.3   *   K 5.3*   CL 96*   CO2 25.0   ALKPHO 100   AST 43*   ALT 22   BILT 0.4   TP 7.5               Lab Results   Component Value Date     PT 14.4 09/11/2013     PT 15.2 (H) 09/10/2013     INR 1.08 12/04/2014     INR 1.16  09/11/2013     INR 1.24 (H) 09/10/2013             Recent Labs   Lab 11/30/24  0314   TROPHS 3             Recent Labs   Lab 11/30/24  0314   PBNP 599*         No results for input(s): \"PCT\" in the last 168 hours.     Imaging: Imaging data reviewed in Epic.        Assessment & Plan:  #fungating breast mass  #weakness  #HX breast ca, BRCA2 mutation  -CT showing large fungating left breast mass 22.3 x14.7 cm extending to level of intercostal muscle with suspected invasion, prominent axillary lymph nodes  -likely recurrence of breast ca  -PT/OT  -oncology c/s     #Dyspnea  -CTA chest negative for PE, consolidation long anterior right pleural surface and lung apex likely from prior radiation  -suspect dyspnea 2/2 mild asthma ex  -prednisone 40 mg daily, albuterol q4H prn  -cont home montelukast     #Hyponatremia  -sodium corrects to 133 accounting for glucose  -can allow to correct to normal over next 24 hrs  -cont to monitor BMP     #NILO  #hyperkalemia  #Lactic acidosis  -Cr 1.17 with BUN 19: baseline 0.74 with GFR 92  -K 5.3  -lactate 2.2  -BUN: Cr ratio <20 suggestive of intra-renal vs post-renal, may be 2/2 low BP at home as BP here is borderline low  -NSS @80 mls/h  -cont to monitor BMP, lactate  -strict I/o, avoid nephrotoxins  -if no improvement send urine studies and obtain renal US     #normocytic anemia  -hgb 8.1: baseline around 11-12  -no signs active bleeding  -send iron studies, retic count  -cont to monitor CBC and transfuse for hgb <7     #leukocytosis  -WBC 16.4  -cont to monitor CBC     #contaminated UA  -UA with moderate epithelial cells  -no urinary symptoms     #DM with hyperglycemia  -glucose 237  -SSI, QID checks, hypoglycemia protocol  -sub home glargine with tresiba  -cont home gabapentin        Plan of care discussed with ED physician     Kelsie Estrada DO      12/1/2024 Hospitalist Progress Note     [facility]   part of [facility]     Hospitalist Progress Note            Swathi  [place] Patient Status:  Emergency    1965 MRN [id number]   Location [facility] EMERGENCY DEPARTMENT Attending Christopher Resendiz MD   Hosp Day # 1 PCP Erendira Caceres DO      Chief Complaint: Breast mass        Subjective:  Patient denies new complaints. No pain. No nausea, vomiting, diarrhea.   On room air.      Current medications:  Scheduled Medications    insulin degludec  20 Units Subcutaneous Nightly    sodium chloride   Intravenous Once    enoxaparin  40 mg Subcutaneous Daily    gabapentin  600 mg Oral TID    piperacillin-tazobactam  4.5 g Intravenous Q8H    fluticasone-salmeterol  1 puff Inhalation BID    montelukast  10 mg Oral Nightly    insulin aspart  1-68 Units Subcutaneous TID CC    insulin aspart  1-10 Units Subcutaneous TID AC and HS    sodium ferric gluconate  125 mg Intravenous Daily               Objective:  Review of Systems:   10 point ROS completed and was negative, except for pertinent positive and negatives stated in subjective.     Vital signs:  Temp:  [97 °F (36.1 °C)-98.7 °F (37.1 °C)] 97.6 °F (36.4 °C)  Pulse:  [76-93] 88  Resp:  [18] 18  BP: ()/(46-66) 101/47  SpO2:  [95 %-100 %] 100 %  Patient Weight for the past 72 hrs:    Weight   24 0133 146 lb (66.2 kg)      Physical Exam:    General: No acute distress.   Respiratory: Diminished  Chest: Fungating left breast mass, malodorous   Cardiovascular: S1, S2. Regular rate and rhythm.   Abdomen: Soft, nontender, nondistended.  Positive bowel sounds.  Extremities: No edema.  Neuro: AAOx3     Diagnostic Data:    Labs:       Recent Labs   Lab 24  0314 24  0722   WBC 16.4* 13.8*   HGB 8.1* 6.0*   MCV 81.6 81.4   .0 291.0              Recent Labs   Lab 24  0314 24  0722   * 242*   BUN 19 12   CREATSERUM 1.17* 0.72   CA 9.4 8.2*   ALB 3.3 2.5*   * 138   K 5.3* 4.0   CL 96* 110   CO2 25.0 25.0   ALKPHO 100 62   AST 43* 21   ALT 22 12   BILT 0.4 0.2   TP 7.5 5.2*         Estimated  Creatinine Clearance: 67.9 mL/min (based on SCr of 0.72 mg/dL).     No results for input(s): \"PTP\", \"INR\" in the last 168 hours.           COVID-19 Lab Results     COVID-19        Lab Results   Component Value Date     COVID19 Not Detected 11/30/2024     COVID19 Detected (A) 11/02/2020         Pro-Calcitonin  No results for input(s): \"PCT\" in the last 168 hours.     Cardiac      Recent Labs   Lab 11/30/24 0314   PBNP 599*         Creatinine Kinase  No results for input(s): \"CK\" in the last 168 hours.     Inflammatory Markers       Recent Labs   Lab 11/30/24 0314 12/01/24  0722   CRP  --  14.10*   GALI 221  --    LDH  --  317*   DDIMER 2.27*  --              Recent Labs   Lab 11/30/24 0314   TROPHS 3         Imaging: Imaging data reviewed in Epic.     Medications:   Scheduled Medications    insulin degludec  20 Units Subcutaneous Nightly    sodium chloride   Intravenous Once    enoxaparin  40 mg Subcutaneous Daily    gabapentin  600 mg Oral TID    piperacillin-tazobactam  4.5 g Intravenous Q8H    fluticasone-salmeterol  1 puff Inhalation BID    montelukast  10 mg Oral Nightly    insulin aspart  1-68 Units Subcutaneous TID CC    insulin aspart  1-10 Units Subcutaneous TID AC and HS    sodium ferric gluconate  125 mg Intravenous Daily               Assessment & Plan:  Fungating breast mass with superimposed infection   History of breast cancer sp mastectomy and chemo  IV abx, follow-up cultures, ID consult  SurgOnc consult - patient has seen Dr. Adam in the past for mastectomy, but wishes for another surgeon > consult Dr. Moyer  Medical Oncology consult - CT a/p  Dyspnea on exertion  ECHO   Asthma exacerbation ruled out  BD  Nebs PRN  Leukocytosis, improving   Monitor    Anemia, iron deficiency   IV iron  PRBC x 1  Diabetes mellitus, A1c 6.5  Tresiba - increase to 20/n  Correctional  Carb  Hyponatremia, resolved   Hyperkalemia, resolved   Elevated AST, resolved  History of serous borderline ovarian tumor sp  resection  Lactic acidosis, resolved   Elevated d-dimer, CTA neg for PE  Dyslipidemia  Abnormal UA     DVT Px: Lovenox      At this point Ms. Arguelles is expected to be discharge to: Home     Plan of care discussed with patient, RN and oncologist.      Hua Huffman MD      2024 Hospitalist Progress Note   [facility]   part of [facility]     Hospitalist Progress Note            Swathi Arguelles Patient Status:  Emergency    1965 MRN [id number]   Location [facility] EMERGENCY DEPARTMENT Attending Christopher Resendiz MD   Hosp Day # 2 PCP Erendira Caceres DO      Chief Complaint: Breast mass        Subjective:  Patient without complaints of pain. No nausea or vomiting. Soft stool.     Current medications:  Scheduled Medications    insulin degludec  20 Units Subcutaneous Nightly    multivitamin  1 tablet Oral Daily    [Held by provider] enoxaparin  40 mg Subcutaneous Daily    gabapentin  600 mg Oral TID    piperacillin-tazobactam  4.5 g Intravenous Q8H    fluticasone-salmeterol  1 puff Inhalation BID    montelukast  10 mg Oral Nightly    insulin aspart  1-68 Units Subcutaneous TID CC    insulin aspart  1-10 Units Subcutaneous TID AC and HS    sodium ferric gluconate  125 mg Intravenous Daily               Objective:  Review of Systems:   10 point ROS completed and was negative, except for pertinent positive and negatives stated in subjective.     Vital signs:  Temp:  [97.8 °F (36.6 °C)-99 °F (37.2 °C)] 97.8 °F (36.6 °C)  Pulse:  [85-99] 91  Resp:  [16-20] 18  BP: ()/(48-62) 104/48  SpO2:  [93 %-100 %] 95 %  Patient Weight for the past 72 hrs:    Weight   24 0133 146 lb (66.2 kg)      Physical Exam:    General: No acute distress.   Respiratory: Diminished  Chest: Fungating left breast mass, dressing intact  Cardiovascular: S1, S2. Regular rate and rhythm.   Abdomen: Soft, nontender, nondistended.  Positive bowel sounds.  Extremities: + edema.  Neuro: AAOx3     Diagnostic Data:     Labs:          Recent Labs   Lab 11/30/24 0314 12/01/24 0722 12/01/24  1640 12/02/24  0526 12/02/24  0540   WBC 16.4* 13.8*  --  19.2*  --    HGB 8.1* 6.0* 8.4* 8.9*  --    MCV 81.6 81.4  --  83.9  --    .0 291.0  --  330.0  --    INR  --   --   --   --  1.22*               Recent Labs   Lab 11/30/24 0314 12/01/24 0722 12/02/24  0526   * 242* 70   BUN 19 12 <5*   CREATSERUM 1.17* 0.72 0.66   CA 9.4 8.2* 8.3*   ALB 3.3 2.5*  --    * 138 141   K 5.3* 4.0 3.6   CL 96* 110 111   CO2 25.0 25.0 20.0*   ALKPHO 100 62  --    AST 43* 21  --    ALT 22 12  --    BILT 0.4 0.2  --    TP 7.5 5.2*  --          Estimated Creatinine Clearance: 74 mL/min (based on SCr of 0.66 mg/dL).         Recent Labs   Lab 12/02/24  0540   PTP 15.5*   INR 1.22*               COVID-19 Lab Results     COVID-19        Lab Results   Component Value Date     COVID19 Not Detected 11/30/2024     COVID19 Detected (A) 11/02/2020         Pro-Calcitonin  No results for input(s): \"PCT\" in the last 168 hours.     Cardiac      Recent Labs   Lab 11/30/24 0314   PBNP 599*         Creatinine Kinase  No results for input(s): \"CK\" in the last 168 hours.     Inflammatory Markers       Recent Labs   Lab 11/30/24 0314 12/01/24 0722   CRP  --  14.10*   GALI 221  --    LDH  --  317*   DDIMER 2.27*  --              Recent Labs   Lab 11/30/24 0314   TROPHS 3         Imaging: Imaging data reviewed in Epic.     Medications:   Scheduled Medications    insulin degludec  20 Units Subcutaneous Nightly    multivitamin  1 tablet Oral Daily    [Held by provider] enoxaparin  40 mg Subcutaneous Daily    gabapentin  600 mg Oral TID    piperacillin-tazobactam  4.5 g Intravenous Q8H    fluticasone-salmeterol  1 puff Inhalation BID    montelukast  10 mg Oral Nightly    insulin aspart  1-68 Units Subcutaneous TID CC    insulin aspart  1-10 Units Subcutaneous TID AC and HS    sodium ferric gluconate  125 mg Intravenous Daily               Assessment &  Plan:  Fungating breast mass (CA 27.29 and  normal) with superimposed infection (Cx Enterobacter cloacae and Staph lugdunensis)  History of breast cancer sp mastectomy and chemo  IV abx, follow-up cultures, ID consult  MRI   US biopsy   SurgOnc consult - patient has seen Dr. Adam in the past for mastectomy, but wishes for another surgeon > consult Dr. Moyer  Medical Oncology consult   Dyspnea on exertion, CTA noted, ECHO with preserved EF, on room air  Asthma with out exacerbation   BD  Nebs PRN  Incentive spirometry   Monitor respiratory status  Ambulate   Leukocytosis  Monitor    Anemia, iron deficiency sp PRBC  IV iron  Monitor H&H   Diabetes mellitus, A1c 6.5  Tresiba  Correctional  Change Carb 1:12  Hyponatremia, resolved   Hyperkalemia, resolved   Elevated AST, resolved  History of serous borderline ovarian tumor sp resection  Lactic acidosis, resolved   Elevated d-dimer, CTA neg for PE  Dyslipidemia  Abnormal UA     DVT Px: Lovenox      At this point Ms. Arguelles is expected to be discharge to: Home     Plan of care discussed with patient and RN.     Hua Huffman MD     12/3/2024 Hematology Progress Note   [facility] Progress Note         Patient Name: Swathi Arguelles   YOB: 1965  Medical Record Number: [id number]  Attending Physician: Carl Lopez M.D.      The 21st Century Cures Act makes medical notes like these available to patients in the interest of transparency. Please be advised this is a medical document. Medical documents are intended to carry relevant information, facts as evident, and the clinical opinion of the practitioner. The medical note is intended as peer to peer communication and may appear blunt or direct. It is written in medical language and may contain abbreviations or verbiage that are unfamiliar.       Oncologic History  Swathi Arguelles is a 59 year old female admitted to the hospital on 09/10/2013 with symptomatic anemia.  On  physical examination revealed a malodorous, draining ulcerating right breast mass.  Upon questioning, she admitted that she first noticed the mass in approximately 01/2013.  She states that her only mammogram was approximately at age 43 years.  Biopsy showed grade 3 infiltrating ductal carcinoma.  The tumor was estrogen receptor positive, progesterone receptor negative, and Her2/alberto negative.  CT scans of the chest, abdomen, pelvis showed multiple right axillary lymph nodes, two micronodules in the lungs of unknown significance, and a left sided large cystic adnexal mass.  At initial diagnosis CA 15-3 was 48.3 U/mL,  CA 27.29 was 91.7 U/mL, and  was 171.5 U/mL.  PET/CT showed abnormal FDG uptake in the ulcerating mass of the right breast/chest wall and in retropectoral and subclavicular lymph nodes.  There was no uptake in the cystic pelvic mass or in 3 subcentimeter pulmonary nodules.  Pelvic ultrasound showed a complex multiloculated left adnexal cyst with no separate identifiable ovarian tissue.  Noted was irregular thickening of one of the cyst walls that was worrisome for a cystic ovarian neoplasm.    Patient was premenopausal at diagnosis     On 10/04/2013 she began dose dense doxorubicin and cyclophosphamide. Treatment was complicated by neutropenic fever, anemia requiring transfusion, dehydration, and prolonged thrombocytopenia. CT scans after cycle 4 showed a marked improvement in the breast/chest wall mass and axillary adenopathy. Tumor markers markedly improved. She then received weekly paclitaxel. Treatment was complicated by peripheral neuropathy and neutropenia.      During paclitaxel therapy, she consented to BRCA1/2 testing.  Results obtained on 02/11/2014 revealed the deleterious BRCA2 mutation [id number].     On 03/18/2014 she underwent right mastectomy with axillary node dissection.  Pathology showed 1.4 cm grade 3 invasive ductal carcinoma with 12/12 lymph nodes negative for  malignancy.  She also underwent diagnostic laparoscopy which showed the cystic left ovarian mass but no evidence of metastatic disease.     On 05/13/2014 she underwent bilateral salpingo-oophorectomy, bilateral pelvic lymphadenectomy, limited periaortic lymphadenectomy, omentectomy, peritoneal washings, and appendectomy.  Pathology, reviewed at the [facility], showed serous borderline tumor, typical type.  All lymph nodes and pelvic washings were negative for malignancy.     In 07/2014 she began adjuvant right chest wall/axilla radiation.     She began endocrine therapy with anastrazole in mid 08/2014.      Patient was last seen on 02/03/2017. On that day, patient was advised to continue anastrozole, continue increased surveillance with alternating mammography and breast MRI, and return for follow up in 05/2017. On that day, she expressed an openness to prophylactic left mastectomy if she was a candidate for breast reconstruction. Patient failed to return for follow up in 05/2017 as recommended. She also failed to return a call from the breast navigator to arrange evaluation by plastic surgery. She did have a left sided mammogram as previously ordered by me in 06/2017. Patient was on anastrozole at least through 02/2017 but it is not clear when she discontinued therapy.     Patient next presented to the ED on 11/30/2024 with complaints of generalized weakness, poor appetite, and dyspnea with exertion. Laboratory studies showed anemia, hyponatremia, and hypochlorhydria. CTA chest was negative for pulmonary embolism or metastatic disease but did show a 22.3 x 14.6 x 16.0 cm mass arising from the left breast with areas of necrosis, possible extension into the intracostal musculature, and mildly enlarged left axillary lymph nodes. Visualization of left breast showed a large firm breast which an open area laterally with malodorous drainage. CT abdomen/pelvis w contrast on 12/01/2024 was negative for metastatic disease.       Patient reports that she first noticed a \"rash\" 1.5 months prior to presentation. She stated that 3 months prior to presentation, she did not notice any changes in the left breast. Notably, patient's last breast imaging was in 06/2017.     History of Present Illness  No new complaints. Had breast biopsy yesterday.         Current Medications   Current Medications    traMADol (Ultram) tab 50 mg  50 mg Oral Q6H PRN    ketorolac (Toradol) 15 MG/ML injection 15 mg  15 mg Intravenous Q6H PRN     Or    ketorolac (Toradol) 30 MG/ML injection 30 mg  30 mg Intravenous Q6H PRN    morphINE PF 2 MG/ML injection 0.5 mg  0.5 mg Intravenous Q2H PRN     Or    morphINE PF 2 MG/ML injection 1 mg  1 mg Intravenous Q2H PRN     Or    morphINE PF 2 MG/ML injection 2 mg  2 mg Intravenous Q2H PRN    ceFEPIme (Maxpime) 2 g in sodium chloride 0.9% 100 mL IVPB-MBP  2 g Intravenous Q8H    metroNIDAZOLE (Flagyl) tab 500 mg  500 mg Oral 2 times per day    insulin degludec (Tresiba) 100 units/mL flextouch 20 Units  20 Units Subcutaneous Nightly    [COMPLETED] sodium chloride 0.9% infusion   Intravenous Once    [COMPLETED] iopamidol 76% (ISOVUE-370) injection for power injector  80 mL Intravenous ONCE PRN    multivitamin (Tab-A-Daron/Beta Carotene) tab 1 tablet  1 tablet Oral Daily    [COMPLETED] sodium chloride 0.9 % IV bolus 1,000 mL  1,000 mL Intravenous Once    [COMPLETED] iopamidol 76% (ISOVUE-370) injection for power injector  100 mL Intravenous ONCE PRN    [COMPLETED] piperacillin-tazobactam (Zosyn) 4.5 g in dextrose 5% 100 mL IVPB-ADDV  4.5 g Intravenous Once    glucose (Dex4) 15 GM/59ML oral liquid 15 g  15 g Oral Q15 Min PRN     Or    glucose (Glutose) 40% oral gel 15 g  15 g Oral Q15 Min PRN     Or    glucose-vitamin C (Dex-4) chewable tab 4 tablet  4 tablet Oral Q15 Min PRN     Or    dextrose 50% injection 50 mL  50 mL Intravenous Q15 Min PRN     Or    glucose (Dex4) 15 GM/59ML oral liquid 30 g  30 g Oral Q15 Min PRN     Or     glucose (Glutose) 40% oral gel 30 g  30 g Oral Q15 Min PRN     Or    glucose-vitamin C (Dex-4) chewable tab 8 tablet  8 tablet Oral Q15 Min PRN    enoxaparin (Lovenox) 40 MG/0.4ML SUBQ injection 40 mg  40 mg Subcutaneous Daily    acetaminophen (Tylenol Extra Strength) tab 500 mg  500 mg Oral Q4H PRN    melatonin tab 3 mg  3 mg Oral Nightly PRN    polyethylene glycol (PEG 3350) (Miralax) 17 g oral packet 17 g  17 g Oral Daily PRN    sennosides (Senokot) tab 17.2 mg  17.2 mg Oral Nightly PRN    bisacodyl (Dulcolax) 10 MG rectal suppository 10 mg  10 mg Rectal Daily PRN    fleet enema (Fleet) rectal enema 133 mL  1 enema Rectal Once PRN    ondansetron (Zofran) 4 MG/2ML injection 4 mg  4 mg Intravenous Q6H PRN    prochlorperazine (Compazine) 10 MG/2ML injection 5 mg  5 mg Intravenous Q8H PRN    benzonatate (Tessalon) cap 200 mg  200 mg Oral TID PRN    glycerin-hypromellose- (Artificial Tears) 0.2-0.2-1 % ophthalmic solution 1 drop  1 drop Both Eyes QID PRN    sodium chloride (Saline Mist) 0.65 % nasal solution 1 spray  1 spray Each Nare Q3H PRN    gabapentin (Neurontin) tab 600 mg  600 mg Oral TID    [COMPLETED] piperacillin-tazobactam (Zosyn) 4.5 g in dextrose 5% 100 mL IVPB-ADDV  4.5 g Intravenous Q8H    albuterol (Ventolin) (2.5 MG/3ML) 0.083% nebulizer solution 2.5 mg  2.5 mg Nebulization Q4H PRN    fluticasone-salmeterol (Advair Diskus) 250-50 MCG/ACT inhaler 1 puff  1 puff Inhalation BID    montelukast (Singulair) tab 10 mg  10 mg Oral Nightly    insulin aspart (NovoLOG) 100 Units/mL FlexPen 1-68 Units  1-68 Units Subcutaneous TID CC    insulin aspart (NovoLOG) 100 Units/mL FlexPen 1-10 Units  1-10 Units Subcutaneous TID AC and HS    sodium ferric gluconate (Ferrlecit) 125 mg in sodium chloride 0.9% 100mL IVPB premix  125 mg Intravenous Daily         Allergies   Ms. Arguelles is allergic to fish, levemir, and seasonal.     Vital Signs   /62 (BP Location: Left arm)   Pulse 97   Temp 98.4 °F (36.9 °C)  (Oral)   Resp 18   Ht 1.6 m (5' 3\")   Wt 66.5 kg (146 lb 9.6 oz)   LMP 09/01/2013 (LMP Unknown)   SpO2 100%   BMI 25.97 kg/m²      Physical Examination  Constitutional                  Well developed and well nourished; in no apparent distress.  Head                               Normocephalic and atraumatic.  Eyes                               Conjunctiva clear; sclera anicteric.  ENMT                             External nose normal; external ears normal.   Respiratory                     Normal effort; no respiratory distress.  Cardiovascular               Regular rate and rhythm.  Neurologic                      Motor and sensory grossly intact.  Psychiatric                      Mood and affect appropriate.     Laboratory  Recent Results           Recent Results (from the past 72 hours)   Iron And Tibc     Collection Time: 11/30/24  9:19 AM   Result Value Ref Range     Iron 13 (L) 50 - 170 ug/dL     Transferrin 131 (L) 250 - 380 mg/dL     Total Iron Binding Capacity 179 (L) 250 - 425 ug/dL     % Saturation 7 (L) 15 - 50 %   Aerobic Bacterial Culture     Collection Time: 11/30/24 12:11 PM     Specimen: Breast, left; Other   Result Value Ref Range     Aerobic Culture Result 2+ growth Enterobacter cloacae (A)       Aerobic Culture Result 2+ growth Staphylococcus lugdunensis (A)       Aerobic Culture Result 4+ growth Corynebacterium species (A)       Aerobic Smear No WBCs seen       Aerobic Smear No organisms seen         Susceptibility     Enterobacter cloacae -  (no method available)       Cefazolin   Resistant         Cefepime <=1 Sensitive         Ceftriaxone <=1 Sensitive         Ciprofloxacin <=0.25 Sensitive         Gentamicin <=1 Sensitive         Meropenem <=0.25 Sensitive         Levofloxacin <=0.12 Sensitive         Piperacillin + Tazobactam <=4 Sensitive         Trimethoprim/Sulfa <=20 Sensitive       Staphylococcus lugdunensis -  (no method available)       Cefazolin   Sensitive          Clindamycin <=0.25 Sensitive         Erythromycin <=0.25 Sensitive         Gentamicin <=0.5 Sensitive         Levofloxacin <=0.12 Sensitive         Oxacillin 2 Sensitive         Trimethoprim/Sulfa <=10 Sensitive         Vancomycin <=0.5 Sensitive     POCT Glucose     Collection Time: 11/30/24 12:15 PM   Result Value Ref Range     POC Glucose 312 (H) 70 - 99 mg/dL   POCT Glucose     Collection Time: 11/30/24  4:55 PM   Result Value Ref Range     POC Glucose 192 (H) 70 - 99 mg/dL   POCT Glucose     Collection Time: 11/30/24 11:28 PM   Result Value Ref Range     POC Glucose 311 (H) 70 - 99 mg/dL   POCT Glucose     Collection Time: 12/01/24  6:57 AM   Result Value Ref Range     POC Glucose 272 (H) 70 - 99 mg/dL   CBC With Differential With Platelet     Collection Time: 12/01/24  7:22 AM   Result Value Ref Range     WBC 13.8 (H) 4.0 - 11.0 x10(3) uL     RBC 2.36 (L) 3.80 - 5.30 x10(6)uL     HGB 6.0 (LL) 12.0 - 16.0 g/dL     HCT 19.2 (L) 35.0 - 48.0 %     .0 150.0 - 450.0 10(3)uL     MCV 81.4 80.0 - 100.0 fL     MCH 25.4 (L) 26.0 - 34.0 pg     MCHC 31.3 31.0 - 37.0 g/dL     RDW 16.1 %     Neutrophil Absolute Prelim 12.25 (H) 1.50 - 7.70 x10 (3) uL     Neutrophil Absolute 12.25 (H) 1.50 - 7.70 x10(3) uL     Lymphocyte Absolute 0.64 (L) 1.00 - 4.00 x10(3) uL     Monocyte Absolute 0.69 0.10 - 1.00 x10(3) uL     Eosinophil Absolute 0.08 0.00 - 0.70 x10(3) uL     Basophil Absolute 0.03 0.00 - 0.20 x10(3) uL     Immature Granulocyte Absolute 0.11 0.00 - 1.00 x10(3) uL     Neutrophil % 88.8 %     Lymphocyte % 4.6 %     Monocyte % 5.0 %     Eosinophil % 0.6 %     Basophil % 0.2 %     Immature Granulocyte % 0.8 %   Comp Metabolic Panel (14)     Collection Time: 12/01/24  7:22 AM   Result Value Ref Range     Glucose 242 (H) 70 - 99 mg/dL     Sodium 138 136 - 145 mmol/L     Potassium 4.0 3.5 - 5.1 mmol/L     Chloride 110 98 - 112 mmol/L     CO2 25.0 21.0 - 32.0 mmol/L     Anion Gap 3 0 - 18 mmol/L     BUN 12 9 - 23 mg/dL      Creatinine 0.72 0.55 - 1.02 mg/dL     Calcium, Total 8.2 (L) 8.7 - 10.4 mg/dL     Calculated Osmolality 294 275 - 295 mOsm/kg     eGFR-Cr 95 >=60 mL/min/1.73m2     AST 21 <34 U/L     ALT 12 10 - 49 U/L     Alkaline Phosphatase 62 50 - 130 U/L     Bilirubin, Total 0.2 0.2 - 1.1 mg/dL     Total Protein 5.2 (L) 5.7 - 8.2 g/dL     Albumin 2.5 (L) 3.2 - 4.8 g/dL     Globulin  2.7 2.0 - 3.5 g/dL     A/G Ratio 0.9 (L) 1.0 - 2.0   -II     Collection Time: 12/01/24  7:22 AM   Result Value Ref Range     Cancer Ag 125  11.0 <=30.2 U/mL   CA 27.29     Collection Time: 12/01/24  7:22 AM   Result Value Ref Range     Breast Carcinoma Ag [id number] 25.4 <38.0 u/mL   LDH     Collection Time: 12/01/24  7:22 AM   Result Value Ref Range      (H) 120 - 246 U/L   Folic Acid Serum (Folate)     Collection Time: 12/01/24  7:22 AM   Result Value Ref Range     Folate (Folic Acid) 7.8 >5.4 ng/mL   Sed Rate, Westergren (Automated)     Collection Time: 12/01/24  7:22 AM   Result Value Ref Range     Sed Rate 61 (H) 0 - 30 mm/Hr   C-Reactive Protein     Collection Time: 12/01/24  7:22 AM   Result Value Ref Range     C-Reactive Protein 14.10 (H) <=0.50 mg/dL   Vitamin B12 with reflex to MMA     Collection Time: 12/01/24  7:22 AM   Result Value Ref Range     Vitamin B12 555 211 - 911 pg/mL   HOLD MMA     Collection Time: 12/01/24  7:22 AM   Result Value Ref Range     HOLD MMA Auto Resulted     Haptoglobin     Collection Time: 12/01/24  7:22 AM   Result Value Ref Range     Haptoglobin 286.0 (H) 30.0 - 200.0 mg/dL   POCT Glucose     Collection Time: 12/01/24 11:45 AM   Result Value Ref Range     POC Glucose 221 (H) 70 - 99 mg/dL   POCT Glucose     Collection Time: 12/01/24  4:36 PM   Result Value Ref Range     POC Glucose 139 (H) 70 - 99 mg/dL   Hemoglobin     Collection Time: 12/01/24  4:40 PM   Result Value Ref Range     HGB 8.4 (L) 12.0 - 16.0 g/dL   POCT Glucose     Collection Time: 12/01/24  9:02 PM   Result Value Ref Range     POC  Glucose 201 (H) 70 - 99 mg/dL   Prepare RBC Once     Collection Time: 12/02/24 12:30 AM   Result Value Ref Range     Blood Product [id number]       Unit Number [id number]       UNIT ABO A       UNIT RH NEG       Product Status Presumed Transfused       Expiration Date [id number]       Blood Type Barcode 0600       Unit Volume 350 ml   CBC With Differential With Platelet     Collection Time: 12/02/24  5:26 AM   Result Value Ref Range     WBC 19.2 (H) 4.0 - 11.0 x10(3) uL     RBC 3.47 (L) 3.80 - 5.30 x10(6)uL     HGB 8.9 (L) 12.0 - 16.0 g/dL     HCT 29.1 (L) 35.0 - 48.0 %     .0 150.0 - 450.0 10(3)uL     MCV 83.9 80.0 - 100.0 fL     MCH 25.6 (L) 26.0 - 34.0 pg     MCHC 30.6 (L) 31.0 - 37.0 g/dL     RDW 16.7 %     Neutrophil Absolute Prelim 16.52 (H) 1.50 - 7.70 x10 (3) uL     Neutrophil Absolute 16.52 (H) 1.50 - 7.70 x10(3) uL     Lymphocyte Absolute 1.22 1.00 - 4.00 x10(3) uL     Monocyte Absolute 1.11 (H) 0.10 - 1.00 x10(3) uL     Eosinophil Absolute 0.08 0.00 - 0.70 x10(3) uL     Basophil Absolute 0.04 0.00 - 0.20 x10(3) uL     Immature Granulocyte Absolute 0.25 0.00 - 1.00 x10(3) uL     Neutrophil % 86.0 %     Lymphocyte % 6.3 %     Monocyte % 5.8 %     Eosinophil % 0.4 %     Basophil % 0.2 %     Immature Granulocyte % 1.3 %   Basic Metabolic Panel (8)     Collection Time: 12/02/24  5:26 AM   Result Value Ref Range     Glucose 70 70 - 99 mg/dL     Sodium 141 136 - 145 mmol/L     Potassium 3.6 3.5 - 5.1 mmol/L     Chloride 111 98 - 112 mmol/L     CO2 20.0 (L) 21.0 - 32.0 mmol/L     Anion Gap 10 0 - 18 mmol/L     BUN <5 (L) 9 - 23 mg/dL     Creatinine 0.66 0.55 - 1.02 mg/dL     Calcium, Total 8.3 (L) 8.7 - 10.4 mg/dL     eGFR-Cr 100 >=60 mL/min/1.73m2   CEA     Collection Time: 12/02/24  5:26 AM   Result Value Ref Range     CEA  <0.5 <=5.0 ng/mL   Cancer Antigen (Ca) 15-3     Collection Time: 12/02/24  5:26 AM   Result Value Ref Range     Cancer Ag 15-3 18.5 <=32.4 U/mL   POCT Glucose     Collection  Time: 12/02/24  5:32 AM   Result Value Ref Range     POC Glucose 71 70 - 99 mg/dL   Prothrombin Time (PT)     Collection Time: 12/02/24  5:40 AM   Result Value Ref Range     PT 15.5 (H) 11.6 - 14.8 seconds     INR 1.22 (H) 0.80 - 1.20   PTT, Activated     Collection Time: 12/02/24  5:40 AM   Result Value Ref Range     PTT 26.9 23.0 - 36.0 seconds   POCT Glucose     Collection Time: 12/02/24  8:01 AM   Result Value Ref Range     POC Glucose 111 (H) 70 - 99 mg/dL   POCT Glucose     Collection Time: 12/02/24 10:40 AM   Result Value Ref Range     POC Glucose 178 (H) 70 - 99 mg/dL   Clostridium difficile(toxigenic)PCR     Collection Time: 12/02/24 12:15 PM     Specimen: Stool   Result Value Ref Range     C. Difficile Toxin B Gene Negative Negative   POCT Glucose     Collection Time: 12/02/24 12:33 PM   Result Value Ref Range     POC Glucose 157 (H) 70 - 99 mg/dL   POCT Glucose     Collection Time: 12/02/24  5:23 PM   Result Value Ref Range     POC Glucose 150 (H) 70 - 99 mg/dL   POCT Glucose     Collection Time: 12/02/24  9:55 PM   Result Value Ref Range     POC Glucose 204 (H) 70 - 99 mg/dL   Basic Metabolic Panel (8)     Collection Time: 12/03/24  5:52 AM   Result Value Ref Range     Glucose 134 (H) 70 - 99 mg/dL     Sodium 138 136 - 145 mmol/L     Potassium 3.9 3.5 - 5.1 mmol/L     Chloride 109 98 - 112 mmol/L     CO2 23.0 21.0 - 32.0 mmol/L     Anion Gap 6 0 - 18 mmol/L     BUN 8 (L) 9 - 23 mg/dL     Creatinine 0.66 0.55 - 1.02 mg/dL     Calcium, Total 8.5 (L) 8.7 - 10.4 mg/dL     Calculated Osmolality 286 275 - 295 mOsm/kg     eGFR-Cr 100 >=60 mL/min/1.73m2   POCT Glucose     Collection Time: 12/03/24  5:54 AM   Result Value Ref Range     POC Glucose 136 (H) 70 - 99 mg/dL   CBC With Differential With Platelet     Collection Time: 12/03/24  7:00 AM   Result Value Ref Range     WBC 16.1 (H) 4.0 - 11.0 x10(3) uL     RBC 2.96 (L) 3.80 - 5.30 x10(6)uL     HGB 7.7 (L) 12.0 - 16.0 g/dL     HCT 24.4 (L) 35.0 - 48.0 %      .0 150.0 - 450.0 10(3)uL     MCV 82.4 80.0 - 100.0 fL     MCH 26.0 26.0 - 34.0 pg     MCHC 31.6 31.0 - 37.0 g/dL     RDW 16.7 %     Neutrophil Absolute Prelim 14.52 (H) 1.50 - 7.70 x10 (3) uL     Neutrophil Absolute 14.52 (H) 1.50 - 7.70 x10(3) uL     Lymphocyte Absolute 0.76 (L) 1.00 - 4.00 x10(3) uL     Monocyte Absolute 0.66 0.10 - 1.00 x10(3) uL     Eosinophil Absolute 0.05 0.00 - 0.70 x10(3) uL     Basophil Absolute 0.04 0.00 - 0.20 x10(3) uL     Immature Granulocyte Absolute 0.11 0.00 - 1.00 x10(3) uL     Neutrophil % 90.0 %     Lymphocyte % 4.7 %     Monocyte % 4.1 %     Eosinophil % 0.3 %     Basophil % 0.2 %     Immature Granulocyte % 0.7 %         Impression and Plan  1.   Breast cancer, left sided: Clinical findings are consistent with a primary carcinoma of the left breast. CT imaging is negative for distant metastatic disease. Further staging to evaluate the left axillary lymph nodes, the primary breast mass/chest wall, and bones is recommended. To this end, a PET scan are recommended. PET scan must be performed as an outpatient. MRI of the breasts would be useful to absolutely define chest wall involvement but in discussion with radiology, patient's breast mass is too large for such imaging.            Patient had breast biopsy yesterday.      2.   Infection of left breast mass: Currently on IV antibiotics and ID is managing. Once inpatient care for IV antibiotics are not needed, patient can be discharged. Home health has been arranged for draining wound.     OK for discharge when switched to oral antibiotics. She has PET scan and follow up with me scheduled for next week.      Electronically signed by:     Carl Lopez M.D.  System Medical Director of Oncology Services  [facility]

## 2024-12-03 NOTE — DISCHARGE SUMMARY
[facility]     Swathi Arguelles Patient Status:  Inpatient    1965 MRN [id number]   Location [facility] 4NW-A Attending Lorenzo Kelly MD   Hosp Day # 3 PCP Erendira Caceres DO     Date of Admission: 2024  Date of Discharge:   12/3/2024      Discharge Disposition: Home or Self Care    Discharge Diagnosis:  Fungating Breast mass  Breast Cancer  Asthma  Anemia  DM2      History of Present Illness: Swathi Arguelles is a 59 year old female with PMHx Breast ca (BRCA2 +, s/p mastectomy, chemo)/ asthma/ HLD/ DM who presented to the hospital for multiple symptoms. She reports fatigue for the past 1.5 months and then over the past 2 weeks she developed night sweats with weight loss and new exertional dyspnea when going up and down steps with palpitations. She noticed inreased swelling of her left breast with a malodorous drainage and was covering it with a dressing for the past 6 weeks.  She denied any fever, cough, chest pain.       Brief Synopsis: Pt was admitted and evaluated by ID, and Oncology. Culturese were reivewed and pt was transitioned to oral abx on Dc. Plan for PET scan as outpateint.     Lace+ Score: 61  59-90 High Risk  29-58 Medium Risk  0-28   Low Risk       TCM Follow-Up Recommendation:  LACE > 58: High Risk of readmission after discharge from the hospital.      Procedures during hospitalization:   none    Incidental or significant findings and recommendations (brief descriptions):  none    Lab/Test results pending at Discharge:   none    Consultants:  ID, Oncology    Discharge Medication List:     Discharge Medications        START taking these medications        Instructions Prescription details   levoFLOXacin 750 MG Tabs  Commonly known as: Levaquin      Take 1 tablet (750 mg total) by mouth daily for 7 days.   Stop taking on: December 10, 2024  Quantity: 7 tablet  Refills: 0     metroNIDAZOLE 500 MG Tabs  Commonly known as: Flagyl      Take 1 tablet (500 mg  total) by mouth every 12 (twelve) hours for 7 days.   Stop taking on: December 10, 2024  Quantity: 14 tablet  Refills: 0     traMADol 50 MG Tabs  Commonly known as: Ultram      Take 1 tablet (50 mg total) by mouth every 6 (six) hours as needed.   Quantity: 20 tablet  Refills: 0            CONTINUE taking these medications        Instructions Prescription details   albuterol (2.5 MG/3ML) 0.083% Nebu  Commonly known as: Ventolin      Take 3 mL (2.5 mg total) by nebulization every 4 (four) hours as needed for Wheezing or Shortness of Breath.   Quantity: 90 mL  Refills: 0     BD Pen Needle Siri U/F 32G X 4 MM Misc  Generic drug: Insulin Pen Needle      Up to TID   Quantity: 200 each  Refills: 2     Budesonide-Formoterol Fumarate 160-4.5 MCG/ACT Aero  Commonly known as: SYMBICORT      Inhale 2 puffs into the lungs 2 (two) times daily.   Quantity: 3 each  Refills: 1     fluticasone-salmeterol 100-50 MCG/ACT Aepb  Commonly known as: Wixela Inhub      Inhale 1 puff into the lungs 2 (two) times daily.   Quantity: 3 each  Refills: 0     gabapentin 600 MG Tabs  Commonly known as: Neurontin      TAKE 1 TABLET BY MOUTH THREE TIMES DAILY; TAKE AN EXTRA 600MG AS NEEDED FOR SEVERE PAIN   Quantity: 270 tablet  Refills: 1     Insulin Glargine-yfgn 100 UNIT/ML Sopn      Inject 20 Units into the skin nightly.   Quantity: 24 mL  Refills: 0     montelukast 10 MG Tabs  Commonly known as: Singulair      Take 1 tablet (10 mg total) by mouth nightly.   Quantity: 90 tablet  Refills: 1            STOP taking these medications      loratadine 10 MG Tabs  Commonly known as: Claritin               ASK your doctor about these medications        Instructions Prescription details   Insulin Lispro (1 Unit Dial) 100 UNIT/ML Sopn      Inject 10 Units into the skin in the morning, at noon, and at bedtime.   Quantity: 3 mL  Refills: 0               Where to Get Your Medications        These medications were sent to [id number] - [place],  [place] - [address], [phone], [phone]  [address], [facility] [address]      Phone: [phone]   levoFLOXacin 750 MG Tabs  metroNIDAZOLE 500 MG Tabs  traMADol 50 MG Tabs         [place]    Follow-up appointment:   EH PET SCANNER    Follow up on 12/9/2024  [address] [facility] [place] [address]  Monday 12/09/2024 at 12:45pm  Arrive by 12:30p.  Rashel pre-appointment instructions available on My Chart.    Carl Lopez MD  [address] [facility] [address]  [facility]  [facility] [address]  [phone]    Follow up on 12/10/2024  12:15p appointment. Arrive by 12:00p.    Erendira Caceres DO  [address] [facility] [address]  [facility] [address]  [phone]    Follow up      Appointments for Next 30 Days 12/5/2024 - 1/4/2025        Date Arrival Time Visit Type Length Department Provider     12/9/2024 12:45 PM  EE PET WHL BDY TO THIGH DOSE [4590] 15 min [facility] PET EH PET DOSE RM1    Patient Instructions:     Please arrive 15 minutes early for this appointment.     BEFORE YOUR EXAM  -No strenuous activity for 48 hours before your exam.  -After 5:00 PM the night before your test, avoid carbohydrates (no candy, gum, mints, ice cream, cake, sweets, or soda pop).  -Do not eat any food for 6 hours before your appointment time; please only drink plain water.    DIABETIC PATIENTS ONLY  -If you are taking Metformin, please withhold for at least 48 hours before your exam.  -Do not take any insulin within 4 hours of your appointment.  If you are taking long-acting insulin, take only half the normal dose at your normal time.   -Please avoid carbohydrates the entire day before the scan.  -Do not take oral diabetes medication 4 hours prior to your test.     ON THE DAY OF YOUR EXAM  -Dress warmly and comfortably.  No metal in your clothes, such as snaps or zippers, and please leave valuables/jewelry at home.  -For this test, you will receive an injection, rest for 30-90  minutes and take a 15-50 minute scan.  -There are no side effects to the injection.        Location Instructions:     Your appointment will be at [facility] located at [address] [place] [address], [address]. You may park in the [facility].&nbsp; Enter the Cancer Center entrance and check in at the Cancer Center Registration Desk. The phone number for this location is [phone].  Please bring your insurance card and photo ID. You will also need to bring your doctor's order unless your physician's office submitted the order electronically or faxed the order. Without the order, your test may be delayed or postponed.  Children: Children under the age of 12 must have another adult caregiver with them.&nbsp; Please do not bring your child/children without a caregiver.&nbsp; Because of the highly sensitive equipment and privacy of all our patients, children will not be permitted in the exam rooms, unless otherwise noted and in accordance with departmental policy.  PATIENT RESPONSIBILITY ESTIMATE  - (Estimate) We will provide you with your estimated remaining deductible and coinsurance balance for your services at the time of check in.  - (Payment) Please be aware that you may be asked for payment at the time of service.  Masks are optional for all patients and visitors, unless otherwise indicated.               12/9/2024  2:00 PM  Critical access hospital PET WHL BDY TO THIGH SCAN [[id number]] 45 min [facility] PET  PET SCANNER    Patient Instructions:     Please arrive 15 minutes early for this appointment.     BEFORE YOUR EXAM  -No strenuous activity for 48 hours before your exam.  -After 5:00 PM the night before your test, avoid carbohydrates (no candy, gum, mints, ice cream, cake, sweets, or soda pop).  -Do not eat any food for 6 hours before your appointment time; please only drink plain water.    DIABETIC PATIENTS ONLY  -If you are taking Metformin, please withhold for at least 48 hours before your exam.  -Do  not take any insulin within 4 hours of your appointment.  If you are taking long-acting insulin, take only half the normal dose at your normal time.   -Please avoid carbohydrates the entire day before the scan.  -Do not take oral diabetes medication 4 hours prior to your test.     ON THE DAY OF YOUR EXAM  -Dress warmly and comfortably.  No metal in your clothes, such as snaps or zippers, and please leave valuables/jewelry at home.  -For this test, you will receive an injection, rest for 30-90 minutes and take a 15-50 minute scan.  -There are no side effects to the injection.        Location Instructions:     Your appointment will be at [facility] located at [address] [place] [address], [address]. You may park in the [place] [facility].&nbsp; Enter the Cancer Center entrance and check in at the Cancer Center Registration Desk. The phone number for this location is [phone].  Please bring your insurance card and photo ID. You will also need to bring your doctor's order unless your physician's office submitted the order electronically or faxed the order. Without the order, your test may be delayed or postponed.  Children: Children under the age of 12 must have another adult caregiver with them.&nbsp; Please do not bring your child/children without a caregiver.&nbsp; Because of the highly sensitive equipment and privacy of all our patients, children will not be permitted in the exam rooms, unless otherwise noted and in accordance with departmental policy.  PATIENT RESPONSIBILITY ESTIMATE  - (Estimate) We will provide you with your estimated remaining deductible and coinsurance balance for your services at the time of check in.  - (Payment) Please be aware that you may be asked for payment at the time of service.  Masks are optional for all patients and visitors, unless otherwise indicated.               12/10/2024 12:15 PM  FOLLOW UP-HEM/ONC [[id number]] 45 min [facility] in [place]  Carl Lopez MD    Patient Instructions:         Location Instructions:     **IF YOU NEED LABWORK OR AN INFUSION ALONG WITH YOUR APPOINTMENT, YOU MUST CALL TO SCHEDULE.**  Your appointment is on the [facility] campus in the Cancer Center. The address is [address] [place] [address]. Please register at the [facility]  on the second floor.  Masks are optional for all patients and visitors, unless otherwise indicated.                      Vital signs:             -----------------------------------------------------------------------------------------------  PATIENT DISCHARGE INSTRUCTIONS: See electronic chart    Lorenzo Kelly MD    Total time spent on discharge plannin minutes     The  [employer] Cures Act makes medical notes like these available to patients in the interest of transparency. Please be advised this is a medical document. Medical documents are intended to carry relevant information, facts as evident, and the clinical opinion of the practitioner. The medical note is intended as peer to peer communication and may appear blunt or direct. It is written in medical language and may contain abbreviations or verbiage that are unfamiliar.

## 2024-12-03 NOTE — PROGRESS NOTES
[facility]   part of [facility]     Hospitalist Progress Note     Swathi Arguelles Patient Status:  Emergency    1965 MRN [id number]   [facility] EMERGENCY DEPARTMENT Attending Christopher Resendiz MD   Hosp Day # 3 PCP Erendira Caceres DO     Chief Complaint: Breast mass    Subjective:     Patient without acute events overnight. Pain better. Tolerating diet. Hopes to go home.     Current medications:   cefepime  2 g Intravenous Q8H    metroNIDAZOLE  500 mg Oral 2 times per day    insulin degludec  20 Units Subcutaneous Nightly    multivitamin  1 tablet Oral Daily    enoxaparin  40 mg Subcutaneous Daily    gabapentin  600 mg Oral TID    fluticasone-salmeterol  1 puff Inhalation BID    montelukast  10 mg Oral Nightly    insulin aspart  1-68 Units Subcutaneous TID CC    insulin aspart  1-10 Units Subcutaneous TID AC and HS    sodium ferric gluconate  125 mg Intravenous Daily       Objective:    Review of Systems:   10 point ROS completed and was negative, except for pertinent positive and negatives stated in subjective.    Vital signs:  Temp:  [97.8 °F (36.6 °C)-98.4 °F (36.9 °C)] 98.4 °F (36.9 °C)  Pulse:  [82-97] 97  Resp:  [16-20] 18  BP: ()/(49-62) 120/62  SpO2:  [98 %-100 %] 100 %  Patient Weight for the past 72 hrs:   Weight   24 0133 146 lb (66.2 kg)     Physical Exam:    General: No acute distress.   Respiratory: Diminished  Chest: Fungating left breast mass, dressing intact  Cardiovascular: S1, S2. Regular rate and rhythm.   Abdomen: Soft, nontender, nondistended.  Positive bowel sounds.  Extremities: + edema.  Neuro: AAOx3    Diagnostic Data:    Labs:  Recent Labs   Lab 24  0314 24  0722 24  1640 24  0526 24  0540 24  0700   WBC 16.4* 13.8*  --  19.2*  --  16.1*   HGB 8.1* 6.0* 8.4* 8.9*  --  7.7*   MCV 81.6 81.4  --  83.9  --  82.4   .0 291.0  --  330.0  --  326.0   INR  --   --   --   --  1.22*  --        Recent Labs   Lab  11/30/24 0314 12/01/24 0722 12/02/24  0526 12/03/24  0552   * 242* 70 134*   BUN 19 12 <5* 8*   CREATSERUM 1.17* 0.72 0.66 0.66   CA 9.4 8.2* 8.3* 8.5*   ALB 3.3 2.5*  --   --    * 138 141 138   K 5.3* 4.0 3.6 3.9   CL 96* 110 111 109   CO2 25.0 25.0 20.0* 23.0   ALKPHO 100 62  --   --    AST 43* 21  --   --    ALT 22 12  --   --    BILT 0.4 0.2  --   --    TP 7.5 5.2*  --   --        Estimated Creatinine Clearance: 74 mL/min (based on SCr of 0.66 mg/dL).    Recent Labs   Lab 12/02/24  0540   PTP 15.5*   INR 1.22*              COVID-19 Lab Results    COVID-19  Lab Results   Component Value Date    COVID19 Not Detected 11/30/2024    COVID19 Detected (A) 11/02/2020       Pro-Calcitonin  No results for input(s): \"PCT\" in the last 168 hours.    Cardiac  Recent Labs   Lab 11/30/24 0314   PBNP 599*       Creatinine Kinase  No results for input(s): \"CK\" in the last 168 hours.    Inflammatory Markers  Recent Labs   Lab 11/30/24 0314 12/01/24 0722   CRP  --  14.10*   GALI 221  --    LDH  --  317*   DDIMER 2.27*  --        Recent Labs   Lab 11/30/24 0314   TROPHS 3       Imaging: Imaging data reviewed in Epic.    Medications:    cefepime  2 g Intravenous Q8H    metroNIDAZOLE  500 mg Oral 2 times per day    insulin degludec  20 Units Subcutaneous Nightly    multivitamin  1 tablet Oral Daily    enoxaparin  40 mg Subcutaneous Daily    gabapentin  600 mg Oral TID    fluticasone-salmeterol  1 puff Inhalation BID    montelukast  10 mg Oral Nightly    insulin aspart  1-68 Units Subcutaneous TID CC    insulin aspart  1-10 Units Subcutaneous TID AC and HS    sodium ferric gluconate  125 mg Intravenous Daily       Assessment & Plan:    Fungating breast mass (CA 27.29 and  normal) with superimposed infection (Cx Enterobacter cloacae and Staph lugdunensis)  History of breast cancer sp mastectomy and chemo  IV abx, follow-up cultures, ID consult  Transition to oral abx on DC  Medical Oncology consult   Dyspnea on  exertion, CTA noted, ECHO with preserved EF, on room air  Asthma with out exacerbation   BD  Nebs PRN  Incentive spirometry   Monitor respiratory status  Ambulate   Leukocytosis  Monitor    Anemia, iron deficiency sp PRBC  IV iron  Monitor H&H   Diabetes mellitus, A1c 6.5  Tresiba  Correctional  Hyponatremia, resolved   Hyperkalemia, resolved   Elevated AST, resolved  History of serous borderline ovarian tumor sp resection  Lactic acidosis, resolved   Elevated d-dimer, CTA neg for PE  Dyslipidemia  Abnormal UA    DVT Px: Lovenox     At this point Ms. Arguelles is expected to be discharge to: Home    Plan of care discussed with patient and RN.    Lorenzo Kelly MD          Supplementary Documentation:   DVT Mechanical Prophylaxis:   SCDs,    DVT Pharmacologic Prophylaxis   Medication    enoxaparin (Lovenox) 40 MG/0.4ML SUBQ injection 40 mg                Code Status: Full Code  Bonner: No urinary catheter in place  Bonner Duration (in days):   Central line:    ABDELRAHMAN:               Dietitian Malnutrition Assessment    Evaluation for Malnutrition: Criteria for severe malnutrition diagnosis- chronic illness related to   Wt loss greater than 10% in 6 months., Energy intake less than 75% for greater than 1 month.               RD Malnutrition Care Plan: Intiated ONS (oral nutritional supplements)., Ordered multivitamin.    Body Fat/Muscle Mass:          Physician Assessment     Patient has a diagnosis of severe malnutrition

## 2024-12-03 NOTE — PLAN OF CARE
Pt A&Ox4, RA, VSS. All meds given per MAR, antibiotic given. Dressing reinforced to L breast. Pt is resting in bed with call light in reach, safety precautions in place.    Problem: Diabetes/Glucose Control  Goal: Glucose maintained within prescribed range  Description: INTERVENTIONS:  - Monitor Blood Glucose as ordered  - Assess for signs and symptoms of hyperglycemia and hypoglycemia  - Administer ordered medications to maintain glucose within target range  - Assess barriers to adequate nutritional intake and initiate nutrition consult as needed  - Instruct patient on self management of diabetes  Outcome: Progressing     Problem: SKIN/TISSUE INTEGRITY - ADULT  Goal: Skin integrity remains intact  Description: INTERVENTIONS  - Assess and document risk factors for pressure ulcer development  - Assess and document skin integrity  - Monitor for areas of redness and/or skin breakdown  - Initiate interventions, skin care algorithm/standards of care as needed  Outcome: Progressing  Goal: Incision(s), wounds(s) or drain site(s) healing without S/S of infection  Description: INTERVENTIONS:  - Assess and document risk factors for pressure ulcer development  - Assess and document skin integrity  - Assess and document dressing/incision, wound bed, drain sites and surrounding tissue  - Implement wound care per orders  - Initiate isolation precautions as appropriate  - Initiate Pressure Ulcer prevention bundle as indicated  Outcome: Progressing

## 2024-12-03 NOTE — PROGRESS NOTES
[facility] Progress Note       Patient Name: Swathi Arguelles   YOB: 1965  Medical Record Number: [id number]  Attending Physician: Carl Lopez M.D.     The 21st Century Cures Act makes medical notes like these available to patients in the interest of transparency. Please be advised this is a medical document. Medical documents are intended to carry relevant information, facts as evident, and the clinical opinion of the practitioner. The medical note is intended as peer to peer communication and may appear blunt or direct. It is written in medical language and may contain abbreviations or verbiage that are unfamiliar.      Oncologic History  Swathi Arguelles is a 59 year old female admitted to the hospital on 09/10/2013 with symptomatic anemia.  On physical examination revealed a malodorous, draining ulcerating right breast mass.  Upon questioning, she admitted that she first noticed the mass in approximately 01/2013.  She states that her only mammogram was approximately at age 43 years.  Biopsy showed grade 3 infiltrating ductal carcinoma.  The tumor was estrogen receptor positive, progesterone receptor negative, and Her2/alberto negative.  CT scans of the chest, abdomen, pelvis showed multiple right axillary lymph nodes, two micronodules in the lungs of unknown significance, and a left sided large cystic adnexal mass.  At initial diagnosis CA 15-3 was 48.3 U/mL,  CA 27.29 was 91.7 U/mL, and  was 171.5 U/mL.  PET/CT showed abnormal FDG uptake in the ulcerating mass of the right breast/chest wall and in retropectoral and subclavicular lymph nodes.  There was no uptake in the cystic pelvic mass or in 3 subcentimeter pulmonary nodules.  Pelvic ultrasound showed a complex multiloculated left adnexal cyst with no separate identifiable ovarian tissue.  Noted was irregular thickening of one of the cyst walls that was worrisome for a cystic ovarian neoplasm.    Patient was  premenopausal at diagnosis    On 10/04/2013 she began dose dense doxorubicin and cyclophosphamide. Treatment was complicated by neutropenic fever, anemia requiring transfusion, dehydration, and prolonged thrombocytopenia. CT scans after cycle 4 showed a marked improvement in the breast/chest wall mass and axillary adenopathy. Tumor markers markedly improved. She then received weekly paclitaxel. Treatment was complicated by peripheral neuropathy and neutropenia.     During paclitaxel therapy, she consented to BRCA1/2 testing.  Results obtained on 02/11/2014 revealed the deleterious BRCA2 mutation [id number].    On 03/18/2014 she underwent right mastectomy with axillary node dissection.  Pathology showed 1.4 cm grade 3 invasive ductal carcinoma with 12/12 lymph nodes negative for malignancy.  She also underwent diagnostic laparoscopy which showed the cystic left ovarian mass but no evidence of metastatic disease.    On 05/13/2014 she underwent bilateral salpingo-oophorectomy, bilateral pelvic lymphadenectomy, limited periaortic lymphadenectomy, omentectomy, peritoneal washings, and appendectomy.  Pathology, reviewed at the [facility], showed serous borderline tumor, typical type.  All lymph nodes and pelvic washings were negative for malignancy.    In 07/2014 she began adjuvant right chest wall/axilla radiation.    She began endocrine therapy with anastrazole in mid 08/2014.     Patient was last seen on 02/03/2017. On that day, patient was advised to continue anastrozole, continue increased surveillance with alternating mammography and breast MRI, and return for follow up in 05/2017. On that day, she expressed an openness to prophylactic left mastectomy if she was a candidate for breast reconstruction. Patient failed to return for follow up in 05/2017 as recommended. She also failed to return a call from the breast navigator to arrange evaluation by plastic surgery. She did have a left sided mammogram as  previously ordered by me in 06/2017. Patient was on anastrozole at least through 02/2017 but it is not clear when she discontinued therapy.    Patient next presented to the ED on 11/30/2024 with complaints of generalized weakness, poor appetite, and dyspnea with exertion. Laboratory studies showed anemia, hyponatremia, and hypochlorhydria. CTA chest was negative for pulmonary embolism or metastatic disease but did show a 22.3 x 14.6 x 16.0 cm mass arising from the left breast with areas of necrosis, possible extension into the intracostal musculature, and mildly enlarged left axillary lymph nodes. Visualization of left breast showed a large firm breast which an open area laterally with malodorous drainage. CT abdomen/pelvis w contrast on 12/01/2024 was negative for metastatic disease.     Patient reports that she first noticed a \"rash\" 1.5 months prior to presentation. She stated that 3 months prior to presentation, she did not notice any changes in the left breast. Notably, patient's last breast imaging was in 06/2017.    History of Present Illness  No new complaints. Had breast biopsy yesterday.       Current Medications    traMADol (Ultram) tab 50 mg  50 mg Oral Q6H PRN    ketorolac (Toradol) 15 MG/ML injection 15 mg  15 mg Intravenous Q6H PRN    Or    ketorolac (Toradol) 30 MG/ML injection 30 mg  30 mg Intravenous Q6H PRN    morphINE PF 2 MG/ML injection 0.5 mg  0.5 mg Intravenous Q2H PRN    Or    morphINE PF 2 MG/ML injection 1 mg  1 mg Intravenous Q2H PRN    Or    morphINE PF 2 MG/ML injection 2 mg  2 mg Intravenous Q2H PRN    ceFEPIme (Maxpime) 2 g in sodium chloride 0.9% 100 mL IVPB-MBP  2 g Intravenous Q8H    metroNIDAZOLE (Flagyl) tab 500 mg  500 mg Oral 2 times per day    insulin degludec (Tresiba) 100 units/mL flextouch 20 Units  20 Units Subcutaneous Nightly    [COMPLETED] sodium chloride 0.9% infusion   Intravenous Once    [COMPLETED] iopamidol 76% (ISOVUE-370) injection for power injector  80 mL  Intravenous ONCE PRN    multivitamin (Tab-A-Daron/Beta Carotene) tab 1 tablet  1 tablet Oral Daily    [COMPLETED] sodium chloride 0.9 % IV bolus 1,000 mL  1,000 mL Intravenous Once    [COMPLETED] iopamidol 76% (ISOVUE-370) injection for power injector  100 mL Intravenous ONCE PRN    [COMPLETED] piperacillin-tazobactam (Zosyn) 4.5 g in dextrose 5% 100 mL IVPB-ADDV  4.5 g Intravenous Once    glucose (Dex4) 15 GM/59ML oral liquid 15 g  15 g Oral Q15 Min PRN    Or    glucose (Glutose) 40% oral gel 15 g  15 g Oral Q15 Min PRN    Or    glucose-vitamin C (Dex-4) chewable tab 4 tablet  4 tablet Oral Q15 Min PRN    Or    dextrose 50% injection 50 mL  50 mL Intravenous Q15 Min PRN    Or    glucose (Dex4) 15 GM/59ML oral liquid 30 g  30 g Oral Q15 Min PRN    Or    glucose (Glutose) 40% oral gel 30 g  30 g Oral Q15 Min PRN    Or    glucose-vitamin C (Dex-4) chewable tab 8 tablet  8 tablet Oral Q15 Min PRN    enoxaparin (Lovenox) 40 MG/0.4ML SUBQ injection 40 mg  40 mg Subcutaneous Daily    acetaminophen (Tylenol Extra Strength) tab 500 mg  500 mg Oral Q4H PRN    melatonin tab 3 mg  3 mg Oral Nightly PRN    polyethylene glycol (PEG 3350) (Miralax) 17 g oral packet 17 g  17 g Oral Daily PRN    sennosides (Senokot) tab 17.2 mg  17.2 mg Oral Nightly PRN    bisacodyl (Dulcolax) 10 MG rectal suppository 10 mg  10 mg Rectal Daily PRN    fleet enema (Fleet) rectal enema 133 mL  1 enema Rectal Once PRN    ondansetron (Zofran) 4 MG/2ML injection 4 mg  4 mg Intravenous Q6H PRN    prochlorperazine (Compazine) 10 MG/2ML injection 5 mg  5 mg Intravenous Q8H PRN    benzonatate (Tessalon) cap 200 mg  200 mg Oral TID PRN    glycerin-hypromellose- (Artificial Tears) 0.2-0.2-1 % ophthalmic solution 1 drop  1 drop Both Eyes QID PRN    sodium chloride (Saline Mist) 0.65 % nasal solution 1 spray  1 spray Each Nare Q3H PRN    gabapentin (Neurontin) tab 600 mg  600 mg Oral TID    [COMPLETED] piperacillin-tazobactam (Zosyn) 4.5 g in dextrose 5%  100 mL IVPB-ADDV  4.5 g Intravenous Q8H    albuterol (Ventolin) (2.5 MG/3ML) 0.083% nebulizer solution 2.5 mg  2.5 mg Nebulization Q4H PRN    fluticasone-salmeterol (Advair Diskus) 250-50 MCG/ACT inhaler 1 puff  1 puff Inhalation BID    montelukast (Singulair) tab 10 mg  10 mg Oral Nightly    insulin aspart (NovoLOG) 100 Units/mL FlexPen 1-68 Units  1-68 Units Subcutaneous TID CC    insulin aspart (NovoLOG) 100 Units/mL FlexPen 1-10 Units  1-10 Units Subcutaneous TID AC and HS    sodium ferric gluconate (Ferrlecit) 125 mg in sodium chloride 0.9% 100mL IVPB premix  125 mg Intravenous Daily      Allergies   Ms. Arguelles is allergic to fish, levemir, and seasonal.    Vital Signs   /62 (BP Location: Left arm)   Pulse 97   Temp 98.4 °F (36.9 °C) (Oral)   Resp 18   Ht 1.6 m (5' 3\")   Wt 66.5 kg (146 lb 9.6 oz)   LMP 09/01/2013 (LMP Unknown)   SpO2 100%   BMI 25.97 kg/m²     Physical Examination  Constitutional  Well developed and well nourished; in no apparent distress.  Head   Normocephalic and atraumatic.  Eyes   Conjunctiva clear; sclera anicteric.  ENMT   External nose normal; external ears normal.   Respiratory  Normal effort; no respiratory distress.  Cardiovascular  Regular rate and rhythm.  Neurologic  Motor and sensory grossly intact.  Psychiatric  Mood and affect appropriate.    Laboratory  Recent Results (from the past 72 hours)   Iron And Tibc    Collection Time: 11/30/24  9:19 AM   Result Value Ref Range    Iron 13 (L) 50 - 170 ug/dL    Transferrin 131 (L) 250 - 380 mg/dL    Total Iron Binding Capacity 179 (L) 250 - 425 ug/dL    % Saturation 7 (L) 15 - 50 %   Aerobic Bacterial Culture    Collection Time: 11/30/24 12:11 PM    Specimen: Breast, left; Other   Result Value Ref Range    Aerobic Culture Result 2+ growth Enterobacter cloacae (A)     Aerobic Culture Result 2+ growth Staphylococcus lugdunensis (A)     Aerobic Culture Result 4+ growth Corynebacterium species (A)     Aerobic Smear No WBCs  seen     Aerobic Smear No organisms seen        Susceptibility    Enterobacter cloacae -  (no method available)     Cefazolin  Resistant      Cefepime <=1 Sensitive      Ceftriaxone <=1 Sensitive      Ciprofloxacin <=0.25 Sensitive      Gentamicin <=1 Sensitive      Meropenem <=0.25 Sensitive      Levofloxacin <=0.12 Sensitive      Piperacillin + Tazobactam <=4 Sensitive      Trimethoprim/Sulfa <=20 Sensitive     Staphylococcus lugdunensis -  (no method available)     Cefazolin  Sensitive      Clindamycin <=0.25 Sensitive      Erythromycin <=0.25 Sensitive      Gentamicin <=0.5 Sensitive      Levofloxacin <=0.12 Sensitive      Oxacillin 2 Sensitive      Trimethoprim/Sulfa <=10 Sensitive      Vancomycin <=0.5 Sensitive    POCT Glucose    Collection Time: 11/30/24 12:15 PM   Result Value Ref Range    POC Glucose 312 (H) 70 - 99 mg/dL   POCT Glucose    Collection Time: 11/30/24  4:55 PM   Result Value Ref Range    POC Glucose 192 (H) 70 - 99 mg/dL   POCT Glucose    Collection Time: 11/30/24 11:28 PM   Result Value Ref Range    POC Glucose 311 (H) 70 - 99 mg/dL   POCT Glucose    Collection Time: 12/01/24  6:57 AM   Result Value Ref Range    POC Glucose 272 (H) 70 - 99 mg/dL   CBC With Differential With Platelet    Collection Time: 12/01/24  7:22 AM   Result Value Ref Range    WBC 13.8 (H) 4.0 - 11.0 x10(3) uL    RBC 2.36 (L) 3.80 - 5.30 x10(6)uL    HGB 6.0 (LL) 12.0 - 16.0 g/dL    HCT 19.2 (L) 35.0 - 48.0 %    .0 150.0 - 450.0 10(3)uL    MCV 81.4 80.0 - 100.0 fL    MCH 25.4 (L) 26.0 - 34.0 pg    MCHC 31.3 31.0 - 37.0 g/dL    RDW 16.1 %    Neutrophil Absolute Prelim 12.25 (H) 1.50 - 7.70 x10 (3) uL    Neutrophil Absolute 12.25 (H) 1.50 - 7.70 x10(3) uL    Lymphocyte Absolute 0.64 (L) 1.00 - 4.00 x10(3) uL    Monocyte Absolute 0.69 0.10 - 1.00 x10(3) uL    Eosinophil Absolute 0.08 0.00 - 0.70 x10(3) uL    Basophil Absolute 0.03 0.00 - 0.20 x10(3) uL    Immature Granulocyte Absolute 0.11 0.00 - 1.00 x10(3) uL     Neutrophil % 88.8 %    Lymphocyte % 4.6 %    Monocyte % 5.0 %    Eosinophil % 0.6 %    Basophil % 0.2 %    Immature Granulocyte % 0.8 %   Comp Metabolic Panel (14)    Collection Time: 12/01/24  7:22 AM   Result Value Ref Range    Glucose 242 (H) 70 - 99 mg/dL    Sodium 138 136 - 145 mmol/L    Potassium 4.0 3.5 - 5.1 mmol/L    Chloride 110 98 - 112 mmol/L    CO2 25.0 21.0 - 32.0 mmol/L    Anion Gap 3 0 - 18 mmol/L    BUN 12 9 - 23 mg/dL    Creatinine 0.72 0.55 - 1.02 mg/dL    Calcium, Total 8.2 (L) 8.7 - 10.4 mg/dL    Calculated Osmolality 294 275 - 295 mOsm/kg    eGFR-Cr 95 >=60 mL/min/1.73m2    AST 21 <34 U/L    ALT 12 10 - 49 U/L    Alkaline Phosphatase 62 50 - 130 U/L    Bilirubin, Total 0.2 0.2 - 1.1 mg/dL    Total Protein 5.2 (L) 5.7 - 8.2 g/dL    Albumin 2.5 (L) 3.2 - 4.8 g/dL    Globulin  2.7 2.0 - 3.5 g/dL    A/G Ratio 0.9 (L) 1.0 - 2.0   -II    Collection Time: 12/01/24  7:22 AM   Result Value Ref Range    Cancer Ag 125  11.0 <=30.2 U/mL   CA 27.29    Collection Time: 12/01/24  7:22 AM   Result Value Ref Range    Breast Carcinoma Ag [id number] 25.4 <38.0 u/mL   LDH    Collection Time: 12/01/24  7:22 AM   Result Value Ref Range     (H) 120 - 246 U/L   Folic Acid Serum (Folate)    Collection Time: 12/01/24  7:22 AM   Result Value Ref Range    Folate (Folic Acid) 7.8 >5.4 ng/mL   Sed Rate, Westergren (Automated)    Collection Time: 12/01/24  7:22 AM   Result Value Ref Range    Sed Rate 61 (H) 0 - 30 mm/Hr   C-Reactive Protein    Collection Time: 12/01/24  7:22 AM   Result Value Ref Range    C-Reactive Protein 14.10 (H) <=0.50 mg/dL   Vitamin B12 with reflex to MMA    Collection Time: 12/01/24  7:22 AM   Result Value Ref Range    Vitamin B12 555 211 - 911 pg/mL   HOLD MMA    Collection Time: 12/01/24  7:22 AM   Result Value Ref Range    HOLD MMA Auto Resulted    Haptoglobin    Collection Time: 12/01/24  7:22 AM   Result Value Ref Range    Haptoglobin 286.0 (H) 30.0 - 200.0 mg/dL   POCT Glucose     Collection Time: 12/01/24 11:45 AM   Result Value Ref Range    POC Glucose 221 (H) 70 - 99 mg/dL   POCT Glucose    Collection Time: 12/01/24  4:36 PM   Result Value Ref Range    POC Glucose 139 (H) 70 - 99 mg/dL   Hemoglobin    Collection Time: 12/01/24  4:40 PM   Result Value Ref Range    HGB 8.4 (L) 12.0 - 16.0 g/dL   POCT Glucose    Collection Time: 12/01/24  9:02 PM   Result Value Ref Range    POC Glucose 201 (H) 70 - 99 mg/dL   Prepare RBC Once    Collection Time: 12/02/24 12:30 AM   Result Value Ref Range    Blood Product [id number]     Unit Number [id number]     UNIT ABO A     UNIT RH NEG     Product Status Presumed Transfused     Expiration Date [id number]     Blood Type Barcode 0600     Unit Volume 350 ml   CBC With Differential With Platelet    Collection Time: 12/02/24  5:26 AM   Result Value Ref Range    WBC 19.2 (H) 4.0 - 11.0 x10(3) uL    RBC 3.47 (L) 3.80 - 5.30 x10(6)uL    HGB 8.9 (L) 12.0 - 16.0 g/dL    HCT 29.1 (L) 35.0 - 48.0 %    .0 150.0 - 450.0 10(3)uL    MCV 83.9 80.0 - 100.0 fL    MCH 25.6 (L) 26.0 - 34.0 pg    MCHC 30.6 (L) 31.0 - 37.0 g/dL    RDW 16.7 %    Neutrophil Absolute Prelim 16.52 (H) 1.50 - 7.70 x10 (3) uL    Neutrophil Absolute 16.52 (H) 1.50 - 7.70 x10(3) uL    Lymphocyte Absolute 1.22 1.00 - 4.00 x10(3) uL    Monocyte Absolute 1.11 (H) 0.10 - 1.00 x10(3) uL    Eosinophil Absolute 0.08 0.00 - 0.70 x10(3) uL    Basophil Absolute 0.04 0.00 - 0.20 x10(3) uL    Immature Granulocyte Absolute 0.25 0.00 - 1.00 x10(3) uL    Neutrophil % 86.0 %    Lymphocyte % 6.3 %    Monocyte % 5.8 %    Eosinophil % 0.4 %    Basophil % 0.2 %    Immature Granulocyte % 1.3 %   Basic Metabolic Panel (8)    Collection Time: 12/02/24  5:26 AM   Result Value Ref Range    Glucose 70 70 - 99 mg/dL    Sodium 141 136 - 145 mmol/L    Potassium 3.6 3.5 - 5.1 mmol/L    Chloride 111 98 - 112 mmol/L    CO2 20.0 (L) 21.0 - 32.0 mmol/L    Anion Gap 10 0 - 18 mmol/L    BUN <5 (L) 9 - 23 mg/dL    Creatinine  0.66 0.55 - 1.02 mg/dL    Calcium, Total 8.3 (L) 8.7 - 10.4 mg/dL    eGFR-Cr 100 >=60 mL/min/1.73m2   CEA    Collection Time: 12/02/24  5:26 AM   Result Value Ref Range    CEA  <0.5 <=5.0 ng/mL   Cancer Antigen (Ca) 15-3    Collection Time: 12/02/24  5:26 AM   Result Value Ref Range    Cancer Ag 15-3 18.5 <=32.4 U/mL   POCT Glucose    Collection Time: 12/02/24  5:32 AM   Result Value Ref Range    POC Glucose 71 70 - 99 mg/dL   Prothrombin Time (PT)    Collection Time: 12/02/24  5:40 AM   Result Value Ref Range    PT 15.5 (H) 11.6 - 14.8 seconds    INR 1.22 (H) 0.80 - 1.20   PTT, Activated    Collection Time: 12/02/24  5:40 AM   Result Value Ref Range    PTT 26.9 23.0 - 36.0 seconds   POCT Glucose    Collection Time: 12/02/24  8:01 AM   Result Value Ref Range    POC Glucose 111 (H) 70 - 99 mg/dL   POCT Glucose    Collection Time: 12/02/24 10:40 AM   Result Value Ref Range    POC Glucose 178 (H) 70 - 99 mg/dL   Clostridium difficile(toxigenic)PCR    Collection Time: 12/02/24 12:15 PM    Specimen: Stool   Result Value Ref Range    C. Difficile Toxin B Gene Negative Negative   POCT Glucose    Collection Time: 12/02/24 12:33 PM   Result Value Ref Range    POC Glucose 157 (H) 70 - 99 mg/dL   POCT Glucose    Collection Time: 12/02/24  5:23 PM   Result Value Ref Range    POC Glucose 150 (H) 70 - 99 mg/dL   POCT Glucose    Collection Time: 12/02/24  9:55 PM   Result Value Ref Range    POC Glucose 204 (H) 70 - 99 mg/dL   Basic Metabolic Panel (8)    Collection Time: 12/03/24  5:52 AM   Result Value Ref Range    Glucose 134 (H) 70 - 99 mg/dL    Sodium 138 136 - 145 mmol/L    Potassium 3.9 3.5 - 5.1 mmol/L    Chloride 109 98 - 112 mmol/L    CO2 23.0 21.0 - 32.0 mmol/L    Anion Gap 6 0 - 18 mmol/L    BUN 8 (L) 9 - 23 mg/dL    Creatinine 0.66 0.55 - 1.02 mg/dL    Calcium, Total 8.5 (L) 8.7 - 10.4 mg/dL    Calculated Osmolality 286 275 - 295 mOsm/kg    eGFR-Cr 100 >=60 mL/min/1.73m2   POCT Glucose    Collection Time: 12/03/24   5:54 AM   Result Value Ref Range    POC Glucose 136 (H) 70 - 99 mg/dL   CBC With Differential With Platelet    Collection Time: 12/03/24  7:00 AM   Result Value Ref Range    WBC 16.1 (H) 4.0 - 11.0 x10(3) uL    RBC 2.96 (L) 3.80 - 5.30 x10(6)uL    HGB 7.7 (L) 12.0 - 16.0 g/dL    HCT 24.4 (L) 35.0 - 48.0 %    .0 150.0 - 450.0 10(3)uL    MCV 82.4 80.0 - 100.0 fL    MCH 26.0 26.0 - 34.0 pg    MCHC 31.6 31.0 - 37.0 g/dL    RDW 16.7 %    Neutrophil Absolute Prelim 14.52 (H) 1.50 - 7.70 x10 (3) uL    Neutrophil Absolute 14.52 (H) 1.50 - 7.70 x10(3) uL    Lymphocyte Absolute 0.76 (L) 1.00 - 4.00 x10(3) uL    Monocyte Absolute 0.66 0.10 - 1.00 x10(3) uL    Eosinophil Absolute 0.05 0.00 - 0.70 x10(3) uL    Basophil Absolute 0.04 0.00 - 0.20 x10(3) uL    Immature Granulocyte Absolute 0.11 0.00 - 1.00 x10(3) uL    Neutrophil % 90.0 %    Lymphocyte % 4.7 %    Monocyte % 4.1 %    Eosinophil % 0.3 %    Basophil % 0.2 %    Immature Granulocyte % 0.7 %      Impression and Plan  1.   Breast cancer, left sided: Clinical findings are consistent with a primary carcinoma of the left breast. CT imaging is negative for distant metastatic disease. Further staging to evaluate the left axillary lymph nodes, the primary breast mass/chest wall, and bones is recommended. To this end, a PET scan are recommended. PET scan must be performed as an outpatient. MRI of the breasts would be useful to absolutely define chest wall involvement but in discussion with radiology, patient's breast mass is too large for such imaging.           Patient had breast biopsy yesterday.     2.   Infection of left breast mass: Currently on IV antibiotics and ID is managing. Once inpatient care for IV antibiotics are not needed, patient can be discharged. Home health has been arranged for draining wound.    OK for discharge when switched to oral antibiotics. She has PET scan and follow up with me scheduled for next week.     Electronically signed by:    Carl DODD  KAYLA Lopez.  System Medical Director of Oncology Services  [facility]

## 2024-12-03 NOTE — CM/SW NOTE
SW noted that patient is medically cleared for DC today to return home. SW met with patient at bedside to discuss DC planning and HH for home wound care and patient politely declined. Patient reported that she has had HH in the past and did not find them helpful. She performed her own wound care and feels comfortable to continue managing it independently.     SW will continue to follow for plan of care changes and remain available for any additional DC needs or concerns.     Pooja Posada MSW, LSW  Discharge Planner   [phone]

## 2024-12-03 NOTE — PROGRESS NOTES
INFECTIOUS DISEASE  PROGRESS NOTE            [facility] Patient Status:  Inpatient    1965 MRN [id number]   [facility] 4NW-A Attending Hua Huffman MD   Hosp Day # 3 PCP Erendira Caceres DO     Antibiotics: #3  Cefepime, flagyl    Subjective:  : resting    Objective:  Temp:  [97.8 °F (36.6 °C)-98.4 °F (36.9 °C)] 98.4 °F (36.9 °C)  Pulse:  [82-97] 97  Resp:  [16-20] 18  BP: ()/(49-62) 120/62  SpO2:  [98 %-100 %] 100 %    General: awake laert  Vital signs:Temp:  [97.8 °F (36.6 °C)-98.4 °F (36.9 °C)] 98.4 °F (36.9 °C)  Pulse:  [82-97] 97  Resp:  [16-20] 18  BP: ()/(49-62) 120/62  SpO2:  [98 %-100 %] 100 %  HEENT: Moist mucous membranes. Extraocular muscles are intact.  Neck: supple no masses  Respiratory: Non labored, symmetric excursion, normal respirations  Left breast covered  Musculoskeletal: joints: no swelling   Integument: No lesions. No erythema. No open wounds.  Labs:     COVID-19 Lab Results    COVID-19  Lab Results   Component Value Date    COVID19 Not Detected 2024    COVID19 Detected (A) 2020       Pro-Calcitonin  No results for input(s): \"PCT\" in the last 168 hours.    Cardiac  Recent Labs   Lab 24   PBNP 599*       Creatinine Kinase  No results for input(s): \"CK\" in the last 168 hours.    Inflammatory Markers  Recent Labs   Lab 24  0722   CRP  --  14.10*   GALI 221  --    LDH  --  317*   DDIMER 2.27*  --        Recent Labs   Lab 24  0526   RBC 3.47*   HGB 8.9*   HCT 29.1*   MCV 83.9   MCH 25.6*   MCHC 30.6*   RDW 16.7   NEPRELIM 16.52*   WBC 19.2*   .0         Recent Labs   Lab 24  0314 24  0722 24  0526 24  0552   * 242* 70 134*   BUN 19 12 <5* 8*   CREATSERUM 1.17* 0.72 0.66 0.66   CA 9.4 8.2* 8.3* 8.5*   ALB 3.3 2.5*  --   --    * 138 141 138   K 5.3* 4.0 3.6 3.9   CL 96* 110 111 109   CO2 25.0 25.0 20.0* 23.0   ALKPHO 100 62  --   --    AST 43*  21  --   --    ALT 22 12  --   --    BILT 0.4 0.2  --   --    TP 7.5 5.2*  --   --        VANCOMYCIN TROUGH (ug/mL)   Date Value   11/25/2013 16.5     Microbiology    Hospital Encounter on 11/30/24   1. Aerobic Bacterial Culture     Status: Abnormal (Preliminary result)    Collection Time: 11/30/24 12:11 PM    Specimen: Breast, left; Other   Result Value Ref Range    Aerobic Culture Result 2+ growth Enterobacter cloacae (A) N/A    Aerobic Culture Result 2+ growth Staphylococcus lugdunensis (A) N/A    Aerobic Culture Result 4+ growth Corynebacterium species (A) N/A    Aerobic Smear No WBCs seen N/A    Aerobic Smear No organisms seen N/A       Susceptibility    Enterobacter cloacae -  (no method available)     Cefazolin  Resistant      Cefepime <=1 Sensitive      Ceftriaxone <=1 Sensitive      Ciprofloxacin <=0.25 Sensitive      Gentamicin <=1 Sensitive      Meropenem <=0.25 Sensitive      Levofloxacin <=0.12 Sensitive      Piperacillin + Tazobactam <=4 Sensitive      Trimethoprim/Sulfa <=20 Sensitive    2. Blood Culture     Status: None (Preliminary result)    Collection Time: 11/30/24  3:45 AM    Specimen: Blood,peripheral   Result Value Ref Range    Blood Culture Result No Growth 2 Days N/A         Problem list reviewed:  Patient Active Problem List   Diagnosis    Exacerbation of asthma (HCC)    Pure hypercholesterolemia    Goiter, unspecified    DM2 (diabetes mellitus, type 2) (HCC)    Family history of breast cancer    BRCA2 genetic carrier    Neuropathic pain    Left ovarian tumor of borderline malignancy    Cancer of overlapping sites of right breast (HCC)    Drug-induced peripheral neuropathy (HCC)    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)    Community acquired pneumonia    Human metapneumovirus (hMPV) pneumonia    Mild intermittent asthma without complication (HCC)    History of pneumonia    Abnormal MRI, breast    Acquired absence of right breast and nipple    Type 2 diabetes mellitus  without complication, with long-term current use of insulin (HCC)    Bell's palsy    Hyponatremia    Anemia    Hyperglycemia    Community acquired pneumonia, unspecified laterality    Hypoxia    Severe asthma with status asthmaticus, unspecified whether persistent (HCC)    COVID-19    Type 2 diabetes mellitus with diabetic neuropathy (HCC)    Dyspnea, unspecified type    Elevated lactic acid level    Mass of left breast, unspecified quadrant    Dehydration    History of cancer of right breast    Iron deficiency anemia secondary to blood loss (chronic)    Normocytic anemia    Breast mass in female             ASSESSMENT/PLAN:  1. Fungating breast mass with superimposed infection  Wound cultures Enterobacter and staph lug    Oncology following  Biopsy pending  Per oncology home pending path  shanti Hinds MD, MD [facility]  [phone]

## 2024-12-04 ENCOUNTER — PATIENT OUTREACH (OUTPATIENT)
Dept: CASE MANAGEMENT | Age: 59
End: 2024-12-04

## 2024-12-04 ENCOUNTER — TELEPHONE (OUTPATIENT)
Dept: FAMILY MEDICINE CLINIC | Facility: CLINIC | Age: 59
End: 2024-12-04

## 2024-12-04 DIAGNOSIS — R06.00 DYSPNEA, UNSPECIFIED TYPE: Primary | ICD-10-CM

## 2024-12-04 DIAGNOSIS — Z02.9 ENCOUNTERS FOR ADMINISTRATIVE PURPOSES: ICD-10-CM

## 2024-12-04 PROBLEM — C50.812 MALIGNANT NEOPLASM OF OVERLAPPING SITES OF LEFT BREAST IN FEMALE, ESTROGEN RECEPTOR NEGATIVE (HCC): Status: ACTIVE | Noted: 2024-12-04

## 2024-12-04 PROBLEM — Z17.1 MALIGNANT NEOPLASM OF OVERLAPPING SITES OF LEFT BREAST IN FEMALE, ESTROGEN RECEPTOR NEGATIVE (HCC): Status: ACTIVE | Noted: 2024-12-04

## 2024-12-04 NOTE — PROGRESS NOTES
Transitional Care Management   Discharge Date: 12/3/24  Contact Date: 2024    Assessment:  TCM Initial Assessment    General:  Assessment completed with: Patient  Patient Subjective: I have a lot of tests and follow up with the oncologist next week. I'm feeling pretty good. The pain is okay. The tramadol seems to be helping. I would say the pain is about 3/10. The wound was changed yesterday so right now I don't have any drainage coming from the dressing so that's good.  Chief Complaint: Dyspnea  Verify patient name and  with patient/ caregiver: Yes    Hospital Stay/Discharge:  Tell me what you understand of why you were in the hospital or emergency department: left breast pain  Prior to leaving the hospital were your Discharge Instructions reviewed with you?: Yes  Did you receive a copy of your written Discharge Instructions?: Yes  What questions do you have about your Discharge Instructions?: none  Do you feel better or worse since you left the hospital or emergency department?: Better    Follow - Up Appointment:  Do you have a follow-up appointment?: Yes  Date: 12/10/24  Physician: Oncology  Are there any barriers to getting to your follow-up appointment?: No    Home Health/DME:  Prior to leaving the hospital was Home Health (HH) arranged for you?: No     Prior to leaving the hospital or emergency department was Durable Medical Equipment (DME), medical supplies, or infusions arranged for you?: No  Are DME/medical supply/infusions needs identified by staff during this assessment?: No     Medications/Diet:  Did any of your medications change, during or after your hospital stay or ED visit?: Yes  Do you have your new or updated medications?: Yes  Do you understand what your medications are for and possible side effects?: Yes  Are there any reasons that keep you from taking your medication as prescribed?: No  Any concerns about medication refills?: No    Were you given a different diet per your Discharge  Instructions?: No     Questions/Concerns:  Do you have any questions or concerns that have not been discussed?: No           Nursing Interventions: NCM s/w patient for HFU. She states that she is feeling pretty good. She states that the pain is at about a 3/10 after taking Tramadol about an hour ago. With regards to the left breast wound, she states that the dressing was changed yesterday and she has seen no drainage coming from the dressing. Her blood sugar this morning was 100. She denied having any fever, n/v/c/d, sob, lightheadedness, HA or any new or worsening symptoms. Med review completed. She denied having any questions or concerns at this time.     Medication Review:   Current Outpatient Medications   Medication Sig Dispense Refill    traMADol 50 MG Oral Tab Take 1 tablet (50 mg total) by mouth every 6 (six) hours as needed. 20 tablet 0    metroNIDAZOLE 500 MG Oral Tab Take 1 tablet (500 mg total) by mouth every 12 (twelve) hours for 7 days. 14 tablet 0    levoFLOXacin 750 MG Oral Tab Take 1 tablet (750 mg total) by mouth daily for 7 days. 7 tablet 0    gabapentin 600 MG Oral Tab TAKE 1 TABLET BY MOUTH THREE TIMES DAILY; TAKE AN EXTRA 600MG AS NEEDED FOR SEVERE PAIN 270 tablet 1    Insulin Glargine-yfgn 100 UNIT/ML Subcutaneous Solution Pen-injector Inject 20 Units into the skin nightly. 24 mL 0    fluticasone-salmeterol (WIXELA INHUB) 100-50 MCG/ACT Inhalation Aerosol Powder, Breath Activated Inhale 1 puff into the lungs 2 (two) times daily. 3 each 0    Insulin Lispro, 1 Unit Dial, 100 UNIT/ML Subcutaneous Solution Pen-injector Inject 10 Units into the skin in the morning, at noon, and at bedtime. (Patient not taking: Reported on 11/30/2024) 3 mL 0    Budesonide-Formoterol Fumarate 160-4.5 MCG/ACT Inhalation Aerosol Inhale 2 puffs into the lungs 2 (two) times daily. (Patient taking differently: Inhale 2 puffs into the lungs 2 (two) times daily. Pt states she would like to go back to budesonide) 3 each 1     albuterol (2.5 MG/3ML) 0.083% Inhalation Nebu Soln Take 3 mL (2.5 mg total) by nebulization every 4 (four) hours as needed for Wheezing or Shortness of Breath. 90 mL 0    Insulin Pen Needle (BD PEN NEEDLE DANIELA U/F) 32G X 4 MM Does not apply Misc Up to  each 2    montelukast 10 MG Oral Tab Take 1 tablet (10 mg total) by mouth nightly. 90 tablet 1     Did patient review medications using current pill bottles and not just a medication list?  No  Discharge medications reviewed/discussed/and reconciled against outpatient medications with patient.  Any changes or updates to medications sent to primary care provider.  Patient Acknowledged    SDOH:   Transportation Needs: No Transportation Needs (11/30/2024)    Transportation Needs     Lack of Transportation: No     Car Seat: Not on file     Financial Resource Strain: Low Risk  (12/4/2024)    Financial Resource Strain     Difficulty of Paying Living Expenses: Not hard at all     Med Affordability: Not on file     Follow-up Appointments:  Your appointments       Date & Time Appointment Department (Center)    Dec 09, 2024 12:45 PM CST PET WHOLE BODY SCAN TO THIGH DOSE with EH PET DOSE RM1 [facility] PET ([facility])    Please arrive 15 minutes early for this appointment.     BEFORE YOUR EXAM  -No strenuous activity for 48 hours before your exam.  -After 5:00 PM the night before your test, avoid carbohydrates (no candy, gum, mints, ice cream, cake, sweets, or soda pop).  -Do not eat any food for 6 hours before your appointment time; please only drink plain water.    DIABETIC PATIENTS ONLY  -If you are taking Metformin, please withhold for at least 48 hours before your exam.  -Do not take any insulin within 4 hours of your appointment.  If you are taking long-acting insulin, take only half the normal dose at your normal time.   -Please avoid carbohydrates the entire day before the scan.  -Do not take oral diabetes medication 4 hours prior to  your test.     ON THE DAY OF YOUR EXAM  -Dress warmly and comfortably.  No metal in your clothes, such as snaps or zippers, and please leave valuables/jewelry at home.  -For this test, you will receive an injection, rest for 30-90 minutes and take a 15-50 minute scan.  -There are no side effects to the injection.        Dec 09, 2024 2:00 PM CST PET WHOLE BODY SCAN TO THIGH with  PET SCANNER [facility] PET ([facility])    Please arrive 15 minutes early for this appointment.     BEFORE YOUR EXAM  -No strenuous activity for 48 hours before your exam.  -After 5:00 PM the night before your test, avoid carbohydrates (no candy, gum, mints, ice cream, cake, sweets, or soda pop).  -Do not eat any food for 6 hours before your appointment time; please only drink plain water.    DIABETIC PATIENTS ONLY  -If you are taking Metformin, please withhold for at least 48 hours before your exam.  -Do not take any insulin within 4 hours of your appointment.  If you are taking long-acting insulin, take only half the normal dose at your normal time.   -Please avoid carbohydrates the entire day before the scan.  -Do not take oral diabetes medication 4 hours prior to your test.     ON THE DAY OF YOUR EXAM  -Dress warmly and comfortably.  No metal in your clothes, such as snaps or zippers, and please leave valuables/jewelry at home.  -For this test, you will receive an injection, rest for 30-90 minutes and take a 15-50 minute scan.  -There are no side effects to the injection.        Dec 10, 2024 12:15 PM CST HEMATOLOGY ONCOLOGY FOLLOW UP with Carl Lopez MD [facility] in [place] ([facility])              [facility] in [facility]  [address]  [facility] [address]  [phone] [facility] PET  [facility]  [address]  [facility]  [address]  [phone]            Transitional [facility] Clinic  Was TCC Ordered: No    Primary Care Provider (If no TCC appointment)  Does patient already have a PCP appointment scheduled? No  Nurse Care Manager Attempted to schedule PCP office TCM appointment with patient   -If no appointment scheduled: Explain -pt declined at this time. NCM sent TE to PCP.     Specialist  Does the patient have any other follow-up appointment(s) that need to be scheduled? Yes   -If yes: Nurse Care Manager reviewed upcoming specialist appointments with patient: Yes   -Does the patient need assistance scheduling appointment(s): No, pt seeing oncology next week.     CCM referral placed:  No    Book By Date: 12/17/24

## 2024-12-04 NOTE — TELEPHONE ENCOUNTER
CHRISTOPHER, Spoke to patient for TCM today.  Patient states that she will follow up with oncology first and will schedule with PCP at a later time.  TCM appointment recommended by 12/10/2024 as patient is a High risk for readmission.      BOOK BY DATE (last date for TCM): 12/17/24

## 2024-12-04 NOTE — PLAN OF CARE
Patient alert oriented times four, on room air. Patient given prn toradol for left breast pain, and obtained relief but didn't give relief score. Dressing changed to left breast foul smell, cleansed with saline, applied silver alginate, and applied multiple mepilex. Patient sat up in chair during shift. Patient discharged and discharge nurse gave instrutcions to patient and denied having any questions.Left per wheelchair accompanied by daughter., Fall precations maintained.See discharge record.

## 2024-12-05 NOTE — PAYOR COMM NOTE
Discharge Notification    Patient Name: ETHAN VELOZ  Payor: [employer]  Subscriber #: [id number]  Authorization Number: [id number]  Admit Date/Time: 11/30/2024 2:11 AM  Discharge Date/Time: 12/3/2024 7:01 PM

## 2024-12-09 ENCOUNTER — HOSPITAL ENCOUNTER (OUTPATIENT)
Dept: NUCLEAR MEDICINE | Facility: HOSPITAL | Age: 59
Discharge: HOME | End: 2024-12-09
Attending: SPECIALIST
Payer: COMMERCIAL

## 2024-12-09 DIAGNOSIS — C50.912 BREAST CANCER METASTASIZED TO AXILLARY LYMPH NODE, LEFT (HCC): ICD-10-CM

## 2024-12-09 DIAGNOSIS — C77.3 BREAST CANCER METASTASIZED TO AXILLARY LYMPH NODE, LEFT (HCC): ICD-10-CM

## 2024-12-09 LAB — GLUCOSE BLD-MCNC: 95 MG/DL (ref 70–99)

## 2024-12-09 PROCEDURE — 78815 PET IMAGE W/CT SKULL-THIGH: CPT | Performed by: SPECIALIST

## 2024-12-09 PROCEDURE — 82962 GLUCOSE BLOOD TEST: CPT

## 2024-12-10 ENCOUNTER — OFFICE VISIT (OUTPATIENT)
Dept: HEMATOLOGY/ONCOLOGY | Facility: HOSPITAL | Age: 59
End: 2024-12-10
Attending: RADIOLOGY
Payer: COMMERCIAL

## 2024-12-10 VITALS
SYSTOLIC BLOOD PRESSURE: 168 MMHG | OXYGEN SATURATION: 95 % | BODY MASS INDEX: 26.58 KG/M2 | HEART RATE: 120 BPM | TEMPERATURE: 98 F | WEIGHT: 150 LBS | DIASTOLIC BLOOD PRESSURE: 95 MMHG | HEIGHT: 62.99 IN | RESPIRATION RATE: 16 BRPM

## 2024-12-10 DIAGNOSIS — C77.3 SECONDARY MALIGNANT NEOPLASM OF AXILLARY LYMPH NODES (HCC): ICD-10-CM

## 2024-12-10 DIAGNOSIS — C50.812 MALIGNANT NEOPLASM OF OVERLAPPING SITES OF LEFT BREAST IN FEMALE, ESTROGEN RECEPTOR NEGATIVE (HCC): Primary | ICD-10-CM

## 2024-12-10 DIAGNOSIS — Z17.1 MALIGNANT NEOPLASM OF OVERLAPPING SITES OF LEFT BREAST IN FEMALE, ESTROGEN RECEPTOR NEGATIVE (HCC): Primary | ICD-10-CM

## 2024-12-10 LAB
ALBUMIN SERPL-MCNC: 3.2 G/DL (ref 3.2–4.8)
ALBUMIN/GLOB SERPL: 0.9 {RATIO} (ref 1–2)
ALP LIVER SERPL-CCNC: 95 U/L
ALT SERPL-CCNC: 10 U/L
ANION GAP SERPL CALC-SCNC: 9 MMOL/L (ref 0–18)
ANTIBODY SCREEN: NEGATIVE
AST SERPL-CCNC: 29 U/L (ref ?–34)
BASOPHILS # BLD AUTO: 0.02 X10(3) UL (ref 0–0.2)
BASOPHILS NFR BLD AUTO: 0.2 %
BILIRUB SERPL-MCNC: 0.5 MG/DL (ref 0.2–1.1)
BUN BLD-MCNC: 6 MG/DL (ref 9–23)
CALCIUM BLD-MCNC: 9.1 MG/DL (ref 8.7–10.4)
CHLORIDE SERPL-SCNC: 100 MMOL/L (ref 98–112)
CO2 SERPL-SCNC: 26 MMOL/L (ref 21–32)
CREAT BLD-MCNC: 0.66 MG/DL
EGFRCR SERPLBLD CKD-EPI 2021: 100 ML/MIN/1.73M2 (ref 60–?)
EOSINOPHIL # BLD AUTO: 0.01 X10(3) UL (ref 0–0.7)
EOSINOPHIL NFR BLD AUTO: 0.1 %
ERYTHROCYTE [DISTWIDTH] IN BLOOD BY AUTOMATED COUNT: 18.7 %
GLOBULIN PLAS-MCNC: 3.7 G/DL (ref 2–3.5)
GLUCOSE BLD-MCNC: 123 MG/DL (ref 70–99)
HCT VFR BLD AUTO: 28.5 %
HGB BLD-MCNC: 9.1 G/DL
IMM GRANULOCYTES # BLD AUTO: 0.06 X10(3) UL (ref 0–1)
IMM GRANULOCYTES NFR BLD: 0.5 %
LYMPHOCYTES # BLD AUTO: 0.36 X10(3) UL (ref 1–4)
LYMPHOCYTES NFR BLD AUTO: 2.7 %
MCH RBC QN AUTO: 26.2 PG (ref 26–34)
MCHC RBC AUTO-ENTMCNC: 31.9 G/DL (ref 31–37)
MCV RBC AUTO: 82.1 FL
MONOCYTES # BLD AUTO: 0.74 X10(3) UL (ref 0.1–1)
MONOCYTES NFR BLD AUTO: 5.6 %
NEUTROPHILS # BLD AUTO: 12.03 X10 (3) UL (ref 1.5–7.7)
NEUTROPHILS # BLD AUTO: 12.03 X10(3) UL (ref 1.5–7.7)
NEUTROPHILS NFR BLD AUTO: 90.9 %
OSMOLALITY SERPL CALC.SUM OF ELEC: 279 MOSM/KG (ref 275–295)
PLATELET # BLD AUTO: 423 10(3)UL (ref 150–450)
POTASSIUM SERPL-SCNC: 4.1 MMOL/L (ref 3.5–5.1)
PROT SERPL-MCNC: 6.9 G/DL (ref 5.7–8.2)
RBC # BLD AUTO: 3.47 X10(6)UL
RH BLOOD TYPE: POSITIVE
SODIUM SERPL-SCNC: 135 MMOL/L (ref 136–145)
WBC # BLD AUTO: 13.2 X10(3) UL (ref 4–11)

## 2024-12-10 PROCEDURE — 99215 OFFICE O/P EST HI 40 MIN: CPT | Performed by: SPECIALIST

## 2024-12-10 PROCEDURE — G2211 COMPLEX E/M VISIT ADD ON: HCPCS | Performed by: SPECIALIST

## 2024-12-10 NOTE — PROGRESS NOTES
Patient to start TCHP this week. PICC placement scheduled prior to chemo appt and education on 12/12.

## 2024-12-10 NOTE — PROGRESS NOTES
Education Record    Learner:  Patient    Disease / Diagnosis: Breast CA    Barriers / Limitations:  None   Comments:    Method:  Discussion   Comments:    General Topics:  Side effects and symptom management and Plan of care reviewed   Comments:    Outcome:  Shows understanding   Comments:    Here for post hospital follow up. Pain in her breast. She is changing her dressing at home. Low appetite which she says is mildly improving. Low energy. PET scan completed 12/10.

## 2024-12-11 RX ORDER — DIPHENHYDRAMINE HCL 25 MG
25 CAPSULE ORAL ONCE
Status: CANCELLED | OUTPATIENT
Start: 2024-12-12

## 2024-12-11 RX ORDER — ACETAMINOPHEN 325 MG/1
650 TABLET ORAL ONCE
Status: CANCELLED | OUTPATIENT
Start: 2024-12-12

## 2024-12-12 ENCOUNTER — OFFICE VISIT (OUTPATIENT)
Dept: HEMATOLOGY/ONCOLOGY | Facility: HOSPITAL | Age: 59
End: 2024-12-12
Attending: RADIOLOGY
Payer: COMMERCIAL

## 2024-12-12 ENCOUNTER — HOSPITAL ENCOUNTER (OUTPATIENT)
Facility: HOSPITAL | Age: 59
Discharge: HOME | End: 2024-12-12
Attending: SPECIALIST
Payer: COMMERCIAL

## 2024-12-12 VITALS
DIASTOLIC BLOOD PRESSURE: 73 MMHG | HEART RATE: 120 BPM | TEMPERATURE: 98 F | BODY MASS INDEX: 27.06 KG/M2 | WEIGHT: 150.81 LBS | SYSTOLIC BLOOD PRESSURE: 122 MMHG | HEIGHT: 62.44 IN | RESPIRATION RATE: 18 BRPM | OXYGEN SATURATION: 95 %

## 2024-12-12 DIAGNOSIS — Z17.1 MALIGNANT NEOPLASM OF OVERLAPPING SITES OF LEFT BREAST IN FEMALE, ESTROGEN RECEPTOR NEGATIVE (HCC): Primary | ICD-10-CM

## 2024-12-12 DIAGNOSIS — C50.812 MALIGNANT NEOPLASM OF OVERLAPPING SITES OF LEFT BREAST IN FEMALE, ESTROGEN RECEPTOR NEGATIVE (HCC): Primary | ICD-10-CM

## 2024-12-12 DIAGNOSIS — Z71.9 ENCOUNTER FOR HEALTH EDUCATION: ICD-10-CM

## 2024-12-12 DIAGNOSIS — C77.3 SECONDARY MALIGNANT NEOPLASM OF AXILLARY LYMPH NODES (HCC): Primary | ICD-10-CM

## 2024-12-12 DIAGNOSIS — C50.812 MALIGNANT NEOPLASM OF OVERLAPPING SITES OF LEFT BREAST IN FEMALE, ESTROGEN RECEPTOR NEGATIVE (HCC): ICD-10-CM

## 2024-12-12 DIAGNOSIS — Z17.1 MALIGNANT NEOPLASM OF OVERLAPPING SITES OF LEFT BREAST IN FEMALE, ESTROGEN RECEPTOR NEGATIVE (HCC): ICD-10-CM

## 2024-12-12 DIAGNOSIS — C77.3 SECONDARY MALIGNANT NEOPLASM OF AXILLARY LYMPH NODES (HCC): ICD-10-CM

## 2024-12-12 PROCEDURE — G2211 COMPLEX E/M VISIT ADD ON: HCPCS | Performed by: NURSE PRACTITIONER

## 2024-12-12 PROCEDURE — 36569 INSJ PICC 5 YR+ W/O IMAGING: CPT

## 2024-12-12 PROCEDURE — 99417 PROLNG OP E/M EACH 15 MIN: CPT | Performed by: NURSE PRACTITIONER

## 2024-12-12 PROCEDURE — 96417 CHEMO IV INFUS EACH ADDL SEQ: CPT

## 2024-12-12 PROCEDURE — 99215 OFFICE O/P EST HI 40 MIN: CPT | Performed by: NURSE PRACTITIONER

## 2024-12-12 PROCEDURE — 96375 TX/PRO/DX INJ NEW DRUG ADDON: CPT

## 2024-12-12 PROCEDURE — 96413 CHEMO IV INFUSION 1 HR: CPT

## 2024-12-12 RX ORDER — PROCHLORPERAZINE MALEATE 10 MG
10 TABLET ORAL EVERY 6 HOURS PRN
Qty: 30 TABLET | Refills: 3 | Status: SHIPPED | OUTPATIENT
Start: 2024-12-12

## 2024-12-12 RX ORDER — ONDANSETRON 8 MG/1
8 TABLET, FILM COATED ORAL EVERY 8 HOURS PRN
Qty: 30 TABLET | Refills: 3 | Status: SHIPPED | OUTPATIENT
Start: 2024-12-12

## 2024-12-12 RX ORDER — PALONOSETRON 0.05 MG/ML
0.25 INJECTION, SOLUTION INTRAVENOUS ONCE
Status: COMPLETED | OUTPATIENT
Start: 2024-12-12 | End: 2024-12-12

## 2024-12-12 RX ORDER — PALONOSETRON 0.05 MG/ML
0.25 INJECTION, SOLUTION INTRAVENOUS ONCE
Status: CANCELLED
Start: 2024-12-12

## 2024-12-12 RX ORDER — LIDOCAINE HYDROCHLORIDE 10 MG/ML
5 INJECTION, SOLUTION EPIDURAL; INFILTRATION; INTRACAUDAL; PERINEURAL
Status: DISCONTINUED | OUTPATIENT
Start: 2024-12-12 | End: 2024-12-13

## 2024-12-12 RX ORDER — DIPHENHYDRAMINE HCL 25 MG
25 CAPSULE ORAL ONCE
Status: COMPLETED | OUTPATIENT
Start: 2024-12-12 | End: 2024-12-12

## 2024-12-12 RX ORDER — ACETAMINOPHEN 325 MG/1
650 TABLET ORAL ONCE
Status: COMPLETED | OUTPATIENT
Start: 2024-12-12 | End: 2024-12-12

## 2024-12-12 RX ADMIN — PALONOSETRON 0.25 MG: 0.05 INJECTION, SOLUTION INTRAVENOUS at 11:15:00

## 2024-12-12 RX ADMIN — ACETAMINOPHEN 650 MG: 325 TABLET ORAL at 11:14:00

## 2024-12-12 RX ADMIN — DIPHENHYDRAMINE HCL 25 MG: 25 MG CAPSULE ORAL at 11:14:00

## 2024-12-12 NOTE — PROGRESS NOTES
Pt here for C1D1 Drug(s) TCHP (Taxotere, Carbo, Trastuzumab, Pertuzumab).  Arrives Ambulating independently, accompanied by Family member     Patient was evaluated today by ARMANDO and Treatment Nurse.    Oral medications included in this regimen:  no    Patient confirms comprehension of cancer treatment schedule:  yes    Pregnancy screening:  Not applicable    Modifications in dose or schedule:  No    Medications appearance and physical integrity checked by RN: yes.    Chemotherapy IV pump settings verified by 2 RNs:  Yes.  Frequency of blood return and site check throughout administration: Prior to administration, Prior to each drug, and At completion of therapy     Infusion/treatment outcome:  patient tolerated treatment without incident    Education Record    Learner:  Patient and Family Member  Barriers / Limitations:  None  Method:  Discussion and Printed material  Education / instructions given:  plan of care, next appts, nausea and diarrhea management  Outcome:  Shows understanding    Discharged Home, Ambulating independently, accompanied by:Family member    Patient/family verbalized understanding of future appointments: by printed AVS

## 2024-12-12 NOTE — PROGRESS NOTES
[facility] Progress Note       Patient Name: Swathi Arguelles   YOB: 1965  Medical Record Number: [id number]  Attending Physician: Carl Lopez M.D.     The 21st Century Cures Act makes medical notes like these available to patients in the interest of transparency. Please be advised this is a medical document. Medical documents are intended to carry relevant information, facts as evident, and the clinical opinion of the practitioner. The medical note is intended as peer to peer communication and may appear blunt or direct. It is written in medical language and may contain abbreviations or verbiage that are unfamiliar.      Date of Visit: 12/10/2024       Chief Complaint  Invasive ductal carcinoma, left breast - follow up.     Oncologic History  Swathi Arguelles is a 59 year old post-menopausal female admitted to the hospital on 09/10/2013 with symptomatic anemia.  On physical examination revealed a malodorous, draining ulcerating right breast mass.  Upon questioning, she admitted that she first noticed the mass in approximately 01/2013.  She states that her only mammogram was approximately at age 43 years.  Biopsy showed grade 3 infiltrating ductal carcinoma.  The tumor was estrogen receptor positive, progesterone receptor negative, and Her2/alberto negative.  CT scans of the chest, abdomen, pelvis showed multiple right axillary lymph nodes, two micronodules in the lungs of unknown significance, and a left sided large cystic adnexal mass.  At initial diagnosis CA 15-3 was 48.3 U/mL,  CA 27.29 was 91.7 U/mL, and  was 171.5 U/mL.  PET/CT showed abnormal FDG uptake in the ulcerating mass of the right breast/chest wall and in retropectoral and subclavicular lymph nodes.  There was no uptake in the cystic pelvic mass or in 3 subcentimeter pulmonary nodules.  Pelvic ultrasound showed a complex multiloculated left adnexal cyst with no separate identifiable ovarian tissue.   Noted was irregular thickening of one of the cyst walls that was worrisome for a cystic ovarian neoplasm.    Patient was premenopausal at diagnosis    On 10/04/2013 she began dose dense doxorubicin and cyclophosphamide. Treatment was complicated by neutropenic fever, anemia requiring transfusion, dehydration, and prolonged thrombocytopenia. CT scans after cycle 4 showed a marked improvement in the breast/chest wall mass and axillary adenopathy. Tumor markers markedly improved. She then received weekly paclitaxel. Treatment was complicated by peripheral neuropathy and neutropenia.     During paclitaxel therapy, she consented to BRCA1/2 testing.  Results obtained on 02/11/2014 revealed the deleterious BRCA2 mutation [id number].    On 03/18/2014 she underwent right mastectomy with axillary node dissection.  Pathology showed 1.4 cm grade 3 invasive ductal carcinoma with 12/12 lymph nodes negative for malignancy.  She also underwent diagnostic laparoscopy which showed the cystic left ovarian mass but no evidence of metastatic disease.    On 05/13/2014 she underwent bilateral salpingo-oophorectomy, bilateral pelvic lymphadenectomy, limited periaortic lymphadenectomy, omentectomy, peritoneal washings, and appendectomy.  Pathology, reviewed at the [facility], showed serous borderline tumor, typical type.  All lymph nodes and pelvic washings were negative for malignancy.    In 07/2014 she began adjuvant right chest wall/axilla radiation.    She began endocrine therapy with anastrazole in mid 08/2014.     Patient was last seen on 02/03/2017. On that day, patient was advised to continue anastrozole, continue increased surveillance with alternating mammography and breast MRI, and return for follow up in 05/2017. On that day, she expressed an openness to prophylactic left mastectomy if she was a candidate for breast reconstruction. Patient failed to return for follow up in 05/2017 as recommended. She also failed to  return a call from the breast navigator to arrange evaluation by plastic surgery. She did have a left sided mammogram as previously ordered by me in 06/2017. Patient was on anastrozole at least through 02/2017 but it is not clear when she discontinued therapy.    Patient next presented to the ED on 11/30/2024 with complaints of generalized weakness, poor appetite, and dyspnea with exertion. Laboratory studies showed anemia, hyponatremia, and hypochlorhydria. CTA chest was negative for pulmonary embolism or metastatic disease but did show a 22.3 x 14.6 x 16.0 cm mass arising from the left breast with areas of necrosis, possible extension into the intracostal musculature, and mildly enlarged left axillary lymph nodes. Visualization of left breast showed a large firm breast which an open area laterally with malodorous drainage. CT abdomen/pelvis w contrast on 12/01/2024 was negative for metastatic disease.     Patient reports that she first noticed a \"rash\" 1.5 months prior to presentation. She stated that 3 months prior to presentation, she did not notice any changes in the left breast. Notably, patient's last breast imaging prior to hospitalization was in 06/2017.    Biopsy of the left breast was performed on 12/02/2024. 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 Immunohistochemical studies were as follows: estrogen receptor 0%, progesterone receptor 0%, Her2 3+, Ki67 25%.     PET/CT scan on 02/09/2024 showed abnormal SUV uptake in the left breast mass peripherally, multiple left axillary lymph nodes, subpectoral lymph nodes, a subcentimeter mediastinal lymph node.     History of Present Illness  Patient returns for scheduled follow up. She remains on antibiotic therapy. She reports minimal discomfort in the left breast. She complains of fatigue.     Performance Status   Karnofsky 90% - Normal, only minor signs/symptoms.      Past Medical History (historical data, reviewed by  physician)   Invasive ductal carcinoma, left breast (as above); invasive ductal carcinoma, right breast (as above); diabetes mellitus, asthma.     Past Surgical History (historical data, reviewed by physician)   Right mastectomy and axillary node dissection; D&C.     Family History (historical data, reviewed by physician)   A three generation pedigree was obtained. Ms. Arguelles’s father  at age 76 from an unknown cancer. He had one brother and no sisters. Ms. Arguelles’s paternal grandmother  in her late 40s or early 50s from unknown causes. Ms. Arguelles’s mother is 76 years-old and has no history of cancer. Ms. Arguelles’s maternal grandmother  at age 59 from a myocardial infarction. Ms. Arguelles’s maternal grandmother’s sister had breast cancer in her 40s and had a mastectomy. There is no other known family history of cancer. Please see the pedigree for additional family history information.     Social History (historical data, reviewed by physician)   Denies tobacco, alcohol, illicit drug use.    Current Medications    traMADol 50 MG Oral Tab Take 1 tablet (50 mg total) by mouth every 6 (six) hours as needed. 20 tablet 0    [] metroNIDAZOLE 500 MG Oral Tab Take 1 tablet (500 mg total) by mouth every 12 (twelve) hours for 7 days. 14 tablet 0    [] levoFLOXacin 750 MG Oral Tab Take 1 tablet (750 mg total) by mouth daily for 7 days. 7 tablet 0    gabapentin 600 MG Oral Tab TAKE 1 TABLET BY MOUTH THREE TIMES DAILY; TAKE AN EXTRA 600MG AS NEEDED FOR SEVERE PAIN 270 tablet 1    Insulin Glargine-yfgn 100 UNIT/ML Subcutaneous Solution Pen-injector Inject 20 Units into the skin nightly. 24 mL 0    fluticasone-salmeterol (WIXELA INHUB) 100-50 MCG/ACT Inhalation Aerosol Powder, Breath Activated Inhale 1 puff into the lungs 2 (two) times daily. 3 each 0    Insulin Lispro, 1 Unit Dial, 100 UNIT/ML Subcutaneous Solution Pen-injector Inject 10 Units into the skin in the morning, at noon, and at bedtime. 3 mL  0    Budesonide-Formoterol Fumarate 160-4.5 MCG/ACT Inhalation Aerosol Inhale 2 puffs into the lungs 2 (two) times daily. (Patient taking differently: Inhale 2 puffs into the lungs 2 (two) times daily. Pt states she would like to go back to budesonide) 3 each 1    albuterol (2.5 MG/3ML) 0.083% Inhalation Nebu Soln Take 3 mL (2.5 mg total) by nebulization every 4 (four) hours as needed for Wheezing or Shortness of Breath. 90 mL 0    Insulin Pen Needle (BD PEN NEEDLE DANIELA U/F) 32G X 4 MM Does not apply Misc Up to  each 2    montelukast 10 MG Oral Tab Take 1 tablet (10 mg total) by mouth nightly. 90 tablet 1      Allergies   Ms. Arguelles is allergic to fish, fish oil, levemir, and seasonal.    Vital Signs   BP (!) 168/95 (BP Location: Left arm, Patient Position: Sitting, Cuff Size: adult)   Pulse 120   Temp 97.5 °F (36.4 °C) (Temporal)   Resp 16   Ht 1.6 m (5' 2.99\")   Wt 68 kg (150 lb)   LMP 09/01/2013 (LMP Unknown)   SpO2 95%   BMI 26.58 kg/m²     Physical Examination  Constitutional  Well developed and well nourished; in no apparent distress.  Head   Normocephalic and atraumatic.  Eyes   Conjunctiva clear; sclera anicteric.  ENMT   External nose normal; external ears normal.   Breasts   Right mastectomy without reconstruction. Left breast with large breast mass.   Respiratory  Normal effort; no respiratory distress; clear to auscultation bilaterally.  Cardiovascular  Regular rate and rhythm; normal S1S2.  Abdomen  Not distended.   Neurologic  Motor and sensory grossly intact.  Psychiatric  Mood and affect appropriate.    Laboratory  Recent Results (from the past 72 hours)   POCT Glucose    Collection Time: 12/09/24 12:35 PM   Result Value Ref Range    POC Glucose 95 70 - 99 mg/dL   CBC W/DIFF [E]    Collection Time: 12/10/24  1:30 PM   Result Value Ref Range    WBC 13.2 (H) 4.0 - 11.0 x10(3) uL    RBC 3.47 (L) 3.80 - 5.30 x10(6)uL    HGB 9.1 (L) 12.0 - 16.0 g/dL    HCT 28.5 (L) 35.0 - 48.0 %    PLT  423.0 150.0 - 450.0 10(3)uL    MCV 82.1 80.0 - 100.0 fL    MCH 26.2 26.0 - 34.0 pg    MCHC 31.9 31.0 - 37.0 g/dL    RDW 18.7 %    Neutrophil Absolute Prelim 12.03 (H) 1.50 - 7.70 x10 (3) uL    Neutrophil Absolute 12.03 (H) 1.50 - 7.70 x10(3) uL    Lymphocyte Absolute 0.36 (L) 1.00 - 4.00 x10(3) uL    Monocyte Absolute 0.74 0.10 - 1.00 x10(3) uL    Eosinophil Absolute 0.01 0.00 - 0.70 x10(3) uL    Basophil Absolute 0.02 0.00 - 0.20 x10(3) uL    Immature Granulocyte Absolute 0.06 0.00 - 1.00 x10(3) uL    Neutrophil % 90.9 %    Lymphocyte % 2.7 %    Monocyte % 5.6 %    Eosinophil % 0.1 %    Basophil % 0.2 %    Immature Granulocyte % 0.5 %   COMP METABOLIC PANEL [E]    Collection Time: 12/10/24  1:30 PM   Result Value Ref Range    Glucose 123 (H) 70 - 99 mg/dL    Sodium 135 (L) 136 - 145 mmol/L    Potassium 4.1 3.5 - 5.1 mmol/L    Chloride 100 98 - 112 mmol/L    CO2 26.0 21.0 - 32.0 mmol/L    Anion Gap 9 0 - 18 mmol/L    BUN 6 (L) 9 - 23 mg/dL    Creatinine 0.66 0.55 - 1.02 mg/dL    Calcium, Total 9.1 8.7 - 10.4 mg/dL    Calculated Osmolality 279 275 - 295 mOsm/kg    eGFR-Cr 100 >=60 mL/min/1.73m2    AST 29 <34 U/L    ALT 10 10 - 49 U/L    Alkaline Phosphatase 95 50 - 130 U/L    Bilirubin, Total 0.5 0.2 - 1.1 mg/dL    Total Protein 6.9 5.7 - 8.2 g/dL    Albumin 3.2 3.2 - 4.8 g/dL    Globulin  3.7 (H) 2.0 - 3.5 g/dL    A/G Ratio 0.9 (L) 1.0 - 2.0    Patient Fasting for CMP? Patient not present    ABORH (Blood Type)    Collection Time: 12/10/24  1:30 PM   Result Value Ref Range    ABO BLOOD TYPE A     RH BLOOD TYPE Positive    Antibody Screen    Collection Time: 12/10/24  1:30 PM   Result Value Ref Range    Antibody Screen Negative       Impression and Plan  1.   Breast cancer, left sided: Patient is staged as T4N3M1 (one subcentimeter mediastinal lymph node). Her disease is estrogen and progesterone receptor negative and Her2 3+.           As patient's M1 disease is limited to a single subcentimeter mediastinal lymph  node, I recommend an aggressive approach including neoadjuvant systemic therapy with plans for surgical resection and radiation if appropriate. Patient is in agreement.           For systemic therapy, I recommend docetaxel, carboplatin, trastuzumab, and pertuzumab (TCHP). I reviewed the palliative and perhaps curative intent of therapy and the potential adverse effects including, but not limited to, myelosuppression, peripheral neuropathy, cardiotoxicity, diarrhea, nausea, vomiting, alopecia. Patient provided verbal consent for treatment. We will plan to begin this week.           For systemic therapy, a double lumen PICC line will be placed.     2.   Infection of left breast mass: Complete antibiotic therapy.     Planned Follow up  Patient will return for treatment later this week.     Encounter Time  Pre-charting/reviewing medical records: 5 minutes.  In room with patient obtaining history, performing exam, counseling on diagnosis, and reviewing plan: 45 minutes.  Orders/notes: - minutes.  Total time: 50 minutes.    Electronically signed by:    Carl Lopez M.D.  System Medical Director of Oncology Services  [facility]

## 2024-12-12 NOTE — PROGRESS NOTES
IV Chemotherapy Education    Learner:  Patient and Family Member    Barriers / Limitations:  None    Chemotherapy education goals:  Learn the drug names  Administration schedule  Routes of administration   Treatment setting    Drug names:  docetaxel + carboplatin + trastuzumab + pertuzumab      Chemotherapy action on cancer / normal cells: Achieved  Targeted anti-HER2 action on cancer / normal cells: Achieved      Treatment Effects on Constitution: Achieved    Anticipated fatigue   Role of activity in recovery   Notify MD/RN of inability to complete ADLs      Treatment Effects on Mucous Membranes: Achieved     Appropriate oral hygiene / signs of stomatitis and thrush  Nausea and vomiting / use of antiemetics  Diarrhea / use of imodium / dietary changes   Notify MD/RN of above symptoms if they persist > 24 hours      Treatment Effects on Nutritional Status: Achieved    Changes in taste perception / appetite  Access to RD for additional support  Notify MD/RN of weight loss or gain and any appetite changes      Treatment Effects on Hair: Achieved    Potential for hair loss / how to obtain a wig      Treatment Effects on the Skin: Achieved    Potential nail changes  Potential for darkening of the palms or skin tone  Potential for increased sun sensitivity / use of SPF or sun protection  Notify MD/RN of any new rash      Treatment Effects on Bone Marrow: Achieved    Function of white blood cells / signs of infection  Function of red blood cells / signs of anemia / role of pegfilgrastim  Function of platelets / signs of bleeding  Notify MD/RN of any chills or fever 100.5 and above  Notify MD/RN of any bleeding      Treatment Effects on Neurological System: Achieved    Potential numbness / tingling in hands or feet  Notify MD/RN of any numbness / tingling at next visit      Treatment Effects on the Heart: Achieved    Potential for cardiac toxicities / role of periodic cardiac monitoring with echocardiograms  Notify MD/RN  immediately for any new chest pain / palpitations / dyspnea      Treatment Effects on the Bladder and Kidneys: Achieved    Function of the kidneys  Suggested fluid intake  Notify MD/RN if blood appears in urine or if you have a decreased urine output      Treatment Effects on the Musculoskeletal System: Achieved    Potential for bone pain and body aches from chemo, worsened if GCSF is added  Use of OTC antihistamine starting the day before chemo to help minimize pain  Use of OTC analgesics for pain control  Notify MD/RN if pain is not adequately controlled.      Treatment Effects on Emotional Status: Achieved    Potential mood changes, depression, nervousness, difficulty sleeping  Importance of support system  Notify MD/RN of any emotional changes      Infusion reaction: Achieved    Potential for infusion reaction, decreased risk with each treatment, how reactions are managed   Signs / symptoms include back pain, chills, dizziness, flushing, chest pain/tightness, itching, shortness of breath, nausea, vomiting  Notify MD/RN IMMEDIATELY if you have any of the above signs / symptoms      Teaching Materials Provided:     Chemotherapy information sheets from [employer] provided  Dietician information sheet  When to contact the Treatment Team Information Sheet  Side Effect Management Information Sheet  [facility] Support Services Sheet  National Resources Information sheet    Patient and care partner were given ample opportunity to ask questions. All questions and concerns addressed. We discussed self care techniques, symptom management, fluid, diet, and activities.     Chemotherapy Consent Form signed by the patient.    I spent a total of 60 minutes with the patient, 100 % of that time was spent counseling patient regarding the above documented side effects and management, when to call provider and contact information.     Time spent on day of encounter:  Precharting, Face to face & orders 60 minutes  Documentation and  coordination of care 15 minutes  TOTAL = 75 minutes    Electronically signed:     Alice Lowe DNP, NP-C  Nurse Practitioner  [facility]

## 2024-12-12 NOTE — PATIENT INSTRUCTIONS
Anti-Nausea Medication:    Ondansetron (Zofran) -- can take every 8 hours as needed for nausea.    Prochlorperazine (Compazine) -- can take every 6 hours as needed for nausea.       Oral Zofran - take when you get home today, before dinner    Oral Compazine - take 4 hrs after zofran, before bed      We recommend alternating every 4 hours for the next 2-3 days. EX: Zofran 8am, compazine 12pm, zofran 4pm, compazine 8pm, etc...     You do not need to wake yourself up to take anything. Start up again in the morning.       Zofran may cause headaches and/or constipation.     You may use laxatives/stool softeners (miralax, etc) and tylenol to help with this (try to avoid advil/motrin/ibuprofen).     To manage diarrhea   - start imodium (loperamide) at first sign of loose/watery stools    - 2 tabs with first loose stool, then 1 tab with every episode of diarrhea after, no more than 8 tabs per day.     Coconut macaroons have been reported to help slow diarrhea.     Follow bland diet including bananas, rice, applesauce, toast (also known as BRAT diet).     Push oral fluids (any non-caffeinated fluid counts) with a goal of at least 60-80 ounces (or 6-8 glasses or 5 water bottles) in a day, or 2-3 extra glasses of fluid over what you're currently drinking

## 2024-12-13 ENCOUNTER — TELEPHONE (OUTPATIENT)
Dept: HEMATOLOGY/ONCOLOGY | Facility: HOSPITAL | Age: 59
End: 2024-12-13

## 2024-12-13 ENCOUNTER — OFFICE VISIT (OUTPATIENT)
Dept: HEMATOLOGY/ONCOLOGY | Facility: HOSPITAL | Age: 59
End: 2024-12-13
Attending: RADIOLOGY
Payer: COMMERCIAL

## 2024-12-13 DIAGNOSIS — C77.3 SECONDARY MALIGNANT NEOPLASM OF AXILLARY LYMPH NODES (HCC): Primary | ICD-10-CM

## 2024-12-13 DIAGNOSIS — C50.812 MALIGNANT NEOPLASM OF OVERLAPPING SITES OF LEFT BREAST IN FEMALE, ESTROGEN RECEPTOR NEGATIVE (HCC): ICD-10-CM

## 2024-12-13 DIAGNOSIS — G62.0 DRUG-INDUCED PERIPHERAL NEUROPATHY (HCC): ICD-10-CM

## 2024-12-13 DIAGNOSIS — E11.9 TYPE 2 DIABETES MELLITUS WITHOUT COMPLICATION, UNSPECIFIED WHETHER LONG TERM INSULIN USE (HCC): ICD-10-CM

## 2024-12-13 DIAGNOSIS — Z17.1 MALIGNANT NEOPLASM OF OVERLAPPING SITES OF LEFT BREAST IN FEMALE, ESTROGEN RECEPTOR NEGATIVE (HCC): ICD-10-CM

## 2024-12-13 DIAGNOSIS — M79.2 NEUROPATHIC PAIN: ICD-10-CM

## 2024-12-13 PROCEDURE — 96372 THER/PROPH/DIAG INJ SC/IM: CPT

## 2024-12-13 RX ORDER — GABAPENTIN 600 MG/1
TABLET ORAL
Qty: 270 TABLET | Refills: 1 | Status: SHIPPED | OUTPATIENT
Start: 2024-12-13

## 2024-12-13 RX ORDER — INSULIN GLARGINE-YFGN 100 [IU]/ML
20 INJECTION, SOLUTION SUBCUTANEOUS NIGHTLY
Qty: 24 ML | Refills: 0 | Status: SHIPPED | OUTPATIENT
Start: 2024-12-13

## 2024-12-13 NOTE — TELEPHONE ENCOUNTER
Toxicities: C1 D1 Docetaxel/Carboplatin/Trastuzumab-dskt/Pertuzumab on 12/12/2024    I attempted to reach Swathi to see how she is feeling after her first treatment. I left a voice mail message asking her to please return my call at her earliest convenience.

## 2024-12-13 NOTE — PROGRESS NOTES
Pt here for C1D2 Drug(s) Fulphila inj.  Arrives Ambulating independently, accompanied by Self     Patient was evaluated today by Treatment Nurse.    Oral medications included in this regimen:  no    Patient confirms comprehension of cancer treatment schedule:  yes    Pregnancy screening:  Not applicable    Modifications in dose or schedule:  No    Medications appearance and physical integrity checked by RN: yes.    Chemotherapy IV pump settings verified by 2 RNs:  n/a - cancer treatment injection administered.  Frequency of blood return and site check throughout administration: Other N/A      Infusion/treatment outcome:  patient tolerated treatment without incident    Education Record    Learner:  Patient  Barriers / Limitations:  None  Method:  Discussion  Education / instructions given:  today's regime  Outcome:  Shows understanding    Discharged Home, Ambulating independently, accompanied by:Self    Patient/family verbalized understanding of future appointments: by [employer] messaging

## 2024-12-13 NOTE — TELEPHONE ENCOUNTER
.A refill request was received for:  Requested Prescriptions     Pending Prescriptions Disp Refills    gabapentin 600 MG Oral Tab 270 tablet 1     Sig: TAKE 1 TABLET BY MOUTH THREE TIMES DAILY; TAKE AN EXTRA 600MG AS NEEDED FOR SEVERE PAIN    Insulin Glargine-yfgn 100 UNIT/ML Subcutaneous Solution Pen-injector 24 mL 0     Sig: Inject 20 Units into the skin nightly.       Last refill date:   9/3/24    Last office visit: 6/14/24    Follow up due:  Future Appointments   Date Time Provider Department Center   12/19/2024  1:30 PM NP TX RN8 NP  [facility]   12/19/2024  2:00 PM Alice Lowe APRN NP  [facility]   12/26/2024  1:00 PM NP TX RN5 NP  [facility]   1/2/2025 11:00 AM NP TX RN9 NP  [facility]   1/2/2025 11:30 AM Emperatriz Almeida APRN NP  [facility]   1/3/2025  1:00 PM NP TX RN2 NP  [facility]   1/9/2025 10:00 AM NP TX RN1 NP  [facility]   1/16/2025 10:30 AM NP TX RN2 NP  [facility]   1/23/2025  9:00 AM NP TX RN6 NP  [facility]   1/23/2025  9:30 AM Carl Lopez MD NP  [facility]

## 2024-12-19 ENCOUNTER — NURSE ONLY (OUTPATIENT)
Age: 59
End: 2024-12-19
Attending: RADIOLOGY
Payer: COMMERCIAL

## 2024-12-19 ENCOUNTER — OFFICE VISIT (OUTPATIENT)
Age: 59
End: 2024-12-19
Attending: RADIOLOGY
Payer: COMMERCIAL

## 2024-12-19 VITALS
WEIGHT: 139.81 LBS | TEMPERATURE: 97 F | HEIGHT: 62.44 IN | SYSTOLIC BLOOD PRESSURE: 120 MMHG | HEART RATE: 115 BPM | RESPIRATION RATE: 16 BRPM | DIASTOLIC BLOOD PRESSURE: 78 MMHG | BODY MASS INDEX: 25.09 KG/M2 | OXYGEN SATURATION: 94 %

## 2024-12-19 DIAGNOSIS — C77.3 SECONDARY MALIGNANT NEOPLASM OF AXILLARY LYMPH NODES (HCC): ICD-10-CM

## 2024-12-19 DIAGNOSIS — D64.9 ANEMIA, UNSPECIFIED TYPE: ICD-10-CM

## 2024-12-19 DIAGNOSIS — Z51.11 ENCOUNTER FOR CHEMOTHERAPY MANAGEMENT: ICD-10-CM

## 2024-12-19 DIAGNOSIS — G89.3 NEOPLASM RELATED PAIN: ICD-10-CM

## 2024-12-19 DIAGNOSIS — C50.812 MALIGNANT NEOPLASM OF OVERLAPPING SITES OF LEFT BREAST IN FEMALE, ESTROGEN RECEPTOR NEGATIVE (HCC): ICD-10-CM

## 2024-12-19 DIAGNOSIS — Z79.4 TYPE 2 DIABETES MELLITUS WITH DIABETIC NEUROPATHY, WITH LONG-TERM CURRENT USE OF INSULIN (HCC): ICD-10-CM

## 2024-12-19 DIAGNOSIS — G62.0 DRUG-INDUCED PERIPHERAL NEUROPATHY (HCC): ICD-10-CM

## 2024-12-19 DIAGNOSIS — Z17.1 MALIGNANT NEOPLASM OF OVERLAPPING SITES OF LEFT BREAST IN FEMALE, ESTROGEN RECEPTOR NEGATIVE (HCC): Primary | ICD-10-CM

## 2024-12-19 DIAGNOSIS — C50.812 MALIGNANT NEOPLASM OF OVERLAPPING SITES OF LEFT BREAST IN FEMALE, ESTROGEN RECEPTOR NEGATIVE (HCC): Primary | ICD-10-CM

## 2024-12-19 DIAGNOSIS — E11.40 TYPE 2 DIABETES MELLITUS WITH DIABETIC NEUROPATHY, WITH LONG-TERM CURRENT USE OF INSULIN (HCC): ICD-10-CM

## 2024-12-19 DIAGNOSIS — Z17.1 MALIGNANT NEOPLASM OF OVERLAPPING SITES OF LEFT BREAST IN FEMALE, ESTROGEN RECEPTOR NEGATIVE (HCC): ICD-10-CM

## 2024-12-19 DIAGNOSIS — Z15.01 BRCA2 GENETIC CARRIER: ICD-10-CM

## 2024-12-19 DIAGNOSIS — C50.811 CANCER OF OVERLAPPING SITES OF RIGHT BREAST (HCC): Primary | ICD-10-CM

## 2024-12-19 DIAGNOSIS — D50.0 IRON DEFICIENCY ANEMIA SECONDARY TO BLOOD LOSS (CHRONIC): ICD-10-CM

## 2024-12-19 DIAGNOSIS — Z15.09 BRCA2 GENETIC CARRIER: ICD-10-CM

## 2024-12-19 LAB
ANION GAP SERPL CALC-SCNC: 9 MMOL/L (ref 0–18)
BASOPHILS # BLD: 0 X10(3) UL (ref 0–0.2)
BASOPHILS NFR BLD: 0 %
BUN BLD-MCNC: 8 MG/DL (ref 9–23)
CALCIUM BLD-MCNC: 9.3 MG/DL (ref 8.7–10.4)
CHLORIDE SERPL-SCNC: 100 MMOL/L (ref 98–112)
CO2 SERPL-SCNC: 30 MMOL/L (ref 21–32)
CREAT BLD-MCNC: 0.81 MG/DL
EGFRCR SERPLBLD CKD-EPI 2021: 83 ML/MIN/1.73M2 (ref 60–?)
EOSINOPHIL # BLD: 0 X10(3) UL (ref 0–0.7)
EOSINOPHIL NFR BLD: 0 %
ERYTHROCYTE [DISTWIDTH] IN BLOOD BY AUTOMATED COUNT: 19.6 %
FASTING STATUS PATIENT QL REPORTED: NO
GLUCOSE BLD-MCNC: 88 MG/DL (ref 70–99)
HCT VFR BLD AUTO: 26.8 %
HGB BLD-MCNC: 8.4 G/DL
LYMPHOCYTES NFR BLD: 0.82 X10(3) UL (ref 1–4)
LYMPHOCYTES NFR BLD: 17 %
MCH RBC QN AUTO: 25.5 PG (ref 26–34)
MCHC RBC AUTO-ENTMCNC: 31.3 G/DL (ref 31–37)
MCV RBC AUTO: 81.2 FL
METAMYELOCYTES # BLD: 0.14 X10(3) UL
METAMYELOCYTES NFR BLD: 3 %
MONOCYTES # BLD: 0.72 X10(3) UL (ref 0.1–1)
MONOCYTES NFR BLD: 15 %
MYELOCYTES # BLD: 0.05 X10(3) UL
MYELOCYTES NFR BLD: 1 %
NEUTROPHILS # BLD AUTO: 2.96 X10 (3) UL (ref 1.5–7.7)
NEUTROPHILS NFR BLD: 62 %
NEUTS BAND NFR BLD: 2 %
NEUTS HYPERSEG # BLD: 3.07 X10(3) UL (ref 1.5–7.7)
OSMOLALITY SERPL CALC.SUM OF ELEC: 286 MOSM/KG (ref 275–295)
PLATELET # BLD AUTO: 143 10(3)UL (ref 150–450)
PLATELET MORPHOLOGY: NORMAL
POTASSIUM SERPL-SCNC: 3.8 MMOL/L (ref 3.5–5.1)
RBC # BLD AUTO: 3.3 X10(6)UL
SODIUM SERPL-SCNC: 139 MMOL/L (ref 136–145)
TOTAL CELLS COUNTED BLD: 100
WBC # BLD AUTO: 4.8 X10(3) UL (ref 4–11)

## 2024-12-19 RX ORDER — HYDROCODONE BITARTRATE AND ACETAMINOPHEN 5; 325 MG/1; MG/1
1-2 TABLET ORAL EVERY 4 HOURS PRN
Refills: 0 | Status: CANCELLED | OUTPATIENT
Start: 2024-12-19

## 2024-12-19 RX ORDER — HYDROCODONE BITARTRATE AND ACETAMINOPHEN 10; 325 MG/1; MG/1
0.5 TABLET ORAL ONCE
Status: CANCELLED
Start: 2024-12-19 | End: 2024-12-19

## 2024-12-19 RX ORDER — HYDROCODONE BITARTRATE AND ACETAMINOPHEN 5; 325 MG/1; MG/1
1-2 TABLET ORAL EVERY 4 HOURS PRN
Qty: 30 TABLET | Refills: 0 | Status: SHIPPED | OUTPATIENT
Start: 2024-12-19

## 2024-12-19 RX ORDER — HYDROCODONE BITARTRATE AND ACETAMINOPHEN 10; 325 MG/1; MG/1
0.5 TABLET ORAL ONCE
Status: COMPLETED | OUTPATIENT
Start: 2024-12-19 | End: 2024-12-19

## 2024-12-19 RX ADMIN — HYDROCODONE BITARTRATE AND ACETAMINOPHEN 0.5 TABLET: 10; 325 TABLET ORAL at 15:41:00

## 2024-12-19 NOTE — PROGRESS NOTES
Chief Complaint   Patient presents with    Follow - Up     Pt is here for follow up - Day 8 TCHP. She reports little to no appetite, hydration has been a challenge. Denies N,V,D,C but did see an increase in stools, mostly soft; weight down 11 lbs from treatment day. OK first 2 days after treatment; didn't feel good after bone shot, had minimal relief with claritin. Low/flat energy, tires easily; sleeping ok, no fevers or chills. Left breast pain 8/10.    Education Record    Learner:  Patient and Family Member    Disease / Diagnosis: breast cancer    Barriers / Limitations:  None   Comments:    Method:  Brief focused   Comments:    General Topics:  Diet, Medication, Pain, Side effects and symptom management, and Plan of care reviewed   Comments:    Outcome:  Shows understanding   Comments:

## 2024-12-19 NOTE — PROGRESS NOTES
[facility] ANP Visit Note    Patient Name: Swathi Arguelles   YOB: 1965   Medical Record Number: [id number]   Date of visit: 12/19/2024     Chief Complaint/Reason for Visit:  Chief Complaint   Patient presents with    Follow - Up      Oncologic history:     Initially admitted to the hospital on 09/10/2013 with symptomatic anemia.  On physical examination revealed a malodorous, draining ulcerating right breast mass.  Upon questioning, she admitted that she first noticed the mass in approximately 01/2013.  She states that her only mammogram was approximately at age 43 years.  Biopsy showed grade 3 infiltrating ductal carcinoma.  The tumor was estrogen receptor positive, progesterone receptor negative, and Her2/alberto negative.  CT scans of the chest, abdomen, pelvis showed multiple right axillary lymph nodes, two micronodules in the lungs of unknown significance, and a left sided large cystic adnexal mass.  At initial diagnosis CA 15-3 was 48.3 U/mL,  CA 27.29 was 91.7 U/mL, and  was 171.5 U/mL.  PET/CT showed abnormal FDG uptake in the ulcerating mass of the right breast/chest wall and in retropectoral and subclavicular lymph nodes.  There was no uptake in the cystic pelvic mass or in 3 subcentimeter pulmonary nodules.  Pelvic ultrasound showed a complex multiloculated left adnexal cyst with no separate identifiable ovarian tissue.  Noted was irregular thickening of one of the cyst walls that was worrisome for a cystic ovarian neoplasm.    Patient was premenopausal at diagnosis     On 10/04/2013 she began dose dense doxorubicin and cyclophosphamide. Treatment was complicated by neutropenic fever, anemia requiring transfusion, dehydration, and prolonged thrombocytopenia. CT scans after cycle 4 showed a marked improvement in the breast/chest wall mass and axillary adenopathy. Tumor markers markedly improved. She then received weekly paclitaxel. Treatment was complicated by peripheral neuropathy and  neutropenia.      During paclitaxel therapy, she consented to BRCA1/2 testing.  Results obtained on 02/11/2014 revealed the deleterious BRCA2 mutation [id number].     On 03/18/2014 she underwent right mastectomy with axillary node dissection.  Pathology showed 1.4 cm grade 3 invasive ductal carcinoma with 12/12 lymph nodes negative for malignancy.  She also underwent diagnostic laparoscopy which showed the cystic left ovarian mass but no evidence of metastatic disease.     On 05/13/2014 she underwent bilateral salpingo-oophorectomy, bilateral pelvic lymphadenectomy, limited periaortic lymphadenectomy, omentectomy, peritoneal washings, and appendectomy.  Pathology, reviewed at the [facility], showed serous borderline tumor, typical type.  All lymph nodes and pelvic washings were negative for malignancy.     In 07/2014 she began adjuvant right chest wall/axilla radiation.     She began endocrine therapy with anastrazole in mid 08/2014.      Patient was last seen on 02/03/2017. On that day, patient was advised to continue anastrozole, continue increased surveillance with alternating mammography and breast MRI, and return for follow up in 05/2017. On that day, she expressed an openness to prophylactic left mastectomy if she was a candidate for breast reconstruction. Patient failed to return for follow up in 05/2017 as recommended. She also failed to return a call from the breast navigator to arrange evaluation by plastic surgery. She did have a left sided mammogram as previously ordered by me in 06/2017. Patient was on anastrozole at least through 02/2017 but it is not clear when she discontinued therapy.     Patient next presented to the ED on 11/30/2024 with complaints of generalized weakness, poor appetite, and dyspnea with exertion. Laboratory studies showed anemia, hyponatremia, and hypochlorhydria. CTA chest was negative for pulmonary embolism or metastatic disease but did show a 22.3 x 14.6 x 16.0 cm  mass arising from the left breast with areas of necrosis, possible extension into the intracostal musculature, and mildly enlarged left axillary lymph nodes. Visualization of left breast showed a large firm breast which an open area laterally with malodorous drainage. CT abdomen/pelvis w contrast on 12/01/2024 was negative for metastatic disease.      Patient reports that she first noticed a \"rash\" 1.5 months prior to presentation. She stated that 3 months prior to presentation, she did not notice any changes in the left breast. Notably, patient's last breast imaging prior to hospitalization was in 06/2017.     Biopsy of the left breast was performed on 12/02/2024. 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 Immunohistochemical studies were as follows: estrogen receptor 0%, progesterone receptor 0%, Her2 3+, Ki67 25%.      PET/CT scan on 02/09/2024 showed abnormal SUV uptake in the left breast mass peripherally, multiple left axillary lymph nodes, subpectoral lymph nodes, a subcentimeter mediastinal lymph node.     On 12/12/2024, she began therapy with TCHP    History of Present Illness:     Swathi Arguelles is a 59 year old patient of Dr. Lopez with the above oncologic history who presents today for toxicity check with APN 1 week after receiving first dose of TCHP for breast cancer. She is accompanied by her daughter.     Reports low energy, body aches, and poor appetite following chemo last week. Weight is down 11 lbs. She has not had any diarrhea, but reports increased stooling frequency. Breast wound has had an increase in drainage and breast pain is not well controlled. Currently rates pain as 8/10, with minimal relief when she takes Tramadol (last dose was yesterday, only has one pill left). Denies fevers or chills. Checks glucose at home 2x/day, no hypoglycemic episodes.     Neuropathy in her hand and feet is unchanged, but she does report that she is walking differently.  Describes the gait change as \"walking like a mummy\". Initially thought it was due to leg swelling and it's associated heaviness, but swelling has since resolved but the gait remains different.     Reviewed available chart notes, labs, and imaging.    History:     Past medical, surgical, social and family history reviewed with the patient and updated as appropriate in the chart.    Allergies:  Allergies[1]    Medications:    Current Outpatient Medications:     HYDROcodone-acetaminophen 5-325 MG Oral Tab, Take 1-2 tablets by mouth every 4 (four) hours as needed for Pain., Disp: 30 tablet, Rfl: 0    gabapentin 600 MG Oral Tab, TAKE 1 TABLET BY MOUTH THREE TIMES DAILY; TAKE AN EXTRA 600MG AS NEEDED FOR SEVERE PAIN, Disp: 270 tablet, Rfl: 1    Insulin Glargine-yfgn 100 UNIT/ML Subcutaneous Solution Pen-injector, Inject 20 Units into the skin nightly., Disp: 24 mL, Rfl: 0    prochlorperazine (COMPAZINE) 10 mg tablet, Take 1 tablet (10 mg total) by mouth every 6 (six) hours as needed for Nausea., Disp: 30 tablet, Rfl: 3    ondansetron (ZOFRAN) 8 MG tablet, Take 1 tablet (8 mg total) by mouth every 8 (eight) hours as needed for Nausea., Disp: 30 tablet, Rfl: 3    traMADol 50 MG Oral Tab, Take 1 tablet (50 mg total) by mouth every 6 (six) hours as needed., Disp: 20 tablet, Rfl: 0    fluticasone-salmeterol (WIXELA INHUB) 100-50 MCG/ACT Inhalation Aerosol Powder, Breath Activated, Inhale 1 puff into the lungs 2 (two) times daily., Disp: 3 each, Rfl: 0    Insulin Lispro, 1 Unit Dial, 100 UNIT/ML Subcutaneous Solution Pen-injector, Inject 10 Units into the skin in the morning, at noon, and at bedtime., Disp: 3 mL, Rfl: 0    Budesonide-Formoterol Fumarate 160-4.5 MCG/ACT Inhalation Aerosol, Inhale 2 puffs into the lungs 2 (two) times daily. (Patient taking differently: Inhale 2 puffs into the lungs 2 (two) times daily. Pt states she would like to go back to budesonide), Disp: 3 each, Rfl: 1    albuterol (2.5 MG/3ML) 0.083%  Inhalation Nebu Soln, Take 3 mL (2.5 mg total) by nebulization every 4 (four) hours as needed for Wheezing or Shortness of Breath., Disp: 90 mL, Rfl: 0    Insulin Pen Needle (BD PEN NEEDLE DANIELA U/F) 32G X 4 MM Does not apply Misc, Up to TID, Disp: 200 each, Rfl: 2    montelukast 10 MG Oral Tab, Take 1 tablet (10 mg total) by mouth nightly., Disp: 90 tablet, Rfl: 1    Review of Systems:  A comprehensive 14 point review of systems was completed.  Pertinent positives and negatives noted in the HPI.    Performance Status: ECOG 1-2    Vital Signs:   12/19/2024  1:28 PM   Oncology Vitals    Height 5' 2.441\"    Height 159 cm    Weight 139 lb 12.8 oz    Weight 63.413 kg    BSA (m2) 1.65 m2    BMI 25.21 kg/m2    /78    Pulse 115    Resp 16    Temp 97.3 °F (36.3 °C)    SpO2 94 %    Pain Score 8      Physical Examination:  General: fatigued but non-toxic appearing, alert and oriented x 3, not in acute distress.  HEENT: Anicteric, conjunctivae and sclerae clear  Chest: Clear to auscultation, respirations unlabored.  Heart: Regular rate and rhythm, normal S1/2. No murmur.   Extremities: No edema.  Neurological: Grossly intact.   Skin: Warm, Dry, No erythema, No rash  Psych/Depression: mood and affect are appropriate.     Labs:     Recent Results (from the past 72 hours)   CBC W/DIFF [E]    Collection Time: 12/19/24  2:28 PM   Result Value Ref Range    WBC 4.8 4.0 - 11.0 x10(3) uL    RBC 3.30 (L) 3.80 - 5.30 x10(6)uL    HGB 8.4 (L) 12.0 - 16.0 g/dL    HCT 26.8 (L) 35.0 - 48.0 %    .0 (L) 150.0 - 450.0 10(3)uL    MCV 81.2 80.0 - 100.0 fL    MCH 25.5 (L) 26.0 - 34.0 pg    MCHC 31.3 31.0 - 37.0 g/dL    RDW 19.6 %    Neutrophil Absolute Prelim 2.96 1.50 - 7.70 x10 (3) uL   BASIC METABOLIC PANEL [E]    Collection Time: 12/19/24  2:28 PM   Result Value Ref Range    Glucose 88 70 - 99 mg/dL    Sodium 139 136 - 145 mmol/L    Potassium 3.8 3.5 - 5.1 mmol/L    Chloride 100 98 - 112 mmol/L    CO2 30.0 21.0 - 32.0 mmol/L     Anion Gap 9 0 - 18 mmol/L    BUN 8 (L) 9 - 23 mg/dL    Creatinine 0.81 0.55 - 1.02 mg/dL    Calcium, Total 9.3 8.7 - 10.4 mg/dL    Calculated Osmolality 286 275 - 295 mOsm/kg    eGFR-Cr 83 >=60 mL/min/1.73m2    Patient Fasting for BMP? No      Impression/Plan    Left sided breast cancer: T4N3M1, triple positive. Started on neoadjuvant TCHP last week, tolerating with anticipated toxicities. Discussed expected trajectory, recovery, and pattern of chemotherapy related side effects. IVF in clinic today while we await lab results.     L breast mass: increased drainage. Referral to wound care provided today.     Neoplasm related pain: poorly controlled with tramadol. Will trial hydrocodone. Discussed anticipated side effects of sedation and constipation. Start hydrocodone 5/325 1-2 tabs every 4 hrs as needed. Will give small supply and can increase if the medication is working without significant side effect. Will give dose today in clinic     Anemia: received PRBC transfusion + IV iron with recent hospitalization. Hgb downtrending today. Repeat CBC in 1 week.     Neuropathy: predates initiation of this course of chemo. Will need to monitor closely.     T2DM: no hypoglycemic episodes in the last week. Will need to monitor closely due to chemo associated anorexia.     Planned Follow Up: 1 week for PICC dressing change + labs, 2 weeks for APN + labs + chemo    Risk Level: HIGH - breast cancer receiving chemotherapy with systemic effects requiring intervention and close monitoring     The 21st Century Cures Act makes medical notes like these available to patients in the interest of transparency. Please be advised this is a medical document. Medical documents are intended to carry relevant information, facts as evident, and the clinical opinion of the practitioner. The medical note is intended as peer to peer communication and may appear blunt or direct. It is written in medical language and may contain abbreviations or  verbiage that are unfamiliar.     Electronically Signed by:    Alice Lowe, JOSELO, NP-C  Nurse Practitioner  [facility]       [1]   Allergies  Allergen Reactions    Fish ANAPHYLAXIS and HIVES     All fish, Wheezing, short of breath    Fish Oil ANAPHYLAXIS and HIVES     All fish, Wheezing, short of breath   All fish, Wheezing, short of breath    Levemir HIVES and ITCHING    Seasonal WHEEZING and Runny nose     Ragweed, pollen

## 2024-12-19 NOTE — PROGRESS NOTES
Patient here for lab draw and APN Day 8 follow up. Had her first chemotherapy TCHP last week. Labs drawn from the PICC line. PICC line dressing also changed today. Saw Alice in clinic. Received orders to give IVF and Norco today. CBC and T&S added to appointment for next week when she comes in for her dressing change. Reminded that she has to wait for results. AVS given to patient.  
0

## 2024-12-20 ENCOUNTER — DOCUMENTATION ONLY (OUTPATIENT)
Age: 59
End: 2024-12-20

## 2024-12-26 ENCOUNTER — NURSE ONLY (OUTPATIENT)
Age: 59
End: 2024-12-26
Attending: RADIOLOGY
Payer: COMMERCIAL

## 2024-12-26 ENCOUNTER — APPOINTMENT (OUTPATIENT)
Age: 59
End: 2024-12-26
Attending: RADIOLOGY
Payer: COMMERCIAL

## 2024-12-26 VITALS
OXYGEN SATURATION: 98 % | SYSTOLIC BLOOD PRESSURE: 146 MMHG | DIASTOLIC BLOOD PRESSURE: 82 MMHG | RESPIRATION RATE: 16 BRPM | HEART RATE: 98 BPM | TEMPERATURE: 97 F

## 2024-12-26 DIAGNOSIS — C50.812 MALIGNANT NEOPLASM OF OVERLAPPING SITES OF LEFT BREAST IN FEMALE, ESTROGEN RECEPTOR NEGATIVE (HCC): ICD-10-CM

## 2024-12-26 DIAGNOSIS — C50.811 CANCER OF OVERLAPPING SITES OF RIGHT BREAST (HCC): Primary | ICD-10-CM

## 2024-12-26 DIAGNOSIS — D64.9 ANEMIA, UNSPECIFIED TYPE: ICD-10-CM

## 2024-12-26 DIAGNOSIS — Z17.1 MALIGNANT NEOPLASM OF OVERLAPPING SITES OF LEFT BREAST IN FEMALE, ESTROGEN RECEPTOR NEGATIVE (HCC): ICD-10-CM

## 2024-12-26 LAB
ANTIBODY SCREEN: NEGATIVE
BASOPHILS # BLD AUTO: 0.03 X10(3) UL (ref 0–0.2)
BASOPHILS NFR BLD AUTO: 0.3 %
EOSINOPHIL # BLD AUTO: 0.01 X10(3) UL (ref 0–0.7)
EOSINOPHIL NFR BLD AUTO: 0.1 %
ERYTHROCYTE [DISTWIDTH] IN BLOOD BY AUTOMATED COUNT: 19.5 %
HCT VFR BLD AUTO: 26.8 %
HGB BLD-MCNC: 8.3 G/DL
IMM GRANULOCYTES # BLD AUTO: 0.07 X10(3) UL (ref 0–1)
IMM GRANULOCYTES NFR BLD: 0.6 %
LYMPHOCYTES # BLD AUTO: 1.82 X10(3) UL (ref 1–4)
LYMPHOCYTES NFR BLD AUTO: 15.7 %
MCH RBC QN AUTO: 25.5 PG (ref 26–34)
MCHC RBC AUTO-ENTMCNC: 31 G/DL (ref 31–37)
MCV RBC AUTO: 82.2 FL
MONOCYTES # BLD AUTO: 0.74 X10(3) UL (ref 0.1–1)
MONOCYTES NFR BLD AUTO: 6.4 %
NEUTROPHILS # BLD AUTO: 8.92 X10 (3) UL (ref 1.5–7.7)
NEUTROPHILS # BLD AUTO: 8.92 X10(3) UL (ref 1.5–7.7)
NEUTROPHILS NFR BLD AUTO: 76.9 %
PLATELET # BLD AUTO: 35 10(3)UL (ref 150–450)
PLATELETS.RETICULATED NFR BLD AUTO: 4 % (ref 0–7)
RBC # BLD AUTO: 3.26 X10(6)UL
RH BLOOD TYPE: POSITIVE
WBC # BLD AUTO: 11.6 X10(3) UL (ref 4–11)

## 2024-12-26 NOTE — PROGRESS NOTES
Education Record    Learner:  Patient    Disease / Diagnosis:    Barriers / Limitations:  None   Comments:    Method:  Discussion   Comments:    General Topics:  Plan of care reviewed   Comments:    Outcome:  Shows understanding   Comments:    Patient here for labs/poss transfusion/IVF. Patient complains of ongoing loss of appetite/poor PO intake, dizziness, and fatigue. Patient tachycardic to 130s prior to fluid administration. Hgb 8.3 and platelets 35k - results relayed to APN. Patient denies any active signs of bleeding. No transfusions needed. 1L 0.9 NS given, HR down to 90s.   Patient scheduled for labs/poss transfusion/IVF next Tuesday, 1/31. PICC dressing changed and patient left in stable condition.

## 2024-12-31 ENCOUNTER — OFFICE VISIT (OUTPATIENT)
Age: 59
End: 2024-12-31
Attending: RADIOLOGY
Payer: COMMERCIAL

## 2024-12-31 VITALS
DIASTOLIC BLOOD PRESSURE: 73 MMHG | HEART RATE: 72 BPM | SYSTOLIC BLOOD PRESSURE: 117 MMHG | RESPIRATION RATE: 16 BRPM | OXYGEN SATURATION: 96 % | TEMPERATURE: 98 F

## 2024-12-31 DIAGNOSIS — C50.812 MALIGNANT NEOPLASM OF OVERLAPPING SITES OF LEFT BREAST IN FEMALE, ESTROGEN RECEPTOR NEGATIVE (HCC): ICD-10-CM

## 2024-12-31 DIAGNOSIS — C77.3 SECONDARY MALIGNANT NEOPLASM OF AXILLARY LYMPH NODES (HCC): ICD-10-CM

## 2024-12-31 DIAGNOSIS — C50.811 CANCER OF OVERLAPPING SITES OF RIGHT BREAST (HCC): Primary | ICD-10-CM

## 2024-12-31 DIAGNOSIS — Z17.1 MALIGNANT NEOPLASM OF OVERLAPPING SITES OF LEFT BREAST IN FEMALE, ESTROGEN RECEPTOR NEGATIVE (HCC): ICD-10-CM

## 2024-12-31 LAB
ALBUMIN SERPL-MCNC: 3.2 G/DL (ref 3.2–4.8)
ALBUMIN/GLOB SERPL: 1 {RATIO} (ref 1–2)
ALP LIVER SERPL-CCNC: 125 U/L
ALT SERPL-CCNC: 8 U/L
ANION GAP SERPL CALC-SCNC: 10 MMOL/L (ref 0–18)
ANTIBODY SCREEN: NEGATIVE
AST SERPL-CCNC: 24 U/L (ref ?–34)
BASOPHILS # BLD AUTO: 0.01 X10(3) UL (ref 0–0.2)
BASOPHILS NFR BLD AUTO: 0.2 %
BILIRUB SERPL-MCNC: 0.3 MG/DL (ref 0.2–1.1)
BUN BLD-MCNC: 7 MG/DL (ref 9–23)
CALCIUM BLD-MCNC: 9 MG/DL (ref 8.7–10.4)
CHLORIDE SERPL-SCNC: 102 MMOL/L (ref 98–112)
CO2 SERPL-SCNC: 28 MMOL/L (ref 21–32)
CREAT BLD-MCNC: 0.7 MG/DL
EGFRCR SERPLBLD CKD-EPI 2021: 98 ML/MIN/1.73M2 (ref 60–?)
EOSINOPHIL # BLD AUTO: 0.01 X10(3) UL (ref 0–0.7)
EOSINOPHIL NFR BLD AUTO: 0.2 %
ERYTHROCYTE [DISTWIDTH] IN BLOOD BY AUTOMATED COUNT: 20.9 %
FASTING STATUS PATIENT QL REPORTED: NO
GLOBULIN PLAS-MCNC: 3.2 G/DL (ref 2–3.5)
GLUCOSE BLD-MCNC: 162 MG/DL (ref 70–99)
HCT VFR BLD AUTO: 22.2 %
HGB BLD-MCNC: 6.8 G/DL
IMM GRANULOCYTES # BLD AUTO: 0.02 X10(3) UL (ref 0–1)
IMM GRANULOCYTES NFR BLD: 0.5 %
LYMPHOCYTES # BLD AUTO: 0.78 X10(3) UL (ref 1–4)
LYMPHOCYTES NFR BLD AUTO: 18.9 %
MCH RBC QN AUTO: 26 PG (ref 26–34)
MCHC RBC AUTO-ENTMCNC: 30.6 G/DL (ref 31–37)
MCV RBC AUTO: 84.7 FL
MONOCYTES # BLD AUTO: 0.33 X10(3) UL (ref 0.1–1)
MONOCYTES NFR BLD AUTO: 8 %
NEUTROPHILS # BLD AUTO: 2.98 X10 (3) UL (ref 1.5–7.7)
NEUTROPHILS # BLD AUTO: 2.98 X10(3) UL (ref 1.5–7.7)
NEUTROPHILS NFR BLD AUTO: 72.2 %
OSMOLALITY SERPL CALC.SUM OF ELEC: 292 MOSM/KG (ref 275–295)
PLATELET # BLD AUTO: 75 10(3)UL (ref 150–450)
PLATELETS.RETICULATED NFR BLD AUTO: 3.5 % (ref 0–7)
POTASSIUM SERPL-SCNC: 3.4 MMOL/L (ref 3.5–5.1)
PROT SERPL-MCNC: 6.4 G/DL (ref 5.7–8.2)
RBC # BLD AUTO: 2.62 X10(6)UL
RH BLOOD TYPE: POSITIVE
SODIUM SERPL-SCNC: 140 MMOL/L (ref 136–145)
WBC # BLD AUTO: 4.1 X10(3) UL (ref 4–11)

## 2024-12-31 RX ORDER — DIPHENHYDRAMINE HCL 25 MG
25 CAPSULE ORAL ONCE
Status: COMPLETED | OUTPATIENT
Start: 2024-12-31 | End: 2024-12-31

## 2024-12-31 RX ORDER — DIPHENHYDRAMINE HCL 25 MG
25 CAPSULE ORAL ONCE
OUTPATIENT
Start: 2024-12-31

## 2024-12-31 RX ORDER — ACETAMINOPHEN 325 MG/1
650 TABLET ORAL ONCE
OUTPATIENT
Start: 2024-12-31

## 2024-12-31 RX ORDER — ACETAMINOPHEN 325 MG/1
650 TABLET ORAL ONCE
Status: COMPLETED | OUTPATIENT
Start: 2024-12-31 | End: 2024-12-31

## 2024-12-31 RX ADMIN — ACETAMINOPHEN 650 MG: 325 TABLET ORAL at 12:37:00

## 2024-12-31 RX ADMIN — DIPHENHYDRAMINE HCL 25 MG: 25 MG CAPSULE ORAL at 12:37:00

## 2024-12-31 NOTE — PROGRESS NOTES
Education Record    Learner:  Patient and Family Member    Disease / Diagnosis: Breast CA    Barriers / Limitations:  None   Comments:    Method:  Discussion   Comments:    General Topics:  Medication, Side effects and symptom management, Plan of care reviewed, and Fall risk and prevention   Comments:    Outcome:  Shows understanding   Comments:    Pt here for labs, IVF, and possible transfusion. Labs for chemo on 1/2 drawn today as well. Critical CBC result (Hgb 6.8) reviewed with Parul RENTERIA, orders to transfuse 1u PRBCs. Pt premedicated and consented. Transfusion tolerated well. Pt discharged ambulatory in stable condition upon completion.

## 2025-01-01 LAB
BLOOD TYPE BARCODE: 600
UNIT VOLUME: 350 ML

## 2025-01-02 ENCOUNTER — OFFICE VISIT (OUTPATIENT)
Age: 60
End: 2025-01-02
Attending: RADIOLOGY
Payer: COMMERCIAL

## 2025-01-02 VITALS
TEMPERATURE: 98 F | HEART RATE: 103 BPM | SYSTOLIC BLOOD PRESSURE: 125 MMHG | BODY MASS INDEX: 24.58 KG/M2 | RESPIRATION RATE: 16 BRPM | DIASTOLIC BLOOD PRESSURE: 88 MMHG | HEIGHT: 62.44 IN | OXYGEN SATURATION: 93 % | WEIGHT: 137 LBS

## 2025-01-02 DIAGNOSIS — E87.6 HYPOKALEMIA: ICD-10-CM

## 2025-01-02 DIAGNOSIS — E86.0 DEHYDRATION: ICD-10-CM

## 2025-01-02 DIAGNOSIS — Z17.1 MALIGNANT NEOPLASM OF OVERLAPPING SITES OF LEFT BREAST IN FEMALE, ESTROGEN RECEPTOR NEGATIVE (HCC): ICD-10-CM

## 2025-01-02 DIAGNOSIS — C77.3 SECONDARY MALIGNANT NEOPLASM OF AXILLARY LYMPH NODES (HCC): ICD-10-CM

## 2025-01-02 DIAGNOSIS — C50.812 MALIGNANT NEOPLASM OF OVERLAPPING SITES OF LEFT BREAST IN FEMALE, ESTROGEN RECEPTOR NEGATIVE (HCC): Primary | ICD-10-CM

## 2025-01-02 DIAGNOSIS — E83.42 HYPOMAGNESEMIA: ICD-10-CM

## 2025-01-02 DIAGNOSIS — C77.3 SECONDARY MALIGNANT NEOPLASM OF AXILLARY LYMPH NODES (HCC): Primary | ICD-10-CM

## 2025-01-02 DIAGNOSIS — Z51.11 ENCOUNTER FOR CHEMOTHERAPY MANAGEMENT: ICD-10-CM

## 2025-01-02 DIAGNOSIS — C50.811 CANCER OF OVERLAPPING SITES OF RIGHT BREAST (HCC): ICD-10-CM

## 2025-01-02 DIAGNOSIS — C50.812 MALIGNANT NEOPLASM OF OVERLAPPING SITES OF LEFT BREAST IN FEMALE, ESTROGEN RECEPTOR NEGATIVE (HCC): ICD-10-CM

## 2025-01-02 DIAGNOSIS — Z17.1 MALIGNANT NEOPLASM OF OVERLAPPING SITES OF LEFT BREAST IN FEMALE, ESTROGEN RECEPTOR NEGATIVE (HCC): Primary | ICD-10-CM

## 2025-01-02 LAB
ANION GAP SERPL CALC-SCNC: 8 MMOL/L (ref 0–18)
BASOPHILS # BLD AUTO: 0.01 X10(3) UL (ref 0–0.2)
BASOPHILS NFR BLD AUTO: 0.2 %
BUN BLD-MCNC: 5 MG/DL (ref 9–23)
CALCIUM BLD-MCNC: 9.2 MG/DL (ref 8.7–10.4)
CHLORIDE SERPL-SCNC: 105 MMOL/L (ref 98–112)
CO2 SERPL-SCNC: 29 MMOL/L (ref 21–32)
CREAT BLD-MCNC: 0.72 MG/DL
EGFRCR SERPLBLD CKD-EPI 2021: 95 ML/MIN/1.73M2 (ref 60–?)
EOSINOPHIL # BLD AUTO: 0.02 X10(3) UL (ref 0–0.7)
EOSINOPHIL NFR BLD AUTO: 0.4 %
ERYTHROCYTE [DISTWIDTH] IN BLOOD BY AUTOMATED COUNT: 20.6 %
GLUCOSE BLD-MCNC: 154 MG/DL (ref 70–99)
HCT VFR BLD AUTO: 26 %
HGB BLD-MCNC: 8.6 G/DL
IMM GRANULOCYTES # BLD AUTO: 0.01 X10(3) UL (ref 0–1)
IMM GRANULOCYTES NFR BLD: 0.2 %
LYMPHOCYTES # BLD AUTO: 0.88 X10(3) UL (ref 1–4)
LYMPHOCYTES NFR BLD AUTO: 18.8 %
MAGNESIUM SERPL-MCNC: 1.4 MG/DL (ref 1.6–2.6)
MCH RBC QN AUTO: 27.5 PG (ref 26–34)
MCHC RBC AUTO-ENTMCNC: 33.1 G/DL (ref 31–37)
MCV RBC AUTO: 83.1 FL
MONOCYTES # BLD AUTO: 0.32 X10(3) UL (ref 0.1–1)
MONOCYTES NFR BLD AUTO: 6.9 %
NEUTROPHILS # BLD AUTO: 3.43 X10 (3) UL (ref 1.5–7.7)
NEUTROPHILS # BLD AUTO: 3.43 X10(3) UL (ref 1.5–7.7)
NEUTROPHILS NFR BLD AUTO: 73.5 %
OSMOLALITY SERPL CALC.SUM OF ELEC: 294 MOSM/KG (ref 275–295)
PLATELET # BLD AUTO: 149 10(3)UL (ref 150–450)
POTASSIUM SERPL-SCNC: 3.1 MMOL/L (ref 3.5–5.1)
RBC # BLD AUTO: 3.13 X10(6)UL
SODIUM SERPL-SCNC: 142 MMOL/L (ref 136–145)
WBC # BLD AUTO: 4.7 X10(3) UL (ref 4–11)

## 2025-01-02 RX ORDER — POTASSIUM CHLORIDE 750 MG/1
20 TABLET, EXTENDED RELEASE ORAL ONCE
Status: COMPLETED | OUTPATIENT
Start: 2025-01-02 | End: 2025-01-02

## 2025-01-02 RX ORDER — PALONOSETRON 0.05 MG/ML
0.25 INJECTION, SOLUTION INTRAVENOUS ONCE
Status: CANCELLED
Start: 2025-01-02 | End: 2025-01-02

## 2025-01-02 RX ORDER — PALONOSETRON 0.05 MG/ML
0.25 INJECTION, SOLUTION INTRAVENOUS ONCE
Status: COMPLETED | OUTPATIENT
Start: 2025-01-02 | End: 2025-01-02

## 2025-01-02 RX ORDER — POTASSIUM CHLORIDE 750 MG/1
20 TABLET, EXTENDED RELEASE ORAL ONCE
Status: CANCELLED
Start: 2025-01-02 | End: 2025-01-02

## 2025-01-02 RX ADMIN — PALONOSETRON 0.25 MG: 0.05 INJECTION, SOLUTION INTRAVENOUS at 12:37:00

## 2025-01-02 RX ADMIN — POTASSIUM CHLORIDE 20 MEQ: 750 TABLET, EXTENDED RELEASE ORAL at 12:36:00

## 2025-01-02 NOTE — PROGRESS NOTES
Pt here for C2D1 Drug(s) TCHP.  Arrives Ambulating independently, accompanied by Family member     Patient was evaluated today by MD and Treatment Nurse.    Oral medications included in this regimen:  no    Patient confirms comprehension of cancer treatment schedule:  yes    Pregnancy screening:  Denies possibility of pregnancy    Modifications in dose or schedule:  No    Medications appearance and physical integrity checked by RN: yes.    Chemotherapy IV pump settings verified by 2 RNs:  Yes.  Frequency of blood return and site check throughout administration: Prior to administration and At completion of therapy     Infusion/treatment outcome:  patient tolerated treatment without incident    Education Record    Learner:  Patient  Barriers / Limitations:  None  Method:  Brief focused and Reinforcement  Education / instructions given:  POC  Outcome:  Shows understanding    Discharged Home, Ambulating independently, accompanied by:Family member    Patient/family verbalized understanding of future appointments: by printed AVS    Labs from today discussed with Dr. Lopez - IVF with K/Mag and PO K given today. Patient tolerated treatment well and given ice packs during Taxotere infusion.

## 2025-01-02 NOTE — PROGRESS NOTES
[facility] Progress Note       Patient Name: Swathi Arguelles   YOB: 1965  Medical Record Number: [id number]  Attending Physician: Carl Lopez M.D.     The 21st Century Cures Act makes medical notes like these available to patients in the interest of transparency. Please be advised this is a medical document. Medical documents are intended to carry relevant information, facts as evident, and the clinical opinion of the practitioner. The medical note is intended as peer to peer communication and may appear blunt or direct. It is written in medical language and may contain abbreviations or verbiage that are unfamiliar.      Date of Visit: 1/2/2025      Chief Complaint  Invasive ductal carcinoma, left breast - follow up and treatment.     Oncologic History  Swathi Arguelles is a 59 year old post-menopausal female admitted to the hospital on 09/10/2013 with symptomatic anemia.  On physical examination revealed a malodorous, draining ulcerating right breast mass.  Upon questioning, she admitted that she first noticed the mass in approximately 01/2013.  She states that her only mammogram was approximately at age 43 years.  Biopsy showed grade 3 infiltrating ductal carcinoma.  The tumor was estrogen receptor positive, progesterone receptor negative, and Her2/alberto negative.  CT scans of the chest, abdomen, pelvis showed multiple right axillary lymph nodes, two micronodules in the lungs of unknown significance, and a left sided large cystic adnexal mass.  At initial diagnosis CA 15-3 was 48.3 U/mL,  CA 27.29 was 91.7 U/mL, and  was 171.5 U/mL.  PET/CT showed abnormal FDG uptake in the ulcerating mass of the right breast/chest wall and in retropectoral and subclavicular lymph nodes.  There was no uptake in the cystic pelvic mass or in 3 subcentimeter pulmonary nodules.  Pelvic ultrasound showed a complex multiloculated left adnexal cyst with no separate identifiable  ovarian tissue.  Noted was irregular thickening of one of the cyst walls that was worrisome for a cystic ovarian neoplasm.    Patient was premenopausal at diagnosis    On 10/04/2013 she began dose dense doxorubicin and cyclophosphamide. Treatment was complicated by neutropenic fever, anemia requiring transfusion, dehydration, and prolonged thrombocytopenia. CT scans after cycle 4 showed a marked improvement in the breast/chest wall mass and axillary adenopathy. Tumor markers markedly improved. She then received weekly paclitaxel. Treatment was complicated by peripheral neuropathy and neutropenia.     During paclitaxel therapy, she consented to BRCA1/2 testing.  Results obtained on 02/11/2014 revealed the deleterious BRCA2 mutation [id number].    On 03/18/2014 she underwent right mastectomy with axillary node dissection.  Pathology showed 1.4 cm grade 3 invasive ductal carcinoma with 12/12 lymph nodes negative for malignancy.  She also underwent diagnostic laparoscopy which showed the cystic left ovarian mass but no evidence of metastatic disease.    On 05/13/2014 she underwent bilateral salpingo-oophorectomy, bilateral pelvic lymphadenectomy, limited periaortic lymphadenectomy, omentectomy, peritoneal washings, and appendectomy.  Pathology, reviewed at the [facility], showed serous borderline tumor, typical type.  All lymph nodes and pelvic washings were negative for malignancy.    In 07/2014 she began adjuvant right chest wall/axilla radiation.    She began endocrine therapy with anastrazole in mid 08/2014.     Patient was last seen on 02/03/2017. On that day, patient was advised to continue anastrozole, continue increased surveillance with alternating mammography and breast MRI, and return for follow up in 05/2017. On that day, she expressed an openness to prophylactic left mastectomy if she was a candidate for breast reconstruction. Patient failed to return for follow up in 05/2017 as recommended. She  also failed to return a call from the breast navigator to arrange evaluation by plastic surgery. She did have a left sided mammogram as previously ordered by me in 06/2017. Patient was on anastrozole at least through 02/2017 but it is not clear when she discontinued therapy.    Patient next presented to the ED on 11/30/2024 with complaints of generalized weakness, poor appetite, and dyspnea with exertion. Laboratory studies showed anemia, hyponatremia, and hypochlorhydria. CTA chest was negative for pulmonary embolism or metastatic disease but did show a 22.3 x 14.6 x 16.0 cm mass arising from the left breast with areas of necrosis, possible extension into the intracostal musculature, and mildly enlarged left axillary lymph nodes. Visualization of left breast showed a large firm breast which an open area laterally with malodorous drainage. CT abdomen/pelvis w contrast on 12/01/2024 was negative for metastatic disease.     Patient reports that she first noticed a \"rash\" 1.5 months prior to presentation. She stated that 3 months prior to presentation, she did not notice any changes in the left breast. Notably, patient's last breast imaging prior to hospitalization was in 06/2017.    Biopsy of the left breast was performed on 12/02/2024. 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 Immunohistochemical studies were as follows: estrogen receptor 0%, progesterone receptor 0%, Her2 3+, Ki67 25%.     PET/CT scan on 02/09/2024 showed abnormal SUV uptake in the left breast mass peripherally, multiple left axillary lymph nodes, subpectoral lymph nodes, a subcentimeter mediastinal lymph node.     On 12/12/2024 she began neoadjuvant systemic therapy with TCHP.     History of Present Illness  Patient returns for follow up and treatment. She reports significant fatigue and decreased oral intake after last chemotherapy. She feels that her chronic peripheral neuropathy involving her hands and feet has  increased mildly. No nausea or vomiting. Loose stools twice daily.     Performance Status   Karnofsky 90% - Normal, only minor signs/symptoms.      Past Medical History (historical data, reviewed by physician)   Invasive ductal carcinoma, left breast (as above); invasive ductal carcinoma, right breast (as above); diabetes mellitus, asthma.     Past Surgical History (historical data, reviewed by physician)   Right mastectomy and axillary node dissection; D&C.     Family History (historical data, reviewed by physician)   A three generation pedigree was obtained. Ms. Arguelles’s father  at age 76 from an unknown cancer. He had one brother and no sisters. Ms. Arguelles’s paternal grandmother  in her late 40s or early 50s from unknown causes. Ms. Arguelles’s mother is 76 years-old and has no history of cancer. Ms. Arguelles’s maternal grandmother  at age 59 from a myocardial infarction. Ms. Arguelles’s maternal grandmother’s sister had breast cancer in her 40s and had a mastectomy. There is no other known family history of cancer. Please see the pedigree for additional family history information.     Social History (historical data, reviewed by physician)   Denies tobacco, alcohol, illicit drug use.    Current Medications    HYDROcodone-acetaminophen 5-325 MG Oral Tab Take 1-2 tablets by mouth every 4 (four) hours as needed for Pain. 30 tablet 0    gabapentin 600 MG Oral Tab TAKE 1 TABLET BY MOUTH THREE TIMES DAILY; TAKE AN EXTRA 600MG AS NEEDED FOR SEVERE PAIN 270 tablet 1    Insulin Glargine-yfgn 100 UNIT/ML Subcutaneous Solution Pen-injector Inject 20 Units into the skin nightly. 24 mL 0    prochlorperazine (COMPAZINE) 10 mg tablet Take 1 tablet (10 mg total) by mouth every 6 (six) hours as needed for Nausea. 30 tablet 3    ondansetron (ZOFRAN) 8 MG tablet Take 1 tablet (8 mg total) by mouth every 8 (eight) hours as needed for Nausea. 30 tablet 3    traMADol 50 MG Oral Tab Take 1 tablet (50 mg total) by mouth every  6 (six) hours as needed. 20 tablet 0    fluticasone-salmeterol (WIXELA INHUB) 100-50 MCG/ACT Inhalation Aerosol Powder, Breath Activated Inhale 1 puff into the lungs 2 (two) times daily. 3 each 0    Insulin Lispro, 1 Unit Dial, 100 UNIT/ML Subcutaneous Solution Pen-injector Inject 10 Units into the skin in the morning, at noon, and at bedtime. 3 mL 0    Budesonide-Formoterol Fumarate 160-4.5 MCG/ACT Inhalation Aerosol Inhale 2 puffs into the lungs 2 (two) times daily. (Patient taking differently: Inhale 2 puffs into the lungs 2 (two) times daily. Pt states she would like to go back to budesonide) 3 each 1    albuterol (2.5 MG/3ML) 0.083% Inhalation Nebu Soln Take 3 mL (2.5 mg total) by nebulization every 4 (four) hours as needed for Wheezing or Shortness of Breath. 90 mL 0    Insulin Pen Needle (BD PEN NEEDLE DANIELA U/F) 32G X 4 MM Does not apply Misc Up to  each 2    montelukast 10 MG Oral Tab Take 1 tablet (10 mg total) by mouth nightly. 90 tablet 1      Allergies   Ms. Arguelles is allergic to fish, fish oil, levemir, and seasonal.    Vital Signs   Height: 158.6 cm (5' 2.44\") (01/02 1121)  Weight: 62.1 kg (137 lb) (01/02 1121)  BSA (Calculated - sq m): 1.64 sq meters (01/02 1121)  Pulse: 103 (01/02 1121)  BP: 125/88 (01/02 1121)  Temp: 97.7 °F (36.5 °C) (01/02 1121)  Do Not Use - Resp Rate: --  SpO2: 93 % (01/02 1121)     Physical Examination  Constitutional  Well developed and well nourished; in no apparent distress.  Head   Normocephalic and atraumatic.  Eyes   Conjunctiva clear; sclera anicteric.  ENMT   External nose normal; external ears normal.   Breasts   Left breast with large breast mass that appears smaller than before chemotherapy.   Respiratory  Normal effort; no respiratory distress; clear to auscultation bilaterally.  Cardiovascular  Regular rate and rhythm; normal S1S2.  Abdomen  Not distended.   Neurologic  Motor and sensory grossly intact.  Psychiatric  Mood and affect  appropriate.    Laboratory  Recent Results (from the past 72 hours)   CBC W/DIFF [E]    Collection Time: 12/31/24 11:40 AM   Result Value Ref Range    WBC 4.1 4.0 - 11.0 x10(3) uL    RBC 2.62 (L) 3.80 - 5.30 x10(6)uL    HGB 6.8 (LL) 12.0 - 16.0 g/dL    HCT 22.2 (L) 35.0 - 48.0 %    PLT 75.0 (L) 150.0 - 450.0 10(3)uL    Immature Platelet Fraction 3.5 0.0 - 7.0 %    MCV 84.7 80.0 - 100.0 fL    MCH 26.0 26.0 - 34.0 pg    MCHC 30.6 (L) 31.0 - 37.0 g/dL    RDW 20.9 %    Neutrophil Absolute Prelim 2.98 1.50 - 7.70 x10 (3) uL    Neutrophil Absolute 2.98 1.50 - 7.70 x10(3) uL    Lymphocyte Absolute 0.78 (L) 1.00 - 4.00 x10(3) uL    Monocyte Absolute 0.33 0.10 - 1.00 x10(3) uL    Eosinophil Absolute 0.01 0.00 - 0.70 x10(3) uL    Basophil Absolute 0.01 0.00 - 0.20 x10(3) uL    Immature Granulocyte Absolute 0.02 0.00 - 1.00 x10(3) uL    Neutrophil % 72.2 %    Lymphocyte % 18.9 %    Monocyte % 8.0 %    Eosinophil % 0.2 %    Basophil % 0.2 %    Immature Granulocyte % 0.5 %   ABORH (Blood Type)    Collection Time: 12/31/24 11:40 AM   Result Value Ref Range    ABO BLOOD TYPE A     RH BLOOD TYPE Positive    Antibody Screen    Collection Time: 12/31/24 11:40 AM   Result Value Ref Range    Antibody Screen Negative    Comp Metabolic Panel (14)    Collection Time: 12/31/24 11:40 AM   Result Value Ref Range    Glucose 162 (H) 70 - 99 mg/dL    Sodium 140 136 - 145 mmol/L    Potassium 3.4 (L) 3.5 - 5.1 mmol/L    Chloride 102 98 - 112 mmol/L    CO2 28.0 21.0 - 32.0 mmol/L    Anion Gap 10 0 - 18 mmol/L    BUN 7 (L) 9 - 23 mg/dL    Creatinine 0.70 0.55 - 1.02 mg/dL    Calcium, Total 9.0 8.7 - 10.4 mg/dL    Calculated Osmolality 292 275 - 295 mOsm/kg    eGFR-Cr 98 >=60 mL/min/1.73m2    AST 24 <34 U/L    ALT 8 (L) 10 - 49 U/L    Alkaline Phosphatase 125 50 - 130 U/L    Bilirubin, Total 0.3 0.2 - 1.1 mg/dL    Total Protein 6.4 5.7 - 8.2 g/dL    Albumin 3.2 3.2 - 4.8 g/dL    Globulin  3.2 2.0 - 3.5 g/dL    A/G Ratio 1.0 1.0 - 2.0    Patient  Fasting for CMP? No    Prepare RBC Once    Collection Time: 01/01/25 12:30 AM   Result Value Ref Range    Blood Product [id number]     Unit Number [id number]     UNIT ABO A     UNIT RH NEG     Product Status Presumed Transfused     Expiration Date [id number]     Blood Type Barcode 0600     Unit Volume 350 ml   MAGNESIUM [E]    Collection Time: 01/02/25 11:21 AM   Result Value Ref Range    Magnesium 1.4 (L) 1.6 - 2.6 mg/dL   CBC W/DIFF [E]    Collection Time: 01/02/25 11:21 AM   Result Value Ref Range    WBC 4.7 4.0 - 11.0 x10(3) uL    RBC 3.13 (L) 3.80 - 5.30 x10(6)uL    HGB 8.6 (L) 12.0 - 16.0 g/dL    HCT 26.0 (L) 35.0 - 48.0 %    .0 (L) 150.0 - 450.0 10(3)uL    MCV 83.1 80.0 - 100.0 fL    MCH 27.5 26.0 - 34.0 pg    MCHC 33.1 31.0 - 37.0 g/dL    RDW 20.6 %    Neutrophil Absolute Prelim 3.43 1.50 - 7.70 x10 (3) uL    Neutrophil Absolute 3.43 1.50 - 7.70 x10(3) uL    Lymphocyte Absolute 0.88 (L) 1.00 - 4.00 x10(3) uL    Monocyte Absolute 0.32 0.10 - 1.00 x10(3) uL    Eosinophil Absolute 0.02 0.00 - 0.70 x10(3) uL    Basophil Absolute 0.01 0.00 - 0.20 x10(3) uL    Immature Granulocyte Absolute 0.01 0.00 - 1.00 x10(3) uL    Neutrophil % 73.5 %    Lymphocyte % 18.8 %    Monocyte % 6.9 %    Eosinophil % 0.4 %    Basophil % 0.2 %    Immature Granulocyte % 0.2 %   BASIC METABOLIC PANEL [E]    Collection Time: 01/02/25 11:21 AM   Result Value Ref Range    Glucose 154 (H) 70 - 99 mg/dL    Sodium 142 136 - 145 mmol/L    Potassium 3.1 (L) 3.5 - 5.1 mmol/L    Chloride 105 98 - 112 mmol/L    CO2 29.0 21.0 - 32.0 mmol/L    Anion Gap 8 0 - 18 mmol/L    BUN 5 (L) 9 - 23 mg/dL    Creatinine 0.72 0.55 - 1.02 mg/dL    Calcium, Total 9.2 8.7 - 10.4 mg/dL    Calculated Osmolality 294 275 - 295 mOsm/kg    eGFR-Cr 95 >=60 mL/min/1.73m2    Patient Fasting for BMP? Patient not present       Impression and Plan  1.   Breast cancer, left sided: Patient is staged as T4N3M1 (one subcentimeter mediastinal lymph node). Her disease is  estrogen and progesterone receptor negative and Her2 3+.           Proceed with cycle 2 of TCHP. Because of transportation issues, will give peg-filgrastim on day 3 instead of day 2.    2.   Left breast wound: This is not a wound that will heal as it is due to necrotic tumor. However, patient should keep it dry and clean. To this end, we will arrange for either home health or wound care.     3.   Dehydration: Plan for IVF on day 4 and 6.     4.   Hypokalemia and hypomagnesemia: Will replace intravenously today.     Planned Follow up  Patient will return as above and in 3 weeks for next cycle.    Electronically Signed by:     Carl Lopez M.D.  System Medical Director, Oncology Services  [place] [facility]

## 2025-01-03 ENCOUNTER — TELEPHONE (OUTPATIENT)
Age: 60
End: 2025-01-03

## 2025-01-03 ENCOUNTER — APPOINTMENT (OUTPATIENT)
Age: 60
End: 2025-01-03
Attending: RADIOLOGY
Payer: COMMERCIAL

## 2025-01-03 NOTE — PROGRESS NOTES
Oncology Nutrition Consultation    Patient Name: Swathi Arguelles  YOB: 1965  Medical Record Number: [id number]   Account Number: [id number]  Dietitian: Destiny Penn RD, LDN    Date of visit: 1/2/2025    Diet Rx: high protein/quality as tolerated    Pertinent Dx/PMH: Invasive ductal carcinoma, left breast     Past Medical History:    Anemia    transfusions 9/13    Asthma (HCC)    Breast cancer (HCC)    right breast 9/13    Cancer (HCC)    breast    COVID-19    Tested 11/9/20    Heart murmur    History of blood transfusion    Murmur    benign murmur, unknown what type    Neuropathy    Personal history of antineoplastic chemotherapy    last chemo treatment    Pneumonia, organism unspecified(486)    Seasonal allergies    Type II or unspecified type diabetes mellitus without mention of complication, not stated as uncontrolled    Wheezing    related seasonal and fish allergies, takes inhalers       TX: TCHP    Other pertinent subjective/objective information:    Pertinent Meds:    Current Outpatient Medications:     HYDROcodone-acetaminophen 5-325 MG Oral Tab, Take 1-2 tablets by mouth every 4 (four) hours as needed for Pain., Disp: 30 tablet, Rfl: 0    gabapentin 600 MG Oral Tab, TAKE 1 TABLET BY MOUTH THREE TIMES DAILY; TAKE AN EXTRA 600MG AS NEEDED FOR SEVERE PAIN, Disp: 270 tablet, Rfl: 1    Insulin Glargine-yfgn 100 UNIT/ML Subcutaneous Solution Pen-injector, Inject 20 Units into the skin nightly., Disp: 24 mL, Rfl: 0    prochlorperazine (COMPAZINE) 10 mg tablet, Take 1 tablet (10 mg total) by mouth every 6 (six) hours as needed for Nausea., Disp: 30 tablet, Rfl: 3    ondansetron (ZOFRAN) 8 MG tablet, Take 1 tablet (8 mg total) by mouth every 8 (eight) hours as needed for Nausea., Disp: 30 tablet, Rfl: 3    traMADol 50 MG Oral Tab, Take 1 tablet (50 mg total) by mouth every 6 (six) hours as needed., Disp: 20 tablet, Rfl: 0    fluticasone-salmeterol (WIXELA INHUB) 100-50 MCG/ACT Inhalation Aerosol  Powder, Breath Activated, Inhale 1 puff into the lungs 2 (two) times daily., Disp: 3 each, Rfl: 0    Insulin Lispro, 1 Unit Dial, 100 UNIT/ML Subcutaneous Solution Pen-injector, Inject 10 Units into the skin in the morning, at noon, and at bedtime., Disp: 3 mL, Rfl: 0    Budesonide-Formoterol Fumarate 160-4.5 MCG/ACT Inhalation Aerosol, Inhale 2 puffs into the lungs 2 (two) times daily. (Patient taking differently: Inhale 2 puffs into the lungs 2 (two) times daily. Pt states she would like to go back to budesonide), Disp: 3 each, Rfl: 1    albuterol (2.5 MG/3ML) 0.083% Inhalation Nebu Soln, Take 3 mL (2.5 mg total) by nebulization every 4 (four) hours as needed for Wheezing or Shortness of Breath., Disp: 90 mL, Rfl: 0    Insulin Pen Needle (BD PEN NEEDLE DANIELA U/F) 32G X 4 MM Does not apply Misc, Up to TID, Disp: 200 each, Rfl: 2    montelukast 10 MG Oral Tab, Take 1 tablet (10 mg total) by mouth nightly., Disp: 90 tablet, Rfl: 1    Pertinent Labs: Noted    Height: 5'2.44\"            IBW: 115 +/- 10%    WT HX:   Wt Readings from Last 9 Encounters:   01/02/25 62.1 kg (137 lb)   12/19/24 63.4 kg (139 lb 12.8 oz)   12/12/24 68.4 kg (150 lb 12.8 oz)   12/10/24 68 kg (150 lb)   11/30/24 66.5 kg (146 lb 9.6 oz)   06/14/24 75.8 kg (167 lb)   12/22/23 82.6 kg (182 lb)   01/12/22 79.8 kg (176 lb)   03/30/21 78 kg (172 lb)       Estimated Nutrition Needs: 25-30 kcals/kg = 4195-3446 KCALS/d; 1.5 gms protein/kg =  93    gms/d    Services Provided: Verbal ix provided addressing -  importance of nutrition during tx; potential nutrition related side effects of tx and ways to address; samples/coupons for [employer] provided    Assessment/Plan: RD met w/ this pleasant, talkative, 60 y/o female and her daughter in tx room today as per noted unplanned wt loss.     Pt stated she feels appetite is improving and portions sizes are increased; however, pt noted eating only 1 meal/d but snacking a bit throughout the day. She follows a lacto-ovo  vegetarian diet. Pt noted being on insulin HS and has been avoiding foods containing high sugar content. She noted not doing w/ with hydration.     RD reviewed recommendations as noted requesting she consider increasing protein intake throughout the day offering potential options as well as reviewing use of ONS. RD also noted diabetes sick day management recommendations w/ potential need to adjust insulin management rather than restricting diet.     Pt expressed agreement verbalizing understanding of recommendations made.     RD offered support and will continue to monitor.     Thank you for allowing me to participate in the care of Swathi.     The 21st Century Cures Act makes medical notes like these available to patients in the interest of transparency. Please be advised this is a medical document. Medical documents are intended to carry relevant information, facts as evident, and the clinical opinion of the practitioner. The medical note is intended as peer to peer communication and may appear blunt or direct. It is written in medical language and may contain abbreviations or verbiage that are unfamiliar.

## 2025-01-03 NOTE — TELEPHONE ENCOUNTER
Received Family Medical Leave Act forms from office for patient, no authorization attached, sending [employer] to obtain Release of Information completion, logged for processing,     Also called patient to gather details, Left message to call back

## 2025-01-04 ENCOUNTER — OFFICE VISIT (OUTPATIENT)
Age: 60
End: 2025-01-04
Attending: RADIOLOGY
Payer: COMMERCIAL

## 2025-01-04 DIAGNOSIS — C50.812 MALIGNANT NEOPLASM OF OVERLAPPING SITES OF LEFT BREAST IN FEMALE, ESTROGEN RECEPTOR NEGATIVE (HCC): ICD-10-CM

## 2025-01-04 DIAGNOSIS — C77.3 SECONDARY MALIGNANT NEOPLASM OF AXILLARY LYMPH NODES (HCC): Primary | ICD-10-CM

## 2025-01-04 DIAGNOSIS — Z17.1 MALIGNANT NEOPLASM OF OVERLAPPING SITES OF LEFT BREAST IN FEMALE, ESTROGEN RECEPTOR NEGATIVE (HCC): ICD-10-CM

## 2025-01-04 NOTE — PROGRESS NOTES
Education Record    Learner:  Patient and Family Member    Disease / Diagnosis: injection    Barriers / Limitations:  None   Comments:    Method:  Brief focused   Comments:    General Topics:  Side effects and symptom management   Comments:    Outcome:  Shows understanding   Comments:

## 2025-01-05 ENCOUNTER — OFFICE VISIT (OUTPATIENT)
Age: 60
End: 2025-01-05
Attending: RADIOLOGY
Payer: COMMERCIAL

## 2025-01-05 VITALS
SYSTOLIC BLOOD PRESSURE: 120 MMHG | RESPIRATION RATE: 16 BRPM | OXYGEN SATURATION: 95 % | HEART RATE: 87 BPM | DIASTOLIC BLOOD PRESSURE: 78 MMHG | TEMPERATURE: 98 F

## 2025-01-05 DIAGNOSIS — C50.812 MALIGNANT NEOPLASM OF OVERLAPPING SITES OF LEFT BREAST IN FEMALE, ESTROGEN RECEPTOR NEGATIVE (HCC): ICD-10-CM

## 2025-01-05 DIAGNOSIS — Z17.1 MALIGNANT NEOPLASM OF OVERLAPPING SITES OF LEFT BREAST IN FEMALE, ESTROGEN RECEPTOR NEGATIVE (HCC): ICD-10-CM

## 2025-01-05 DIAGNOSIS — C50.811 CANCER OF OVERLAPPING SITES OF RIGHT BREAST (HCC): Primary | ICD-10-CM

## 2025-01-05 NOTE — PROGRESS NOTES
Education Record    Learner:  Patient and Family Member    Disease / Diagnosis: breast ca    Barriers / Limitations:  None   Comments:    Method:  Brief focused   Comments:    General Topics:  Plan of care reviewed   Comments:    Outcome:  Shows understanding   Comments:    Here for IVF. Tolerated without issue.

## 2025-01-07 ENCOUNTER — OFFICE VISIT (OUTPATIENT)
Age: 60
End: 2025-01-07
Attending: RADIOLOGY
Payer: COMMERCIAL

## 2025-01-07 VITALS
OXYGEN SATURATION: 95 % | SYSTOLIC BLOOD PRESSURE: 138 MMHG | WEIGHT: 136.81 LBS | DIASTOLIC BLOOD PRESSURE: 70 MMHG | HEART RATE: 96 BPM | RESPIRATION RATE: 18 BRPM | BODY MASS INDEX: 24.55 KG/M2 | TEMPERATURE: 98 F | HEIGHT: 62.44 IN

## 2025-01-07 DIAGNOSIS — C77.3 SECONDARY MALIGNANT NEOPLASM OF AXILLARY LYMPH NODES (HCC): Primary | ICD-10-CM

## 2025-01-07 DIAGNOSIS — Z17.1 MALIGNANT NEOPLASM OF OVERLAPPING SITES OF LEFT BREAST IN FEMALE, ESTROGEN RECEPTOR NEGATIVE (HCC): ICD-10-CM

## 2025-01-07 DIAGNOSIS — E83.42 HYPOMAGNESEMIA: ICD-10-CM

## 2025-01-07 DIAGNOSIS — C50.812 MALIGNANT NEOPLASM OF OVERLAPPING SITES OF LEFT BREAST IN FEMALE, ESTROGEN RECEPTOR NEGATIVE (HCC): Primary | ICD-10-CM

## 2025-01-07 DIAGNOSIS — E87.6 HYPOKALEMIA: ICD-10-CM

## 2025-01-07 DIAGNOSIS — E86.0 DEHYDRATION: ICD-10-CM

## 2025-01-07 DIAGNOSIS — Z17.1 MALIGNANT NEOPLASM OF OVERLAPPING SITES OF LEFT BREAST IN FEMALE, ESTROGEN RECEPTOR NEGATIVE (HCC): Primary | ICD-10-CM

## 2025-01-07 DIAGNOSIS — C50.812 MALIGNANT NEOPLASM OF OVERLAPPING SITES OF LEFT BREAST IN FEMALE, ESTROGEN RECEPTOR NEGATIVE (HCC): ICD-10-CM

## 2025-01-07 LAB
ANION GAP SERPL CALC-SCNC: 4 MMOL/L (ref 0–18)
BASOPHILS # BLD AUTO: 0.02 X10(3) UL (ref 0–0.2)
BASOPHILS NFR BLD AUTO: 1.8 %
BUN BLD-MCNC: 6 MG/DL (ref 9–23)
CALCIUM BLD-MCNC: 9.2 MG/DL (ref 8.7–10.4)
CHLORIDE SERPL-SCNC: 105 MMOL/L (ref 98–112)
CO2 SERPL-SCNC: 30 MMOL/L (ref 21–32)
CREAT BLD-MCNC: 0.56 MG/DL
EGFRCR SERPLBLD CKD-EPI 2021: 104 ML/MIN/1.73M2 (ref 60–?)
EOSINOPHIL # BLD AUTO: 0.07 X10(3) UL (ref 0–0.7)
EOSINOPHIL NFR BLD AUTO: 6.1 %
ERYTHROCYTE [DISTWIDTH] IN BLOOD BY AUTOMATED COUNT: 21.2 %
GLUCOSE BLD-MCNC: 101 MG/DL (ref 70–99)
HCT VFR BLD AUTO: 24.8 %
HGB BLD-MCNC: 8 G/DL
IMM GRANULOCYTES # BLD AUTO: 0.02 X10(3) UL (ref 0–1)
IMM GRANULOCYTES NFR BLD: 1.8 %
LYMPHOCYTES # BLD AUTO: 0.44 X10(3) UL (ref 1–4)
LYMPHOCYTES NFR BLD AUTO: 38.6 %
MAGNESIUM SERPL-MCNC: 1.5 MG/DL (ref 1.6–2.6)
MCH RBC QN AUTO: 27.5 PG (ref 26–34)
MCHC RBC AUTO-ENTMCNC: 32.3 G/DL (ref 31–37)
MCV RBC AUTO: 85.2 FL
MONOCYTES # BLD AUTO: 0.04 X10(3) UL (ref 0.1–1)
MONOCYTES NFR BLD AUTO: 3.5 %
NEUTROPHILS # BLD AUTO: 0.55 X10 (3) UL (ref 1.5–7.7)
NEUTROPHILS # BLD AUTO: 0.55 X10(3) UL (ref 1.5–7.7)
NEUTROPHILS NFR BLD AUTO: 48.2 %
OSMOLALITY SERPL CALC.SUM OF ELEC: 286 MOSM/KG (ref 275–295)
PLATELET # BLD AUTO: 204 10(3)UL (ref 150–450)
POTASSIUM SERPL-SCNC: 3.2 MMOL/L (ref 3.5–5.1)
RBC # BLD AUTO: 2.91 X10(6)UL
SODIUM SERPL-SCNC: 139 MMOL/L (ref 136–145)
WBC # BLD AUTO: 1.1 X10(3) UL (ref 4–11)

## 2025-01-07 RX ORDER — POTASSIUM CHLORIDE 750 MG/1
20 TABLET, EXTENDED RELEASE ORAL ONCE
Status: CANCELLED
Start: 2025-01-07 | End: 2025-01-07

## 2025-01-07 RX ORDER — POTASSIUM CHLORIDE 750 MG/1
20 TABLET, EXTENDED RELEASE ORAL ONCE
Status: COMPLETED | OUTPATIENT
Start: 2025-01-07 | End: 2025-01-07

## 2025-01-07 RX ADMIN — POTASSIUM CHLORIDE 20 MEQ: 750 TABLET, EXTENDED RELEASE ORAL at 10:45:00

## 2025-01-07 NOTE — PROGRESS NOTES
Education Record    Learner:  Patient and Family Member    Disease / Diagnosis:for IVFluids post chemo    Barriers / Limitations:  None   Comments:    Method:  Discussion   Comments:    General Topics:  Medication and Plan of care reviewed   Comments:    Outcome:  Shows understanding   Comments:Potassium 3.2, magnesium 1.5. 1 liter of ns given. 2g mg and 20 meq potassium ivpb given over 2 hours and tolerated. 20 meq potassium given orally as well.

## 2025-01-07 NOTE — PROGRESS NOTES
Chief Complaint   Patient presents with    Follow - Up     Pt is here for follow up and fluids; she was last treated 01/02/2025 C2 D1 TCHP. Eating and drinking despite poor appetite. Denies N,V,D,C. Energy improved, pain improved. Sleep ok; no fever or chills.    Education Record    Learner:  Patient and Family Member    Disease / Diagnosis: breast cancer    Barriers / Limitations:  None   Comments:    Method:  Brief focused   Comments:    General Topics:  Diet, Medication, Pain, Side effects and symptom management, and Plan of care reviewed   Comments:    Outcome:  Shows understanding   Comments:

## 2025-01-07 NOTE — PROGRESS NOTES
CHIEF COMPLAINT:     Chief Complaint   Patient presents with    Wound Care     Patient arrives for initial wound care appointment concerning a wound on her left breast. She has been dressing the area with multiple (5+) sacral foams with additional pieces of foam underneath. She has also been adding alginates.      HPI:   Information obtained from patient, daughter, chart  1-8-25 INITIAL:  Patient is a 58 yo female who was dx with right breast ca in 2013 and was lost to follow-up in 2017.  Patient next presented to the ED on 11/30/2024 with complaints of generalized weakness, poor appetite, and dyspnea with exertion. Laboratory studies showed anemia, hyponatremia, and hypochlorhydria. CTA chest was negative for pulmonary embolism or metastatic disease but did show a 22.3 x 14.6 x 16.0 cm mass arising from the left breast with areas of necrosis, possible extension into the intracostal musculature, and mildly enlarged left axillary lymph nodes. Visualization of left breast showed a large firm breast which an open area laterally with malodorous drainage. CT abdomen/pelvis w contrast on 12/01/2024 was negative for metastatic disease. Biopsy of the left breast was performed on 12/02/2024. 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 It was discussed with patient that the left breast wound will not heal and she is presenting today for assistance with management of the left breast wound.  There is significant malodor and necrotic, liquifing adipose tissue.  I was able to remove a large amount of it which will help with the drainage and malodor.  Patient has not been showering because she did not think she should get the wound wet.  The wound is not overly vascular.  We discussed utilizing dakins solution and a baby diaper.  Will order supplies for patient.     MEDICATIONS:     Current Outpatient Medications:     sodium hypochlorite 0.125 % External Solution, Moisten gauze with dakins, place  onto wound, cover with baby diaper three times a day, Disp: 1000 mL, Rfl: 3    HYDROcodone-acetaminophen 5-325 MG Oral Tab, Take 1-2 tablets by mouth every 4 (four) hours as needed for Pain., Disp: 30 tablet, Rfl: 0    gabapentin 600 MG Oral Tab, TAKE 1 TABLET BY MOUTH THREE TIMES DAILY; TAKE AN EXTRA 600MG AS NEEDED FOR SEVERE PAIN, Disp: 270 tablet, Rfl: 1    Insulin Glargine-yfgn 100 UNIT/ML Subcutaneous Solution Pen-injector, Inject 20 Units into the skin nightly., Disp: 24 mL, Rfl: 0    prochlorperazine (COMPAZINE) 10 mg tablet, Take 1 tablet (10 mg total) by mouth every 6 (six) hours as needed for Nausea., Disp: 30 tablet, Rfl: 3    ondansetron (ZOFRAN) 8 MG tablet, Take 1 tablet (8 mg total) by mouth every 8 (eight) hours as needed for Nausea., Disp: 30 tablet, Rfl: 3    traMADol 50 MG Oral Tab, Take 1 tablet (50 mg total) by mouth every 6 (six) hours as needed., Disp: 20 tablet, Rfl: 0    fluticasone-salmeterol (WIXELA INHUB) 100-50 MCG/ACT Inhalation Aerosol Powder, Breath Activated, Inhale 1 puff into the lungs 2 (two) times daily., Disp: 3 each, Rfl: 0    Insulin Lispro, 1 Unit Dial, 100 UNIT/ML Subcutaneous Solution Pen-injector, Inject 10 Units into the skin in the morning, at noon, and at bedtime., Disp: 3 mL, Rfl: 0    Budesonide-Formoterol Fumarate 160-4.5 MCG/ACT Inhalation Aerosol, Inhale 2 puffs into the lungs 2 (two) times daily. (Patient taking differently: Inhale 2 puffs into the lungs 2 (two) times daily. Pt states she would like to go back to budesonide), Disp: 3 each, Rfl: 1    albuterol (2.5 MG/3ML) 0.083% Inhalation Nebu Soln, Take 3 mL (2.5 mg total) by nebulization every 4 (four) hours as needed for Wheezing or Shortness of Breath., Disp: 90 mL, Rfl: 0    Insulin Pen Needle (BD PEN NEEDLE DANIELA U/F) 32G X 4 MM Does not apply Misc, Up to TID, Disp: 200 each, Rfl: 2    montelukast 10 MG Oral Tab, Take 1 tablet (10 mg total) by mouth nightly., Disp: 90 tablet, Rfl: 1  ALLERGIES:    Allergies[1]   REVIEW OF SYSTEMS:   This information was obtained from the patient/family and chart.    See HPI for pertinent positives, otherwise 10 pt ROS negative.    HISTORY:     Past Medical History:    Anemia    transfusions 9/13    Asthma (HCC)    Breast cancer (HCC)    right breast 9/13    Cancer (HCC)    breast    COVID-19    Tested 11/9/20    Heart murmur    History of blood transfusion    Murmur    benign murmur, unknown what type    Neuropathy    Personal history of antineoplastic chemotherapy    last chemo treatment    Pneumonia, organism unspecified(486)    Seasonal allergies    Type II or unspecified type diabetes mellitus without mention of complication, not stated as uncontrolled    Wheezing    related seasonal and fish allergies, takes inhalers     Past Surgical History:   Procedure Laterality Date    Access port      left chest port a cath    Breast biopsy  9/12/2013    Procedure: BREAST BIOPSY;  Surgeon: Yasmany Goode MD;  Location:  MAIN OR    D & c      Mastectomy modified radical  3/18/2014    Procedure: BREAST MODIFIED RADICAL MASTECTOMY;  Surgeon: Jose Luis Adam MD;  Location:  MAIN OR    Mastectomy right      3/18/14    Needle biopsy right      Other surgical history  4/30/2015    mediport removal    Radiation right      Total abdominal hysterectomy  5/13/2014    Procedure: ABDOMINAL HYSTERECTOMY TOTAL BSO STAGING;  Surgeon: Jose Luis Adam MD;  Location:  MAIN OR      Social History     Socioeconomic History    Marital status:     Number of children: 1   Occupational History    Occupation: RN , on leave of absence     Employer: [employer]   Tobacco Use    Smoking status: Never    Smokeless tobacco: Never   Vaping Use    Vaping status: Never Used   Substance and Sexual Activity    Alcohol use: No     Alcohol/week: 0.0 standard drinks of alcohol    Drug use: No   Other Topics Concern    Blood Transfusions Yes    Caffeine Concern No     Comment: occassional    Exercise  Yes     Comment: bike riding, walking, active   Social History Narrative    Lives alone in [place]     Social Drivers of Health     Financial Resource Strain: Low Risk  (12/4/2024)    Financial Resource Strain     Difficulty of Paying Living Expenses: Not hard at all   Food Insecurity: No Food Insecurity (11/30/2024)    Food Insecurity     Food Insecurity: Never true   Transportation Needs: No Transportation Needs (11/30/2024)    Transportation Needs     Lack of Transportation: No   Housing Stability: Low Risk  (11/30/2024)    Housing Stability     Housing Instability: No     PHYSICAL EXAM:     Vitals:    01/08/25 1412   BP: 140/77   Pulse: 105   Resp: 16   Temp: 98 °F (36.7 °C)      Estimated body mass index is 24.67 kg/m² as calculated from the following:    Height as of 1/7/25: 62.44\".    Weight as of 1/7/25: 136 lb 12.8 oz (62.1 kg).   POC Glucose   Date Value Ref Range Status   12/09/2024 95 70 - 99 mg/dL Final   12/03/2024 250 (H) 70 - 99 mg/dL Final   12/03/2024 136 (H) 70 - 99 mg/dL Final       Vital signs reviewed.Appears stated age, well groomed.    Constitutional:  Bp wnl for patient. Pulse Regular and wnl for patient. Respirations easy and unlabored. Temperature wnl. Weight normal for height. Appearance neat and clean. Appears in no acute distress. Well nourished and well developed.    Cardiovascular:  Heart rhythm and rate regular, without murmur or gallop.     Musculoskeletal:  Gait and station stable   Integumentary:  refer to wound characteristics and images   Psychiatric:  Judgment and insight intact. Alert and oriented times 3. No evidence of depression, anxiety, or agitation. Calm, cooperative, and communicative.   DIAGNOSTICS:     Lab Results   Component Value Date    BUN 6 (L) 01/07/2025    CREATSERUM 0.56 01/07/2025    GFRCKDEPI 105.47 04/18/2018    ALB 3.2 12/31/2024    TP 6.4 12/31/2024    A1C 6.5 (H) 11/30/2024       WOUND ASSESSMENT:     Wound 01/08/25 #1 Breast Left (Active)   Date First  Assessed/Time First Assessed: 01/08/25 1414    Wound Number (Wound Clinic Only): #1  Primary Wound Type: (c) Other (comment)  Location: Breast  Wound Location Orientation: Left      Assessments 1/8/2025  2:16 PM   Wound Image        Drainage Amount Large   Drainage Description Serous;Yellow   Wound Length (cm) 17 cm   Wound Width (cm) 30 cm   Wound Surface Area (cm^2) 510 cm^2   Wound Depth (cm) 1 cm   Wound Volume (cm^3) 510 cm^3   Margins Well-defined edges   Non-staged Wound Description Full thickness   Lora-wound Assessment Edema   Wound Granulation Tissue Red;Pink;Spongy   Wound Bed Granulation (%) 10 %   Wound Bed Epithelium (%) 15 %   Wound Bed Slough (%) 75 %   Wound Odor Strong       Active Orders   Date Order Priority Status Authorizing Provider   01/08/25 1618 Debridement Other (comment) Left Breast Routine Active Vira Alejandro APRN          PROCEDURE:      This procedure was medically necessary to promote wound healing by removing nonviable tissue, decrease chance of infection, and return the wound to an acute state.  See rn px note    ASSESSMENT AND PLAN:    1. Malignant neoplasm of overlapping sites of left breast in female, estrogen receptor negative (HCC)          Discussed with patient the aspects of wound healing including:  wound bed optimization including moist wound healing, removal of necrosis, bioburden control, monitoring for infection, and finally the patient as a whole including nutrition and increased protein intake and blood glucose control.  We specifically discussed that because this is cancer we are not going to \"heal\" the wound, but we can help manage the symptoms.      Risks, benefits, and alternatives of current treatment plan discussed in detail.  Questions and concerns addressed. Red flags to RTC or ED reviewed.  Patient (or parent) agrees to plan.      NOTE TO PATIENT: The 21st Century Cures Act makes clinical notes like these available to patients in the interest of  transparency. Clinical notes are medical documents used by physicians and care providers to communicate with each other. These documents include medical language and terminology, abbreviations, and treatment information that may sound technical and at times possibly unfamiliar. In addition, at times, the verbiage may appear blunt or direct. These documents are one tool providers use to communicate relevant information and clinical opinions of the care providers in a way that allows common understanding of the clinical context.   Patient new to clinic has seen [facility] providers previously.  I spent   45   minutes with the patient. This time included:    preparing to see the patient (eg, review notes and recent diagnostics),  seeing the patient, obtaining and/or reviewing separately obtained history, performing a medically appropriate examination and/or evaluation, counseling and educating the patient, documenting in the record.  Bill selective debridement only  DISCHARGE:      Patient Instructions   Please return:  1 week for RN visit for wound assessment.  If everything is going fine, you can cancel this appointment    2 weeks with Vira      Meds & Refills for this Visit:  Requested Prescriptions     Signed Prescriptions Disp Refills    sodium hypochlorite 0.125 % External Solution 1000 mL 3     Sig: Moisten gauze with dakins, place onto wound, cover with baby diaper three times a day     Patient discharge and wound care instructions  Swathi Arguelles  1/8/2025     You may shower and cleanse area with mild soap and water, Dakins, dab dry with gauze and apply your dressings as directed.     Changing your dressing:            three times a day    Wash your hands with soap and water.  Ensure that the old dressing is removed completely. Place it in a plastic bag and throw it in the trash.  Cleanse the wound with hypochlorous wound cleanser (ie. Anasept, vashe, pure and clean) .  It's ok to “scrub” your wound with the gauze,  small amount of bleeding with cleansing is normal and ok.  Apply the following dressings:    Moisten gauze with DAKINS solution, place onto wound, cover with baby diaper, hold in place with camisole.    Nutrition and blood sugar control:  Focus on the following:  Protein: Meats, beans, eggs, milk and yogurt particularly Greek yogurt), tofu, soy nuts, soy protein products (Follow the protein handout in your welcome folder)  Vitamin C: Citrus fruits and juices, strawberries, tomatoes, tomato juice, peppers, baked potatoes, spinach, broccoli, cauliflower, [place] sprouts, cabbage  Vitamin A: Dark green, leafy vegetables, orange or yellow vegetables, cantaloupe, fortified dairy products, liver, fortified cereals  Zinc: Fortified cereals, red meats, seafood  Consider supplementing with Maximino by [employer]. It can be purchased on amazon, [employer] website, or local pharmacy may be able to order it for you.  (These are essential branch chain amino acids that help with tissue building and wound healing).   When your blood sugar is consistently elevated greater than 180 your body can't heal or fight infection.      Concerns:  Signs of infection may include the following:  Increase in redness  Red \"streaks\" from wound  Increase in swelling  Fever  Unusual odor  Change in the amount of wound drainage     Should you experience any significant changes in your wound(s) or have any questions regarding your home care instructions please contact the [facility] @ [phone] If after regular business hours, please call your family doctor or local emergency room. The treatment plan has been discussed at length between you and your provider. Follow all instructions carefully, it is very important. If you do not follow all instructions you are at risk of your wound not healing, infection, possible loss of limb and even loss of life.    Vira Alejandro FNP-C, CWCN-AP, CFCN, CSWS, WCC, DWC  1/8/2025          [1]    Allergies  Allergen Reactions    Fish ANAPHYLAXIS and HIVES     All fish, Wheezing, short of breath    Fish Oil ANAPHYLAXIS and HIVES     All fish, Wheezing, short of breath   All fish, Wheezing, short of breath    Levemir HIVES and ITCHING    Seasonal WHEEZING and Runny nose     Ragweed, pollen

## 2025-01-07 NOTE — PROGRESS NOTES
Cancer Center Progress Note    Patient Name: Swathi Arguelles   YOB: 1965   Medical Record Number: [id number]   CSN: [id number]   Date of visit: 1/7/2025  Attending Oncology Physician:  Carl Lopez    NOTE TO PATIENT:  The 21st Century Cures Act makes medical notes like these available to patients in the interest of transparency. Please be advised this is a medical document. Medical documents are intended to carry relevant information, facts as evident, and the clinical opinion of the practitioner. The medical note is intended as peer to peer communication and may appear blunt or direct. It is written in medical language and may contain abbreviations or verbiage that are unfamiliar.      Chief Complaint:  Follow up      Oncologic History:  Swathi Arguelles is a 59 year old post-menopausal female admitted to the hospital on 09/10/2013 with symptomatic anemia.  On physical examination revealed a malodorous, draining ulcerating right breast mass.  Upon questioning, she admitted that she first noticed the mass in approximately 01/2013.  She states that her only mammogram was approximately at age 43 years.  Biopsy showed grade 3 infiltrating ductal carcinoma.  The tumor was estrogen receptor positive, progesterone receptor negative, and Her2/alberto negative.  CT scans of the chest, abdomen, pelvis showed multiple right axillary lymph nodes, two micronodules in the lungs of unknown significance, and a left sided large cystic adnexal mass.  At initial diagnosis CA 15-3 was 48.3 U/mL,  CA 27.29 was 91.7 U/mL, and  was 171.5 U/mL.  PET/CT showed abnormal FDG uptake in the ulcerating mass of the right breast/chest wall and in retropectoral and subclavicular lymph nodes.  There was no uptake in the cystic pelvic mass or in 3 subcentimeter pulmonary nodules.  Pelvic ultrasound showed a complex multiloculated left adnexal cyst with no separate identifiable ovarian tissue.  Noted was irregular thickening of one of  the cyst walls that was worrisome for a cystic ovarian neoplasm.    Patient was premenopausal at diagnosis     On 10/04/2013 she began dose dense doxorubicin and cyclophosphamide. Treatment was complicated by neutropenic fever, anemia requiring transfusion, dehydration, and prolonged thrombocytopenia. CT scans after cycle 4 showed a marked improvement in the breast/chest wall mass and axillary adenopathy. Tumor markers markedly improved. She then received weekly paclitaxel. Treatment was complicated by peripheral neuropathy and neutropenia.      During paclitaxel therapy, she consented to BRCA1/2 testing.  Results obtained on 02/11/2014 revealed the deleterious BRCA2 mutation [id number].     On 03/18/2014 she underwent right mastectomy with axillary node dissection.  Pathology showed 1.4 cm grade 3 invasive ductal carcinoma with 12/12 lymph nodes negative for malignancy.  She also underwent diagnostic laparoscopy which showed the cystic left ovarian mass but no evidence of metastatic disease.     On 05/13/2014 she underwent bilateral salpingo-oophorectomy, bilateral pelvic lymphadenectomy, limited periaortic lymphadenectomy, omentectomy, peritoneal washings, and appendectomy.  Pathology, reviewed at the [facility], showed serous borderline tumor, typical type.  All lymph nodes and pelvic washings were negative for malignancy.     In 07/2014 she began adjuvant right chest wall/axilla radiation.     She began endocrine therapy with anastrazole in mid 08/2014.      Patient was last seen on 02/03/2017. On that day, patient was advised to continue anastrozole, continue increased surveillance with alternating mammography and breast MRI, and return for follow up in 05/2017. On that day, she expressed an openness to prophylactic left mastectomy if she was a candidate for breast reconstruction. Patient failed to return for follow up in 05/2017 as recommended. She also failed to return a call from the breast  navigator to arrange evaluation by plastic surgery. She did have a left sided mammogram as previously ordered by me in 06/2017. Patient was on anastrozole at least through 02/2017 but it is not clear when she discontinued therapy.     Patient next presented to the ED on 11/30/2024 with complaints of generalized weakness, poor appetite, and dyspnea with exertion. Laboratory studies showed anemia, hyponatremia, and hypochlorhydria. CTA chest was negative for pulmonary embolism or metastatic disease but did show a 22.3 x 14.6 x 16.0 cm mass arising from the left breast with areas of necrosis, possible extension into the intracostal musculature, and mildly enlarged left axillary lymph nodes. Visualization of left breast showed a large firm breast which an open area laterally with malodorous drainage. CT abdomen/pelvis w contrast on 12/01/2024 was negative for metastatic disease.      Patient reports that she first noticed a \"rash\" 1.5 months prior to presentation. She stated that 3 months prior to presentation, she did not notice any changes in the left breast. Notably, patient's last breast imaging prior to hospitalization was in 06/2017.     Biopsy of the left breast was performed on 12/02/2024. 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 Immunohistochemical studies were as follows: estrogen receptor 0%, progesterone receptor 0%, Her2 3+, Ki67 25%.      PET/CT scan on 02/09/2024 showed abnormal SUV uptake in the left breast mass peripherally, multiple left axillary lymph nodes, subpectoral lymph nodes, a subcentimeter mediastinal lymph node.      On 12/12/2024 she began neoadjuvant systemic therapy with TCHP.     Interval Events:  59 year old  patient of Dr. Rees  who had C2 chemo on 1/2.  She presents for post chemo evaluation.  She received pegfilgrastim on 1/4.  She has lightheadedness upon rising.  She estimates she drinks less than 32 oz per day.  Appetite is ok.  Urine output  is normal.  No N/V/D.  She states she is [facility] for the left breast tumor-has some drainage, pain is controlled.  She states her energy is better though still relatively low.  Peripheral neuropathy pre-dated this chemo (from prior chemotherapy), minimal increase and not interfering with her ADLs.     Allergies:  Allergies[1]    Social History     Socioeconomic History    Marital status:     Number of children: 1   Occupational History    Occupation: RN , on leave of absence     Employer: [employer]   Tobacco Use    Smoking status: Never    Smokeless tobacco: Never   Vaping Use    Vaping status: Never Used   Substance and Sexual Activity    Alcohol use: No     Alcohol/week: 0.0 standard drinks of alcohol    Drug use: No   Other Topics Concern    Blood Transfusions Yes    Caffeine Concern No     Comment: occassional    Exercise Yes     Comment: bike riding, walking, active        Past Medical History:    Anemia    transfusions 9/13    Asthma (HCC)    Breast cancer (HCC)    right breast 9/13    Cancer (HCC)    breast    COVID-19    Tested 11/9/20    Heart murmur    History of blood transfusion    Murmur    benign murmur, unknown what type    Neuropathy    Personal history of antineoplastic chemotherapy    last chemo treatment    Pneumonia, organism unspecified(486)    Seasonal allergies    Type II or unspecified type diabetes mellitus without mention of complication, not stated as uncontrolled    Wheezing    related seasonal and fish allergies, takes inhalers        Past Surgical History:   Procedure Laterality Date    Access port      left chest port a cath    Breast biopsy  9/12/2013    Procedure: BREAST BIOPSY;  Surgeon: Yasmany Goode MD;  Location:  MAIN OR    D & c      Mastectomy modified radical  3/18/2014    Procedure: BREAST MODIFIED RADICAL MASTECTOMY;  Surgeon: Jose Luis Adam MD;  Location:  MAIN OR    Mastectomy right      3/18/14    Needle biopsy  right      Other surgical history  4/30/2015    mediport removal    Radiation right      Total abdominal hysterectomy  5/13/2014    Procedure: ABDOMINAL HYSTERECTOMY TOTAL BSO STAGING;  Surgeon: Jose Luis Adam MD;  Location:  MAIN OR          Vital Signs:  Height: 158.6 cm (5' 2.44\") (01/07 0952)  Weight: 62.1 kg (136 lb 12.8 oz) (01/07 0952)  BSA (Calculated - sq m): 1.64 sq meters (01/07 0952)  Pulse: 96 (01/07 0952)  BP: 138/70 (01/07 0952)  Temp: 97.6 °F (36.4 °C) (01/07 0952)  Do Not Use - Resp Rate: --  SpO2: 95 % (01/07 0952)        Medications:    Current Outpatient Medications:     HYDROcodone-acetaminophen 5-325 MG Oral Tab, Take 1-2 tablets by mouth every 4 (four) hours as needed for Pain., Disp: 30 tablet, Rfl: 0    gabapentin 600 MG Oral Tab, TAKE 1 TABLET BY MOUTH THREE TIMES DAILY; TAKE AN EXTRA 600MG AS NEEDED FOR SEVERE PAIN, Disp: 270 tablet, Rfl: 1    Insulin Glargine-yfgn 100 UNIT/ML Subcutaneous Solution Pen-injector, Inject 20 Units into the skin nightly., Disp: 24 mL, Rfl: 0    prochlorperazine (COMPAZINE) 10 mg tablet, Take 1 tablet (10 mg total) by mouth every 6 (six) hours as needed for Nausea., Disp: 30 tablet, Rfl: 3    ondansetron (ZOFRAN) 8 MG tablet, Take 1 tablet (8 mg total) by mouth every 8 (eight) hours as needed for Nausea., Disp: 30 tablet, Rfl: 3    traMADol 50 MG Oral Tab, Take 1 tablet (50 mg total) by mouth every 6 (six) hours as needed., Disp: 20 tablet, Rfl: 0    fluticasone-salmeterol (WIXELA INHUB) 100-50 MCG/ACT Inhalation Aerosol Powder, Breath Activated, Inhale 1 puff into the lungs 2 (two) times daily., Disp: 3 each, Rfl: 0    Insulin Lispro, 1 Unit Dial, 100 UNIT/ML Subcutaneous Solution Pen-injector, Inject 10 Units into the skin in the morning, at noon, and at bedtime., Disp: 3 mL, Rfl: 0    Budesonide-Formoterol Fumarate 160-4.5 MCG/ACT Inhalation Aerosol, Inhale 2 puffs into the lungs 2 (two) times daily. (Patient taking differently: Inhale 2 puffs into the  lungs 2 (two) times daily. Pt states she would like to go back to budesonide), Disp: 3 each, Rfl: 1    albuterol (2.5 MG/3ML) 0.083% Inhalation Nebu Soln, Take 3 mL (2.5 mg total) by nebulization every 4 (four) hours as needed for Wheezing or Shortness of Breath., Disp: 90 mL, Rfl: 0    Insulin Pen Needle (BD PEN NEEDLE DANIELA U/F) 32G X 4 MM Does not apply Misc, Up to TID, Disp: 200 each, Rfl: 2    montelukast 10 MG Oral Tab, Take 1 tablet (10 mg total) by mouth nightly., Disp: 90 tablet, Rfl: 1    Review of Systems:   As in HPI    Physical Examination:  General: Awake, alert, oriented x3, no acute distress.    HEENT:  Anicteric, conjunctivae and sclerae clear, no sinus tenderness, no oropharyngeal lesion/thrush, mucous membranes are moist.  Neck:  Supple, no tenderness, no masses or adenopathy  Breasts:  Left breast dressing is c/d/i   Lungs:  Clear to auscultation bilaterally  CV:  Regular rate and rhythm  Abdomen:  Non-distended, normoactive bowel sounds, soft,nontender, no hepatosplenomegaly.  Extremities:  No edema, no tenderness  Neuro:  CN 2-12 intact    ECO    Labs:  Recent Results (from the past 24 hours)   CBC W/DIFF [E]    Collection Time: 25  9:53 AM   Result Value Ref Range    WBC 1.1 (L) 4.0 - 11.0 x10(3) uL    RBC 2.91 (L) 3.80 - 5.30 x10(6)uL    HGB 8.0 (L) 12.0 - 16.0 g/dL    HCT 24.8 (L) 35.0 - 48.0 %    .0 150.0 - 450.0 10(3)uL    MCV 85.2 80.0 - 100.0 fL    MCH 27.5 26.0 - 34.0 pg    MCHC 32.3 31.0 - 37.0 g/dL    RDW 21.2 %    Neutrophil Absolute Prelim 0.55 (L) 1.50 - 7.70 x10 (3) uL    Neutrophil Absolute 0.55 (L) 1.50 - 7.70 x10(3) uL    Lymphocyte Absolute 0.44 (L) 1.00 - 4.00 x10(3) uL    Monocyte Absolute 0.04 (L) 0.10 - 1.00 x10(3) uL    Eosinophil Absolute 0.07 0.00 - 0.70 x10(3) uL    Basophil Absolute 0.02 0.00 - 0.20 x10(3) uL    Immature Granulocyte Absolute 0.02 0.00 - 1.00 x10(3) uL    Neutrophil % 48.2 %    Lymphocyte % 38.6 %    Monocyte % 3.5 %    Eosinophil % 6.1  %    Basophil % 1.8 %    Immature Granulocyte % 1.8 %   BASIC METABOLIC PANEL [E]    Collection Time: 01/07/25  9:53 AM   Result Value Ref Range    Glucose 101 (H) 70 - 99 mg/dL    Sodium 139 136 - 145 mmol/L    Potassium 3.2 (L) 3.5 - 5.1 mmol/L    Chloride 105 98 - 112 mmol/L    CO2 30.0 21.0 - 32.0 mmol/L    Anion Gap 4 0 - 18 mmol/L    BUN 6 (L) 9 - 23 mg/dL    Creatinine 0.56 0.55 - 1.02 mg/dL    Calcium, Total 9.2 8.7 - 10.4 mg/dL    Calculated Osmolality 286 275 - 295 mOsm/kg    eGFR-Cr 104 >=60 mL/min/1.73m2    Patient Fasting for BMP? Patient not present    MAGNESIUM [E]    Collection Time: 01/07/25  9:53 AM   Result Value Ref Range    Magnesium 1.5 (L) 1.6 - 2.6 mg/dL   Scan slide    Collection Time: 01/07/25  9:53 AM   Result Value Ref Range    Slide Review       Slide reviewed, normal WBC, RBC, and platelet morphology.             Impression/Plan:  Breast cancer, left sided: Patient is staged as T4N3M1 (one subcentimeter mediastinal lymph node). Her disease is estrogen and progesterone receptor negative and Her2 3+.            S/p cycle 2 of TCHP.      2.   Left breast wound: This is not a wound that will heal as it is due to necrotic tumor. Has appointment [facility] tomorrow.      3.   Dehydration: Hydration IVF today      4.   Hypokalemia and hypomagnesemia: Will replace intravenously today 20meq and 2gms respectively + oral KCL additional 20meq.       Emotional Well Being:  I have assessed the patient's emotional well-being and any concerns about anxiety or depression.  No acute psychosocial intervention required at this time.    Patient will continue to receive longitudinal care at the [facility] for the complex care required for the cancer diagnosis including the expected complications related to anticancer therapy.    Risk level:  High-breast cancer on cancer therapy, requiring intervention and close monitoring.    SHANA Tirado  [facility]  [facility]  [facility]         [1]   Allergies  Allergen Reactions    Fish ANAPHYLAXIS and HIVES     All fish, Wheezing, short of breath    Fish Oil ANAPHYLAXIS and HIVES     All fish, Wheezing, short of breath   All fish, Wheezing, short of breath    Levemir HIVES and ITCHING    Seasonal WHEEZING and Runny nose     Ragweed, pollen

## 2025-01-08 ENCOUNTER — OFFICE VISIT (OUTPATIENT)
Dept: WOUND CARE | Facility: HOSPITAL | Age: 60
End: 2025-01-08
Attending: NURSE PRACTITIONER
Payer: COMMERCIAL

## 2025-01-08 VITALS
SYSTOLIC BLOOD PRESSURE: 140 MMHG | DIASTOLIC BLOOD PRESSURE: 77 MMHG | HEART RATE: 105 BPM | TEMPERATURE: 98 F | RESPIRATION RATE: 16 BRPM

## 2025-01-08 DIAGNOSIS — C50.812 MALIGNANT NEOPLASM OF OVERLAPPING SITES OF LEFT BREAST IN FEMALE, ESTROGEN RECEPTOR NEGATIVE (HCC): Primary | ICD-10-CM

## 2025-01-08 DIAGNOSIS — Z17.1 MALIGNANT NEOPLASM OF OVERLAPPING SITES OF LEFT BREAST IN FEMALE, ESTROGEN RECEPTOR NEGATIVE (HCC): Primary | ICD-10-CM

## 2025-01-08 PROCEDURE — 97598 DBRDMT OPN WND ADDL 20CM/<: CPT | Performed by: NURSE PRACTITIONER

## 2025-01-08 PROCEDURE — 97597 DBRDMT OPN WND 1ST 20 CM/<: CPT | Performed by: NURSE PRACTITIONER

## 2025-01-08 RX ORDER — SODIUM HYPOCHLORITE 1.25 MG/ML
SOLUTION TOPICAL
Qty: 1000 ML | Refills: 3 | Status: SHIPPED | OUTPATIENT
Start: 2025-01-08

## 2025-01-08 NOTE — PROGRESS NOTES
Patient ID: Swathi Arguelles is a 59 year old female.    Debridement Other (comment) Left Breast   Wound 01/08/25 #1 Breast Left    Performed by: Vira Alejandro APRN  Authorized by: Vira Alejandro APRN      Consent   Consent obtained? verbal  Consent given by: patient    Debridement Details  Performed by: NP  Debridement type: conservative sharp  Pain control: lidocaine 4%  Pain control administration type: topical    Pre-debridement measurements  Length (cm): 17  Width (cm): 30  Depth (cm): 1  Surface Area (cm^2): 510    Post-debridement measurements  Length (cm): 17  Width (cm): 30  Depth (cm): 1.1  Percent debrided: 85%  Surface Area (cm^2): 510  Area Debrided (cm^2): 433.5  Volume (cm^3): 561    Devitalized tissue debrided: necrotic debris and necrotic adipose  Instrument(s) utilized: curette and forceps  Bleeding: none  Hemostasis obtained with: not applicable  Procedural pain (0-10): 0  Post-procedural pain: 0   Response to treatment: procedure was tolerated well

## 2025-01-08 NOTE — PATIENT INSTRUCTIONS
Please return:  1 week for RN visit for wound assessment.  If everything is going fine, you can cancel this appointment    2 weeks with Vira      Meds & Refills for this Visit:  Requested Prescriptions     Signed Prescriptions Disp Refills    sodium hypochlorite 0.125 % External Solution 1000 mL 3     Sig: Moisten gauze with dakins, place onto wound, cover with baby diaper three times a day     Patient discharge and wound care instructions  Swathi Arguelles  1/8/2025     You may shower and cleanse area with mild soap and water, Dakins, dab dry with gauze and apply your dressings as directed.     Changing your dressing:            three times a day    Wash your hands with soap and water.  Ensure that the old dressing is removed completely. Place it in a plastic bag and throw it in the trash.  Cleanse the wound with hypochlorous wound cleanser (ie. Anasept, vashe, pure and clean) .  It's ok to “scrub” your wound with the gauze, small amount of bleeding with cleansing is normal and ok.  Apply the following dressings:    Moisten gauze with DAKINS solution, place onto wound, cover with baby diaper, hold in place with camisole.    Nutrition and blood sugar control:  Focus on the following:  Protein: Meats, beans, eggs, milk and yogurt particularly Greek yogurt), tofu, soy nuts, soy protein products (Follow the protein handout in your welcome folder)  Vitamin C: Citrus fruits and juices, strawberries, tomatoes, tomato juice, peppers, baked potatoes, spinach, broccoli, cauliflower, [place] sprouts, cabbage  Vitamin A: Dark green, leafy vegetables, orange or yellow vegetables, cantaloupe, fortified dairy products, liver, fortified cereals  Zinc: Fortified cereals, red meats, seafood  Consider supplementing with Maximino by [employer]. It can be purchased on amazon, [employer] website, or local pharmacy may be able to order it for you.  (These are essential branch chain amino acids that help with tissue building and wound  healing).   When your blood sugar is consistently elevated greater than 180 your body can't heal or fight infection.      Concerns:  Signs of infection may include the following:  Increase in redness  Red \"streaks\" from wound  Increase in swelling  Fever  Unusual odor  Change in the amount of wound drainage     Should you experience any significant changes in your wound(s) or have any questions regarding your home care instructions please contact the [facility] @ [phone] If after regular business hours, please call your family doctor or local emergency room. The treatment plan has been discussed at length between you and your provider. Follow all instructions carefully, it is very important. If you do not follow all instructions you are at risk of your wound not healing, infection, possible loss of limb and even loss of life.

## 2025-01-09 ENCOUNTER — NURSE ONLY (OUTPATIENT)
Age: 60
End: 2025-01-09
Attending: SPECIALIST
Payer: COMMERCIAL

## 2025-01-09 DIAGNOSIS — C50.811 CANCER OF OVERLAPPING SITES OF RIGHT BREAST (HCC): Primary | ICD-10-CM

## 2025-01-09 DIAGNOSIS — C50.812 MALIGNANT NEOPLASM OF OVERLAPPING SITES OF LEFT BREAST IN FEMALE, ESTROGEN RECEPTOR NEGATIVE (HCC): ICD-10-CM

## 2025-01-09 DIAGNOSIS — Z17.1 MALIGNANT NEOPLASM OF OVERLAPPING SITES OF LEFT BREAST IN FEMALE, ESTROGEN RECEPTOR NEGATIVE (HCC): ICD-10-CM

## 2025-01-09 RX ORDER — SODIUM CHLORIDE 9 MG/ML
INJECTION, SOLUTION INTRAVENOUS ONCE
Start: 2025-01-09 | End: 2025-01-09

## 2025-01-09 RX ORDER — SODIUM CHLORIDE 9 MG/ML
INJECTION, SOLUTION INTRAVENOUS ONCE
Status: COMPLETED | OUTPATIENT
Start: 2025-01-09 | End: 2025-01-09

## 2025-01-09 RX ADMIN — SODIUM CHLORIDE: 9 INJECTION, SOLUTION INTRAVENOUS at 10:55:00

## 2025-01-09 NOTE — PROGRESS NOTES
Education Record    Learner:  Patient and Family Member    Disease / Diagnosis: IVF and PICC dressing change    Barriers / Limitations:  None   Comments:    Method:  Brief focused   Comments:    General Topics:  Procedure   Comments:    Outcome:  Shows understanding   Comments:

## 2025-01-13 ENCOUNTER — TELEPHONE (OUTPATIENT)
Dept: HEMATOLOGY/ONCOLOGY | Facility: HOSPITAL | Age: 60
End: 2025-01-13

## 2025-01-13 DIAGNOSIS — C50.812 MALIGNANT NEOPLASM OF OVERLAPPING SITES OF LEFT BREAST IN FEMALE, ESTROGEN RECEPTOR NEGATIVE (HCC): ICD-10-CM

## 2025-01-13 DIAGNOSIS — Z17.1 MALIGNANT NEOPLASM OF OVERLAPPING SITES OF LEFT BREAST IN FEMALE, ESTROGEN RECEPTOR NEGATIVE (HCC): ICD-10-CM

## 2025-01-13 DIAGNOSIS — C77.3 SECONDARY MALIGNANT NEOPLASM OF AXILLARY LYMPH NODES (HCC): ICD-10-CM

## 2025-01-13 NOTE — TELEPHONE ENCOUNTER
Patient returned call - pt states her appetite has been decreased, as well as her fluid intake. She reports over the last few days she has been drinking about 3 glasses of water per day. She feels very fatigued and \"dry.\" Discussed patient symptoms with DEXTER Almeida, per APN check BMP and Mg and have patient come in for IVF tomorrow. Offered patient 12:00pm appt tomorrow in [place] - pt agreeable. Message sent to scheduling to add patient's appt. She thanked me for the call.

## 2025-01-13 NOTE — TELEPHONE ENCOUNTER
[id number]  Swathi [phone]  Would like a call back she is trying to get a order for Normal Saline to hydrate  her would like to know if this could be done tomorrow. Thanks Marian

## 2025-01-14 ENCOUNTER — OFFICE VISIT (OUTPATIENT)
Age: 60
End: 2025-01-14
Attending: RADIOLOGY
Payer: COMMERCIAL

## 2025-01-14 ENCOUNTER — OFFICE VISIT (OUTPATIENT)
Age: 60
End: 2025-01-14
Attending: SPECIALIST
Payer: COMMERCIAL

## 2025-01-14 VITALS
TEMPERATURE: 98 F | HEART RATE: 106 BPM | OXYGEN SATURATION: 93 % | DIASTOLIC BLOOD PRESSURE: 73 MMHG | HEIGHT: 62.44 IN | WEIGHT: 134.38 LBS | RESPIRATION RATE: 14 BRPM | BODY MASS INDEX: 24.11 KG/M2 | SYSTOLIC BLOOD PRESSURE: 137 MMHG

## 2025-01-14 DIAGNOSIS — E83.42 HYPOMAGNESEMIA: ICD-10-CM

## 2025-01-14 DIAGNOSIS — E87.6 HYPOKALEMIA: ICD-10-CM

## 2025-01-14 DIAGNOSIS — C77.3 SECONDARY MALIGNANT NEOPLASM OF AXILLARY LYMPH NODES (HCC): ICD-10-CM

## 2025-01-14 DIAGNOSIS — C50.811 CANCER OF OVERLAPPING SITES OF RIGHT BREAST (HCC): Primary | ICD-10-CM

## 2025-01-14 DIAGNOSIS — Z17.1 MALIGNANT NEOPLASM OF OVERLAPPING SITES OF LEFT BREAST IN FEMALE, ESTROGEN RECEPTOR NEGATIVE (HCC): ICD-10-CM

## 2025-01-14 DIAGNOSIS — C50.812 MALIGNANT NEOPLASM OF OVERLAPPING SITES OF LEFT BREAST IN FEMALE, ESTROGEN RECEPTOR NEGATIVE (HCC): ICD-10-CM

## 2025-01-14 DIAGNOSIS — E86.0 DEHYDRATION: ICD-10-CM

## 2025-01-14 LAB
ANION GAP SERPL CALC-SCNC: 6 MMOL/L (ref 0–18)
BUN BLD-MCNC: <5 MG/DL (ref 9–23)
CALCIUM BLD-MCNC: 9.6 MG/DL (ref 8.7–10.6)
CHLORIDE SERPL-SCNC: 106 MMOL/L (ref 98–112)
CO2 SERPL-SCNC: 31 MMOL/L (ref 21–32)
CREAT BLD-MCNC: 0.74 MG/DL
EGFRCR SERPLBLD CKD-EPI 2021: 91 ML/MIN/1.73M2 (ref 60–?)
GLUCOSE BLD-MCNC: 116 MG/DL (ref 70–99)
MAGNESIUM SERPL-MCNC: 1.3 MG/DL (ref 1.6–2.6)
POTASSIUM SERPL-SCNC: 3.1 MMOL/L (ref 3.5–5.1)
SODIUM SERPL-SCNC: 143 MMOL/L (ref 136–145)

## 2025-01-14 RX ORDER — POTASSIUM CHLORIDE 750 MG/1
20 TABLET, EXTENDED RELEASE ORAL ONCE
Status: CANCELLED
Start: 2025-01-14 | End: 2025-01-14

## 2025-01-14 RX ORDER — SODIUM CHLORIDE 9 MG/ML
INJECTION, SOLUTION INTRAVENOUS ONCE
Status: COMPLETED | OUTPATIENT
Start: 2025-01-14 | End: 2025-01-14

## 2025-01-14 RX ORDER — POTASSIUM CHLORIDE 750 MG/1
20 TABLET, EXTENDED RELEASE ORAL ONCE
Status: COMPLETED | OUTPATIENT
Start: 2025-01-14 | End: 2025-01-14

## 2025-01-14 RX ORDER — SODIUM CHLORIDE 9 MG/ML
INJECTION, SOLUTION INTRAVENOUS ONCE
Start: 2025-01-14 | End: 2025-01-14

## 2025-01-14 RX ADMIN — POTASSIUM CHLORIDE 20 MEQ: 750 TABLET, EXTENDED RELEASE ORAL at 13:58:00

## 2025-01-14 RX ADMIN — SODIUM CHLORIDE: 9 INJECTION, SOLUTION INTRAVENOUS at 12:12:00

## 2025-01-14 NOTE — PROGRESS NOTES
Cancer Center Progress Note    Patient Name: Swathi Arguelles   YOB: 1965   Medical Record Number: [id number]   CSN: [id number]   Date of visit: 1/14/2025  Attending Oncology Physician:  Carl Lopez    NOTE TO PATIENT:  The 21st Century Cures Act makes medical notes like these available to patients in the interest of transparency. Please be advised this is a medical document. Medical documents are intended to carry relevant information, facts as evident, and the clinical opinion of the practitioner. The medical note is intended as peer to peer communication and may appear blunt or direct. It is written in medical language and may contain abbreviations or verbiage that are unfamiliar.      Chief Complaint:  Follow up      Oncologic History:  Swathi Arguelles is a 59 year old post-menopausal female admitted to the hospital on 09/10/2013 with symptomatic anemia.  On physical examination revealed a malodorous, draining ulcerating right breast mass.  Upon questioning, she admitted that she first noticed the mass in approximately 01/2013.  She states that her only mammogram was approximately at age 43 years.  Biopsy showed grade 3 infiltrating ductal carcinoma.  The tumor was estrogen receptor positive, progesterone receptor negative, and Her2/alberto negative.  CT scans of the chest, abdomen, pelvis showed multiple right axillary lymph nodes, two micronodules in the lungs of unknown significance, and a left sided large cystic adnexal mass.  At initial diagnosis CA 15-3 was 48.3 U/mL,  CA 27.29 was 91.7 U/mL, and  was 171.5 U/mL.  PET/CT showed abnormal FDG uptake in the ulcerating mass of the right breast/chest wall and in retropectoral and subclavicular lymph nodes.  There was no uptake in the cystic pelvic mass or in 3 subcentimeter pulmonary nodules.  Pelvic ultrasound showed a complex multiloculated left adnexal cyst with no separate identifiable ovarian tissue.  Noted was irregular thickening of one  of the cyst walls that was worrisome for a cystic ovarian neoplasm.    Patient was premenopausal at diagnosis     On 10/04/2013 she began dose dense doxorubicin and cyclophosphamide. Treatment was complicated by neutropenic fever, anemia requiring transfusion, dehydration, and prolonged thrombocytopenia. CT scans after cycle 4 showed a marked improvement in the breast/chest wall mass and axillary adenopathy. Tumor markers markedly improved. She then received weekly paclitaxel. Treatment was complicated by peripheral neuropathy and neutropenia.      During paclitaxel therapy, she consented to BRCA1/2 testing.  Results obtained on 02/11/2014 revealed the deleterious BRCA2 mutation [id number].     On 03/18/2014 she underwent right mastectomy with axillary node dissection.  Pathology showed 1.4 cm grade 3 invasive ductal carcinoma with 12/12 lymph nodes negative for malignancy.  She also underwent diagnostic laparoscopy which showed the cystic left ovarian mass but no evidence of metastatic disease.     On 05/13/2014 she underwent bilateral salpingo-oophorectomy, bilateral pelvic lymphadenectomy, limited periaortic lymphadenectomy, omentectomy, peritoneal washings, and appendectomy.  Pathology, reviewed at the [facility], showed serous borderline tumor, typical type.  All lymph nodes and pelvic washings were negative for malignancy.     In 07/2014 she began adjuvant right chest wall/axilla radiation.     She began endocrine therapy with anastrazole in mid 08/2014.      Patient was last seen on 02/03/2017. On that day, patient was advised to continue anastrozole, continue increased surveillance with alternating mammography and breast MRI, and return for follow up in 05/2017. On that day, she expressed an openness to prophylactic left mastectomy if she was a candidate for breast reconstruction. Patient failed to return for follow up in 05/2017 as recommended. She also failed to return a call from the breast  navigator to arrange evaluation by plastic surgery. She did have a left sided mammogram as previously ordered by me in 06/2017. Patient was on anastrozole at least through 02/2017 but it is not clear when she discontinued therapy.     Patient next presented to the ED on 11/30/2024 with complaints of generalized weakness, poor appetite, and dyspnea with exertion. Laboratory studies showed anemia, hyponatremia, and hypochlorhydria. CTA chest was negative for pulmonary embolism or metastatic disease but did show a 22.3 x 14.6 x 16.0 cm mass arising from the left breast with areas of necrosis, possible extension into the intracostal musculature, and mildly enlarged left axillary lymph nodes. Visualization of left breast showed a large firm breast which an open area laterally with malodorous drainage. CT abdomen/pelvis w contrast on 12/01/2024 was negative for metastatic disease.      Patient reports that she first noticed a \"rash\" 1.5 months prior to presentation. She stated that 3 months prior to presentation, she did not notice any changes in the left breast. Notably, patient's last breast imaging prior to hospitalization was in 06/2017.     Biopsy of the left breast was performed on 12/02/2024. 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 Immunohistochemical studies were as follows: estrogen receptor 0%, progesterone receptor 0%, Her2 3+, Ki67 25%.      PET/CT scan on 02/09/2024 showed abnormal SUV uptake in the left breast mass peripherally, multiple left axillary lymph nodes, subpectoral lymph nodes, a subcentimeter mediastinal lymph node.      On 12/12/2024 she began neoadjuvant systemic therapy with TCHP.     Interval Events:  60 year old  patient of Dr. Rees  who had C2 chemo on 1/2.  She presents for routine PICC line care however requested if she could receive hydration IVF.  She is unable to drink adequately- not thirsty, though she tries to drink despite lack of thirst but  unsuccessful to meet her hydration goal.  She had transient episode of lightheadedness upon rising.  She does not take BP medications.  She is afebrile.  No vomiting or significant diarrhea but she notes some loosening of her stools.  She has no SOB or cough.  [facility]rotic [facility].  She has a wound on the dorsal surface of the left foot-she hit her foot on an object.  This is healing without infectious complications.     Allergies:  Allergies[1]    Social History     Socioeconomic History    Marital status:     Number of children: 1   Occupational History    Occupation: RN , on leave of absence     Employer: [employer]   Tobacco Use    Smoking status: Never    Smokeless tobacco: Never   Vaping Use    Vaping status: Never Used   Substance and Sexual Activity    Alcohol use: No     Alcohol/week: 0.0 standard drinks of alcohol    Drug use: No   Other Topics Concern    Blood Transfusions Yes    Caffeine Concern No     Comment: occassional    Exercise Yes     Comment: bike riding, walking, active        Past Medical History:    Anemia    transfusions 9/13    Asthma (HCC)    Breast cancer (HCC)    right breast 9/13    Cancer (HCC)    breast    COVID-19    Tested 11/9/20    Heart murmur    History of blood transfusion    Murmur    benign murmur, unknown what type    Neuropathy    Personal history of antineoplastic chemotherapy    last chemo treatment    Pneumonia, organism unspecified(486)    Seasonal allergies    Type II or unspecified type diabetes mellitus without mention of complication, not stated as uncontrolled    Wheezing    related seasonal and fish allergies, takes inhalers        Past Surgical History:   Procedure Laterality Date    Access port      left chest port a cath    Breast biopsy  9/12/2013    Procedure: BREAST BIOPSY;  Surgeon: Yasmany Goode MD;  Location:  MAIN OR    D & c      Mastectomy modified radical  3/18/2014    Procedure: BREAST  MODIFIED RADICAL MASTECTOMY;  Surgeon: Jose Luis Adam MD;  Location:  MAIN OR    Mastectomy right      3/18/14    Needle biopsy right      Other surgical history  4/30/2015    mediport removal    Radiation right      Total abdominal hysterectomy  5/13/2014    Procedure: ABDOMINAL HYSTERECTOMY TOTAL BSO STAGING;  Surgeon: Jose Luis Adam MD;  Location:  MAIN OR          Vital Signs:  Height: 158.6 cm (5' 2.44\") (01/14 1209)  Weight: 61 kg (134 lb 6.4 oz) (01/14 1209)  BSA (Calculated - sq m): 1.62 sq meters (01/14 1209)  Pulse: 106 (01/14 1209)  BP: 137/73 (01/14 1209)  Temp: 98 °F (36.7 °C) (01/14 1209)  Do Not Use - Resp Rate: --  SpO2: 93 % (01/14 1209)        Medications:    Current Outpatient Medications:     sodium hypochlorite 0.125 % External Solution, Moisten gauze with dakins, place onto wound, cover with baby diaper three times a day, Disp: 1000 mL, Rfl: 3    HYDROcodone-acetaminophen 5-325 MG Oral Tab, Take 1-2 tablets by mouth every 4 (four) hours as needed for Pain., Disp: 30 tablet, Rfl: 0    gabapentin 600 MG Oral Tab, TAKE 1 TABLET BY MOUTH THREE TIMES DAILY; TAKE AN EXTRA 600MG AS NEEDED FOR SEVERE PAIN, Disp: 270 tablet, Rfl: 1    Insulin Glargine-yfgn 100 UNIT/ML Subcutaneous Solution Pen-injector, Inject 20 Units into the skin nightly., Disp: 24 mL, Rfl: 0    prochlorperazine (COMPAZINE) 10 mg tablet, Take 1 tablet (10 mg total) by mouth every 6 (six) hours as needed for Nausea., Disp: 30 tablet, Rfl: 3    ondansetron (ZOFRAN) 8 MG tablet, Take 1 tablet (8 mg total) by mouth every 8 (eight) hours as needed for Nausea., Disp: 30 tablet, Rfl: 3    traMADol 50 MG Oral Tab, Take 1 tablet (50 mg total) by mouth every 6 (six) hours as needed., Disp: 20 tablet, Rfl: 0    fluticasone-salmeterol (WIXELA INHUB) 100-50 MCG/ACT Inhalation Aerosol Powder, Breath Activated, Inhale 1 puff into the lungs 2 (two) times daily., Disp: 3 each, Rfl: 0    Insulin Lispro, 1 Unit Dial, 100 UNIT/ML Subcutaneous  Solution Pen-injector, Inject 10 Units into the skin in the morning, at noon, and at bedtime., Disp: 3 mL, Rfl: 0    Budesonide-Formoterol Fumarate 160-4.5 MCG/ACT Inhalation Aerosol, Inhale 2 puffs into the lungs 2 (two) times daily. (Patient taking differently: Inhale 2 puffs into the lungs 2 (two) times daily. Pt states she would like to go back to budesonide), Disp: 3 each, Rfl: 1    albuterol (2.5 MG/3ML) 0.083% Inhalation Nebu Soln, Take 3 mL (2.5 mg total) by nebulization every 4 (four) hours as needed for Wheezing or Shortness of Breath., Disp: 90 mL, Rfl: 0    Insulin Pen Needle (BD PEN NEEDLE DANIELA U/F) 32G X 4 MM Does not apply Misc, Up to TID, Disp: 200 each, Rfl: 2    montelukast 10 MG Oral Tab, Take 1 tablet (10 mg total) by mouth nightly., Disp: 90 tablet, Rfl: 1    Review of Systems:   As in HPI    Physical Examination:  General: Awake, alert, oriented x3, no acute distress.    HEENT:  Anicteric, conjunctivae and sclerae clear, no sinus tenderness, no oropharyngeal lesion/thrush, mucous membranes are moist.  Neck:  Supple, no tenderness, no masses or adenopathy  Breasts:  Left breast dressing is c/d/i   Lungs:  Clear to auscultation bilaterally  CV:  Regular rate and rhythm  Abdomen:  Non-distended, normoactive bowel sounds, soft,nontender, no hepatosplenomegaly.  Extremities: Blood blister on dorsal surface of left foot with surrounding new skin.  No edema, no tenderness  Neuro:  CN 2-12 intact    ECO    Labs:  Recent Results (from the past 24 hours)   BASIC METABOLIC PANEL [E]    Collection Time: 25 12:01 PM   Result Value Ref Range    Glucose 116 (H) 70 - 99 mg/dL    Sodium 143 136 - 145 mmol/L    Potassium 3.1 (L) 3.5 - 5.1 mmol/L    Chloride 106 98 - 112 mmol/L    CO2 31.0 21.0 - 32.0 mmol/L    Anion Gap 6 0 - 18 mmol/L    BUN <5 (L) 9 - 23 mg/dL    Creatinine 0.74 0.55 - 1.02 mg/dL    Calcium, Total 9.6 8.7 - 10.6 mg/dL    eGFR-Cr 91 >=60 mL/min/1.73m2    Patient Fasting for BMP?  Patient not present    MAGNESIUM [E]    Collection Time: 01/14/25 12:01 PM   Result Value Ref Range    Magnesium 1.3 (L) 1.6 - 2.6 mg/dL             Impression/Plan:  Breast cancer, left sided: Patient is staged as T4N3M1 (one subcentimeter mediastinal lymph node). Her disease is estrogen and progesterone receptor negative and Her2 3+.            S/p cycle 2 of TCHP on 1/2/25     2.   Dehydration: Hydration IVF today      3.   Hypokalemia and hypomagnesemia: Will replace intravenously today 20meq and 2gms respectively + oral KCL additional 20meq.       Emotional Well Being:  I have assessed the patient's emotional well-being and any concerns about anxiety or depression.  No acute psychosocial intervention required at this time.    Patient will continue to receive longitudinal [facility] at the [facility] for the complex care required for the cancer diagnosis including the expected complications related to anticancer therapy.    Risk level:  High-breast cancer on cancer therapy, requiring intervention and close monitoring.    SHANA Tirado  [facility]  [facility]         [1]   Allergies  Allergen Reactions    Fish ANAPHYLAXIS and HIVES     All fish, Wheezing, short of breath    Fish Oil ANAPHYLAXIS and HIVES     All fish, Wheezing, short of breath   All fish, Wheezing, short of breath    Levemir HIVES and ITCHING    Seasonal WHEEZING and Runny nose     Ragweed, pollen

## 2025-01-14 NOTE — PROGRESS NOTES
Pt arrived amb for IVF.  Lab drawn per APN order.  IVF given per MD order.  BMP showed low K+/Mag+, Ivonne, APN ordered K+/Mag+ infusion and oral K+.  Pt aware of plan.  Pt john well. Left amb without c/o with spouse.      Education Record    Learner:  Patient    Pt here for: IVF for chemo    Barriers / Limitations:  None    Method:  discussion    General Topics:  Plan of care reviewed    Outcome: Pt tolerated infusion with no c/o.

## 2025-01-15 ENCOUNTER — APPOINTMENT (OUTPATIENT)
Dept: WOUND CARE | Facility: HOSPITAL | Age: 60
End: 2025-01-15
Attending: NURSE PRACTITIONER
Payer: COMMERCIAL

## 2025-01-16 ENCOUNTER — NURSE ONLY (OUTPATIENT)
Age: 60
End: 2025-01-16
Attending: SPECIALIST
Payer: COMMERCIAL

## 2025-01-16 NOTE — PROGRESS NOTES
Education Record    Learner:  Patient    Disease / Diagnosis: breast cancer    Barriers / Limitations:  None   Comments:    Method:  Brief focused   Comments:    General Topics:  Plan of care reviewed   Comments:    Outcome:  Shows understanding   Comments:    PICC dressing changed without complication. Pt discharged in stable condition.

## 2025-01-22 ENCOUNTER — OFFICE VISIT (OUTPATIENT)
Dept: WOUND CARE | Facility: HOSPITAL | Age: 60
End: 2025-01-22
Attending: NURSE PRACTITIONER
Payer: COMMERCIAL

## 2025-01-22 VITALS
HEART RATE: 107 BPM | SYSTOLIC BLOOD PRESSURE: 120 MMHG | RESPIRATION RATE: 14 BRPM | DIASTOLIC BLOOD PRESSURE: 62 MMHG | TEMPERATURE: 98 F

## 2025-01-22 DIAGNOSIS — C50.812 MALIGNANT NEOPLASM OF OVERLAPPING SITES OF LEFT BREAST IN FEMALE, ESTROGEN RECEPTOR NEGATIVE (HCC): Primary | ICD-10-CM

## 2025-01-22 DIAGNOSIS — Z17.1 MALIGNANT NEOPLASM OF OVERLAPPING SITES OF LEFT BREAST IN FEMALE, ESTROGEN RECEPTOR NEGATIVE (HCC): Primary | ICD-10-CM

## 2025-01-22 PROCEDURE — 97598 DBRDMT OPN WND ADDL 20CM/<: CPT | Performed by: NURSE PRACTITIONER

## 2025-01-22 PROCEDURE — 99214 OFFICE O/P EST MOD 30 MIN: CPT

## 2025-01-22 PROCEDURE — 97597 DBRDMT OPN WND 1ST 20 CM/<: CPT | Performed by: NURSE PRACTITIONER

## 2025-01-22 RX ORDER — PALONOSETRON 0.05 MG/ML
0.25 INJECTION, SOLUTION INTRAVENOUS ONCE
Status: CANCELLED
Start: 2025-01-23 | End: 2025-01-23

## 2025-01-22 NOTE — PROGRESS NOTES
.Weekly Wound Education Note    Teaching Provided To: Patient  Training Topics: Discharge instructions;Dressing;Cleasing and general instructions  Training Method: Explain/Verbal;Written  Training Response: Patient responds and understands            Continue VASHE moistened gauze to all areas open, pack loosely one 4x4 gauze into most anterior portion of wound.  Cover with border foam, baby diaper, secure with medipore tape if needed.  Change dressings twice daily.  Supplies ordered.

## 2025-01-22 NOTE — PROGRESS NOTES
[facility]ology Oncology [facility] Progress Note       Patient Name: Swathi Arguelles   YOB: 1965  Medical Record Number: [id number]  Attending Physician: Carl Lopez M.D.     The 21st Century Cures Act makes medical notes like these available to patients in the interest of transparency. Please be advised this is a medical document. Medical documents are intended to carry relevant information, facts as evident, and the clinical opinion of the practitioner. The medical note is intended as peer to peer communication and may appear blunt or direct. It is written in medical language and may contain abbreviations or verbiage that are unfamiliar.      Date of Visit: 1/23/2025       Chief Complaint  Invasive ductal carcinoma, left breast - follow up and treatment.     Oncologic History  Swathi Arguelles is a 60 year old post-menopausal female admitted to the hospital on 09/10/2013 with symptomatic anemia.  On physical examination revealed a malodorous, draining ulcerating right breast mass.  Upon questioning, she admitted that she first noticed the mass in approximately 01/2013.  She states that her only mammogram was approximately at age 43 years.  Biopsy showed grade 3 infiltrating ductal carcinoma.  The tumor was estrogen receptor positive, progesterone receptor negative, and Her2/alberto negative.  CT scans of the chest, abdomen, pelvis showed multiple right axillary lymph nodes, two micronodules in the lungs of unknown significance, and a left sided large cystic adnexal mass.  At initial diagnosis CA 15-3 was 48.3 U/mL,  CA 27.29 was 91.7 U/mL, and  was 171.5 U/mL.  PET/CT showed abnormal FDG uptake in the ulcerating mass of the right breast/chest wall and in retropectoral and subclavicular lymph nodes.  There was no uptake in the cystic pelvic mass or in 3 subcentimeter pulmonary nodules.  Pelvic ultrasound showed a complex multiloculated left adnexal cyst with no separate identifiable  ovarian tissue.  Noted was irregular thickening of one of the cyst walls that was worrisome for a cystic ovarian neoplasm.    Patient was premenopausal at diagnosis    On 10/04/2013 she began dose dense doxorubicin and cyclophosphamide. Treatment was complicated by neutropenic fever, anemia requiring transfusion, dehydration, and prolonged thrombocytopenia. CT scans after cycle 4 showed a marked improvement in the breast/chest wall mass and axillary adenopathy. Tumor markers markedly improved. She then received weekly paclitaxel. Treatment was complicated by peripheral neuropathy and neutropenia.     During paclitaxel therapy, she consented to BRCA1/2 testing.  Results obtained on 02/11/2014 revealed the deleterious BRCA2 mutation [id number].    On 03/18/2014 she underwent right mastectomy with axillary node dissection.  Pathology showed 1.4 cm grade 3 invasive ductal carcinoma with 12/12 lymph nodes negative for malignancy.  She also underwent diagnostic laparoscopy which showed the cystic left ovarian mass but no evidence of metastatic disease.    On 05/13/2014 she underwent bilateral salpingo-oophorectomy, bilateral pelvic lymphadenectomy, limited periaortic lymphadenectomy, omentectomy, peritoneal washings, and appendectomy.  Pathology, reviewed at the [facility], showed serous borderline tumor, typical type.  All lymph nodes and pelvic washings were negative for malignancy.    In 07/2014 she began adjuvant right chest wall/axilla radiation.    She began endocrine therapy with anastrazole in mid 08/2014.     Patient was last seen on 02/03/2017. On that day, patient was advised to continue anastrozole, continue increased surveillance with alternating mammography and breast MRI, and return for follow up in 05/2017. On that day, she expressed an openness to prophylactic left mastectomy if she was a candidate for breast reconstruction. Patient failed to return for follow up in 05/2017 as recommended. She  also failed to return a call from the breast navigator to arrange evaluation by plastic surgery. She did have a left sided mammogram as previously ordered by me in 06/2017. Patient was on anastrozole at least through 02/2017 but it is not clear when she discontinued therapy.    Patient next presented to the ED on 11/30/2024 with complaints of generalized weakness, poor appetite, and dyspnea with exertion. Laboratory studies showed anemia, hyponatremia, and hypochlorhydria. CTA chest was negative for pulmonary embolism or metastatic disease but did show a 22.3 x 14.6 x 16.0 cm mass arising from the left breast with areas of necrosis, possible extension into the intracostal musculature, and mildly enlarged left axillary lymph nodes. Visualization of left breast showed a large firm breast which an open area laterally with malodorous drainage. CT abdomen/pelvis w contrast on 12/01/2024 was negative for metastatic disease.     Patient reports that she first noticed a \"rash\" 1.5 months prior to presentation. She stated that 3 months prior to presentation, she did not notice any changes in the left breast. Notably, patient's last breast imaging prior to hospitalization was in 06/2017.    Biopsy of the left breast was performed on 12/02/2024. 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 Immunohistochemical studies were as follows: estrogen receptor 0%, progesterone receptor 0%, Her2 3+, Ki67 25%.     PET/CT scan on 02/09/2024 showed abnormal SUV uptake in the left breast mass peripherally, multiple left axillary lymph nodes, subpectoral lymph nodes, a subcentimeter mediastinal lymph node.     On 12/12/2024 she began neoadjuvant systemic therapy with TCHP.     History of Present Illness  Patient returns for follow up and treatment. She states that with the additional scheduled fluids, she did much better with the last cycle. She states that her energy level is good. She is eating. Her  peripheral neuropathy is stable.     Performance Status   Karnofsky 90% - Normal, only minor signs/symptoms.      Past Medical History (historical data, reviewed by physician)   Invasive ductal carcinoma, left breast (as above); invasive ductal carcinoma, right breast (as above); diabetes mellitus, asthma.     Past Surgical History (historical data, reviewed by physician)   Right mastectomy and axillary node dissection; D&C.     Family History (historical data, reviewed by physician)   A three generation pedigree was obtained. Ms. Arguelles’s father  at age 76 from an unknown cancer. He had one brother and no sisters. Ms. Arguelles’s paternal grandmother  in her late 40s or early 50s from unknown causes. Ms. Arguelles’s mother is 76 years-old and has no history of cancer. Ms. Arguelles’s maternal grandmother  at age 59 from a myocardial infarction. Ms. Arguelles’s maternal grandmother’s sister had breast cancer in her 40s and had a mastectomy. There is no other known family history of cancer. Please see the pedigree for additional family history information.     Social History (historical data, reviewed by physician)   Denies tobacco, alcohol, illicit drug use.    Current Medications    sodium hypochlorite 0.125 % External Solution Moisten gauze with dakins, place onto wound, cover with baby diaper three times a day 1000 mL 3    HYDROcodone-acetaminophen 5-325 MG Oral Tab Take 1-2 tablets by mouth every 4 (four) hours as needed for Pain. 30 tablet 0    gabapentin 600 MG Oral Tab TAKE 1 TABLET BY MOUTH THREE TIMES DAILY; TAKE AN EXTRA 600MG AS NEEDED FOR SEVERE PAIN 270 tablet 1    Insulin Glargine-yfgn 100 UNIT/ML Subcutaneous Solution Pen-injector Inject 20 Units into the skin nightly. 24 mL 0    prochlorperazine (COMPAZINE) 10 mg tablet Take 1 tablet (10 mg total) by mouth every 6 (six) hours as needed for Nausea. 30 tablet 3    ondansetron (ZOFRAN) 8 MG tablet Take 1 tablet (8 mg total) by mouth every 8 (eight)  hours as needed for Nausea. 30 tablet 3    traMADol 50 MG Oral Tab Take 1 tablet (50 mg total) by mouth every 6 (six) hours as needed. 20 tablet 0    fluticasone-salmeterol (WIXELA INHUB) 100-50 MCG/ACT Inhalation Aerosol Powder, Breath Activated Inhale 1 puff into the lungs 2 (two) times daily. 3 each 0    Insulin Lispro, 1 Unit Dial, 100 UNIT/ML Subcutaneous Solution Pen-injector Inject 10 Units into the skin in the morning, at noon, and at bedtime. 3 mL 0    Budesonide-Formoterol Fumarate 160-4.5 MCG/ACT Inhalation Aerosol Inhale 2 puffs into the lungs 2 (two) times daily. (Patient taking differently: Inhale 2 puffs into the lungs 2 (two) times daily. Pt states she would like to go back to budesonide) 3 each 1    albuterol (2.5 MG/3ML) 0.083% Inhalation Nebu Soln Take 3 mL (2.5 mg total) by nebulization every 4 (four) hours as needed for Wheezing or Shortness of Breath. 90 mL 0    Insulin Pen Needle (BD PEN NEEDLE DANIELA U/F) 32G X 4 MM Does not apply Misc Up to  each 2    montelukast 10 MG Oral Tab Take 1 tablet (10 mg total) by mouth nightly. 90 tablet 1      Allergies   Ms. Arguelles is allergic to fish, fish oil, levemir, and seasonal.    Vital Signs   Height: 158.6 cm (5' 2.44\") (01/23 0910)  Weight: 62.7 kg (138 lb 3.2 oz) (01/23 0910)  BSA (Calculated - sq m): 1.64 sq meters (01/23 0910)  Pulse: 76 (01/23 1311)  BP: 126/66 (01/23 1311)  Temp: 98.4 °F (36.9 °C) (01/23 1311)  Do Not Use - Resp Rate: --  SpO2: 99 % (01/23 1311)     Physical Examination  Constitutional  Well developed and well nourished; in no apparent distress.  Head   Normocephalic and atraumatic.  Eyes   Conjunctiva clear; sclera anicteric.  ENMT   External nose normal; external ears normal.   BRespiratory  Normal effort; no respiratory distress; clear to auscultation bilaterally.  Cardiovascular  Regular rate and rhythm; normal S1S2.  Abdomen  Not distended.   Extremities  Mild bilateral lower extremity edema, left greater than right.    Neurologic  Motor and sensory grossly intact.  Psychiatric  Mood and affect appropriate.    Laboratory  Recent Results (from the past 72 hours)   MAGNESIUM [E]    Collection Time: 01/23/25  8:56 AM   Result Value Ref Range    Magnesium 1.6 1.6 - 2.6 mg/dL   CBC W Differential W Platelet    Collection Time: 01/23/25  8:56 AM   Result Value Ref Range    WBC 7.1 4.0 - 11.0 x10(3) uL    RBC 2.14 (L) 3.80 - 5.30 x10(6)uL    HGB 6.2 (LL) 12.0 - 16.0 g/dL    HCT 19.5 (L) 35.0 - 48.0 %    PLT 49.0 (L) 150.0 - 450.0 10(3)uL    Immature Platelet Fraction 2.5 0.0 - 7.0 %    MCV 91.1 80.0 - 100.0 fL    MCH 29.0 26.0 - 34.0 pg    MCHC 31.8 31.0 - 37.0 g/dL    RDW 26.5 %    Neutrophil Absolute Prelim 5.31 1.50 - 7.70 x10 (3) uL    Neutrophil Absolute 5.31 1.50 - 7.70 x10(3) uL    Lymphocyte Absolute 1.15 1.00 - 4.00 x10(3) uL    Monocyte Absolute 0.59 0.10 - 1.00 x10(3) uL    Eosinophil Absolute 0.01 0.00 - 0.70 x10(3) uL    Basophil Absolute 0.01 0.00 - 0.20 x10(3) uL    Immature Granulocyte Absolute 0.04 0.00 - 1.00 x10(3) uL    Neutrophil % 74.7 %    Lymphocyte % 16.2 %    Monocyte % 8.3 %    Eosinophil % 0.1 %    Basophil % 0.1 %    Immature Granulocyte % 0.6 %   Comp Metabolic Panel (14)    Collection Time: 01/23/25  8:56 AM   Result Value Ref Range    Glucose 105 (H) 70 - 99 mg/dL    Sodium 143 136 - 145 mmol/L    Potassium 3.6 3.5 - 5.1 mmol/L    Chloride 106 98 - 112 mmol/L    CO2 30.0 21.0 - 32.0 mmol/L    Anion Gap 7 0 - 18 mmol/L    BUN 7 (L) 9 - 23 mg/dL    Creatinine 0.63 0.55 - 1.02 mg/dL    Calcium, Total 9.1 8.7 - 10.6 mg/dL    Calculated Osmolality 294 275 - 295 mOsm/kg    eGFR-Cr 100 >=60 mL/min/1.73m2    AST 17 <34 U/L    ALT <7 (L) 10 - 49 U/L    Alkaline Phosphatase 106 50 - 130 U/L    Bilirubin, Total 0.3 0.2 - 1.1 mg/dL    Total Protein 6.2 5.7 - 8.2 g/dL    Albumin 3.5 3.2 - 4.8 g/dL    Globulin  2.7 2.0 - 3.5 g/dL    A/G Ratio 1.3 1.0 - 2.0    Patient Fasting for CMP? Patient not present    Scan slide     Collection Time: 01/23/25  8:56 AM   Result Value Ref Range    Slide Review Slide reviewed,morphology review added    RBC Morphology Scan    Collection Time: 01/23/25  8:56 AM   Result Value Ref Range    RBC Morphology See morphology below (A) Normal, Slide reviewed, see previous RBC morphology.    Platelet Morphology Normal Normal    Microcytosis 1+      Macrocytosis 1+     IRON AND TIBC [E]    Collection Time: 01/23/25  9:38 AM   Result Value Ref Range    Iron 49 (L) 50 - 170 ug/dL    Transferrin 162 (L) 250 - 380 mg/dL    Total Iron Binding Capacity 228 (L) 250 - 425 ug/dL    % Saturation 21 15 - 50 %   FERRITIN [E]    Collection Time: 01/23/25  9:38 AM   Result Value Ref Range    Ferritin 341 (H) 50 - 306 ng/mL   ABORH (Blood Type)    Collection Time: 01/23/25  9:38 AM   Result Value Ref Range    ABO BLOOD TYPE A     RH BLOOD TYPE Positive    Antibody Screen    Collection Time: 01/23/25  9:38 AM   Result Value Ref Range    Antibody Screen Negative    Prepare RBC Once    Collection Time: 01/23/25 11:05 AM   Result Value Ref Range    Blood Product [id number]     Unit Number [id number]     UNIT ABO A     UNIT RH POS     Product Status Issued     Expiration Date [id number]     Blood Type Barcode 6200     Unit Volume 350 ml     Impression and Plan  1.   Breast cancer, left sided: Patient is staged as T4N3M1 (one subcentimeter mediastinal lymph node). Her disease is estrogen and progesterone receptor negative and Her2 3+.           Hold cycle 3 today for thrombocytopenia. Plan for C3D1 in 1 week with dose reduction.     2.   Left breast wound: Patient is following with wound care.     3.   Anemia: Multifactorial. Transfuse 1 unit PRBC today.     Planned Follow up  Patient will return for follow up and treatment in 1 week.     Electronically Signed by:     Carl Lopez M.D.  System Medical Director, Oncology Services  [facility]

## 2025-01-22 NOTE — PROGRESS NOTES
Patient ID: Swathi Arguelles is a 60 year old female.    Debridement Other (comment) Left Breast   Wound 01/08/25 #1 Breast Left    Performed by: Vira Alejandro APRN  Authorized by: Vira Alejandro APRN      Consent   Consent obtained? verbal  Consent given by: patient    Debridement Details  Performed by: NP  Debridement type: conservative sharp  Pain control: lidocaine 4%  Pain control administration type: topical    Pre-debridement measurements  Length (cm): 15.2  Width (cm): 26  Depth (cm): 2  Surface Area (cm^2): 395.2    Post-debridement measurements  Length (cm): 15.2  Width (cm): 26  Depth (cm): 7  Percent debrided: 70%  Surface Area (cm^2): 395.2  Area Debrided (cm^2): 276.64  Volume (cm^3): 2766.4    Devitalized tissue debrided: biofilm, fibrin, necrotic debris, slough and necrotic adipose  Instrument(s) utilized: forceps and curette  Bleeding: small  Hemostasis obtained with: not applicable  Procedural pain (0-10): 0  Post-procedural pain: 0   Response to treatment: procedure was tolerated well      Photo unavailable

## 2025-01-22 NOTE — PATIENT INSTRUCTIONS
Please return:  1.5 weeks   Patient discharge and wound care instructions  Swathi Arguelles  1/22/2025       You may shower and cleanse area with mild soap and water, Dakins, dab dry with gauze and apply your dressings as directed.     Changing your dressing:            two- three times a day    Wash your hands with soap and water.  Ensure that the old dressing is removed completely. Place it in a plastic bag and throw it in the trash.  Cleanse the wound with hypochlorous wound cleanser (ie. Anasept, vashe, pure and clean) .  It's ok to “scrub” your wound with the gauze, small amount of bleeding with cleansing is normal and ok.  Apply the following dressings:     Moisten gauze with DAKINS solution, place into wound, cover with baby diaper or bordered zetuvit dressings    Nutrition and blood sugar control:  Focus on the following:  Protein: Meats, beans, eggs, milk and yogurt particularly Greek yogurt), tofu, soy nuts, soy protein products (Follow the protein handout in your welcome folder)  Vitamin C: Citrus fruits and juices, strawberries, tomatoes, tomato juice, peppers, baked potatoes, spinach, broccoli, cauliflower, [place] sprouts, cabbage  Vitamin A: Dark green, leafy vegetables, orange or yellow vegetables, cantaloupe, fortified dairy products, liver, fortified cereals  Zinc: Fortified cereals, red meats, seafood  Consider supplementing with Maximino by [employer]. It can be purchased on amazon, [employer] website, or local pharmacy may be able to order it for you.  (These are essential branch chain amino acids that help with tissue building and wound healing).   When your blood sugar is consistently elevated greater than 180 your body can't heal or fight infection.      Concerns:  Signs of infection may include the following:  Increase in redness  Red \"streaks\" from wound  Increase in swelling  Fever  Unusual odor  Change in the amount of wound drainage     Should you experience any significant changes in your  wound(s) or have any questions regarding your home care instructions please contact the wound center [facility] @ [phone] If after regular business hours, please call your family doctor or local emergency room. The treatment plan has been discussed at length between you and your provider. Follow all instructions carefully, it is very important. If you do not follow all instructions you are at risk of your wound not healing, infection, possible loss of limb and even loss of life.

## 2025-01-22 NOTE — PROGRESS NOTES
CHIEF COMPLAINT:     Chief Complaint   Patient presents with    Wound Care     Follow up for left breast wound. Pt stated that she was having trouble keeping dressing in place. Pt had to tape in place and use foam dressings to bottom of wound due to drainage.      HPI:   Information obtained from patient, daughter, chart  1-8-25 INITIAL:  Patient is a 60 yo female who was dx with right breast ca in 2013 and was lost to follow-up in 2017.  Patient next presented to the ED on 11/30/2024 with complaints of generalized weakness, poor appetite, and dyspnea with exertion. Laboratory studies showed anemia, hyponatremia, and hypochlorhydria. CTA chest was negative for pulmonary embolism or metastatic disease but did show a 22.3 x 14.6 x 16.0 cm mass arising from the left breast with areas of necrosis, possible extension into the intracostal musculature, and mildly enlarged left axillary lymph nodes. Visualization of left breast showed a large firm breast which an open area laterally with malodorous drainage. CT abdomen/pelvis w contrast on 12/01/2024 was negative for metastatic disease. Biopsy of the left breast was performed on 12/02/2024. 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 It was discussed with patient that the left breast wound will not heal and she is presenting today for assistance with management of the left breast wound.  There is significant malodor and necrotic, liquifing adipose tissue.  I was able to remove a large amount of it which will help with the drainage and malodor.  Patient has not been showering because she did not think she should get the wound wet.  The wound is not overly vascular.  We discussed utilizing dakins solution and a baby diaper.  Will order supplies for patient.     1-22-25 patient returns.  Since last visit patient was seen at cancer clinic for ivfs for dehydration as she has not been able to hydrate adequately.  patient's care plan of her breast  has included: dressing with dakins and using large bordered foam over twice daily. She states with the baby diaper she felt as though she was always getting wet and the camisole and the border dressings helps prevent leaking. Will order patient some border zetuvit dressings. She states she did not get any delievery. Rn will f/u with carmen. Today the lesion to the most anterior is now open with necrosis leaking out.  She states the malodor improved for a couple days after last appointment but has been slowly returning.  I again was able to remove a good amount of necrotic tissue which should help with the malodor.  We discussed that should the drainage start to slow, we can transition to metrogel (instead of the dakins). Patient has no c/o pain.    MEDICATIONS:     Current Outpatient Medications:     sodium hypochlorite 0.125 % External Solution, Moisten gauze with dakins, place onto wound, cover with baby diaper three times a day, Disp: 1000 mL, Rfl: 3    HYDROcodone-acetaminophen 5-325 MG Oral Tab, Take 1-2 tablets by mouth every 4 (four) hours as needed for Pain., Disp: 30 tablet, Rfl: 0    gabapentin 600 MG Oral Tab, TAKE 1 TABLET BY MOUTH THREE TIMES DAILY; TAKE AN EXTRA 600MG AS NEEDED FOR SEVERE PAIN, Disp: 270 tablet, Rfl: 1    Insulin Glargine-yfgn 100 UNIT/ML Subcutaneous Solution Pen-injector, Inject 20 Units into the skin nightly., Disp: 24 mL, Rfl: 0    prochlorperazine (COMPAZINE) 10 mg tablet, Take 1 tablet (10 mg total) by mouth every 6 (six) hours as needed for Nausea., Disp: 30 tablet, Rfl: 3    ondansetron (ZOFRAN) 8 MG tablet, Take 1 tablet (8 mg total) by mouth every 8 (eight) hours as needed for Nausea., Disp: 30 tablet, Rfl: 3    traMADol 50 MG Oral Tab, Take 1 tablet (50 mg total) by mouth every 6 (six) hours as needed., Disp: 20 tablet, Rfl: 0    fluticasone-salmeterol (WIXELA INHUB) 100-50 MCG/ACT Inhalation Aerosol Powder, Breath Activated, Inhale 1 puff into the lungs 2 (two) times daily.,  Disp: 3 each, Rfl: 0    Insulin Lispro, 1 Unit Dial, 100 UNIT/ML Subcutaneous Solution Pen-injector, Inject 10 Units into the skin in the morning, at noon, and at bedtime., Disp: 3 mL, Rfl: 0    Budesonide-Formoterol Fumarate 160-4.5 MCG/ACT Inhalation Aerosol, Inhale 2 puffs into the lungs 2 (two) times daily. (Patient taking differently: Inhale 2 puffs into the lungs 2 (two) times daily. Pt states she would like to go back to budesonide), Disp: 3 each, Rfl: 1    albuterol (2.5 MG/3ML) 0.083% Inhalation Nebu Soln, Take 3 mL (2.5 mg total) by nebulization every 4 (four) hours as needed for Wheezing or Shortness of Breath., Disp: 90 mL, Rfl: 0    Insulin Pen Needle (BD PEN NEEDLE DANIELA U/F) 32G X 4 MM Does not apply Misc, Up to TID, Disp: 200 each, Rfl: 2    montelukast 10 MG Oral Tab, Take 1 tablet (10 mg total) by mouth nightly., Disp: 90 tablet, Rfl: 1  ALLERGIES:   Allergies[1]   REVIEW OF SYSTEMS:   This information was obtained from the patient/family and chart.    See HPI for pertinent positives, otherwise 10 pt ROS negative.    HISTORY:   Past medical, surgical, family and social history updated where appropriate.      PHYSICAL EXAM:     Vitals:    01/22/25 1303   BP: 120/62   Pulse: 107   Resp: 14   Temp: 97.6 °F (36.4 °C)        Estimated body mass index is 24.24 kg/m² as calculated from the following:    Height as of 1/14/25: 62.44\".    Weight as of 1/14/25: 134 lb 6.4 oz (61 kg).   POC Glucose   Date Value Ref Range Status   12/09/2024 95 70 - 99 mg/dL Final   12/03/2024 250 (H) 70 - 99 mg/dL Final   12/03/2024 136 (H) 70 - 99 mg/dL Final       Vital signs reviewed.Appears stated age, well groomed.    Constitutional:  Bp wnl for patient. Pulse Regular and wnl for patient. Respirations easy and unlabored. Temperature wnl. Weight normal for height. Appearance neat and clean. Appears in no acute distress. Well nourished and well developed.    Musculoskeletal:  Gait and station stable   Integumentary:  refer  to wound characteristics and images   Psychiatric:  Judgment and insight intact. Alert and oriented times 3. No evidence of depression, anxiety, or agitation. Calm, cooperative, and communicative.   DIAGNOSTICS:     Lab Results   Component Value Date    BUN <5 (L) 01/14/2025    CREATSERUM 0.74 01/14/2025    GFRCKDEPI 105.47 04/18/2018    ALB 3.2 12/31/2024    TP 6.4 12/31/2024    A1C 6.5 (H) 11/30/2024       WOUND ASSESSMENT:     Wound 01/08/25 #1 Breast Left (Active)   Date First Assessed/Time First Assessed: 01/08/25 1414    Wound Number (Wound Clinic Only): #1  Primary Wound Type: (c) Other (comment)  Location: Breast  Wound Location Orientation: Left      Assessments 1/8/2025  2:16 PM 1/22/2025  1:06 PM   Wound Image           Drainage Amount Large Moderate   Drainage Description Serous;Yellow Serosanguineous   Wound Length (cm) 17 cm 15.2 cm   Wound Width (cm) 30 cm 26 cm   Wound Surface Area (cm^2) 510 cm^2 395.2 cm^2   Wound Depth (cm) 1 cm 2 cm   Wound Volume (cm^3) 510 cm^3 790.4 cm^3   Wound Healing % -- -55   Margins Well-defined edges Well-defined edges   Non-staged Wound Description Full thickness Full thickness   Lora-wound Assessment Edema Edema   Wound Granulation Tissue Red;Pink;Spongy Red;Spongy   Wound Bed Granulation (%) 10 % 30 %   Wound Bed Epithelium (%) 15 % 30 %   Wound Bed Slough (%) 75 % 40 %   Wound Odor Strong Mild   Shape -- Clustered       Active Orders   Date Order Priority Status Authorizing Provider   01/22/25 1503 Debridement Other (comment) Left Breast Routine Active Vira Alejandro APRN       Inactive Orders   Date Order Priority Status Authorizing Provider   01/08/25 1618 Debridement Other (comment) Left Breast Routine Completed Vira Alejandro APRN        PROCEDURE:      This procedure was medically necessary to promote wound healing by removing nonviable tissue, decrease chance of infection, and return the wound to an acute state.  See rn px note    S/p debridement the   anterior/superior wound with a depth of at least 7 cm. Large lateral wound also now with a saucerized type wound with depth of at least 5 cm    (Pictures not available)    ASSESSMENT AND PLAN:    1. Malignant neoplasm of overlapping sites of left breast in female, estrogen receptor negative (HCC)        Risks, benefits, and alternatives of current treatment plan discussed in detail.  Questions and concerns addressed. Red flags to RTC or ED reviewed.  Patient (or parent) agrees to plan.      NOTE TO PATIENT: The 21st Century Cures Act makes clinical notes like these available to patients in the interest of transparency. Clinical notes are medical documents used by physicians and care providers to communicate with each other. These documents include medical language and terminology, abbreviations, and treatment information that may sound technical and at times possibly unfamiliar. In addition, at times, the verbiage may appear blunt or direct. These documents are one tool providers use to communicate relevant information and clinical opinions of the care providers in a way that allows common understanding of the clinical context.    I spent  40 minutes with the patient. This time included:    preparing to see the patient (eg, review notes and recent diagnostics),  seeing the patient, obtaining and/or reviewing separately obtained history, performing a medically appropriate examination and/or evaluation, counseling and educating the patient, documenting in the record.  Bill debridement only  DISCHARGE:      Patient Instructions   Please return:  1.5 weeks   Patient discharge and wound care instructions  Swathi Arguelles  1/22/2025       You may shower and cleanse area with mild soap and water, Dakins, dab dry with gauze and apply your dressings as directed.     Changing your dressing:            two- three times a day    Wash your hands with soap and water.  Ensure that the old dressing is removed completely. Place it  in a plastic bag and throw it in the trash.  Cleanse the wound with hypochlorous wound cleanser (ie. Anasept, vashe, pure and clean) .  It's ok to “scrub” your wound with the gauze, small amount of bleeding with cleansing is normal and ok.  Apply the following dressings:     Moisten gauze with DAKINS solution, place into wound, cover with baby diaper or bordered zetuvit dressings    Nutrition and blood sugar control:  Focus on the following:  Protein: Meats, beans, eggs, milk and yogurt particularly Greek yogurt), tofu, soy nuts, soy protein products (Follow the protein handout in your welcome folder)  Vitamin C: Citrus fruits and juices, strawberries, tomatoes, tomato juice, peppers, baked potatoes, spinach, broccoli, cauliflower, [place] sprouts, cabbage  Vitamin A: Dark green, leafy vegetables, orange or yellow vegetables, cantaloupe, fortified dairy products, liver, fortified cereals  Zinc: Fortified cereals, red meats, seafood  Consider supplementing with Maximino by [employer]. It can be purchased on amazon, [employer] website, or local pharmacy may be able to order it for you.  (These are essential branch chain amino acids that help with tissue building and wound healing).   When your blood sugar is consistently elevated greater than 180 your body can't heal or fight infection.      Concerns:  Signs of infection may include the following:  Increase in redness  Red \"streaks\" from wound  Increase in swelling  Fever  Unusual odor  Change in the amount of wound drainage     Should you experience any significant changes in your wound(s) or have any questions regarding your home care instructions please contact the wound center [facility] @ [phone] If after regular business hours, please call your family doctor or local emergency room. The treatment plan has been discussed at length between you and your provider. Follow all instructions carefully, it is very important. If you do not follow all instructions you  are at risk of your wound not healing, infection, possible loss of limb and even loss of life.    Vira Alejandro FNP-C, CWCN-AP, CFCN, CSWS, WCC, DWC  1/22/2025          [1]   Allergies  Allergen Reactions    Fish ANAPHYLAXIS and HIVES     All fish, Wheezing, short of breath    Fish Oil ANAPHYLAXIS and HIVES     All fish, Wheezing, short of breath   All fish, Wheezing, short of breath    Levemir HIVES and ITCHING    Seasonal WHEEZING and Runny nose     Ragweed, pollen

## 2025-01-23 ENCOUNTER — OFFICE VISIT (OUTPATIENT)
Dept: HEMATOLOGY/ONCOLOGY | Facility: HOSPITAL | Age: 60
End: 2025-01-23
Attending: RADIOLOGY
Payer: COMMERCIAL

## 2025-01-23 ENCOUNTER — OFFICE VISIT (OUTPATIENT)
Age: 60
End: 2025-01-23
Attending: RADIOLOGY
Payer: COMMERCIAL

## 2025-01-23 VITALS
RESPIRATION RATE: 16 BRPM | HEART RATE: 76 BPM | SYSTOLIC BLOOD PRESSURE: 126 MMHG | HEIGHT: 62.44 IN | DIASTOLIC BLOOD PRESSURE: 66 MMHG | BODY MASS INDEX: 24.79 KG/M2 | WEIGHT: 138.19 LBS | OXYGEN SATURATION: 99 % | TEMPERATURE: 98 F

## 2025-01-23 DIAGNOSIS — C50.812 MALIGNANT NEOPLASM OF OVERLAPPING SITES OF LEFT BREAST IN FEMALE, ESTROGEN RECEPTOR NEGATIVE (HCC): ICD-10-CM

## 2025-01-23 DIAGNOSIS — C50.811 CANCER OF OVERLAPPING SITES OF RIGHT BREAST (HCC): ICD-10-CM

## 2025-01-23 DIAGNOSIS — D64.9 NORMOCYTIC NORMOCHROMIC ANEMIA: ICD-10-CM

## 2025-01-23 DIAGNOSIS — Z51.11 ENCOUNTER FOR CHEMOTHERAPY MANAGEMENT: ICD-10-CM

## 2025-01-23 DIAGNOSIS — Z17.1 MALIGNANT NEOPLASM OF OVERLAPPING SITES OF LEFT BREAST IN FEMALE, ESTROGEN RECEPTOR NEGATIVE (HCC): ICD-10-CM

## 2025-01-23 DIAGNOSIS — D64.9 NORMOCYTIC NORMOCHROMIC ANEMIA: Primary | ICD-10-CM

## 2025-01-23 DIAGNOSIS — C77.3 SECONDARY MALIGNANT NEOPLASM OF AXILLARY LYMPH NODES (HCC): ICD-10-CM

## 2025-01-23 DIAGNOSIS — C77.3 SECONDARY MALIGNANT NEOPLASM OF AXILLARY LYMPH NODES (HCC): Primary | ICD-10-CM

## 2025-01-23 LAB
ALBUMIN SERPL-MCNC: 3.5 G/DL (ref 3.2–4.8)
ALBUMIN/GLOB SERPL: 1.3 {RATIO} (ref 1–2)
ALP LIVER SERPL-CCNC: 106 U/L
ALT SERPL-CCNC: <7 U/L
ANION GAP SERPL CALC-SCNC: 7 MMOL/L (ref 0–18)
ANTIBODY SCREEN: NEGATIVE
AST SERPL-CCNC: 17 U/L (ref ?–34)
BASOPHILS # BLD AUTO: 0.01 X10(3) UL (ref 0–0.2)
BASOPHILS NFR BLD AUTO: 0.1 %
BILIRUB SERPL-MCNC: 0.3 MG/DL (ref 0.2–1.1)
BUN BLD-MCNC: 7 MG/DL (ref 9–23)
CALCIUM BLD-MCNC: 9.1 MG/DL (ref 8.7–10.6)
CHLORIDE SERPL-SCNC: 106 MMOL/L (ref 98–112)
CO2 SERPL-SCNC: 30 MMOL/L (ref 21–32)
CREAT BLD-MCNC: 0.63 MG/DL
DEPRECATED HBV CORE AB SER IA-ACNC: 341 NG/ML
EGFRCR SERPLBLD CKD-EPI 2021: 100 ML/MIN/1.73M2 (ref 60–?)
EOSINOPHIL # BLD AUTO: 0.01 X10(3) UL (ref 0–0.7)
EOSINOPHIL NFR BLD AUTO: 0.1 %
ERYTHROCYTE [DISTWIDTH] IN BLOOD BY AUTOMATED COUNT: 26.5 %
GLOBULIN PLAS-MCNC: 2.7 G/DL (ref 2–3.5)
GLUCOSE BLD-MCNC: 105 MG/DL (ref 70–99)
HCT VFR BLD AUTO: 19.5 %
HGB BLD-MCNC: 6.2 G/DL
IMM GRANULOCYTES # BLD AUTO: 0.04 X10(3) UL (ref 0–1)
IMM GRANULOCYTES NFR BLD: 0.6 %
IRON SATN MFR SERPL: 21 %
IRON SERPL-MCNC: 49 UG/DL
LYMPHOCYTES # BLD AUTO: 1.15 X10(3) UL (ref 1–4)
LYMPHOCYTES NFR BLD AUTO: 16.2 %
MAGNESIUM SERPL-MCNC: 1.6 MG/DL (ref 1.6–2.6)
MCH RBC QN AUTO: 29 PG (ref 26–34)
MCHC RBC AUTO-ENTMCNC: 31.8 G/DL (ref 31–37)
MCV RBC AUTO: 91.1 FL
MONOCYTES # BLD AUTO: 0.59 X10(3) UL (ref 0.1–1)
MONOCYTES NFR BLD AUTO: 8.3 %
NEUTROPHILS # BLD AUTO: 5.31 X10 (3) UL (ref 1.5–7.7)
NEUTROPHILS # BLD AUTO: 5.31 X10(3) UL (ref 1.5–7.7)
NEUTROPHILS NFR BLD AUTO: 74.7 %
OSMOLALITY SERPL CALC.SUM OF ELEC: 294 MOSM/KG (ref 275–295)
PLATELET # BLD AUTO: 49 10(3)UL (ref 150–450)
PLATELET MORPHOLOGY: NORMAL
PLATELETS.RETICULATED NFR BLD AUTO: 2.5 % (ref 0–7)
POTASSIUM SERPL-SCNC: 3.6 MMOL/L (ref 3.5–5.1)
PROT SERPL-MCNC: 6.2 G/DL (ref 5.7–8.2)
RBC # BLD AUTO: 2.14 X10(6)UL
RH BLOOD TYPE: POSITIVE
SODIUM SERPL-SCNC: 143 MMOL/L (ref 136–145)
TOTAL IRON BINDING CAPACITY: 228 UG/DL (ref 250–425)
TRANSFERRIN SERPL-MCNC: 162 MG/DL (ref 250–380)
WBC # BLD AUTO: 7.1 X10(3) UL (ref 4–11)

## 2025-01-23 RX ORDER — PALONOSETRON 0.05 MG/ML
0.25 INJECTION, SOLUTION INTRAVENOUS ONCE
OUTPATIENT
Start: 2025-01-30

## 2025-01-23 RX ORDER — ACETAMINOPHEN 325 MG/1
650 TABLET ORAL ONCE
Status: CANCELLED | OUTPATIENT
Start: 2025-01-23

## 2025-01-23 RX ORDER — ACETAMINOPHEN 325 MG/1
650 TABLET ORAL ONCE
Status: COMPLETED | OUTPATIENT
Start: 2025-01-23 | End: 2025-01-23

## 2025-01-23 RX ORDER — PALONOSETRON 0.05 MG/ML
0.25 INJECTION, SOLUTION INTRAVENOUS ONCE
Status: DISCONTINUED | OUTPATIENT
Start: 2025-01-23 | End: 2025-01-23

## 2025-01-23 RX ADMIN — ACETAMINOPHEN 650 MG: 325 TABLET ORAL at 10:17:00

## 2025-01-23 NOTE — TELEPHONE ENCOUNTER
Patient called and provided details.    Type of Leave: continuous  Reason for Leave: Chemo treatment  Start date of leave: 12/10/24  End date of leave: 3/10/25 pending re eval  How many flare ups per month/length?:  How many appts per month/length?:   Was Fee and Turnaround info Given?: Yes

## 2025-01-23 NOTE — PROGRESS NOTES
Education Record    Learner:  Patient/ family members    Disease / Diagnosis: left breast cancer   OTV D1C3 TCHP    Barriers / Limitations:  None   Comments:    Method:  Discussion   Comments:    General Topics:  Plan of care reviewed   Comments:    Outcome:  Shows understanding   Comments:   Pt feeling well, some fatigue, but not bad.   Occ diarrhea, usually just needs one dose of imodium.   Not needing pain medicine.   Neuropathy sl worse to hands and feet, not affecting ADL's  Denies rash/ cough SOB.   Eating and drinking well.   Reports that her left hand, and foot a little puffy last few days. PICC line in right arm  Has had the swelling in the past, and it comes and goes, no calf pain.

## 2025-01-23 NOTE — PROGRESS NOTES
Education Record    Learner:  Patient, Spouse, and Family Member    Disease / Diagnosis: Breast CA    Barriers / Limitations:  None   Comments:    Method:  Brief focused and Discussion   Comments:    General Topics:  Medication, Precautions, and Plan of care reviewed   Comments:    Outcome:  Unable to comprehend   Comments:    Patient hgb 6.2, plt 49. Per MD, no treatment today. Receiving 1 unit PRBC today, will return Tuesday for fluids per patient request (patient states she feels she does not drink enough fluid), and return 1 week for cll, md, poss chemo with dose reduction.

## 2025-01-23 NOTE — PROGRESS NOTES
Oncology Nutrition Consultation     Patient Name: Swathi Arguelles  YOB: 1965  Medical Record Number: [id number]            Account Number: [id number]  Dietitian: Destiny Penn RD, LDN     Date of visit: 1/23/2025     Diet Rx: high protein/quality as tolerated     Pertinent Dx/PMH: Invasive ductal carcinoma, left breast      Past Medical History       Past Medical History:    Anemia     transfusions 9/13    Asthma (HCC)    Breast cancer (HCC)     right breast 9/13    Cancer (HCC)     breast    COVID-19     Tested 11/9/20    Heart murmur    History of blood transfusion    Murmur     benign murmur, unknown what type    Neuropathy    Personal history of antineoplastic chemotherapy     last chemo treatment    Pneumonia, organism unspecified(486)    Seasonal allergies    Type II or unspecified type diabetes mellitus without mention of complication, not stated as uncontrolled    Wheezing     related seasonal and fish allergies, takes inhalers            TX: TCHP     Other pertinent subjective/objective information:     Pertinent Meds:    Medications - Current      Current Outpatient Medications:     HYDROcodone-acetaminophen 5-325 MG Oral Tab, Take 1-2 tablets by mouth every 4 (four) hours as needed for Pain., Disp: 30 tablet, Rfl: 0    gabapentin 600 MG Oral Tab, TAKE 1 TABLET BY MOUTH THREE TIMES DAILY; TAKE AN EXTRA 600MG AS NEEDED FOR SEVERE PAIN, Disp: 270 tablet, Rfl: 1    Insulin Glargine-yfgn 100 UNIT/ML Subcutaneous Solution Pen-injector, Inject 20 Units into the skin nightly., Disp: 24 mL, Rfl: 0    prochlorperazine (COMPAZINE) 10 mg tablet, Take 1 tablet (10 mg total) by mouth every 6 (six) hours as needed for Nausea., Disp: 30 tablet, Rfl: 3    ondansetron (ZOFRAN) 8 MG tablet, Take 1 tablet (8 mg total) by mouth every 8 (eight) hours as needed for Nausea., Disp: 30 tablet, Rfl: 3    traMADol 50 MG Oral Tab, Take 1 tablet (50 mg total) by mouth every 6 (six) hours as needed., Disp: 20 tablet, Rfl:  0    fluticasone-salmeterol (WIXELA INHUB) 100-50 MCG/ACT Inhalation Aerosol Powder, Breath Activated, Inhale 1 puff into the lungs 2 (two) times daily., Disp: 3 each, Rfl: 0    Insulin Lispro, 1 Unit Dial, 100 UNIT/ML Subcutaneous Solution Pen-injector, Inject 10 Units into the skin in the morning, at noon, and at bedtime., Disp: 3 mL, Rfl: 0    Budesonide-Formoterol Fumarate 160-4.5 MCG/ACT Inhalation Aerosol, Inhale 2 puffs into the lungs 2 (two) times daily. (Patient taking differently: Inhale 2 puffs into the lungs 2 (two) times daily. Pt states she would like to go back to budesonide), Disp: 3 each, Rfl: 1    albuterol (2.5 MG/3ML) 0.083% Inhalation Nebu Soln, Take 3 mL (2.5 mg total) by nebulization every 4 (four) hours as needed for Wheezing or Shortness of Breath., Disp: 90 mL, Rfl: 0    Insulin Pen Needle (BD PEN NEEDLE DANIELA U/F) 32G X 4 MM Does not apply Misc, Up to TID, Disp: 200 each, Rfl: 2    montelukast 10 MG Oral Tab, Take 1 tablet (10 mg total) by mouth nightly., Disp: 90 tablet, Rfl: 1        Pertinent Labs: Noted     Height: 5'2.44\"                     IBW: 115 +/- 10%     WT HX:   01/23/25 62.7 kg (138 lb 3.2 oz)   01/02/25 62.1 kg (137 lb)   12/19/24 63.4 kg (139 lb 12.8 oz)   12/12/24 68.4 kg (150 lb 12.8 oz)   12/10/24 68 kg (150 lb)   11/30/24 66.5 kg (146 lb 9.6 oz)   06/14/24 75.8 kg (167 lb)   12/22/23 82.6 kg (182 lb)   01/12/22 79.8 kg (176 lb)   03/30/21 78 kg (172 lb)         Estimated Nutrition Needs: 25-30 kcals/kg = 6190-4914 KCALS/d; 1.5 gms protein/kg =  93    gms/d     Assessment/Plan: RD f/u w/ pt/family in tx room today. Pt noting improved appetite, snacking more which helps w/ nausea/appetite. Pt noted she hasn't tried the Orgain as she has been eating better. She has also stopped needing HS insulin.     RD offered support and will remain available on consult.       The 21st Century Cures Act makes medical notes like these available to patients in the interest of  transparency. Please be advised this is a medical document. Medical documents are intended to carry relevant information, facts as evident, and the clinical opinion of the practitioner. The medical note is intended as peer to peer communication and may appear blunt or direct. It is written in medical language and may contain abbreviations or verbiage that are unfamiliar.

## 2025-01-24 LAB
BLOOD TYPE BARCODE: 6200
UNIT VOLUME: 350 ML

## 2025-01-27 NOTE — TELEPHONE ENCOUNTER
Dr. Lopez,       Please sign off on form if you agree to: Family Medical Leave Act Cont. & Intermittent 12/10/2024-03/10/2025   (place your signature on the first page only)    -From your Inbasket, Highlight the patient and click Chart   -Double click the 01/03/2025 Forms Completion telephone encounter  -Scroll down to the Media section   -Click the blue Hyperlink: Family Medical Leave Act Dr. Carl Lopez 01/27/2025  -Click Acknowledge located in the top right ribbon/menu   -Drag the mouse into the blank space of the document and a + sign will appear. Left click to   electronically sign the document.     Thank you,  Janine DODD

## 2025-01-27 NOTE — TELEPHONE ENCOUNTER
Family Medical Leave Act forms signed, no authorization on file sending [employer] message to adv patient. Archiving forms.

## 2025-01-28 ENCOUNTER — OFFICE VISIT (OUTPATIENT)
Age: 60
End: 2025-01-28
Attending: RADIOLOGY
Payer: COMMERCIAL

## 2025-01-28 VITALS
OXYGEN SATURATION: 96 % | DIASTOLIC BLOOD PRESSURE: 69 MMHG | TEMPERATURE: 98 F | SYSTOLIC BLOOD PRESSURE: 119 MMHG | HEART RATE: 87 BPM | RESPIRATION RATE: 16 BRPM

## 2025-01-28 DIAGNOSIS — Z17.1 MALIGNANT NEOPLASM OF OVERLAPPING SITES OF LEFT BREAST IN FEMALE, ESTROGEN RECEPTOR NEGATIVE (HCC): ICD-10-CM

## 2025-01-28 DIAGNOSIS — C50.811 CANCER OF OVERLAPPING SITES OF RIGHT BREAST (HCC): Primary | ICD-10-CM

## 2025-01-28 DIAGNOSIS — C50.812 MALIGNANT NEOPLASM OF OVERLAPPING SITES OF LEFT BREAST IN FEMALE, ESTROGEN RECEPTOR NEGATIVE (HCC): ICD-10-CM

## 2025-01-28 RX ORDER — SODIUM CHLORIDE 9 MG/ML
INJECTION, SOLUTION INTRAVENOUS ONCE
Start: 2025-01-28 | End: 2025-01-28

## 2025-01-28 RX ORDER — SODIUM CHLORIDE 9 MG/ML
INJECTION, SOLUTION INTRAVENOUS ONCE
Status: COMPLETED | OUTPATIENT
Start: 2025-01-28 | End: 2025-01-28

## 2025-01-28 RX ADMIN — SODIUM CHLORIDE: 9 INJECTION, SOLUTION INTRAVENOUS at 14:17:00

## 2025-01-28 NOTE — PROGRESS NOTES
Education Record    Learner:  Patient and Spouse    Disease / Diagnosis: breast ca - IVF    Barriers / Limitations:  None   Comments:    Method:  Brief focused   Comments:    General Topics:  Plan of care reviewed   Comments:    Outcome:  Shows understanding   Comments:

## 2025-01-29 NOTE — PROGRESS NOTES
[facility]ematology Oncology Group Progress Note       Patient Name: Swathi Arguelles   YOB: 1965  Medical Record Number: [id number]  Attending Physician: Carl Lopez M.D.     The 21st Century Cures Act makes medical notes like these available to patients in the interest of transparency. Please be advised this is a medical document. Medical documents are intended to carry relevant information, facts as evident, and the clinical opinion of the practitioner. The medical note is intended as peer to peer communication and may appear blunt or direct. It is written in medical language and may contain abbreviations or verbiage that are unfamiliar.      Date of Visit: 1/30/2025       Chief Complaint  Invasive ductal carcinoma, left breast - follow up and treatment.     Oncologic History  Swathi Arguelles is a 60 year old post-menopausal female admitted to the hospital on 09/10/2013 with symptomatic anemia.  On physical examination revealed a malodorous, draining ulcerating right breast mass.  Upon questioning, she admitted that she first noticed the mass in approximately 01/2013.  She states that her only mammogram was approximately at age 43 years.  Biopsy showed grade 3 infiltrating ductal carcinoma.  The tumor was estrogen receptor positive, progesterone receptor negative, and Her2/alberto negative.  CT scans of the chest, abdomen, pelvis showed multiple right axillary lymph nodes, two micronodules in the lungs of unknown significance, and a left sided large cystic adnexal mass.  At initial diagnosis CA 15-3 was 48.3 U/mL,  CA 27.29 was 91.7 U/mL, and  was 171.5 U/mL.  PET/CT showed abnormal FDG uptake in the ulcerating mass of the right breast/chest wall and in retropectoral and subclavicular lymph nodes.  There was no uptake in the cystic pelvic mass or in 3 subcentimeter pulmonary nodules.  Pelvic ultrasound showed a complex multiloculated left adnexal cyst with no separate identifiable  ovarian tissue.  Noted was irregular thickening of one of the cyst walls that was worrisome for a cystic ovarian neoplasm.    Patient was premenopausal at diagnosis    On 10/04/2013 she began dose dense doxorubicin and cyclophosphamide. Treatment was complicated by neutropenic fever, anemia requiring transfusion, dehydration, and prolonged thrombocytopenia. CT scans after cycle 4 showed a marked improvement in the breast/chest wall mass and axillary adenopathy. Tumor markers markedly improved. She then received weekly paclitaxel. Treatment was complicated by peripheral neuropathy and neutropenia.     During paclitaxel therapy, she consented to BRCA1/2 testing.  Results obtained on 02/11/2014 revealed the deleterious BRCA2 mutation [id number].    On 03/18/2014 she underwent right mastectomy with axillary node dissection.  Pathology showed 1.4 cm grade 3 invasive ductal carcinoma with 12/12 lymph nodes negative for malignancy.  She also underwent diagnostic laparoscopy which showed the cystic left ovarian mass but no evidence of metastatic disease.    On 05/13/2014 she underwent bilateral salpingo-oophorectomy, bilateral pelvic lymphadenectomy, limited periaortic lymphadenectomy, omentectomy, peritoneal washings, and appendectomy.  Pathology, reviewed at the [facility], showed serous borderline tumor, typical type.  All lymph nodes and pelvic washings were negative for malignancy.    In 07/2014 she began adjuvant right chest wall/axilla radiation.    She began endocrine therapy with anastrazole in mid 08/2014.     Patient was last seen on 02/03/2017. On that day, patient was advised to continue anastrozole, continue increased surveillance with alternating mammography and breast MRI, and return for follow up in 05/2017. On that day, she expressed an openness to prophylactic left mastectomy if she was a candidate for breast reconstruction. Patient failed to return for follow up in 05/2017 as recommended. She  also failed to return a call from the breast navigator to arrange evaluation by plastic surgery. She did have a left sided mammogram as previously ordered by me in 06/2017. Patient was on anastrozole at least through 02/2017 but it is not clear when she discontinued therapy.    Patient next presented to the ED on 11/30/2024 with complaints of generalized weakness, poor appetite, and dyspnea with exertion. Laboratory studies showed anemia, hyponatremia, and hypochlorhydria. CTA chest was negative for pulmonary embolism or metastatic disease but did show a 22.3 x 14.6 x 16.0 cm mass arising from the left breast with areas of necrosis, possible extension into the intracostal musculature, and mildly enlarged left axillary lymph nodes. Visualization of left breast showed a large firm breast which an open area laterally with malodorous drainage. CT abdomen/pelvis w contrast on 12/01/2024 was negative for metastatic disease.     Patient reports that she first noticed a \"rash\" 1.5 months prior to presentation. She stated that 3 months prior to presentation, she did not notice any changes in the left breast. Notably, patient's last breast imaging prior to hospitalization was in 06/2017.    Biopsy of the left breast was performed on 12/02/2024. 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 Immunohistochemical studies were as follows: estrogen receptor 0%, progesterone receptor 0%, Her2 3+, Ki67 25%.     PET/CT scan on 02/09/2024 showed abnormal SUV uptake in the left breast mass peripherally, multiple left axillary lymph nodes, subpectoral lymph nodes, a subcentimeter mediastinal lymph node.     On 12/12/2024 she began neoadjuvant systemic therapy with TCHP. Cycle 3 was delayed for thrombocytopenia.     History of Present Illness  Patient returns for follow up and treatment. She complains of persistent left lower extremity edema. She developed a wound due to trauma in the left leg which continues  to bleed; there is no pus. She states that her energy level is good. She is eating. Her peripheral neuropathy is stable.     Performance Status   Karnofsky 90% - Normal, only minor signs/symptoms.      Past Medical History (historical data, reviewed by physician)   Invasive ductal carcinoma, left breast (as above); invasive ductal carcinoma, right breast (as above); diabetes mellitus, asthma.     Past Surgical History (historical data, reviewed by physician)   Right mastectomy and axillary node dissection; D&C.     Family History (historical data, reviewed by physician)   A three generation pedigree was obtained. Ms. Arguelles’s father  at age 76 from an unknown cancer. He had one brother and no sisters. Ms. Arguelles’s paternal grandmother  in her late 40s or early 50s from unknown causes. Ms. Arguelles’s mother is 76 years-old and has no history of cancer. Ms. Arguelles’s maternal grandmother  at age 59 from a myocardial infarction. Ms. Arguelles’s maternal grandmother’s sister had breast cancer in her 40s and had a mastectomy. There is no other known family history of cancer. Please see the pedigree for additional family history information.     Social History (historical data, reviewed by physician)   Denies tobacco, alcohol, illicit drug use.    Current Medications    amoxicillin clavulanate 875-125 MG Oral Tab Take 1 tablet by mouth 2 (two) times daily. 20 tablet 0    sodium hypochlorite 0.125 % External Solution Moisten gauze with dakins, place onto wound, cover with baby diaper three times a day 1000 mL 3    HYDROcodone-acetaminophen 5-325 MG Oral Tab Take 1-2 tablets by mouth every 4 (four) hours as needed for Pain. 30 tablet 0    gabapentin 600 MG Oral Tab TAKE 1 TABLET BY MOUTH THREE TIMES DAILY; TAKE AN EXTRA 600MG AS NEEDED FOR SEVERE PAIN 270 tablet 1    Insulin Glargine-yfgn 100 UNIT/ML Subcutaneous Solution Pen-injector Inject 20 Units into the skin nightly. 24 mL 0    prochlorperazine (COMPAZINE)  10 mg tablet Take 1 tablet (10 mg total) by mouth every 6 (six) hours as needed for Nausea. 30 tablet 3    ondansetron (ZOFRAN) 8 MG tablet Take 1 tablet (8 mg total) by mouth every 8 (eight) hours as needed for Nausea. 30 tablet 3    traMADol 50 MG Oral Tab Take 1 tablet (50 mg total) by mouth every 6 (six) hours as needed. 20 tablet 0    fluticasone-salmeterol (WIXELA INHUB) 100-50 MCG/ACT Inhalation Aerosol Powder, Breath Activated Inhale 1 puff into the lungs 2 (two) times daily. 3 each 0    Insulin Lispro, 1 Unit Dial, 100 UNIT/ML Subcutaneous Solution Pen-injector Inject 10 Units into the skin in the morning, at noon, and at bedtime. 3 mL 0    Budesonide-Formoterol Fumarate 160-4.5 MCG/ACT Inhalation Aerosol Inhale 2 puffs into the lungs 2 (two) times daily. (Patient taking differently: Inhale 2 puffs into the lungs 2 (two) times daily. Pt states she would like to go back to budesonide) 3 each 1    albuterol (2.5 MG/3ML) 0.083% Inhalation Nebu Soln Take 3 mL (2.5 mg total) by nebulization every 4 (four) hours as needed for Wheezing or Shortness of Breath. 90 mL 0    Insulin Pen Needle (BD PEN NEEDLE DANIELA U/F) 32G X 4 MM Does not apply Misc Up to  each 2    montelukast 10 MG Oral Tab Take 1 tablet (10 mg total) by mouth nightly. 90 tablet 1      Allergies   Ms. Arguelles is allergic to fish, fish oil, levemir, and seasonal.    Vital Signs   Reviewed in electronic medical record.      Physical Examination  Constitutional  Well developed and well nourished; in no apparent distress.  Head   Normocephalic and atraumatic.  Eyes   Conjunctiva clear; sclera anicteric.  ENMT   External nose normal; external ears normal.  Lymphatics  No palpable left axillary adenopathy.   Respiratory  Normal effort; no respiratory distress; clear to auscultation bilaterally.  Cardiovascular  Regular rate and rhythm; normal S1S2.  Abdomen  Not distended.   Extremities  Mild bilateral lower extremity edema, left greater than right.  There is a raised wound with overlying scab in the distal left leg. There is surrounding increased pigmentation with warmth.   Neurologic  Motor and sensory grossly intact.  Psychiatric  Mood and affect appropriate.    Laboratory  Recent Results (from the past 72 hours)   MAGNESIUM [E]    Collection Time: 01/30/25  9:55 AM   Result Value Ref Range    Magnesium 1.6 1.6 - 2.6 mg/dL   CBC W Differential W Platelet    Collection Time: 01/30/25  9:55 AM   Result Value Ref Range    WBC 7.6 4.0 - 11.0 x10(3) uL    RBC 2.69 (L) 3.80 - 5.30 x10(6)uL    HGB 8.3 (L) 12.0 - 16.0 g/dL    HCT 25.6 (L) 35.0 - 48.0 %    .0 150.0 - 450.0 10(3)uL    MCV 95.2 80.0 - 100.0 fL    MCH 30.9 26.0 - 34.0 pg    MCHC 32.4 31.0 - 37.0 g/dL    RDW 23.4 %    Neutrophil Absolute Prelim 5.74 1.50 - 7.70 x10 (3) uL    Neutrophil Absolute 5.74 1.50 - 7.70 x10(3) uL    Lymphocyte Absolute 0.93 (L) 1.00 - 4.00 x10(3) uL    Monocyte Absolute 0.83 0.10 - 1.00 x10(3) uL    Eosinophil Absolute 0.06 0.00 - 0.70 x10(3) uL    Basophil Absolute 0.01 0.00 - 0.20 x10(3) uL    Immature Granulocyte Absolute 0.02 0.00 - 1.00 x10(3) uL    Neutrophil % 75.6 %    Lymphocyte % 12.3 %    Monocyte % 10.9 %    Eosinophil % 0.8 %    Basophil % 0.1 %    Immature Granulocyte % 0.3 %   Comp Metabolic Panel (14)    Collection Time: 01/30/25  9:55 AM   Result Value Ref Range    Glucose 122 (H) 70 - 99 mg/dL    Sodium 142 136 - 145 mmol/L    Potassium 3.5 3.5 - 5.1 mmol/L    Chloride 103 98 - 112 mmol/L    CO2 30.0 21.0 - 32.0 mmol/L    Anion Gap 9 0 - 18 mmol/L    BUN 6 (L) 9 - 23 mg/dL    Creatinine 0.62 0.55 - 1.02 mg/dL    Calcium, Total 9.2 8.7 - 10.6 mg/dL    Calculated Osmolality 293 275 - 295 mOsm/kg    eGFR-Cr 101 >=60 mL/min/1.73m2    AST 14 <34 U/L    ALT 8 (L) 10 - 49 U/L    Alkaline Phosphatase 90 50 - 130 U/L    Bilirubin, Total 0.4 0.2 - 1.1 mg/dL    Total Protein 6.6 5.7 - 8.2 g/dL    Albumin 3.6 3.2 - 4.8 g/dL    Globulin  3.0 2.0 - 3.5 g/dL    A/G Ratio  1.2 1.0 - 2.0    Patient Fasting for CMP? No    RBC Morphology Scan    Collection Time: 01/30/25  9:55 AM   Result Value Ref Range    RBC Morphology See morphology below (A) Normal, Slide reviewed, see previous RBC morphology.    Platelet Morphology Normal Normal    Microcytosis 1+      Macrocytosis 1+       Impression and Plan  1.   Breast cancer, left sided: Patient is staged as T4N3M1 (one subcentimeter mediastinal lymph node). Her disease is estrogen and progesterone receptor negative and Her2 3+.           Proceed with C3D1 of TCHP chemotherapy with dose reduction for thrombocytopenia.           Patient will return this weekend and next week for IVF.    2.   Left breast wound: Patient is following with wound care.     3.   Anemia: Multifactorial. No transfusion required today.     4.   Left lower extremity edema: Check Doppler ultrasound.     5.   Left distal leg wound/cellulitis: Augmentin BID prescribed.     Planned Follow up  Patient will return for follow up and treatment in 3 weeks.    Electronically Signed by:     Carl Lopez M.D.  System Medical Director, Oncology Services  [facility]

## 2025-01-30 ENCOUNTER — HOSPITAL ENCOUNTER (OUTPATIENT)
Dept: ULTRASOUND IMAGING | Facility: HOSPITAL | Age: 60
Discharge: HOME | End: 2025-01-30
Attending: SPECIALIST
Payer: COMMERCIAL

## 2025-01-30 ENCOUNTER — OFFICE VISIT (OUTPATIENT)
Age: 60
End: 2025-01-30
Attending: RADIOLOGY
Payer: COMMERCIAL

## 2025-01-30 VITALS — HEIGHT: 62.44 IN | WEIGHT: 140 LBS | BODY MASS INDEX: 25.12 KG/M2

## 2025-01-30 DIAGNOSIS — Z17.1 MALIGNANT NEOPLASM OF OVERLAPPING SITES OF LEFT BREAST IN FEMALE, ESTROGEN RECEPTOR NEGATIVE (HCC): ICD-10-CM

## 2025-01-30 DIAGNOSIS — C50.812 MALIGNANT NEOPLASM OF OVERLAPPING SITES OF LEFT BREAST IN FEMALE, ESTROGEN RECEPTOR NEGATIVE (HCC): ICD-10-CM

## 2025-01-30 DIAGNOSIS — D64.9 NORMOCYTIC NORMOCHROMIC ANEMIA: ICD-10-CM

## 2025-01-30 DIAGNOSIS — C77.3 SECONDARY MALIGNANT NEOPLASM OF AXILLARY LYMPH NODES (HCC): ICD-10-CM

## 2025-01-30 DIAGNOSIS — C50.811 CANCER OF OVERLAPPING SITES OF RIGHT BREAST (HCC): ICD-10-CM

## 2025-01-30 DIAGNOSIS — M79.89 LEFT LEG SWELLING: ICD-10-CM

## 2025-01-30 DIAGNOSIS — L03.116 LEFT LEG CELLULITIS: ICD-10-CM

## 2025-01-30 DIAGNOSIS — C77.3 SECONDARY MALIGNANT NEOPLASM OF AXILLARY LYMPH NODES (HCC): Primary | ICD-10-CM

## 2025-01-30 DIAGNOSIS — Z51.11 ENCOUNTER FOR CHEMOTHERAPY MANAGEMENT: ICD-10-CM

## 2025-01-30 DIAGNOSIS — M79.89 LEFT LEG SWELLING: Primary | ICD-10-CM

## 2025-01-30 LAB
ALBUMIN SERPL-MCNC: 3.6 G/DL (ref 3.2–4.8)
ALBUMIN/GLOB SERPL: 1.2 {RATIO} (ref 1–2)
ALP LIVER SERPL-CCNC: 90 U/L
ALT SERPL-CCNC: 8 U/L
ANION GAP SERPL CALC-SCNC: 9 MMOL/L (ref 0–18)
AST SERPL-CCNC: 14 U/L (ref ?–34)
BASOPHILS # BLD AUTO: 0.01 X10(3) UL (ref 0–0.2)
BASOPHILS NFR BLD AUTO: 0.1 %
BILIRUB SERPL-MCNC: 0.4 MG/DL (ref 0.2–1.1)
BUN BLD-MCNC: 6 MG/DL (ref 9–23)
CALCIUM BLD-MCNC: 9.2 MG/DL (ref 8.7–10.6)
CHLORIDE SERPL-SCNC: 103 MMOL/L (ref 98–112)
CO2 SERPL-SCNC: 30 MMOL/L (ref 21–32)
CREAT BLD-MCNC: 0.62 MG/DL
EGFRCR SERPLBLD CKD-EPI 2021: 101 ML/MIN/1.73M2 (ref 60–?)
EOSINOPHIL # BLD AUTO: 0.06 X10(3) UL (ref 0–0.7)
EOSINOPHIL NFR BLD AUTO: 0.8 %
ERYTHROCYTE [DISTWIDTH] IN BLOOD BY AUTOMATED COUNT: 23.4 %
FASTING STATUS PATIENT QL REPORTED: NO
GLOBULIN PLAS-MCNC: 3 G/DL (ref 2–3.5)
GLUCOSE BLD-MCNC: 122 MG/DL (ref 70–99)
HCT VFR BLD AUTO: 25.6 %
HGB BLD-MCNC: 8.3 G/DL
IMM GRANULOCYTES # BLD AUTO: 0.02 X10(3) UL (ref 0–1)
IMM GRANULOCYTES NFR BLD: 0.3 %
LYMPHOCYTES # BLD AUTO: 0.93 X10(3) UL (ref 1–4)
LYMPHOCYTES NFR BLD AUTO: 12.3 %
MAGNESIUM SERPL-MCNC: 1.6 MG/DL (ref 1.6–2.6)
MCH RBC QN AUTO: 30.9 PG (ref 26–34)
MCHC RBC AUTO-ENTMCNC: 32.4 G/DL (ref 31–37)
MCV RBC AUTO: 95.2 FL
MONOCYTES # BLD AUTO: 0.83 X10(3) UL (ref 0.1–1)
MONOCYTES NFR BLD AUTO: 10.9 %
NEUTROPHILS # BLD AUTO: 5.74 X10 (3) UL (ref 1.5–7.7)
NEUTROPHILS # BLD AUTO: 5.74 X10(3) UL (ref 1.5–7.7)
NEUTROPHILS NFR BLD AUTO: 75.6 %
OSMOLALITY SERPL CALC.SUM OF ELEC: 293 MOSM/KG (ref 275–295)
PLATELET # BLD AUTO: 277 10(3)UL (ref 150–450)
PLATELET MORPHOLOGY: NORMAL
POTASSIUM SERPL-SCNC: 3.5 MMOL/L (ref 3.5–5.1)
PROT SERPL-MCNC: 6.6 G/DL (ref 5.7–8.2)
RBC # BLD AUTO: 2.69 X10(6)UL
SODIUM SERPL-SCNC: 142 MMOL/L (ref 136–145)
WBC # BLD AUTO: 7.6 X10(3) UL (ref 4–11)

## 2025-01-30 PROCEDURE — 93971 EXTREMITY STUDY: CPT | Performed by: SPECIALIST

## 2025-01-30 RX ORDER — PALONOSETRON 0.05 MG/ML
0.25 INJECTION, SOLUTION INTRAVENOUS ONCE
Status: COMPLETED | OUTPATIENT
Start: 2025-01-30 | End: 2025-01-30

## 2025-01-30 RX ADMIN — PALONOSETRON 0.25 MG: 0.05 INJECTION, SOLUTION INTRAVENOUS at 10:59:00

## 2025-01-30 NOTE — PROGRESS NOTES
Pt here for C3D1 Drug(s)Ogivri/Perjeta/Taxotere/Carbo.  Arrives Ambulating independently, accompanied by Self and Spouse     Patient was evaluated today by MD and Treatment Nurse.    Oral medications included in this regimen:  no    Patient confirms comprehension of cancer treatment schedule:  yes    Pregnancy screening:  Denies possibility of pregnancy    Modifications in dose or schedule:  No    Medications appearance and physical integrity checked by RN: yes.    Chemotherapy IV pump settings verified by 2 RNs:  Yes.  Frequency of blood return and site check throughout administration: Prior to administration     Infusion/treatment outcome:  patient tolerated treatment without incident    Education Record    Learner:  Patient  Barriers / Limitations:  None  Method:  Brief focused  Education / instructions given:  patient  Outcome:  Shows understanding    Discharged Home, Ambulating independently, accompanied by:Self    Patient/family verbalized understanding of future appointments: by printed AVS    [place] [facility] delivered Augmentin pills to the patient.Her DRSNG was changed .    Given future AVS. From [facility] patient is going straight to US department for ultrasound

## 2025-01-30 NOTE — PROGRESS NOTES
Outpatient Oncology Care Plan  Problem list:  fatigue  peripheral neuropathy    Problems related to:    chemotherapy  disease/disease progression  side effect of treatment    Interventions:  provided general teaching    Expected outcomes:  symptoms relieved/minimized  understands plan of care    Progress towards outcome:  making progress    Education Record    Learner:  Patient  Barriers / Limitations:  None  Method:  Brief focused and Reinforcement  Outcome:  Shows understanding  Comments:    Patient here for follow up and C3D1 TCHP. Overall, she states her neuropathy is unchanged, diarrhea is resolved, and wound care/debridement for breast wound is helping. She has low energy levels and LLE edema. Appetite is good. Denies N/V.

## 2025-01-31 ENCOUNTER — OFFICE VISIT (OUTPATIENT)
Age: 60
End: 2025-01-31
Attending: RADIOLOGY
Payer: COMMERCIAL

## 2025-01-31 DIAGNOSIS — Z17.1 MALIGNANT NEOPLASM OF OVERLAPPING SITES OF LEFT BREAST IN FEMALE, ESTROGEN RECEPTOR NEGATIVE (HCC): ICD-10-CM

## 2025-01-31 DIAGNOSIS — C50.812 MALIGNANT NEOPLASM OF OVERLAPPING SITES OF LEFT BREAST IN FEMALE, ESTROGEN RECEPTOR NEGATIVE (HCC): ICD-10-CM

## 2025-01-31 DIAGNOSIS — C77.3 SECONDARY MALIGNANT NEOPLASM OF AXILLARY LYMPH NODES (HCC): Primary | ICD-10-CM

## 2025-01-31 NOTE — PROGRESS NOTES
Education Record    Learner:  Patient    Pt here for: breast ca    Barriers / Limitations:  None    Method:  Brief focused, printed material and  reinforcement    General Topics:  Plan of care reviewed    Outcome: Pt tolerated infusion with no c/o.     Here for fulphila. No complaints, feeling okay today. Has appts.

## 2025-02-01 ENCOUNTER — OFFICE VISIT (OUTPATIENT)
Age: 60
End: 2025-02-01
Attending: SPECIALIST
Payer: COMMERCIAL

## 2025-02-01 VITALS
TEMPERATURE: 99 F | OXYGEN SATURATION: 97 % | RESPIRATION RATE: 16 BRPM | DIASTOLIC BLOOD PRESSURE: 69 MMHG | HEART RATE: 89 BPM | SYSTOLIC BLOOD PRESSURE: 116 MMHG

## 2025-02-01 DIAGNOSIS — Z17.1 MALIGNANT NEOPLASM OF OVERLAPPING SITES OF LEFT BREAST IN FEMALE, ESTROGEN RECEPTOR NEGATIVE (HCC): ICD-10-CM

## 2025-02-01 DIAGNOSIS — C77.3 SECONDARY MALIGNANT NEOPLASM OF AXILLARY LYMPH NODES (HCC): Primary | ICD-10-CM

## 2025-02-01 DIAGNOSIS — C50.812 MALIGNANT NEOPLASM OF OVERLAPPING SITES OF LEFT BREAST IN FEMALE, ESTROGEN RECEPTOR NEGATIVE (HCC): ICD-10-CM

## 2025-02-01 NOTE — PROGRESS NOTES
Education Record    Learner:  Patient    Pt here for: IV fluids    Barriers / Limitations:  None    Method:  Brief focused, printed material and  reinforcement    General Topics:  Plan of care reviewed    Outcome: Pt tolerated infusion with no c/o.

## 2025-02-03 ENCOUNTER — OFFICE VISIT (OUTPATIENT)
Age: 60
End: 2025-02-03
Attending: SPECIALIST
Payer: COMMERCIAL

## 2025-02-03 VITALS
RESPIRATION RATE: 16 BRPM | DIASTOLIC BLOOD PRESSURE: 57 MMHG | SYSTOLIC BLOOD PRESSURE: 113 MMHG | TEMPERATURE: 97 F | HEART RATE: 86 BPM | OXYGEN SATURATION: 99 %

## 2025-02-03 DIAGNOSIS — C77.3 SECONDARY MALIGNANT NEOPLASM OF AXILLARY LYMPH NODES (HCC): Primary | ICD-10-CM

## 2025-02-03 DIAGNOSIS — C50.812 MALIGNANT NEOPLASM OF OVERLAPPING SITES OF LEFT BREAST IN FEMALE, ESTROGEN RECEPTOR NEGATIVE (HCC): ICD-10-CM

## 2025-02-03 DIAGNOSIS — Z17.1 MALIGNANT NEOPLASM OF OVERLAPPING SITES OF LEFT BREAST IN FEMALE, ESTROGEN RECEPTOR NEGATIVE (HCC): ICD-10-CM

## 2025-02-03 NOTE — PROGRESS NOTES
Education Record    Learner:  Patient    Disease / Diagnosis: Breast cancer    Barriers / Limitations:  None   Comments:    Method:  Brief focused and Reinforcement   Comments:    General Topics:  Plan of care reviewed   Comments:    Outcome:  Shows understanding   Comments:    Patient tolerated IVF and discharged in stable condition.

## 2025-02-04 NOTE — PROGRESS NOTES
CHIEF COMPLAINT:     Chief Complaint   Patient presents with    Wound Recheck     Patient arrives on foot for wound care follow-up appointment. She denies pain to her wounds today. No new questions or concerns this visit. Pt started taking an antibiotic, she thinks ampicillin, after a doctor's appointment last week.     HPI:   Information obtained from patient, daughter, chart  1-8-25 INITIAL:  Patient is a 60 yo female who was dx with right breast ca in 2013 and was lost to follow-up in 2017.  Patient next presented to the ED on 11/30/2024 with complaints of generalized weakness, poor appetite, and dyspnea with exertion. Laboratory studies showed anemia, hyponatremia, and hypochlorhydria. CTA chest was negative for pulmonary embolism or metastatic disease but did show a 22.3 x 14.6 x 16.0 cm mass arising from the left breast with areas of necrosis, possible extension into the intracostal musculature, and mildly enlarged left axillary lymph nodes. Visualization of left breast showed a large firm breast which an open area laterally with malodorous drainage. CT abdomen/pelvis w contrast on 12/01/2024 was negative for metastatic disease. Biopsy of the left breast was performed on 12/02/2024. 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 It was discussed with patient that the left breast wound will not heal and she is presenting today for assistance with management of the left breast wound.  There is significant malodor and necrotic, liquifing adipose tissue.  I was able to remove a large amount of it which will help with the drainage and malodor.  Patient has not been showering because she did not think she should get the wound wet.  The wound is not overly vascular.  We discussed utilizing dakins solution and a baby diaper.  Will order supplies for patient.     1-22-25 patient returns.  Since last visit patient was seen at cancer clinic for ivfs for dehydration as she has not been able to  hydrate adequately.  patient's care plan of her breast has included: dressing with dakins and using large bordered foam over twice daily. She states with the baby diaper she felt as though she was always getting wet and the camisole and the border dressings helps prevent leaking. Will order patient some border zetuvit dressings. She states she did not get any delievery. Rn will f/u with [facility]. Today the lesion to the most anterior is now open with necrosis leaking out.  She states the malodor improved for a couple days after last appointment but has been slowly returning.  I again was able to remove a good amount of necrotic tissue which should help with the malodor.  We discussed that should the drainage start to slow, we can transition to metrogel (instead of the dakins). Patient has no c/o pain.    2-5-25 patient returns.  Since last visit she did meet with dietician. She also followed up with oncology and they rx'd augmentin for her left lower leg and had her get a doppler on 1-30-25 which was negative for dvt. I did insist today that we should look at that wound and she was agreeable.  The lle wound appears to be a hematoma, s/p debridement anterior tibia is exposed.  She is continuing with chemo with dose reduction due to her thrombocytopenia. She is also getting ivfs as needed.  She has continued to dress with dakins moist gauze.  Her malodor remains significant, the two areas now communicate. Her breast appears more firm and larger, but she does not feel like it is enlarging.  I debrided again today, will have her pack with roll gauze.  I will also touch base with dr holliday regarding any plan for surgical intervention.    MEDICATIONS:     Current Outpatient Medications:     ampicillin 500 MG Oral Cap, Take 1 capsule (500 mg total) by mouth in the morning and 1 capsule (500 mg total) before bedtime., Disp: , Rfl:     amoxicillin clavulanate 875-125 MG Oral Tab, Take 1 tablet by mouth 2 (two) times daily.,  Disp: 20 tablet, Rfl: 0    sodium hypochlorite 0.125 % External Solution, Moisten gauze with dakins, place onto wound, cover with baby diaper three times a day, Disp: 1000 mL, Rfl: 3    HYDROcodone-acetaminophen 5-325 MG Oral Tab, Take 1-2 tablets by mouth every 4 (four) hours as needed for Pain., Disp: 30 tablet, Rfl: 0    gabapentin 600 MG Oral Tab, TAKE 1 TABLET BY MOUTH THREE TIMES DAILY; TAKE AN EXTRA 600MG AS NEEDED FOR SEVERE PAIN, Disp: 270 tablet, Rfl: 1    Insulin Glargine-yfgn 100 UNIT/ML Subcutaneous Solution Pen-injector, Inject 20 Units into the skin nightly., Disp: 24 mL, Rfl: 0    prochlorperazine (COMPAZINE) 10 mg tablet, Take 1 tablet (10 mg total) by mouth every 6 (six) hours as needed for Nausea., Disp: 30 tablet, Rfl: 3    ondansetron (ZOFRAN) 8 MG tablet, Take 1 tablet (8 mg total) by mouth every 8 (eight) hours as needed for Nausea., Disp: 30 tablet, Rfl: 3    traMADol 50 MG Oral Tab, Take 1 tablet (50 mg total) by mouth every 6 (six) hours as needed., Disp: 20 tablet, Rfl: 0    fluticasone-salmeterol (WIXELA INHUB) 100-50 MCG/ACT Inhalation Aerosol Powder, Breath Activated, Inhale 1 puff into the lungs 2 (two) times daily., Disp: 3 each, Rfl: 0    Insulin Lispro, 1 Unit Dial, 100 UNIT/ML Subcutaneous Solution Pen-injector, Inject 10 Units into the skin in the morning, at noon, and at bedtime., Disp: 3 mL, Rfl: 0    Budesonide-Formoterol Fumarate 160-4.5 MCG/ACT Inhalation Aerosol, Inhale 2 puffs into the lungs 2 (two) times daily. (Patient taking differently: Inhale 2 puffs into the lungs 2 (two) times daily. Pt states she would like to go back to budesonide), Disp: 3 each, Rfl: 1    albuterol (2.5 MG/3ML) 0.083% Inhalation Nebu Soln, Take 3 mL (2.5 mg total) by nebulization every 4 (four) hours as needed for Wheezing or Shortness of Breath., Disp: 90 mL, Rfl: 0    Insulin Pen Needle (BD PEN NEEDLE DANIELA U/F) 32G X 4 MM Does not apply Misc, Up to TID, Disp: 200 each, Rfl: 2    montelukast 10  MG Oral Tab, Take 1 tablet (10 mg total) by mouth nightly., Disp: 90 tablet, Rfl: 1  ALLERGIES:   Allergies[1]   REVIEW OF SYSTEMS:   This information was obtained from the patient/family and chart.    See HPI for pertinent positives, otherwise 10 pt ROS negative.    HISTORY:   Past medical, surgical, family and social history updated where appropriate.      PHYSICAL EXAM:     Vitals:    02/05/25 1100   BP: 109/64  Comment:    Pulse: 102   Resp: 14   Temp: 97.1 °F (36.2 °C)       Estimated body mass index is 25.25 kg/m² as calculated from the following:    Height as of 1/30/25: 62.44\".    Weight as of 1/30/25: 140 lb (63.5 kg).   POC Glucose   Date Value Ref Range Status   02/05/2025 139 (H) 70 - 99 mg/dL Final   12/09/2024 95 70 - 99 mg/dL Final   12/03/2024 250 (H) 70 - 99 mg/dL Final       Vital signs reviewed.Appears stated age, well groomed.    Constitutional:  Bp wnl for patient. Pulse Regular and wnl for patient. Respirations easy and unlabored. Temperature wnl. Weight normal for height. Appearance neat and clean. Appears in no acute distress. Well nourished and well developed.    Lower extremity:  dp/pt palpable left.  Patient with pulsatile signals at the dp/pt/distal hallux left. Left lower extremity free of varicosities, no edema. Capillary refill < 3 seconds. Digits are warm, overlapping. Toenails thickned, discolored, long in length. Skin is very dry. no hairgrowth on legs.    Musculoskeletal:  Gait and station stable   Integumentary:  refer to wound characteristics and images   Psychiatric:  Judgment and insight intact. Alert and oriented times 3. No evidence of depression, anxiety, or agitation. Calm, cooperative, and communicative.   DIAGNOSTICS:     Lab Results   Component Value Date    BUN 6 (L) 01/30/2025    CREATSERUM 0.62 01/30/2025    GFRCKDEPI 105.47 04/18/2018    ALB 3.6 01/30/2025    TP 6.6 01/30/2025    A1C 6.5 (H) 11/30/2024       WOUND ASSESSMENT:     Wound 01/08/25 #1 Breast Left  (Active)   Date First Assessed/Time First Assessed: 01/08/25 1414    Wound Number (Wound Clinic Only): #1  Primary Wound Type: (c) Other (comment)  Location: Breast  Wound Location Orientation: Left      Assessments 1/8/2025  2:16 PM 2/5/2025  1:33 PM   Wound Image             Drainage Amount Large Large   Drainage Description Serous;Yellow Serosanguineous   Wound Length (cm) 17 cm 12.8 cm   Wound Width (cm) 30 cm 22.5 cm   Wound Surface Area (cm^2) 510 cm^2 288 cm^2   Wound Depth (cm) 1 cm 5.5 cm   Wound Volume (cm^3) 510 cm^3 1584 cm^3   Wound Healing % -- -211   Margins Well-defined edges Well-defined edges   Non-staged Wound Description Full thickness Full thickness   Lora-wound Assessment Edema Dry;Edema;Maceration;Moist   Wound Granulation Tissue Red;Pink;Spongy --   Wound Bed Granulation (%) 10 % 10 %   Wound Bed Epithelium (%) 15 % 40 %   Wound Bed Slough (%) 75 % --   Wound Odor Strong Strong   Shape -- Clustered - 50% necrotic tissue       Active Orders   Date Order Priority Status Authorizing Provider   02/05/25 1427 Debridement Other (comment) Left Breast Routine Active Vira Alejandro, APRN       Inactive Orders   Date Order Priority Status Authorizing Provider   01/22/25 1503 Debridement Other (comment) Left Breast Routine Completed Vira Alejandro, APRN   01/08/25 1618 Debridement Other (comment) Left Breast Routine Completed Vira Alejandro, SHANA       Wound 02/05/25 #2 Left lower leg Leg Left (Active)   Date First Assessed/Time First Assessed: 02/05/25 1348    Wound Number (Wound Clinic Only): #2 Left lower leg  Primary Wound Type: Venous Ulcer  Location: Leg  Wound Location Orientation: Left      Assessments 2/5/2025  1:50 PM   Wound Image      Drainage Amount Scant   Drainage Description Yellow;Serous   Wound Length (cm) 1.7 cm   Wound Width (cm) 1.5 cm   Wound Surface Area (cm^2) 2.55 cm^2   Wound Depth (cm) 0.1 cm   Wound Volume (cm^3) 0.255 cm^3   Margins Well-defined edges   Non-staged Wound  Description Full thickness   Wound Bed Slough (%) 100 %   Wound Odor Mild   Tunneling? No   Undermining? No   Sinus Tracts? No       Active Orders   Date Order Priority Status Authorizing Provider   02/05/25 1424 Debridement Venous Ulcer Left Leg Routine Active Vira Alejandro APRN        PROCEDURE:      Left lower leg and left breast  This procedure was medically necessary to promote wound healing by removing nonviable tissue, decrease chance of infection, and return the wound to an acute state.  See rn note      ASSESSMENT AND PLAN:    1. Malignant neoplasm of overlapping sites of left breast in female, estrogen receptor negative (HCC)    2. 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  Questions and concerns addressed. Red flags to RTC or ED reviewed.  Patient (or parent) agrees to plan.      NOTE TO PATIENT: The 21st Century Cures Act makes clinical notes like these available to patients in the interest of transparency. Clinical notes are medical documents used by physicians and care providers to communicate with each other. These documents include medical language and terminology, abbreviations, and treatment information that may sound technical and at times possibly unfamiliar. In addition, at times, the verbiage may appear blunt or direct. These documents are one tool providers use to communicate relevant information and clinical opinions of the care providers in a way that allows common understanding of the clinical context.    I spent  40minutes with the patient. This time included:    preparing to see the patient (eg, review notes and recent diagnostics),  seeing the patient, obtaining and/or reviewing separately obtained history, performing a medically appropriate examination and/or evaluation, counseling and educating the patient, documenting in  the record. Bill debridements only.  DISCHARGE:      Patient Instructions   Please return:  1.5 weeks    Patient discharge and wound care instructions  Swathi Arguelles  2/5/2025          You may shower and cleanse area with mild soap and water, Dakins, dab dry with gauze and apply your dressings as directed.     Changing your dressing:            two- three times a day    Wash your hands with soap and water.  Ensure that the old dressing is removed completely. Place it in a plastic bag and throw it in the trash.  Cleanse the wound with hypochlorous wound cleanser (ie. Anasept, vashe, pure and clean) .  It's ok to “scrub” your wound with the gauze, small amount of bleeding with cleansing is normal and ok.  Apply the following dressings:    Breast:     Moisten gauze with DAKINS solution, place into wound, bordered zetuvit dressings    LEG:   silver alginate>border gauze    Nutrition and blood sugar control:  Focus on the following:  Protein: Meats, beans, eggs, milk and yogurt particularly Greek yogurt), tofu, soy nuts, soy protein products (Follow the protein handout in your welcome folder)  Vitamin C: Citrus fruits and juices, strawberries, tomatoes, tomato juice, peppers, baked potatoes, spinach, broccoli, cauliflower, [place] sprouts, cabbage  Vitamin A: Dark green, leafy vegetables, orange or yellow vegetables, cantaloupe, fortified dairy products, liver, fortified cereals  Zinc: Fortified cereals, red meats, seafood  Consider supplementing with Maximino by [employer]. It can be purchased on amazon, [employer] website, or local pharmacy may be able to order it for you.  (These are essential branch chain amino acids that help with tissue building and wound healing).   When your blood sugar is consistently elevated greater than 180 your body can't heal or fight infection.      Concerns:  Signs of infection may include the following:  Increase in redness  Red \"streaks\" from wound  Increase in swelling  Fever  Unusual  odor  Change in the amount of wound drainage     Should you experience any significant changes in your wound(s) or have any questions regarding your home care instructions please contact the [facility] @ [phone] If after regular business hours, please call your family doctor or local emergency room. The treatment plan has been discussed at length between you and your provider. Follow all instructions carefully, it is very important. If you do not follow all instructions you are at risk of your wound not healing, infection, possible loss of limb and even loss of life.    Vira Alejandro FNP-C, CWCN-AP, CFCN, CSWS, WCC, DWC  2/5/2025          [1]   Allergies  Allergen Reactions    Fish ANAPHYLAXIS and HIVES     All fish, Wheezing, short of breath    Fish Oil ANAPHYLAXIS and HIVES     All fish, Wheezing, short of breath   All fish, Wheezing, short of breath    Levemir HIVES and ITCHING    Seasonal WHEEZING and Runny nose     Ragweed, pollen

## 2025-02-05 ENCOUNTER — OFFICE VISIT (OUTPATIENT)
Dept: WOUND CARE | Facility: HOSPITAL | Age: 60
End: 2025-02-05
Attending: NURSE PRACTITIONER
Payer: COMMERCIAL

## 2025-02-05 VITALS
HEART RATE: 102 BPM | SYSTOLIC BLOOD PRESSURE: 109 MMHG | DIASTOLIC BLOOD PRESSURE: 64 MMHG | TEMPERATURE: 97 F | RESPIRATION RATE: 14 BRPM

## 2025-02-05 DIAGNOSIS — L97.826 NON-PRESSURE CHRONIC ULCER OF OTHER PART OF LEFT LOWER LEG WITH BONE INVOLVEMENT WITHOUT EVIDENCE OF NECROSIS (HCC): ICD-10-CM

## 2025-02-05 DIAGNOSIS — C50.812 MALIGNANT NEOPLASM OF OVERLAPPING SITES OF LEFT BREAST IN FEMALE, ESTROGEN RECEPTOR NEGATIVE (HCC): Primary | ICD-10-CM

## 2025-02-05 DIAGNOSIS — Z17.1 MALIGNANT NEOPLASM OF OVERLAPPING SITES OF LEFT BREAST IN FEMALE, ESTROGEN RECEPTOR NEGATIVE (HCC): Primary | ICD-10-CM

## 2025-02-05 LAB — GLUCOSE BLD-MCNC: 139 MG/DL (ref 70–99)

## 2025-02-05 PROCEDURE — 97597 DBRDMT OPN WND 1ST 20 CM/<: CPT | Performed by: NURSE PRACTITIONER

## 2025-02-05 PROCEDURE — 97598 DBRDMT OPN WND ADDL 20CM/<: CPT | Performed by: NURSE PRACTITIONER

## 2025-02-05 RX ORDER — AMPICILLIN 500 MG/1
500 CAPSULE ORAL 2 TIMES DAILY
COMMUNITY

## 2025-02-05 NOTE — PROGRESS NOTES
.Weekly Wound Education Note    Teaching Provided To: Patient  Training Topics: Discharge instructions;Dressing;Cleasing and general instructions  Training Method: Explain/Verbal;Written  Training Response: Patient responds and understands;Reinforcement needed            Silver alginate to leg wound, cover with border gauze.  VASHE wet to dry with conforming gauze toll to breasts wounds.  Cover with border foam and baby diaper.  Will order additional supplies this visit.

## 2025-02-05 NOTE — PATIENT INSTRUCTIONS
Please return:  1.5 weeks    Patient discharge and wound care instructions  Swathi Arguelles  2/5/2025          You may shower and cleanse area with mild soap and water, Dakins, dab dry with gauze and apply your dressings as directed.     Changing your dressing:            two- three times a day    Wash your hands with soap and water.  Ensure that the old dressing is removed completely. Place it in a plastic bag and throw it in the trash.  Cleanse the wound with hypochlorous wound cleanser (ie. Anasept, vashe, pure and clean) .  It's ok to “scrub” your wound with the gauze, small amount of bleeding with cleansing is normal and ok.  Apply the following dressings:    Breast:     Moisten gauze with DAKINS solution, place into wound, bordered zetuvit dressings    LEG:   silver alginate>border gauze    Nutrition and blood sugar control:  Focus on the following:  Protein: Meats, beans, eggs, milk and yogurt particularly Greek yogurt), tofu, soy nuts, soy protein products (Follow the protein handout in your welcome folder)  Vitamin C: Citrus fruits and juices, strawberries, tomatoes, tomato juice, peppers, baked potatoes, spinach, broccoli, cauliflower, [place] sprouts, cabbage  Vitamin A: Dark green, leafy vegetables, orange or yellow vegetables, cantaloupe, fortified dairy products, liver, fortified cereals  Zinc: Fortified cereals, red meats, seafood  Consider supplementing with Maximino by [employer] It can be purchased on amazon, [employer] website, or local pharmacy may be able to order it for you.  (These are essential branch chain amino acids that help with tissue building and wound healing).   When your blood sugar is consistently elevated greater than 180 your body can't heal or fight infection.      Concerns:  Signs of infection may include the following:  Increase in redness  Red \"streaks\" from wound  Increase in swelling  Fever  Unusual odor  Change in the amount of wound drainage     Should you experience any  significant changes in your wound(s) or have any questions regarding your home care instructions please contact the wound center [facility] @ [phone] If after regular business hours, please call your family doctor or local emergency room. The treatment plan has been discussed at length between you and your provider. Follow all instructions carefully, it is very important. If you do not follow all instructions you are at risk of your wound not healing, infection, possible loss of limb and even loss of life.

## 2025-02-05 NOTE — PROGRESS NOTES
Patient ID: Swathi Arguelles is a 60 year old female.    Debridement Venous Ulcer Left Leg   Wound 02/05/25 #2 Left lower leg Leg Left    Performed by: Vira Alejandro APRN  Authorized by: Vira Alejandro APRN      Consent   Consent obtained? verbal  Consent given by: patient    Debridement Details  Performed by: NP  Debridement type: conservative sharp  Pain control: lidocaine 4%  Pain control administration type: topical    Pre-debridement measurements  Length (cm): 1.7  Width (cm): 1.5  Depth (cm): 0.1  Surface Area (cm^2): 2.55    Post-debridement measurements  Length (cm): 1.7  Width (cm): 1.5  Depth (cm): 1  Percent debrided: 100%  Surface Area (cm^2): 2.55  Area Debrided (cm^2): 2.55  Volume (cm^3): 2.55    Devitalized tissue debrided: biofilm and slough  Instrument(s) utilized: curette  Bleeding: small  Hemostasis obtained with: pressure  Procedural pain (0-10): 0  Post-procedural pain: 0   Response to treatment: procedure was tolerated well    Debridement Other (comment) Left Breast   Wound 01/08/25 #1 Breast Left    Performed by: Vira Alejandro APRN  Authorized by: Vira Alejandro APRN      Consent   Consent obtained? verbal  Consent given by: patient    Debridement Details  Performed by: NP  Debridement type: conservative sharp  Pain control: lidocaine 4%  Pain control administration type: topical    Pre-debridement measurements  Length (cm): 12.8  Width (cm): 22.5  Depth (cm): 5.5  Surface Area (cm^2): 288    Post-debridement measurements  Length (cm): 12.8  Width (cm): 22.5  Depth (cm): 7  Percent debrided: 100%  Surface Area (cm^2): 288  Area Debrided (cm^2): 288  Volume (cm^3): 2016    Devitalized tissue debrided: biofilm, clots, necrotic debris and slough  Instrument(s) utilized: curette, forceps and scissors  Bleeding: small  Hemostasis obtained with: pressure  Procedural pain (0-10): 0  Post-procedural pain: 0   Response to treatment: procedure was tolerated well    Circumferential undermining  deepest at 9:00 1cm to leg wound

## 2025-02-06 ENCOUNTER — NURSE ONLY (OUTPATIENT)
Age: 60
End: 2025-02-06
Attending: SPECIALIST
Payer: COMMERCIAL

## 2025-02-06 NOTE — PROGRESS NOTES
Education Record    Learner:  Patient    Disease / Diagnosis: breast cancer    Barriers / Limitations:  None   Comments:    Method:  Brief focused   Comments:    General Topics:  Plan of care reviewed   Comments:    Outcome:  Shows understanding   Comments:    PICC dressing change completed per protocol.  Pt discharged in stable condition. Pt to rtc in 1 week for dressing change.

## 2025-02-11 RX ORDER — PALONOSETRON 0.05 MG/ML
0.25 INJECTION, SOLUTION INTRAVENOUS ONCE
Start: 2025-02-27 | End: 2025-02-20

## 2025-02-12 NOTE — PROGRESS NOTES
[facility]ology Oncology [facility] Progress Note       Patient Name: Swathi Arguelles   YOB: 1965  Medical Record Number: [id number]  Attending Physician: Carl Lopez M.D.     The 21st Century Cures Act makes medical notes like these available to patients in the interest of transparency. Please be advised this is a medical document. Medical documents are intended to carry relevant information, facts as evident, and the clinical opinion of the practitioner. The medical note is intended as peer to peer communication and may appear blunt or direct. It is written in medical language and may contain abbreviations or verbiage that are unfamiliar.      Date of Visit: 2/20/2025       Chief Complaint  Invasive ductal carcinoma, left breast - follow up and treatment.     Oncologic History  Swathi Arguelles is a 60 year old post-menopausal female admitted to the hospital on 09/10/2013 with symptomatic anemia.  On physical examination revealed a malodorous, draining ulcerating right breast mass.  Upon questioning, she admitted that she first noticed the mass in approximately 01/2013.  She states that her only mammogram was approximately at age 43 years.  Biopsy showed grade 3 infiltrating ductal carcinoma.  The tumor was estrogen receptor positive, progesterone receptor negative, and Her2/alberto negative.  CT scans of the chest, abdomen, pelvis showed multiple right axillary lymph nodes, two micronodules in the lungs of unknown significance, and a left sided large cystic adnexal mass.  At initial diagnosis CA 15-3 was 48.3 U/mL,  CA 27.29 was 91.7 U/mL, and  was 171.5 U/mL.  PET/CT showed abnormal FDG uptake in the ulcerating mass of the right breast/chest wall and in retropectoral and subclavicular lymph nodes.  There was no uptake in the cystic pelvic mass or in 3 subcentimeter pulmonary nodules.  Pelvic ultrasound showed a complex multiloculated left adnexal cyst with no separate identifiable  ovarian tissue.  Noted was irregular thickening of one of the cyst walls that was worrisome for a cystic ovarian neoplasm.    Patient was premenopausal at diagnosis    On 10/04/2013 she began dose dense doxorubicin and cyclophosphamide. Treatment was complicated by neutropenic fever, anemia requiring transfusion, dehydration, and prolonged thrombocytopenia. CT scans after cycle 4 showed a marked improvement in the breast/chest wall mass and axillary adenopathy. Tumor markers markedly improved. She then received weekly paclitaxel. Treatment was complicated by peripheral neuropathy and neutropenia.     During paclitaxel therapy, she consented to BRCA1/2 testing.  Results obtained on 02/11/2014 revealed the deleterious BRCA2 mutation [id number].    On 03/18/2014 she underwent right mastectomy with axillary node dissection.  Pathology showed 1.4 cm grade 3 invasive ductal carcinoma with 12/12 lymph nodes negative for malignancy.  She also underwent diagnostic laparoscopy which showed the cystic left ovarian mass but no evidence of metastatic disease.    On 05/13/2014 she underwent bilateral salpingo-oophorectomy, bilateral pelvic lymphadenectomy, limited periaortic lymphadenectomy, omentectomy, peritoneal washings, and appendectomy.  Pathology, reviewed at the [facility], showed serous borderline tumor, typical type.  All lymph nodes and pelvic washings were negative for malignancy.    In 07/2014 she began adjuvant right chest wall/axilla radiation.    She began endocrine therapy with anastrazole in mid 08/2014.     Patient was last seen on 02/03/2017. On that day, patient was advised to continue anastrozole, continue increased surveillance with alternating mammography and breast MRI, and return for follow up in 05/2017. On that day, she expressed an openness to prophylactic left mastectomy if she was a candidate for breast reconstruction. Patient failed to return for follow up in 05/2017 as recommended. She  also failed to return a call from the breast navigator to arrange evaluation by plastic surgery. She did have a left sided mammogram as previously ordered by me in 06/2017. Patient was on anastrozole at least through 02/2017 but it is not clear when she discontinued therapy.    Patient next presented to the ED on 11/30/2024 with complaints of generalized weakness, poor appetite, and dyspnea with exertion. Laboratory studies showed anemia, hyponatremia, and hypochlorhydria. CTA chest was negative for pulmonary embolism or metastatic disease but did show a 22.3 x 14.6 x 16.0 cm mass arising from the left breast with areas of necrosis, possible extension into the intracostal musculature, and mildly enlarged left axillary lymph nodes. Visualization of left breast showed a large firm breast which an open area laterally with malodorous drainage. CT abdomen/pelvis w contrast on 12/01/2024 was negative for metastatic disease.     Patient reports that she first noticed a \"rash\" 1.5 months prior to presentation. She stated that 3 months prior to presentation, she did not notice any changes in the left breast. Notably, patient's last breast imaging prior to hospitalization was in 06/2017.    Biopsy of the left breast was performed on 12/02/2024. 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 Immunohistochemical studies were as follows: estrogen receptor 0%, progesterone receptor 0%, Her2 3+, Ki67 25%.     PET/CT scan on 02/09/2024 showed abnormal SUV uptake in the left breast mass peripherally, multiple left axillary lymph nodes, subpectoral lymph nodes, a subcentimeter mediastinal lymph node.     On 12/12/2024 she began neoadjuvant systemic therapy with TCHP. Cycle 3 was delayed for thrombocytopenia.     History of Present Illness  Patient returns for follow up and treatment. She reports some increase in peripheral neuropathy involving her feet. Stable energy level and appetite. She continues to  follow with wound clinic and undergoes regular debriding of necrotic tissue from the left breast.     Performance Status   Karnofsky 90% - Normal, only minor signs/symptoms.      Past Medical History (historical data, reviewed by physician)   Invasive ductal carcinoma, left breast (as above); invasive ductal carcinoma, right breast (as above); diabetes mellitus, asthma.     Past Surgical History (historical data, reviewed by physician)   Right mastectomy and axillary node dissection; D&C.     Family History (historical data, reviewed by physician)   A three generation pedigree was obtained. Ms. Arguelles’s father  at age 76 from an unknown cancer. He had one brother and no sisters. Ms. Arguelles’s paternal grandmother  in her late 40s or early 50s from unknown causes. Ms. Arguelles’s mother is 76 years-old and has no history of cancer. Ms. Arguelles’s maternal grandmother  at age 59 from a myocardial infarction. Ms. Arguelles’s maternal grandmother’s sister had breast cancer in her 40s and had a mastectomy. There is no other known family history of cancer. Please see the pedigree for additional family history information.     Social History (historical data, reviewed by physician)   Denies tobacco, alcohol, illicit drug use.    Current Medications    ampicillin 500 MG Oral Cap Take 1 capsule (500 mg total) by mouth in the morning and 1 capsule (500 mg total) before bedtime.      amoxicillin clavulanate 875-125 MG Oral Tab Take 1 tablet by mouth 2 (two) times daily. 20 tablet 0    sodium hypochlorite 0.125 % External Solution Moisten gauze with dakins, place onto wound, cover with baby diaper three times a day 1000 mL 3    HYDROcodone-acetaminophen 5-325 MG Oral Tab Take 1-2 tablets by mouth every 4 (four) hours as needed for Pain. 30 tablet 0    gabapentin 600 MG Oral Tab TAKE 1 TABLET BY MOUTH THREE TIMES DAILY; TAKE AN EXTRA 600MG AS NEEDED FOR SEVERE PAIN 270 tablet 1    Insulin Glargine-yfgn 100 UNIT/ML  Subcutaneous Solution Pen-injector Inject 20 Units into the skin nightly. 24 mL 0    prochlorperazine (COMPAZINE) 10 mg tablet Take 1 tablet (10 mg total) by mouth every 6 (six) hours as needed for Nausea. 30 tablet 3    ondansetron (ZOFRAN) 8 MG tablet Take 1 tablet (8 mg total) by mouth every 8 (eight) hours as needed for Nausea. 30 tablet 3    traMADol 50 MG Oral Tab Take 1 tablet (50 mg total) by mouth every 6 (six) hours as needed. 20 tablet 0    fluticasone-salmeterol (WIXELA INHUB) 100-50 MCG/ACT Inhalation Aerosol Powder, Breath Activated Inhale 1 puff into the lungs 2 (two) times daily. 3 each 0    Insulin Lispro, 1 Unit Dial, 100 UNIT/ML Subcutaneous Solution Pen-injector Inject 10 Units into the skin in the morning, at noon, and at bedtime. 3 mL 0    Budesonide-Formoterol Fumarate 160-4.5 MCG/ACT Inhalation Aerosol Inhale 2 puffs into the lungs 2 (two) times daily. (Patient taking differently: Inhale 2 puffs into the lungs 2 (two) times daily. Pt states she would like to go back to budesonide) 3 each 1    albuterol (2.5 MG/3ML) 0.083% Inhalation Nebu Soln Take 3 mL (2.5 mg total) by nebulization every 4 (four) hours as needed for Wheezing or Shortness of Breath. 90 mL 0    Insulin Pen Needle (BD PEN NEEDLE DANIELA U/F) 32G X 4 MM Does not apply Misc Up to  each 2    montelukast 10 MG Oral Tab Take 1 tablet (10 mg total) by mouth nightly. 90 tablet 1      Allergies   Ms. Arguelles is allergic to fish, fish oil, levemir, and seasonal.    Vital Signs   Height: 158.6 cm (5' 2.44\") (02/20 0924)  Weight: 60.3 kg (133 lb) (02/20 0924)  BSA (Calculated - sq m): 1.62 sq meters (02/20 0924)  Pulse: 81 (02/20 1249)  BP: 93/59 (02/20 1249)  Temp: 98.1 °F (36.7 °C) (02/20 1249)  Do Not Use - Resp Rate: --  SpO2: 98 % (02/20 1151)     Physical Examination  Constitutional  Well developed and well nourished; in no apparent distress.  Head   Normocephalic and atraumatic.  Eyes   Conjunctiva clear; sclera  anicteric.  ENMT   External nose normal; external ears normal.  Respiratory  Normal effort; no respiratory distress; clear to auscultation bilaterally.  Cardiovascular  Regular rate and rhythm; normal S1S2.  Abdomen  Not distended.   Extremities  Mild bilateral lower extremity edema, left greater than right.   Neurologic  Motor and sensory grossly intact.  Psychiatric  Mood and affect appropriate.    Laboratory  Recent Results (from the past 72 hours)   POCT Glucose    Collection Time: 02/18/25  9:18 AM   Result Value Ref Range    POC Glucose 101 (H) 70 - 99 mg/dL   MAGNESIUM [E]    Collection Time: 02/20/25  9:01 AM   Result Value Ref Range    Magnesium 1.4 (L) 1.6 - 2.6 mg/dL   CBC W Differential W Platelet    Collection Time: 02/20/25  9:01 AM   Result Value Ref Range    WBC 7.0 4.0 - 11.0 x10(3) uL    RBC 2.18 (L) 3.80 - 5.30 x10(6)uL    HGB 6.8 (LL) 12.0 - 16.0 g/dL    HCT 21.5 (L) 35.0 - 48.0 %    PLT 44.0 (L) 150.0 - 450.0 10(3)uL    Immature Platelet Fraction 3.0 0.0 - 7.0 %    MCV 98.6 80.0 - 100.0 fL    MCH 31.2 26.0 - 34.0 pg    MCHC 31.6 31.0 - 37.0 g/dL    RDW 22.1 %    Neutrophil Absolute Prelim 5.03 1.50 - 7.70 x10 (3) uL    Neutrophil Absolute 5.03 1.50 - 7.70 x10(3) uL    Lymphocyte Absolute 1.34 1.00 - 4.00 x10(3) uL    Monocyte Absolute 0.61 0.10 - 1.00 x10(3) uL    Eosinophil Absolute 0.00 0.00 - 0.70 x10(3) uL    Basophil Absolute 0.01 0.00 - 0.20 x10(3) uL    Immature Granulocyte Absolute 0.03 0.00 - 1.00 x10(3) uL    Neutrophil % 71.7 %    Lymphocyte % 19.1 %    Monocyte % 8.7 %    Eosinophil % 0.0 %    Basophil % 0.1 %    Immature Granulocyte % 0.4 %   Comp Metabolic Panel (14)    Collection Time: 02/20/25  9:01 AM   Result Value Ref Range    Glucose 130 (H) 70 - 99 mg/dL    Sodium 142 136 - 145 mmol/L    Potassium 3.7 3.5 - 5.1 mmol/L    Chloride 103 98 - 112 mmol/L    CO2 28.0 21.0 - 32.0 mmol/L    Anion Gap 11 0 - 18 mmol/L    BUN 8 (L) 9 - 23 mg/dL    Creatinine 0.77 0.55 - 1.02 mg/dL     Calcium, Total 9.5 8.7 - 10.6 mg/dL    Calculated Osmolality 294 275 - 295 mOsm/kg    eGFR-Cr 87 >=60 mL/min/1.73m2    AST 13 <34 U/L    ALT <7 (L) 10 - 49 U/L    Alkaline Phosphatase 101 50 - 130 U/L    Bilirubin, Total 0.3 0.2 - 1.1 mg/dL    Total Protein 6.2 5.7 - 8.2 g/dL    Albumin 3.7 3.2 - 4.8 g/dL    Globulin  2.5 2.0 - 3.5 g/dL    A/G Ratio 1.5 1.0 - 2.0    Patient Fasting for CMP? Patient not present    ABORH (Blood Type)    Collection Time: 02/20/25  9:01 AM   Result Value Ref Range    ABO BLOOD TYPE A     RH BLOOD TYPE Positive    Antibody Screen    Collection Time: 02/20/25  9:01 AM   Result Value Ref Range    Antibody Screen Negative    Scan slide    Collection Time: 02/20/25  9:01 AM   Result Value Ref Range    Slide Review Slide reviewed,morphology review added    RBC Morphology Scan    Collection Time: 02/20/25  9:01 AM   Result Value Ref Range    RBC Morphology See morphology below (A) Normal, Slide reviewed, see previous RBC morphology.    Platelet Morphology See morphology below (A) Normal    Microcytosis 1+      Macrocytosis 1+      Tear Drop Cells 1+     IRON AND TIBC [E]    Collection Time: 02/20/25  9:53 AM   Result Value Ref Range    Iron 46 (L) 50 - 170 ug/dL    Transferrin 163 (L) 250 - 380 mg/dL    Total Iron Binding Capacity 236 (L) 250 - 425 ug/dL    % Saturation 19 15 - 50 %   FERRITIN [E]    Collection Time: 02/20/25  9:53 AM   Result Value Ref Range    Ferritin 354 (H) 50 - 306 ng/mL   Prepare RBC Once    Collection Time: 02/20/25 11:28 AM   Result Value Ref Range    Blood Product [id number]     Unit Number [id number]     UNIT ABO A     UNIT RH POS     Product Status Issued     Expiration Date [id number]     Blood Type Barcode 6200     Unit Volume 350 ml     Impression and Plan  1.   Breast cancer, left sided: Patient is staged as T4N3M1 (one subcentimeter mediastinal lymph node). Her disease is estrogen and progesterone receptor negative and Her2 3+.           Hold chemotherapy  for thrombocytopenia today. Plan to restart therapy next week with dose reduction.          Prior to start of therapy, patient already had significant peripheral neuropathy involving her feet bilaterally such she sometimes feels off balance. This neuropathy has increased slightly. I do recommend that we proceed with cycle 4 next week. However, we will obtain an MRI of the breasts prior to cycle 5 to determine whether taxane based chemotherapy should continue.           Echocardiogram ordered to be completed before cycle 5.    2.   Left breast wound: Patient is following with wound care.     3.   Anemia: Multifactorial. Transfuse 1 unit PRBC today.     4.   Hypomagnesemia: IV magnesium today.    Planned Follow up  Patient will return for treatment in 1 week and for start of next cycle in 4 weeks.    Electronically Signed by:     Carl Lopez M.D.  System Medical Director, Oncology Services  [facility]

## 2025-02-13 ENCOUNTER — NURSE ONLY (OUTPATIENT)
Age: 60
End: 2025-02-13
Attending: SPECIALIST
Payer: COMMERCIAL

## 2025-02-17 NOTE — PROGRESS NOTES
CHIEF COMPLAINT:     Chief Complaint   Patient presents with    Wound Care     Patients is here for a follow up. Patients stated no issue at this moment      HPI:   Information obtained from patient, daughter, chart  1-8-25 INITIAL:  Patient is a 58 yo female who was dx with right breast ca in 2013 and was lost to follow-up in 2017.  Patient next presented to the ED on 11/30/2024 with complaints of generalized weakness, poor appetite, and dyspnea with exertion. Laboratory studies showed anemia, hyponatremia, and hypochlorhydria. CTA chest was negative for pulmonary embolism or metastatic disease but did show a 22.3 x 14.6 x 16.0 cm mass arising from the left breast with areas of necrosis, possible extension into the intracostal musculature, and mildly enlarged left axillary lymph nodes. Visualization of left breast showed a large firm breast which an open area laterally with malodorous drainage. CT abdomen/pelvis w contrast on 12/01/2024 was negative for metastatic disease. Biopsy of the left breast was performed on 12/02/2024. 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 It was discussed with patient that the left breast wound will not heal and she is presenting today for assistance with management of the left breast wound.  There is significant malodor and necrotic, liquifing adipose tissue.  I was able to remove a large amount of it which will help with the drainage and malodor.  Patient has not been showering because she did not think she should get the wound wet.  The wound is not overly vascular.  We discussed utilizing dakins solution and a baby diaper.  Will order supplies for patient.     1-22-25 patient returns.  Since last visit patient was seen at cancer clinic for ivfs for dehydration as she has not been able to hydrate adequately.  patient's care plan of her breast has included: dressing with dakins and using large bordered foam over twice daily. She states with the baby  diaper she felt as though she was always getting wet and the camisole and the border dressings helps prevent leaking. Will order patient some border zetuvit dressings. She states she did not get any delievery. Rn will f/u with carmen. Today the lesion to the most anterior is now open with necrosis leaking out.  She states the malodor improved for a couple days after last appointment but has been slowly returning.  I again was able to remove a good amount of necrotic tissue which should help with the malodor.  We discussed that should the drainage start to slow, we can transition to metrogel (instead of the dakins). Patient has no c/o pain.    2-5-25 patient returns.  Since last visit she did meet with dietician. She also followed up with oncology and they rx'd augmentin for her left lower leg and had her get a doppler on 1-30-25 which was negative for dvt. I did insist today that we should look at that wound and she was agreeable.  The lle wound appears to be a hematoma, s/p debridement anterior tibia is exposed.  She is continuing with chemo with dose reduction due to her thrombocytopenia. She is also getting ivfs as needed.  She has continued to dress with dakins moist gauze.  Her malodor remains significant, the two areas now communicate. Her breast appears more firm and larger, but she does not feel like it is enlarging.  I debrided again today, will have her pack with roll gauze.  I will also touch base with dr holliday regarding any plan for surgical intervention.    2-18-25 patient returns.  I communicated with dr. Holliday and the plan is for ultimate curative radiation and surgery, but chemo needs to be done first.  The continued necrosis of the tissue is expected as the chemo addresses the cancer.  She has a f/u appointment with Dr. Holliday this Thursday.   She has continued to dress with dakins and roll gauze.  She has been dressing her lower leg wound with silver alginate.  Will run patient for ctp for her  left leg, will utilize collagen and xeroform today.  Debridement done. Patient states that after debridement malodor improves and then as it gets closer to appointment the malodor starts to increase.  The other option for malodor would be metro gel however I feel that will make the drainage unmanageable. Will continue with the dakins at this time. She can also increase the frequency of her appointments.     MEDICATIONS:     Current Outpatient Medications:     ampicillin 500 MG Oral Cap, Take 1 capsule (500 mg total) by mouth in the morning and 1 capsule (500 mg total) before bedtime. (Patient not taking: Reported on 2/5/2025), Disp: , Rfl:     amoxicillin clavulanate 875-125 MG Oral Tab, Take 1 tablet by mouth 2 (two) times daily., Disp: 20 tablet, Rfl: 0    sodium hypochlorite 0.125 % External Solution, Moisten gauze with dakins, place onto wound, cover with baby diaper three times a day, Disp: 1000 mL, Rfl: 3    HYDROcodone-acetaminophen 5-325 MG Oral Tab, Take 1-2 tablets by mouth every 4 (four) hours as needed for Pain., Disp: 30 tablet, Rfl: 0    gabapentin 600 MG Oral Tab, TAKE 1 TABLET BY MOUTH THREE TIMES DAILY; TAKE AN EXTRA 600MG AS NEEDED FOR SEVERE PAIN, Disp: 270 tablet, Rfl: 1    Insulin Glargine-yfgn 100 UNIT/ML Subcutaneous Solution Pen-injector, Inject 20 Units into the skin nightly., Disp: 24 mL, Rfl: 0    prochlorperazine (COMPAZINE) 10 mg tablet, Take 1 tablet (10 mg total) by mouth every 6 (six) hours as needed for Nausea., Disp: 30 tablet, Rfl: 3    ondansetron (ZOFRAN) 8 MG tablet, Take 1 tablet (8 mg total) by mouth every 8 (eight) hours as needed for Nausea., Disp: 30 tablet, Rfl: 3    traMADol 50 MG Oral Tab, Take 1 tablet (50 mg total) by mouth every 6 (six) hours as needed., Disp: 20 tablet, Rfl: 0    fluticasone-salmeterol (WIXELA INHUB) 100-50 MCG/ACT Inhalation Aerosol Powder, Breath Activated, Inhale 1 puff into the lungs 2 (two) times daily., Disp: 3 each, Rfl: 0    Insulin Lispro, 1  Unit Dial, 100 UNIT/ML Subcutaneous Solution Pen-injector, Inject 10 Units into the skin in the morning, at noon, and at bedtime., Disp: 3 mL, Rfl: 0    Budesonide-Formoterol Fumarate 160-4.5 MCG/ACT Inhalation Aerosol, Inhale 2 puffs into the lungs 2 (two) times daily. (Patient taking differently: Inhale 2 puffs into the lungs 2 (two) times daily. Pt states she would like to go back to budesonide), Disp: 3 each, Rfl: 1    albuterol (2.5 MG/3ML) 0.083% Inhalation Nebu Soln, Take 3 mL (2.5 mg total) by nebulization every 4 (four) hours as needed for Wheezing or Shortness of Breath., Disp: 90 mL, Rfl: 0    Insulin Pen Needle (BD PEN NEEDLE DANIELA U/F) 32G X 4 MM Does not apply Misc, Up to TID, Disp: 200 each, Rfl: 2    montelukast 10 MG Oral Tab, Take 1 tablet (10 mg total) by mouth nightly., Disp: 90 tablet, Rfl: 1  ALLERGIES:   Allergies[1]   REVIEW OF SYSTEMS:   This information was obtained from the patient/family and chart.    See HPI for pertinent positives, otherwise 10 pt ROS negative.    HISTORY:   Past medical, surgical, family and social history updated where appropriate.      PHYSICAL EXAM:     Vitals:    02/18/25 0700   BP: 124/67  Comment: 101 glucose   Pulse: 83   Resp: 14   Temp: 98.2 °F (36.8 °C)     Estimated body mass index is 25.25 kg/m² as calculated from the following:    Height as of 1/30/25: 62.44\".    Weight as of 1/30/25: 140 lb (63.5 kg).   POC Glucose   Date Value Ref Range Status   02/18/2025 101 (H) 70 - 99 mg/dL Final   02/05/2025 139 (H) 70 - 99 mg/dL Final   12/09/2024 95 70 - 99 mg/dL Final       Vital signs reviewed.Appears stated age, well groomed.    Constitutional:  Bp wnl for patient. Pulse Regular and wnl for patient. Respirations easy and unlabored. Temperature wnl. Weight normal for height. Appearance neat and clean. Appears in no acute distress. Well nourished and well developed.    Lower extremity:  dp/pt palpable left. Left lower extremity free of varicosities, no edema.  Capillary refill < 3 seconds. Digits are warm, overlapping. Toenails thickned, discolored, adequate length/hygeine. Skin is very dry. no hairgrowth on legs.    Musculoskeletal:  Gait and station stable   Integumentary:  refer to wound characteristics and images   Psychiatric:  Judgment and insight intact. Alert and oriented times 3. No evidence of depression, anxiety, or agitation. Calm, cooperative, and communicative.   DIAGNOSTICS:     Lab Results   Component Value Date    BUN 6 (L) 01/30/2025    CREATSERUM 0.62 01/30/2025    GFRCKDEPI 105.47 04/18/2018    ALB 3.6 01/30/2025    TP 6.6 01/30/2025    A1C 6.5 (H) 11/30/2024       WOUND ASSESSMENT:     Wound 01/08/25 #1 Breast Left (Active)   Date First Assessed/Time First Assessed: 01/08/25 1414    Wound Number (Wound Clinic Only): #1  Primary Wound Type: (c) Other (comment)  Location: Breast  Wound Location Orientation: Left      Assessments 1/8/2025  2:16 PM 2/18/2025  9:06 AM   Wound Image            Drainage Amount Large Large   Drainage Description Serous;Yellow Serosanguineous   Wound Length (cm) 17 cm 10.3 cm   Wound Width (cm) 30 cm 20.2 cm   Wound Surface Area (cm^2) 510 cm^2 208.06 cm^2   Wound Depth (cm) 1 cm 7.2 cm (measure bigger wound for depth)   Wound Volume (cm^3) 510 cm^3 1498.032 cm^3   Wound Healing % -- -194   Margins Well-defined edges Well-defined edges   Non-staged Wound Description Full thickness Full thickness   Lora-wound Assessment Edema Edema   Wound Granulation Tissue Red;Pink;Spongy Spongy;Red   Wound Bed Granulation (%) 10 % 20 %   Wound Bed Epithelium (%) 15 % 40 %   Wound Bed Slough (%) 75 % 40 %   Wound Odor Strong Mild       Active Orders   Date Order Priority Status Authorizing Provider   02/18/25 1009 Debridement Other (comment) Left Breast Routine Active Vira Alejandro APRN       Inactive Orders   Date Order Priority Status Authorizing Provider   02/05/25 1427 Debridement Other (comment) Left Breast Routine Completed Flavia  SHANA Constantino   01/22/25 1503 Debridement Other (comment) Left Breast Routine Completed Vira Alejandro APRN   01/08/25 1618 Debridement Other (comment) Left Breast Routine Completed Vira Alejandro APRN       Wound 02/05/25 #2 Left lower leg Leg Left (Active)   Date First Assessed/Time First Assessed: 02/05/25 1348    Wound Number (Wound Clinic Only): #2 Left lower leg  Primary Wound Type: Venous Ulcer  Location: Leg  Wound Location Orientation: Left      Assessments 2/5/2025  1:50 PM 2/18/2025  9:12 AM   Wound Image        Drainage Amount Scant Moderate   Drainage Description Yellow;Serous Serosanguineous   Wound Length (cm) 1.7 cm 1.5 cm   Wound Width (cm) 1.5 cm 1.1 cm   Wound Surface Area (cm^2) 2.55 cm^2 1.65 cm^2   Wound Depth (cm) 0.1 cm 0.9 cm   Wound Volume (cm^3) 0.255 cm^3 1.485 cm^3   Wound Healing % -- -482   Margins Well-defined edges Well-defined edges   Non-staged Wound Description Full thickness Full thickness   Lora-wound Assessment -- Hemosiderin staining   Wound Granulation Tissue -- Spongy;Pink   Wound Bed Granulation (%) -- 10 %   Wound Bed Slough (%) 100 % 90 %   Wound Odor Mild None   Tunneling? No --   Undermining? No Yes   Number of Undermines -- 1   Undermine 1 Start Position -- 8   Undermine 1 End Position -- 12 (deepest @ 10 0.8cm)   Sinus Tracts? No --       Inactive Orders   Date Order Priority Status Authorizing Provider   02/05/25 1424 Debridement Venous Ulcer Left Leg Routine Completed Vira lAejandro APRN        PROCEDURE:    Left breast:  This procedure was medically necessary to promote wound healing by removing nonviable tissue, decrease chance of infection, and return the wound to an acute state.  See rn px note    ASSESSMENT AND PLAN:    1. Malignant neoplasm of overlapping sites of left breast in female, estrogen receptor negative (HCC)    2. 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  Questions and concerns addressed. Red flags to RTC or ED reviewed.  Patient (or parent) agrees to plan.      NOTE TO PATIENT: The 21st Century Cures Act makes clinical notes like these available to patients in the interest of transparency. Clinical notes are medical documents used by physicians and care providers to communicate with each other. These documents include medical language and terminology, abbreviations, and treatment information that may sound technical and at times possibly unfamiliar. In addition, at times, the verbiage may appear blunt or direct. These documents are one tool providers use to communicate relevant information and clinical opinions of the care providers in a way that allows common understanding of the clinical context.    I spent 35 minutes with the patient. This time included:    preparing to see the patient (eg, review notes and recent diagnostics),  seeing the patient, obtaining and/or reviewing separately obtained history, performing a medically appropriate examination and/or evaluation, counseling and educating the patient, documenting in the record. Bill debridement only  DISCHARGE:      Patient Instructions   Please return:  1.5 weeks    Patient discharge and wound care instructions  Swathi Arguelles  2/18/2025            You may shower and cleanse area with mild soap and water, Dakins, dab dry with gauze and apply your dressings as directed.     Changing your dressing:            two- three times a day    Wash your hands with soap and water.  Ensure that the old dressing is removed completely. Place it in a plastic bag and throw it in the trash.  Cleanse the wound with hypochlorous wound cleanser (ie. Anasept, vashe, pure and clean) .  It's ok to “scrub” your wound with the gauze, small amount of bleeding with cleansing is normal and ok.  Apply the following dressings:    Breast:     Moisten gauze with  DAKINS solution, place into wound, bordered zetuvit dressings    LEG:   patricio>xeroform>border gauze    Nutrition and blood sugar control:  Focus on the following:  Protein: Meats, beans, eggs, milk and yogurt particularly Greek yogurt), tofu, soy nuts, soy protein products (Follow the protein handout in your welcome folder)  Vitamin C: Citrus fruits and juices, strawberries, tomatoes, tomato juice, peppers, baked potatoes, spinach, broccoli, cauliflower, [place] sprouts, cabbage  Vitamin A: Dark green, leafy vegetables, orange or yellow vegetables, cantaloupe, fortified dairy products, liver, fortified cereals  Zinc: Fortified cereals, red meats, seafood  Consider supplementing with Maximino by [employer]. It can be purchased on amazon, [employer] website, or local pharmacy may be able to order it for you.  (These are essential branch chain amino acids that help with tissue building and wound healing).   When your blood sugar is consistently elevated greater than 180 your body can't heal or fight infection.      Concerns:  Signs of infection may include the following:  Increase in redness  Red \"streaks\" from wound  Increase in swelling  Fever  Unusual odor  Change in the amount of wound drainage     Should you experience any significant changes in your wound(s) or have any questions regarding your home care instructions please contact the [facility] @ [phone] If after regular business hours, please call your family doctor or local emergency room. The treatment plan has been discussed at length between you and your provider. Follow all instructions carefully, it is very important. If you do not follow all instructions you are at risk of your wound not healing, infection, possible loss of limb and even loss of life.    Vira Alejandro FNP-C, CWCN-AP, CFCN, CSWS, WCC, DWC  2/18/2025          [1]   Allergies  Allergen Reactions    Fish ANAPHYLAXIS and HIVES     All fish, Wheezing, short of breath     Fish Oil ANAPHYLAXIS and HIVES     All fish, Wheezing, short of breath   All fish, Wheezing, short of breath    Levemir HIVES and ITCHING    Seasonal WHEEZING and Runny nose     Ragweed, pollen

## 2025-02-18 ENCOUNTER — OFFICE VISIT (OUTPATIENT)
Dept: WOUND CARE | Facility: HOSPITAL | Age: 60
End: 2025-02-18
Attending: NURSE PRACTITIONER
Payer: COMMERCIAL

## 2025-02-18 VITALS
HEART RATE: 83 BPM | RESPIRATION RATE: 14 BRPM | SYSTOLIC BLOOD PRESSURE: 124 MMHG | TEMPERATURE: 98 F | DIASTOLIC BLOOD PRESSURE: 67 MMHG

## 2025-02-18 DIAGNOSIS — Z17.1 MALIGNANT NEOPLASM OF OVERLAPPING SITES OF LEFT BREAST IN FEMALE, ESTROGEN RECEPTOR NEGATIVE (HCC): Primary | ICD-10-CM

## 2025-02-18 DIAGNOSIS — C50.812 MALIGNANT NEOPLASM OF OVERLAPPING SITES OF LEFT BREAST IN FEMALE, ESTROGEN RECEPTOR NEGATIVE (HCC): Primary | ICD-10-CM

## 2025-02-18 DIAGNOSIS — L97.826 NON-PRESSURE CHRONIC ULCER OF OTHER PART OF LEFT LOWER LEG WITH BONE INVOLVEMENT WITHOUT EVIDENCE OF NECROSIS (HCC): ICD-10-CM

## 2025-02-18 LAB — GLUCOSE BLD-MCNC: 101 MG/DL (ref 70–99)

## 2025-02-18 PROCEDURE — 97597 DBRDMT OPN WND 1ST 20 CM/<: CPT | Performed by: NURSE PRACTITIONER

## 2025-02-18 PROCEDURE — 97598 DBRDMT OPN WND ADDL 20CM/<: CPT | Performed by: NURSE PRACTITIONER

## 2025-02-18 NOTE — PROGRESS NOTES
Patient ID: Swathi Arguelles is a 60 year old female.    Debridement Other (comment) Left Breast   Wound 01/08/25 #1 Breast Left    Performed by: Vira Alejandro APRN  Authorized by: Vira Alejandro APRN      Consent   Consent obtained? verbal  Consent given by: patient    Debridement Details  Performed by: NP  Debridement type: conservative sharp    Pre-debridement measurements  Length (cm): 10.3  Width (cm): 20.2  Depth (cm): 7.2 (measure bigger wound for depth)  Surface Area (cm^2): 208.06    Post-debridement measurements  Length (cm): 10.3  Width (cm): 20.2  Depth (cm): 10.9  Percent debrided: 100%  Surface Area (cm^2): 208.06  Area Debrided (cm^2): 208.06  Volume (cm^3): 2267.85    Devitalized tissue debrided: necrotic debris and slough  Instrument(s) utilized: curette, forceps and scissors  Bleeding: medium  Hemostasis obtained with: pressure  Procedural pain (0-10): 0  Post-procedural pain: 1   Response to treatment: procedure was tolerated well

## 2025-02-18 NOTE — PATIENT INSTRUCTIONS
Please return:  1.5 weeks    Patient discharge and wound care instructions  Swathi Arguelles  2/18/2025            You may shower and cleanse area with mild soap and water, Dakins, dab dry with gauze and apply your dressings as directed.     Changing your dressing:            two- three times a day    Wash your hands with soap and water.  Ensure that the old dressing is removed completely. Place it in a plastic bag and throw it in the trash.  Cleanse the wound with hypochlorous wound cleanser (ie. Anasept, vashe, pure and clean) .  It's ok to “scrub” your wound with the gauze, small amount of bleeding with cleansing is normal and ok.  Apply the following dressings:    Breast:     Moisten gauze with DAKINS solution, place into wound, bordered zetuvit dressings    LEG:   patricio>xeroform>border gauze    Nutrition and blood sugar control:  Focus on the following:  Protein: Meats, beans, eggs, milk and yogurt particularly Greek yogurt), tofu, soy nuts, soy protein products (Follow the protein handout in your welcome folder)  Vitamin C: Citrus fruits and juices, strawberries, tomatoes, tomato juice, peppers, baked potatoes, spinach, broccoli, cauliflower, [place] sprouts, cabbage  Vitamin A: Dark green, leafy vegetables, orange or yellow vegetables, cantaloupe, fortified dairy products, liver, fortified cereals  Zinc: Fortified cereals, red meats, seafood  Consider supplementing with Maximino by [employer]. It can be purchased on amazon, [employer] website, or local pharmacy may be able to order it for you.  (These are essential branch chain amino acids that help with tissue building and wound healing).   When your blood sugar is consistently elevated greater than 180 your body can't heal or fight infection.      Concerns:  Signs of infection may include the following:  Increase in redness  Red \"streaks\" from wound  Increase in swelling  Fever  Unusual odor  Change in the amount of wound drainage     Should you experience any  significant changes in your wound(s) or have any questions regarding your home care instructions please contact the wound center [facility] @ [phone] If after regular business hours, please call your family doctor or local emergency room. The treatment plan has been discussed at length between you and your provider. Follow all instructions carefully, it is very important. If you do not follow all instructions you are at risk of your wound not healing, infection, possible loss of limb and even loss of life.

## 2025-02-20 ENCOUNTER — OFFICE VISIT (OUTPATIENT)
Age: 60
End: 2025-02-20
Attending: RADIOLOGY
Payer: COMMERCIAL

## 2025-02-20 VITALS
WEIGHT: 133 LBS | BODY MASS INDEX: 23.86 KG/M2 | DIASTOLIC BLOOD PRESSURE: 59 MMHG | HEART RATE: 81 BPM | HEIGHT: 62.44 IN | RESPIRATION RATE: 16 BRPM | TEMPERATURE: 98 F | OXYGEN SATURATION: 98 % | SYSTOLIC BLOOD PRESSURE: 93 MMHG

## 2025-02-20 DIAGNOSIS — D69.59 CHEMOTHERAPY-INDUCED THROMBOCYTOPENIA: ICD-10-CM

## 2025-02-20 DIAGNOSIS — Z17.1 MALIGNANT NEOPLASM OF OVERLAPPING SITES OF LEFT BREAST IN FEMALE, ESTROGEN RECEPTOR NEGATIVE (HCC): ICD-10-CM

## 2025-02-20 DIAGNOSIS — Z15.01 BRCA2 GENETIC CARRIER: ICD-10-CM

## 2025-02-20 DIAGNOSIS — C50.811 CANCER OF OVERLAPPING SITES OF RIGHT BREAST (HCC): ICD-10-CM

## 2025-02-20 DIAGNOSIS — D64.9 NORMOCYTIC NORMOCHROMIC ANEMIA: ICD-10-CM

## 2025-02-20 DIAGNOSIS — D64.9 ANEMIA REQUIRING TRANSFUSIONS: ICD-10-CM

## 2025-02-20 DIAGNOSIS — E83.42 HYPOMAGNESEMIA: ICD-10-CM

## 2025-02-20 DIAGNOSIS — G62.9 PERIPHERAL POLYNEUROPATHY: ICD-10-CM

## 2025-02-20 DIAGNOSIS — Z79.899 ENCOUNTER FOR MONITORING CARDIOTOXIC DRUG THERAPY: Primary | ICD-10-CM

## 2025-02-20 DIAGNOSIS — C50.812 MALIGNANT NEOPLASM OF OVERLAPPING SITES OF LEFT BREAST IN FEMALE, ESTROGEN RECEPTOR NEGATIVE (HCC): ICD-10-CM

## 2025-02-20 DIAGNOSIS — Z51.81 ENCOUNTER FOR MONITORING CARDIOTOXIC DRUG THERAPY: Primary | ICD-10-CM

## 2025-02-20 DIAGNOSIS — C77.3 SECONDARY MALIGNANT NEOPLASM OF AXILLARY LYMPH NODES (HCC): Primary | ICD-10-CM

## 2025-02-20 DIAGNOSIS — Z15.09 BRCA2 GENETIC CARRIER: ICD-10-CM

## 2025-02-20 DIAGNOSIS — T45.1X5A CHEMOTHERAPY-INDUCED THROMBOCYTOPENIA: ICD-10-CM

## 2025-02-20 DIAGNOSIS — C77.3 SECONDARY MALIGNANT NEOPLASM OF AXILLARY LYMPH NODES (HCC): ICD-10-CM

## 2025-02-20 DIAGNOSIS — Z51.11 ENCOUNTER FOR CHEMOTHERAPY MANAGEMENT: ICD-10-CM

## 2025-02-20 LAB
ALBUMIN SERPL-MCNC: 3.7 G/DL (ref 3.2–4.8)
ALBUMIN/GLOB SERPL: 1.5 {RATIO} (ref 1–2)
ALP LIVER SERPL-CCNC: 101 U/L
ALT SERPL-CCNC: <7 U/L
ANION GAP SERPL CALC-SCNC: 11 MMOL/L (ref 0–18)
ANTIBODY SCREEN: NEGATIVE
AST SERPL-CCNC: 13 U/L (ref ?–34)
BASOPHILS # BLD AUTO: 0.01 X10(3) UL (ref 0–0.2)
BASOPHILS NFR BLD AUTO: 0.1 %
BILIRUB SERPL-MCNC: 0.3 MG/DL (ref 0.2–1.1)
BUN BLD-MCNC: 8 MG/DL (ref 9–23)
CALCIUM BLD-MCNC: 9.5 MG/DL (ref 8.7–10.6)
CHLORIDE SERPL-SCNC: 103 MMOL/L (ref 98–112)
CO2 SERPL-SCNC: 28 MMOL/L (ref 21–32)
CREAT BLD-MCNC: 0.77 MG/DL
DEPRECATED HBV CORE AB SER IA-ACNC: 354 NG/ML
EGFRCR SERPLBLD CKD-EPI 2021: 87 ML/MIN/1.73M2 (ref 60–?)
EOSINOPHIL # BLD AUTO: 0 X10(3) UL (ref 0–0.7)
EOSINOPHIL NFR BLD AUTO: 0 %
ERYTHROCYTE [DISTWIDTH] IN BLOOD BY AUTOMATED COUNT: 22.1 %
GLOBULIN PLAS-MCNC: 2.5 G/DL (ref 2–3.5)
GLUCOSE BLD-MCNC: 130 MG/DL (ref 70–99)
HCT VFR BLD AUTO: 21.5 %
HGB BLD-MCNC: 6.8 G/DL
IMM GRANULOCYTES # BLD AUTO: 0.03 X10(3) UL (ref 0–1)
IMM GRANULOCYTES NFR BLD: 0.4 %
IRON SATN MFR SERPL: 19 %
IRON SERPL-MCNC: 46 UG/DL
LYMPHOCYTES # BLD AUTO: 1.34 X10(3) UL (ref 1–4)
LYMPHOCYTES NFR BLD AUTO: 19.1 %
MAGNESIUM SERPL-MCNC: 1.4 MG/DL (ref 1.6–2.6)
MCH RBC QN AUTO: 31.2 PG (ref 26–34)
MCHC RBC AUTO-ENTMCNC: 31.6 G/DL (ref 31–37)
MCV RBC AUTO: 98.6 FL
MONOCYTES # BLD AUTO: 0.61 X10(3) UL (ref 0.1–1)
MONOCYTES NFR BLD AUTO: 8.7 %
NEUTROPHILS # BLD AUTO: 5.03 X10 (3) UL (ref 1.5–7.7)
NEUTROPHILS # BLD AUTO: 5.03 X10(3) UL (ref 1.5–7.7)
NEUTROPHILS NFR BLD AUTO: 71.7 %
OSMOLALITY SERPL CALC.SUM OF ELEC: 294 MOSM/KG (ref 275–295)
PLATELET # BLD AUTO: 44 10(3)UL (ref 150–450)
PLATELETS.RETICULATED NFR BLD AUTO: 3 % (ref 0–7)
POTASSIUM SERPL-SCNC: 3.7 MMOL/L (ref 3.5–5.1)
PROT SERPL-MCNC: 6.2 G/DL (ref 5.7–8.2)
RBC # BLD AUTO: 2.18 X10(6)UL
RH BLOOD TYPE: POSITIVE
SODIUM SERPL-SCNC: 142 MMOL/L (ref 136–145)
TOTAL IRON BINDING CAPACITY: 236 UG/DL (ref 250–425)
TRANSFERRIN SERPL-MCNC: 163 MG/DL (ref 250–380)
WBC # BLD AUTO: 7 X10(3) UL (ref 4–11)

## 2025-02-20 RX ORDER — ACETAMINOPHEN 325 MG/1
650 TABLET ORAL ONCE
Status: CANCELLED | OUTPATIENT
Start: 2025-02-20

## 2025-02-20 RX ORDER — ACETAMINOPHEN 325 MG/1
650 TABLET ORAL ONCE
Status: COMPLETED | OUTPATIENT
Start: 2025-02-20 | End: 2025-02-20

## 2025-02-20 RX ADMIN — ACETAMINOPHEN 650 MG: 325 TABLET ORAL at 10:43:00

## 2025-02-20 NOTE — PROGRESS NOTES
Education Record    Learner:  Patient    Pt here for: breast ca    Barriers / Limitations:  None    Method:  Brief focused, printed material and  reinforcement    General Topics:  Plan of care reviewed    Outcome: Pt tolerated infusion with no c/o.     Here for labs, MD, and chemo. Hgb 6.8, plt 44--no treatment today per MD. 1u PRBCs. Mag 1.4-4g given. Appts scheduled. Back in one week for labs and chemo, IVF appt scheduled as well as dressing changes, and cycle 5 on March 20th. Navigator to schedule MRI with pt. Pt to schedule echo. Scans to be done before Cycle 5 per MD.

## 2025-02-21 ENCOUNTER — TELEPHONE (OUTPATIENT)
Age: 60
End: 2025-02-21

## 2025-02-21 DIAGNOSIS — Z15.01 BRCA2 GENETIC CARRIER: ICD-10-CM

## 2025-02-21 DIAGNOSIS — C50.811 CANCER OF OVERLAPPING SITES OF RIGHT BREAST (HCC): Primary | ICD-10-CM

## 2025-02-21 DIAGNOSIS — Z15.09 BRCA2 GENETIC CARRIER: ICD-10-CM

## 2025-02-21 LAB
BLOOD TYPE BARCODE: 6200
UNIT VOLUME: 350 ML

## 2025-02-24 ENCOUNTER — TELEPHONE (OUTPATIENT)
Age: 60
End: 2025-02-24

## 2025-02-24 NOTE — TELEPHONE ENCOUNTER
Second voicemail left for patient to call back, confirming MRI is schedule on 3/10 at 8am she will need to [facility]. Number provided to navigator and asked she call back to confirm.

## 2025-02-24 NOTE — PROGRESS NOTES
CHIEF COMPLAINT:     No chief complaint on file.    HPI:   Information obtained from patient, daughter, chart  1-8-25 INITIAL:  Patient is a 58 yo female who was dx with right breast ca in 2013 and was lost to follow-up in 2017.  Patient next presented to the ED on 11/30/2024 with complaints of generalized weakness, poor appetite, and dyspnea with exertion. Laboratory studies showed anemia, hyponatremia, and hypochlorhydria. CTA chest was negative for pulmonary embolism or metastatic disease but did show a 22.3 x 14.6 x 16.0 cm mass arising from the left breast with areas of necrosis, possible extension into the intracostal musculature, and mildly enlarged left axillary lymph nodes. Visualization of left breast showed a large firm breast which an open area laterally with malodorous drainage. CT abdomen/pelvis w contrast on 12/01/2024 was negative for metastatic disease. Biopsy of the left breast was performed on 12/02/2024. 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 It was discussed with patient that the left breast wound will not heal and she is presenting today for assistance with management of the left breast wound.  There is significant malodor and necrotic, liquifing adipose tissue.  I was able to remove a large amount of it which will help with the drainage and malodor.  Patient has not been showering because she did not think she should get the wound wet.  The wound is not overly vascular.  We discussed utilizing dakins solution and a baby diaper.  Will order supplies for patient.     1-22-25 patient returns.  Since last visit patient was seen at cancer clinic for ivfs for dehydration as she has not been able to hydrate adequately.  patient's care plan of her breast has included: dressing with dakins and using large bordered foam over twice daily. She states with the baby diaper she felt as though she was always getting wet and the camisole and the border dressings helps  prevent leaking. Will order patient some border zetuvit dressings. She states she did not get any delievery. Rn will f/u with carmen. Today the lesion to the most anterior is now open with necrosis leaking out.  She states the malodor improved for a couple days after last appointment but has been slowly returning.  I again was able to remove a good amount of necrotic tissue which should help with the malodor.  We discussed that should the drainage start to slow, we can transition to metrogel (instead of the dakins). Patient has no c/o pain.    2-5-25 patient returns.  Since last visit she did meet with dietician. She also followed up with oncology and they rx'd augmentin for her left lower leg and had her get a doppler on 1-30-25 which was negative for dvt. I did insist today that we should look at that wound and she was agreeable.  The lle wound appears to be a hematoma, s/p debridement anterior tibia is exposed.  She is continuing with chemo with dose reduction due to her thrombocytopenia. She is also getting ivfs as needed.  She has continued to dress with dakins moist gauze.  Her malodor remains significant, the two areas now communicate. Her breast appears more firm and larger, but she does not feel like it is enlarging.  I debrided again today, will have her pack with roll gauze.  I will also touch base with dr holliday regarding any plan for surgical intervention.    2-18-25 patient returns.  I communicated with dr. Holliday and the plan is for ultimate curative radiation and surgery, but chemo needs to be done first.  The continued necrosis of the tissue is expected as the chemo addresses the cancer.  She has a f/u appointment with Dr. Holliday this Thursday.   She has continued to dress with dakins and roll gauze.  She has been dressing her lower leg wound with silver alginate.  Will run patient for ctp for her left leg, will utilize collagen and xeroform today.  Debridement done. Patient states that after  debridement malodor improves and then as it gets closer to appointment the malodor starts to increase.  The other option for malodor would be metro gel however I feel that will make the drainage unmanageable. Will continue with the dakins at this time. She can also increase the frequency of her appointments.     2-25-25 patient returns.  It has been one week since last visit. CTP for leg?***Patient followed up with dr. Lopez last week and she will be continuing with chemotherapy, but plan is to get an mri of the breast prior to cycle 5-this is scheduled for march 10.  She has continued with the dakins dressings. She reports the malodor has been ***.   The lower leg wound is ***    MEDICATIONS:     Current Outpatient Medications:     ampicillin 500 MG Oral Cap, Take 1 capsule (500 mg total) by mouth in the morning and 1 capsule (500 mg total) before bedtime., Disp: , Rfl:     amoxicillin clavulanate 875-125 MG Oral Tab, Take 1 tablet by mouth 2 (two) times daily., Disp: 20 tablet, Rfl: 0    sodium hypochlorite 0.125 % External Solution, Moisten gauze with dakins, place onto wound, cover with baby diaper three times a day, Disp: 1000 mL, Rfl: 3    HYDROcodone-acetaminophen 5-325 MG Oral Tab, Take 1-2 tablets by mouth every 4 (four) hours as needed for Pain., Disp: 30 tablet, Rfl: 0    gabapentin 600 MG Oral Tab, TAKE 1 TABLET BY MOUTH THREE TIMES DAILY; TAKE AN EXTRA 600MG AS NEEDED FOR SEVERE PAIN, Disp: 270 tablet, Rfl: 1    Insulin Glargine-yfgn 100 UNIT/ML Subcutaneous Solution Pen-injector, Inject 20 Units into the skin nightly., Disp: 24 mL, Rfl: 0    prochlorperazine (COMPAZINE) 10 mg tablet, Take 1 tablet (10 mg total) by mouth every 6 (six) hours as needed for Nausea., Disp: 30 tablet, Rfl: 3    ondansetron (ZOFRAN) 8 MG tablet, Take 1 tablet (8 mg total) by mouth every 8 (eight) hours as needed for Nausea., Disp: 30 tablet, Rfl: 3    traMADol 50 MG Oral Tab, Take 1 tablet (50 mg total) by mouth every 6  (six) hours as needed., Disp: 20 tablet, Rfl: 0    fluticasone-salmeterol (WIXELA INHUB) 100-50 MCG/ACT Inhalation Aerosol Powder, Breath Activated, Inhale 1 puff into the lungs 2 (two) times daily., Disp: 3 each, Rfl: 0    Insulin Lispro, 1 Unit Dial, 100 UNIT/ML Subcutaneous Solution Pen-injector, Inject 10 Units into the skin in the morning, at noon, and at bedtime., Disp: 3 mL, Rfl: 0    Budesonide-Formoterol Fumarate 160-4.5 MCG/ACT Inhalation Aerosol, Inhale 2 puffs into the lungs 2 (two) times daily. (Patient taking differently: Inhale 2 puffs into the lungs 2 (two) times daily. Pt states she would like to go back to budesonide), Disp: 3 each, Rfl: 1    albuterol (2.5 MG/3ML) 0.083% Inhalation Nebu Soln, Take 3 mL (2.5 mg total) by nebulization every 4 (four) hours as needed for Wheezing or Shortness of Breath., Disp: 90 mL, Rfl: 0    Insulin Pen Needle (BD PEN NEEDLE DANIELA U/F) 32G X 4 MM Does not apply Misc, Up to TID, Disp: 200 each, Rfl: 2    montelukast 10 MG Oral Tab, Take 1 tablet (10 mg total) by mouth nightly., Disp: 90 tablet, Rfl: 1  ALLERGIES:   Allergies[1]   REVIEW OF SYSTEMS:   This information was obtained from the patient/family and chart.    See HPI for pertinent positives, otherwise 10 pt ROS negative.    HISTORY:   Past medical, surgical, family and social history updated where appropriate.      PHYSICAL EXAM:     There were no vitals filed for this visit.    Estimated body mass index is 23.98 kg/m² as calculated from the following:    Height as of 2/20/25: 62.44\".    Weight as of 2/20/25: 133 lb (60.3 kg).   POC Glucose   Date Value Ref Range Status   02/18/2025 101 (H) 70 - 99 mg/dL Final   02/05/2025 139 (H) 70 - 99 mg/dL Final   12/09/2024 95 70 - 99 mg/dL Final       Vital signs reviewed.Appears stated age, well groomed.    Constitutional:  Bp ***wnl for patient. Pulse Regular and wnl for patient. Respirations easy and unlabored. Temperature wnl. Weight normal for height. Appearance  neat and clean. Appears in no acute distress. Well nourished and well developed.    Lower extremity:  dp/pt palpable*** left. Left lower extremity free of varicosities, no edema. Capillary refill < 3 seconds. Digits are warm, overlapping. Toenails thickned, discolored, adequate length/hygeine. Skin is very dry. no hairgrowth on legs.    Musculoskeletal:  Gait and station stable   Integumentary:  refer to wound characteristics and images   Psychiatric:  Judgment and insight intact. Alert and oriented times 3. No evidence of depression, anxiety, or agitation. Calm, cooperative, and communicative.   DIAGNOSTICS:     Lab Results   Component Value Date    BUN 8 (L) 02/20/2025    CREATSERUM 0.77 02/20/2025    GFRCKDEPI 105.47 04/18/2018    ALB 3.7 02/20/2025    TP 6.2 02/20/2025    A1C 6.5 (H) 11/30/2024       WOUND ASSESSMENT:     Wound 01/08/25 #1 Breast Left (Active)   Date First Assessed/Time First Assessed: 01/08/25 1414    Wound Number (Wound Clinic Only): #1  Primary Wound Type: (c) Other (comment)  Location: Breast  Wound Location Orientation: Left      Assessments 1/8/2025  2:16 PM 2/18/2025  9:06 AM   Wound Image            Drainage Amount Large Large   Drainage Description Serous;Yellow Serosanguineous   Wound Length (cm) 17 cm 10.3 cm   Wound Width (cm) 30 cm 20.2 cm   Wound Surface Area (cm^2) 510 cm^2 208.06 cm^2   Wound Depth (cm) 1 cm 7.2 cm (measure bigger wound for depth)   Wound Volume (cm^3) 510 cm^3 1498.032 cm^3   Wound Healing % -- -194   Margins Well-defined edges Well-defined edges   Non-staged Wound Description Full thickness Full thickness   Lora-wound Assessment Edema Edema   Wound Granulation Tissue Red;Pink;Spongy Spongy;Red   Wound Bed Granulation (%) 10 % 20 %   Wound Bed Epithelium (%) 15 % 40 %   Wound Bed Slough (%) 75 % 40 %   Wound Odor Strong Mild       Inactive Orders   Date Order Priority Status Authorizing Provider   02/18/25 1009 Debridement Other (comment) Left Breast Routine  Completed Vira Alejandro, APRN   02/05/25 1427 Debridement Other (comment) Left Breast Routine Completed Vira Alejandro, APRN   01/22/25 1503 Debridement Other (comment) Left Breast Routine Completed Vira Alejandro, APRN   01/08/25 1618 Debridement Other (comment) Left Breast Routine Completed Vira Alejandro, APRN       Wound 02/05/25 #2 Left lower leg Leg Left (Active)   Date First Assessed/Time First Assessed: 02/05/25 1348    Wound Number (Wound Clinic Only): #2 Left lower leg  Primary Wound Type: Venous Ulcer  Location: Leg  Wound Location Orientation: Left      Assessments 2/5/2025  1:50 PM 2/18/2025  9:12 AM   Wound Image        Drainage Amount Scant Moderate   Drainage Description Yellow;Serous Serosanguineous   Wound Length (cm) 1.7 cm 1.5 cm   Wound Width (cm) 1.5 cm 1.1 cm   Wound Surface Area (cm^2) 2.55 cm^2 1.65 cm^2   Wound Depth (cm) 0.1 cm 0.9 cm   Wound Volume (cm^3) 0.255 cm^3 1.485 cm^3   Wound Healing % -- -482   Margins Well-defined edges Well-defined edges   Non-staged Wound Description Full thickness Full thickness   Lora-wound Assessment -- Hemosiderin staining   Wound Granulation Tissue -- Spongy;Pink   Wound Bed Granulation (%) -- 10 %   Wound Bed Slough (%) 100 % 90 %   Wound Odor Mild None   Tunneling? No --   Undermining? No Yes   Number of Undermines -- 1   Undermine 1 Start Position -- 8   Undermine 1 End Position -- 12 (deepest @ 10 0.8cm)   Sinus Tracts? No --       Inactive Orders   Date Order Priority Status Authorizing Provider   02/05/25 1424 Debridement Venous Ulcer Left Leg Routine Completed Vira Alejandro, APRBISI        PROCEDURE:      ***    ASSESSMENT AND PLAN:    There are no diagnoses linked to this encounter.      Risks, benefits, and alternatives of current treatment plan discussed in detail.  Questions and concerns addressed. Red flags to RTC or ED reviewed.  Patient (or parent) agrees to plan.      NOTE TO PATIENT: The 21st Century Cures Act makes clinical notes like  these available to patients in the interest of transparency. Clinical notes are medical documents used by physicians and care providers to communicate with each other. These documents include medical language and terminology, abbreviations, and treatment information that may sound technical and at times possibly unfamiliar. In addition, at times, the verbiage may appear blunt or direct. These documents are one tool providers use to communicate relevant information and clinical opinions of the care providers in a way that allows common understanding of the clinical context.    I spent ***minutes with the patient. This time included:    preparing to see the patient (eg, review notes and recent diagnostics),  seeing the patient, obtaining and/or reviewing separately obtained history, performing a medically appropriate examination and/or evaluation, counseling and educating the patient, documenting in the record. ***  DISCHARGE:      There are no Patient Instructions on file for this visit.   Vira Alejandro FNP-C, CWCN-AP, CFCN, CSWS, WCC, DWC  2/25/2025          [1]   Allergies  Allergen Reactions    Fish ANAPHYLAXIS and HIVES     All fish, Wheezing, short of breath    Fish Oil ANAPHYLAXIS and HIVES     All fish, Wheezing, short of breath   All fish, Wheezing, short of breath    Levemir HIVES and ITCHING    Seasonal WHEEZING and Runny nose     Ragweed, pollen      clinical context.    I spent 45 minutes with the patient. This time included:    preparing to see the patient (eg, review notes and recent diagnostics),  seeing the patient, obtaining and/or reviewing separately obtained history, performing a medically appropriate examination and/or evaluation, counseling and educating the patient, documenting in the record. Bill debridement only  DISCHARGE:      Patient Instructions   Please return:  Thursday March 6    Patient discharge and wound care instructions  Swathi Arguelles  2/25/2025     You may shower and cleanse area with mild soap and water, Dakins, dab dry with gauze and apply your dressings as directed.     Changing your dressing:            two- three times a day    Wash your hands with soap and water.  Ensure that the old dressing is removed completely. Place it in a plastic bag and throw it in the trash.  Cleanse the wound with hypochlorous wound cleanser (ie. Anasept, vashe, pure and clean) .  It's ok to “scrub” your wound with the gauze, small amount of bleeding with cleansing is normal and ok.  Apply the following dressings:    Breast:    A.  Crush 4 metronidazole tablets and sprinkle into breast wound  B. Moisten gauze with DAKINS solution, place into wound, bordered zetuvit dressings    LEG:  honey>border gauze    Nutrition and blood sugar control:  Focus on the following:  Protein: Meats, beans, eggs, milk and yogurt particularly Greek yogurt), tofu, soy nuts, soy protein products (Follow the protein handout in your welcome folder)  Vitamin C: Citrus fruits and juices, strawberries, tomatoes, tomato juice, peppers, baked potatoes, spinach, broccoli, cauliflower, [place] sprouts, cabbage  Vitamin A: Dark green, leafy vegetables, orange or yellow vegetables, cantaloupe, fortified dairy products, liver, fortified cereals  Zinc: Fortified cereals, red meats, seafood  Consider supplementing with Maximino by [employer]. It can be purchased on amazon, [employer] website,  or local pharmacy may be able to order it for you.  (These are essential branch chain amino acids that help with tissue building and wound healing).   When your blood sugar is consistently elevated greater than 180 your body can't heal or fight infection.      Concerns:  Signs of infection may include the following:  Increase in redness  Red \"streaks\" from wound  Increase in swelling  Fever  Unusual odor  Change in the amount of wound drainage     Should you experience any significant changes in your wound(s) or have any questions regarding your home care instructions please contact the [facility] @ [phone] If after regular business hours, please call your family doctor or local emergency room. The treatment plan has been discussed at length between you and your provider. Follow all instructions carefully, it is very important. If you do not follow all instructions you are at risk of your wound not healing, infection, possible loss of limb and even loss of life.    Vira Alejandro FNP-C, CWCN-AP, CFCN, CSWS, WCC, DWC  2/25/2025          [1]   Allergies  Allergen Reactions    Fish ANAPHYLAXIS and HIVES     All fish, Wheezing, short of breath    Fish Oil ANAPHYLAXIS and HIVES     All fish, Wheezing, short of breath   All fish, Wheezing, short of breath    Levemir HIVES and ITCHING    Seasonal WHEEZING and Runny nose     Ragweed, pollen

## 2025-02-25 ENCOUNTER — OFFICE VISIT (OUTPATIENT)
Dept: WOUND CARE | Facility: HOSPITAL | Age: 60
End: 2025-02-25
Attending: NURSE PRACTITIONER
Payer: COMMERCIAL

## 2025-02-25 VITALS
SYSTOLIC BLOOD PRESSURE: 114 MMHG | RESPIRATION RATE: 16 BRPM | HEART RATE: 97 BPM | TEMPERATURE: 99 F | DIASTOLIC BLOOD PRESSURE: 73 MMHG

## 2025-02-25 DIAGNOSIS — L97.826 NON-PRESSURE CHRONIC ULCER OF OTHER PART OF LEFT LOWER LEG WITH BONE INVOLVEMENT WITHOUT EVIDENCE OF NECROSIS (HCC): ICD-10-CM

## 2025-02-25 DIAGNOSIS — C50.812 MALIGNANT NEOPLASM OF OVERLAPPING SITES OF LEFT BREAST IN FEMALE, ESTROGEN RECEPTOR NEGATIVE (HCC): Primary | ICD-10-CM

## 2025-02-25 DIAGNOSIS — Z17.1 MALIGNANT NEOPLASM OF OVERLAPPING SITES OF LEFT BREAST IN FEMALE, ESTROGEN RECEPTOR NEGATIVE (HCC): Primary | ICD-10-CM

## 2025-02-25 LAB — GLUCOSE BLD-MCNC: 119 MG/DL (ref 70–99)

## 2025-02-25 PROCEDURE — 97598 DBRDMT OPN WND ADDL 20CM/<: CPT | Performed by: NURSE PRACTITIONER

## 2025-02-25 PROCEDURE — 97597 DBRDMT OPN WND 1ST 20 CM/<: CPT | Performed by: NURSE PRACTITIONER

## 2025-02-25 RX ORDER — METRONIDAZOLE 500 MG/1
TABLET ORAL
Qty: 240 TABLET | Refills: 0 | Status: SHIPPED | OUTPATIENT
Start: 2025-02-25 | End: 2025-03-25

## 2025-02-25 NOTE — PROGRESS NOTES
Patient ID: Swathi Arguelles is a 60 year old female.    Debridement Venous Ulcer Left Leg   Wound 02/05/25 #2 Left lower leg Leg Left    Performed by: Vira Alejandro APRN  Authorized by: Vira Alejandro APRN      Consent   Consent obtained? verbal  Consent given by: patient    Debridement Details  Performed by: NP  Debridement type: conservative sharp    Pre-debridement measurements  Length (cm): 1.2  Width (cm): 0.9  Depth (cm): 0.4  Surface Area (cm^2): 1.08    Post-debridement measurements  Length (cm): 1.2  Width (cm): 0.9  Depth (cm): 0.5  Percent debrided: 100%  Surface Area (cm^2): 1.08  Area Debrided (cm^2): 1.08  Volume (cm^3): 0.54    Devitalized tissue debrided: biofilm and slough  Instrument(s) utilized: forceps  Bleeding: small  Hemostasis obtained with: pressure  Procedural pain (0-10): 1  Post-procedural pain: 2   Response to treatment: procedure was tolerated well

## 2025-02-25 NOTE — PROGRESS NOTES
Patient ID: Swathi Arguelles is a 60 year old female.    Debridement Other (comment) Left Breast   Wound 01/08/25 #1 Breast Left    Performed by: Vira Alejandro APRN  Authorized by: Vira Alejandro APRN      Consent   Consent obtained? verbal  Consent given by: patient    Debridement Details  Performed by: NP  Debridement type: conservative sharp    Pre-debridement measurements  Length (cm): 7.9  Width (cm): 19.5  Depth (cm): 6.1 (measure bigger wound for depth)  Surface Area (cm^2): 154.05    Post-debridement measurements  Length (cm): 7.9  Width (cm): 19.5  Depth (cm): 7.8  Percent debrided: 100%  Surface Area (cm^2): 154.05  Area Debrided (cm^2): 154.05  Volume (cm^3): 1201.59    Devitalized tissue debrided: biofilm, necrotic debris and slough  Instrument(s) utilized: curette, forceps and scissors  Bleeding: small  Hemostasis obtained with: pressure  Procedural pain (0-10): 0  Post-procedural pain: 1   Response to treatment: procedure was tolerated well

## 2025-02-25 NOTE — PATIENT INSTRUCTIONS
Please return:  Thursday March 6    Patient discharge and wound care instructions  Swathi Arguelles  2/25/2025     You may shower and cleanse area with mild soap and water, Dakins, dab dry with gauze and apply your dressings as directed.     Changing your dressing:            two- three times a day    Wash your hands with soap and water.  Ensure that the old dressing is removed completely. Place it in a plastic bag and throw it in the trash.  Cleanse the wound with hypochlorous wound cleanser (ie. Anasept, vashe, pure and clean) .  It's ok to “scrub” your wound with the gauze, small amount of bleeding with cleansing is normal and ok.  Apply the following dressings:    Breast:    A.  Crush 4 metronidazole tablets and sprinkle into breast wound  B. Moisten gauze with DAKINS solution, place into wound, bordered zetuvit dressings    LEG:  honey>border gauze    Nutrition and blood sugar control:  Focus on the following:  Protein: Meats, beans, eggs, milk and yogurt particularly Greek yogurt), tofu, soy nuts, soy protein products (Follow the protein handout in your welcome folder)  Vitamin C: Citrus fruits and juices, strawberries, tomatoes, tomato juice, peppers, baked potatoes, spinach, broccoli, cauliflower, [place] sprouts, cabbage  Vitamin A: Dark green, leafy vegetables, orange or yellow vegetables, cantaloupe, fortified dairy products, liver, fortified cereals  Zinc: Fortified cereals, red meats, seafood  Consider supplementing with Maximino by [employer]. It can be purchased on amazon, [employer] website, or local pharmacy may be able to order it for you.  (These are essential branch chain amino acids that help with tissue building and wound healing).   When your blood sugar is consistently elevated greater than 180 your body can't heal or fight infection.      Concerns:  Signs of infection may include the following:  Increase in redness  Red \"streaks\" from wound  Increase in swelling  Fever  Unusual odor  Change in the  amount of wound drainage     Should you experience any significant changes in your wound(s) or have any questions regarding your home care instructions please contact the [facility] @ [phone] If after regular business hours, please call your family doctor or local emergency room. The treatment plan has been discussed at length between you and your provider. Follow all instructions carefully, it is very important. If you do not follow all instructions you are at risk of your wound not healing, infection, possible loss of limb and even loss of life.

## 2025-02-25 NOTE — PROGRESS NOTES
.Weekly Wound Education Note    Teaching Provided To: Patient  Training Topics: Dressing;Cleasing and general instructions;Discharge instructions  Training Method: Explain/Verbal;Written  Training Response: Patient responds and understands        Notes: Wounds stable. Dressing changed to honey gel and bordered gauze to left leg wound. Continue vashe wet to dry with kerlix, bordered foam, baby diaper, and medipore tape to breast wound. Holly ordered this visit for pt to applied to wound base.

## 2025-02-27 ENCOUNTER — OFFICE VISIT (OUTPATIENT)
Age: 60
End: 2025-02-27
Attending: SPECIALIST
Payer: COMMERCIAL

## 2025-02-27 ENCOUNTER — APPOINTMENT (OUTPATIENT)
Dept: WOUND CARE | Facility: HOSPITAL | Age: 60
End: 2025-02-27
Attending: NURSE PRACTITIONER
Payer: COMMERCIAL

## 2025-02-27 VITALS
TEMPERATURE: 99 F | OXYGEN SATURATION: 95 % | BODY MASS INDEX: 23.68 KG/M2 | HEART RATE: 107 BPM | WEIGHT: 132 LBS | DIASTOLIC BLOOD PRESSURE: 66 MMHG | RESPIRATION RATE: 16 BRPM | HEIGHT: 62.44 IN | SYSTOLIC BLOOD PRESSURE: 117 MMHG

## 2025-02-27 DIAGNOSIS — C50.812 MALIGNANT NEOPLASM OF OVERLAPPING SITES OF LEFT BREAST IN FEMALE, ESTROGEN RECEPTOR NEGATIVE (HCC): ICD-10-CM

## 2025-02-27 DIAGNOSIS — C77.3 SECONDARY MALIGNANT NEOPLASM OF AXILLARY LYMPH NODES (HCC): Primary | ICD-10-CM

## 2025-02-27 DIAGNOSIS — Z17.1 MALIGNANT NEOPLASM OF OVERLAPPING SITES OF LEFT BREAST IN FEMALE, ESTROGEN RECEPTOR NEGATIVE (HCC): ICD-10-CM

## 2025-02-27 LAB
ALBUMIN SERPL-MCNC: 3.7 G/DL (ref 3.2–4.8)
ALBUMIN/GLOB SERPL: 1.3 {RATIO} (ref 1–2)
ALP LIVER SERPL-CCNC: 103 U/L
ALT SERPL-CCNC: <7 U/L
ANION GAP SERPL CALC-SCNC: 5 MMOL/L (ref 0–18)
ANTIBODY SCREEN: NEGATIVE
AST SERPL-CCNC: 13 U/L (ref ?–34)
BASOPHILS # BLD AUTO: 0.01 X10(3) UL (ref 0–0.2)
BASOPHILS NFR BLD AUTO: 0.2 %
BILIRUB SERPL-MCNC: 0.4 MG/DL (ref 0.2–1.1)
BUN BLD-MCNC: 11 MG/DL (ref 9–23)
CALCIUM BLD-MCNC: 9.7 MG/DL (ref 8.7–10.6)
CHLORIDE SERPL-SCNC: 104 MMOL/L (ref 98–112)
CO2 SERPL-SCNC: 32 MMOL/L (ref 21–32)
CREAT BLD-MCNC: 0.84 MG/DL
EGFRCR SERPLBLD CKD-EPI 2021: 79 ML/MIN/1.73M2 (ref 60–?)
EOSINOPHIL # BLD AUTO: 0.02 X10(3) UL (ref 0–0.7)
EOSINOPHIL NFR BLD AUTO: 0.4 %
ERYTHROCYTE [DISTWIDTH] IN BLOOD BY AUTOMATED COUNT: 19.9 %
GLOBULIN PLAS-MCNC: 2.9 G/DL (ref 2–3.5)
GLUCOSE BLD-MCNC: 187 MG/DL (ref 70–99)
HCT VFR BLD AUTO: 26.6 %
HGB BLD-MCNC: 8.8 G/DL
IMM GRANULOCYTES # BLD AUTO: 0.02 X10(3) UL (ref 0–1)
IMM GRANULOCYTES NFR BLD: 0.4 %
LYMPHOCYTES # BLD AUTO: 0.9 X10(3) UL (ref 1–4)
LYMPHOCYTES NFR BLD AUTO: 18.5 %
MAGNESIUM SERPL-MCNC: 1.4 MG/DL (ref 1.6–2.6)
MCH RBC QN AUTO: 32.2 PG (ref 26–34)
MCHC RBC AUTO-ENTMCNC: 33.1 G/DL (ref 31–37)
MCV RBC AUTO: 97.4 FL
MONOCYTES # BLD AUTO: 0.47 X10(3) UL (ref 0.1–1)
MONOCYTES NFR BLD AUTO: 9.7 %
NEUTROPHILS # BLD AUTO: 3.45 X10 (3) UL (ref 1.5–7.7)
NEUTROPHILS # BLD AUTO: 3.45 X10(3) UL (ref 1.5–7.7)
NEUTROPHILS NFR BLD AUTO: 70.8 %
OSMOLALITY SERPL CALC.SUM OF ELEC: 296 MOSM/KG (ref 275–295)
PLATELET # BLD AUTO: 148 10(3)UL (ref 150–450)
POTASSIUM SERPL-SCNC: 3.4 MMOL/L (ref 3.5–5.1)
PROT SERPL-MCNC: 6.6 G/DL (ref 5.7–8.2)
RBC # BLD AUTO: 2.73 X10(6)UL
RH BLOOD TYPE: POSITIVE
SODIUM SERPL-SCNC: 141 MMOL/L (ref 136–145)
WBC # BLD AUTO: 4.9 X10(3) UL (ref 4–11)

## 2025-02-27 RX ORDER — PALONOSETRON 0.05 MG/ML
0.25 INJECTION, SOLUTION INTRAVENOUS ONCE
Status: COMPLETED | OUTPATIENT
Start: 2025-02-27 | End: 2025-02-27

## 2025-02-27 RX ADMIN — PALONOSETRON 0.25 MG: 0.05 INJECTION, SOLUTION INTRAVENOUS at 11:07:00

## 2025-02-27 NOTE — PROGRESS NOTES
Pt here for C4D1 Drug(s) Ogivri/Perjeta/Taxotere/Carboplatin with K+/Mag rider.  Arrives Ambulating independently, accompanied by Family member     Patient was evaluated today by Treatment Nurse.    Oral medications included in this regimen:  no    Patient confirms comprehension of cancer treatment schedule:  yes    Pregnancy screening:  Not applicable    Modifications in dose or schedule:  Yes - K+/Mag rider ordered d/t lab results    Medications appearance and physical integrity checked by RN: yes.    Chemotherapy IV pump settings verified by 2 RNs:  Yes.  Frequency of blood return and site check throughout administration: Prior to administration and At completion of therapy     Infusion/treatment outcome:  patient tolerated treatment without incident    Education Record    Learner:  Patient and Family Member  Barriers / Limitations:  None  Method:  Discussion  Education / instructions given:  today's regime  Outcome:  Shows understanding    Discharged Home, Ambulating independently, accompanied by:Family member    Patient/family verbalized understanding of future appointments: by printed AVS

## 2025-02-28 ENCOUNTER — OFFICE VISIT (OUTPATIENT)
Age: 60
End: 2025-02-28
Attending: SPECIALIST
Payer: COMMERCIAL

## 2025-02-28 DIAGNOSIS — C50.812 MALIGNANT NEOPLASM OF OVERLAPPING SITES OF LEFT BREAST IN FEMALE, ESTROGEN RECEPTOR NEGATIVE (HCC): ICD-10-CM

## 2025-02-28 DIAGNOSIS — Z17.1 MALIGNANT NEOPLASM OF OVERLAPPING SITES OF LEFT BREAST IN FEMALE, ESTROGEN RECEPTOR NEGATIVE (HCC): ICD-10-CM

## 2025-02-28 DIAGNOSIS — C77.3 SECONDARY MALIGNANT NEOPLASM OF AXILLARY LYMPH NODES (HCC): Primary | ICD-10-CM

## 2025-02-28 NOTE — PROGRESS NOTES
Education Record    Learner:  Patient and Family Member    Disease / Diagnosis: breast ca    Barriers / Limitations:  None   Comments:    Method:  Discussion   Comments:    General Topics:  Medication, Side effects and symptom management, Plan of care reviewed, and Fall risk and prevention   Comments:    Outcome:  Shows understanding   Comments:    Fulphila inj administered per order. Tolerated well. Discharged ambulatory in stable condition.

## 2025-03-02 ENCOUNTER — OFFICE VISIT (OUTPATIENT)
Age: 60
End: 2025-03-02
Attending: SPECIALIST
Payer: COMMERCIAL

## 2025-03-02 VITALS
SYSTOLIC BLOOD PRESSURE: 146 MMHG | HEART RATE: 78 BPM | DIASTOLIC BLOOD PRESSURE: 80 MMHG | TEMPERATURE: 98 F | OXYGEN SATURATION: 99 % | RESPIRATION RATE: 18 BRPM

## 2025-03-02 DIAGNOSIS — C77.3 SECONDARY MALIGNANT NEOPLASM OF AXILLARY LYMPH NODES (HCC): Primary | ICD-10-CM

## 2025-03-02 DIAGNOSIS — C50.812 MALIGNANT NEOPLASM OF OVERLAPPING SITES OF LEFT BREAST IN FEMALE, ESTROGEN RECEPTOR NEGATIVE (HCC): ICD-10-CM

## 2025-03-02 DIAGNOSIS — Z17.1 MALIGNANT NEOPLASM OF OVERLAPPING SITES OF LEFT BREAST IN FEMALE, ESTROGEN RECEPTOR NEGATIVE (HCC): ICD-10-CM

## 2025-03-02 NOTE — PROGRESS NOTES
Education Record    Learner:  Patient    Disease / Diagnosis:    Barriers / Limitations:  None   Comments:    Method:  Discussion   Comments:    General Topics:  Plan of care reviewed   Comments:    Outcome:  Shows understanding   Comments:    Patient here for IVF - states feeling well so far after treatment last week. Patient tolerated infusion without issue and left in stable condition with future appointments in place.

## 2025-03-04 ENCOUNTER — OFFICE VISIT (OUTPATIENT)
Age: 60
End: 2025-03-04
Attending: SPECIALIST
Payer: COMMERCIAL

## 2025-03-04 VITALS
OXYGEN SATURATION: 98 % | RESPIRATION RATE: 18 BRPM | DIASTOLIC BLOOD PRESSURE: 73 MMHG | HEART RATE: 102 BPM | TEMPERATURE: 97 F | SYSTOLIC BLOOD PRESSURE: 111 MMHG

## 2025-03-04 DIAGNOSIS — C50.812 MALIGNANT NEOPLASM OF OVERLAPPING SITES OF LEFT BREAST IN FEMALE, ESTROGEN RECEPTOR NEGATIVE (HCC): ICD-10-CM

## 2025-03-04 DIAGNOSIS — Z17.1 MALIGNANT NEOPLASM OF OVERLAPPING SITES OF LEFT BREAST IN FEMALE, ESTROGEN RECEPTOR NEGATIVE (HCC): ICD-10-CM

## 2025-03-04 DIAGNOSIS — C77.3 SECONDARY MALIGNANT NEOPLASM OF AXILLARY LYMPH NODES (HCC): Primary | ICD-10-CM

## 2025-03-04 NOTE — PROGRESS NOTES
Education Record    Learner:  Patient    Disease / Diagnosis: breast ca    Barriers / Limitations:  None   Comments:    Method:  Discussion   Comments:    General Topics:  Medication, Side effects and symptom management, Plan of care reviewed, and Fall risk and prevention   Comments:    Outcome:  Shows understanding   Comments:    IVF administered per order. Tolerated well. Pt discharged ambulatory in stable condition.

## 2025-03-06 ENCOUNTER — OFFICE VISIT (OUTPATIENT)
Dept: WOUND CARE | Facility: HOSPITAL | Age: 60
End: 2025-03-06
Attending: NURSE PRACTITIONER
Payer: COMMERCIAL

## 2025-03-06 ENCOUNTER — NURSE ONLY (OUTPATIENT)
Age: 60
End: 2025-03-06
Attending: SPECIALIST
Payer: COMMERCIAL

## 2025-03-06 VITALS
TEMPERATURE: 98 F | RESPIRATION RATE: 18 BRPM | HEART RATE: 92 BPM | DIASTOLIC BLOOD PRESSURE: 69 MMHG | SYSTOLIC BLOOD PRESSURE: 119 MMHG

## 2025-03-06 DIAGNOSIS — L97.826 NON-PRESSURE CHRONIC ULCER OF OTHER PART OF LEFT LOWER LEG WITH BONE INVOLVEMENT WITHOUT EVIDENCE OF NECROSIS (HCC): ICD-10-CM

## 2025-03-06 DIAGNOSIS — Z17.1 MALIGNANT NEOPLASM OF OVERLAPPING SITES OF LEFT BREAST IN FEMALE, ESTROGEN RECEPTOR NEGATIVE (HCC): Primary | ICD-10-CM

## 2025-03-06 DIAGNOSIS — C50.812 MALIGNANT NEOPLASM OF OVERLAPPING SITES OF LEFT BREAST IN FEMALE, ESTROGEN RECEPTOR NEGATIVE (HCC): Primary | ICD-10-CM

## 2025-03-06 LAB — GLUCOSE BLD-MCNC: 115 MG/DL (ref 70–99)

## 2025-03-06 PROCEDURE — 97598 DBRDMT OPN WND ADDL 20CM/<: CPT | Performed by: NURSE PRACTITIONER

## 2025-03-06 PROCEDURE — 97597 DBRDMT OPN WND 1ST 20 CM/<: CPT | Performed by: NURSE PRACTITIONER

## 2025-03-06 RX ORDER — METRONIDAZOLE 7.5 MG/G
1 GEL TOPICAL 2 TIMES DAILY
Qty: 90 G | Refills: 0 | Status: SHIPPED | OUTPATIENT
Start: 2025-03-06 | End: 2025-04-05

## 2025-03-06 RX ORDER — COLLAGENASE SANTYL 250 [ARB'U]/G
1 OINTMENT TOPICAL DAILY
Qty: 60 G | Refills: 0 | Status: SHIPPED | OUTPATIENT
Start: 2025-03-06 | End: 2025-04-05

## 2025-03-06 NOTE — PROGRESS NOTES
.Weekly Wound Education Note    Teaching Provided To: Patient  Training Topics: Dressing, Cleasing and general instructions, Discharge instructions  Training Method: Explain/Verbal, Written  Training Response: Patient responds and understands        Notes: Wounds stable. Continue dakin's wet to dry using kerlix with bordered foam, baby diaper, and medipore tape. Will order Metronidazole gel for breast. Start santly, and bordered gauze to leg wound, honey gel  used in clinic.

## 2025-03-06 NOTE — PROGRESS NOTES
Education Record    Learner:  Patient    Pt here for: breast ca    Barriers / Limitations:  None    Method:  Brief focused, printed material and  reinforcement    General Topics:  Plan of care reviewed    Outcome: Pt tolerated infusion with no c/o.     Dressing change for PICC. No complaints. Has appts for dressing change next week and labs, APN, chemo in 2 weeks

## 2025-03-06 NOTE — PATIENT INSTRUCTIONS
Please return:  1-1.5 weeks    Meds & Refills for this Visit:  Requested Prescriptions     Signed Prescriptions Disp Refills    collagenase (SANTYL) 250 UNIT/GM External Ointment 60 g 0     Sig: Apply 1 Application topically daily.    metroNIDAZOLE 0.75 % External Gel 90 g 0     Sig: Apply 1 g topically 2 (two) times daily.     Patient discharge and wound care instructions  Swathi Arguelles  3/6/2025       You may shower and cleanse area with mild soap and water, Dakins, dab dry with gauze and apply your dressings as directed.     Changing your dressing:            two- three times a day    Wash your hands with soap and water.  Ensure that the old dressing is removed completely. Place it in a plastic bag and throw it in the trash.  Cleanse the wound with hypochlorous wound cleanser (ie. Anasept, vashe, pure and clean) .  It's ok to “scrub” your wound with the gauze, small amount of bleeding with cleansing is normal and ok.  Apply the following dressings:    Breast:    A.  Place gel on gauze and place in base of wound  B. Moisten gauze with DAKINS solution, place into wound, bordered zetuvit dressings    LEG:  honey (or santyl)>border gauze    Nutrition and blood sugar control:  Focus on the following:  Protein: Meats, beans, eggs, milk and yogurt particularly Greek yogurt), tofu, soy nuts, soy protein products (Follow the protein handout in your welcome folder)  Vitamin C: Citrus fruits and juices, strawberries, tomatoes, tomato juice, peppers, baked potatoes, spinach, broccoli, cauliflower, [place] sprouts, cabbage  Vitamin A: Dark green, leafy vegetables, orange or yellow vegetables, cantaloupe, fortified dairy products, liver, fortified cereals  Zinc: Fortified cereals, red meats, seafood  Consider supplementing with Maximino by [employer]. It can be purchased on amazon, [employer] website, or local pharmacy may be able to order it for you.  (These are essential branch chain amino acids that help with tissue building  and wound healing).   When your blood sugar is consistently elevated greater than 180 your body can't heal or fight infection.      Concerns:  Signs of infection may include the following:  Increase in redness  Red \"streaks\" from wound  Increase in swelling  Fever  Unusual odor  Change in the amount of wound drainage     Should you experience any significant changes in your wound(s) or have any questions regarding your home care instructions please contact the wound center [facility] @ [phone] If after regular business hours, please call your family doctor or local emergency room. The treatment plan has been discussed at length between you and your provider. Follow all instructions carefully, it is very important. If you do not follow all instructions you are at risk of your wound not healing, infection, possible loss of limb and even loss of life.

## 2025-03-06 NOTE — PROGRESS NOTES
CHIEF COMPLAINT:     Chief Complaint   Patient presents with    Wound Care     Arrives for follow-up. Reports that breast wound has still been draining a lot. Denies new concerns.     HPI:   Information obtained from patient, daughter, chart  1-8-25 INITIAL:  Patient is a 58 yo female who was dx with right breast ca in 2013 and was lost to follow-up in 2017.  Patient next presented to the ED on 11/30/2024 with complaints of generalized weakness, poor appetite, and dyspnea with exertion. Laboratory studies showed anemia, hyponatremia, and hypochlorhydria. CTA chest was negative for pulmonary embolism or metastatic disease but did show a 22.3 x 14.6 x 16.0 cm mass arising from the left breast with areas of necrosis, possible extension into the intracostal musculature, and mildly enlarged left axillary lymph nodes. Visualization of left breast showed a large firm breast which an open area laterally with malodorous drainage. CT abdomen/pelvis w contrast on 12/01/2024 was negative for metastatic disease. Biopsy of the left breast was performed on 12/02/2024. 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 It was discussed with patient that the left breast wound will not heal and she is presenting today for assistance with management of the left breast wound.  There is significant malodor and necrotic, liquifing adipose tissue.  I was able to remove a large amount of it which will help with the drainage and malodor.  Patient has not been showering because she did not think she should get the wound wet.  The wound is not overly vascular.  We discussed utilizing dakins solution and a baby diaper.  Will order supplies for patient.     1-22-25 patient returns.  Since last visit patient was seen at cancer clinic for ivfs for dehydration as she has not been able to hydrate adequately.  patient's care plan of her breast has included: dressing with dakins and using large bordered foam over twice daily.  She states with the baby diaper she felt as though she was always getting wet and the camisole and the border dressings helps prevent leaking. Will order patient some border zetuvit dressings. She states she did not get any delievery. Rn will f/u with carmen. Today the lesion to the most anterior is now open with necrosis leaking out.  She states the malodor improved for a couple days after last appointment but has been slowly returning.  I again was able to remove a good amount of necrotic tissue which should help with the malodor.  We discussed that should the drainage start to slow, we can transition to metrogel (instead of the dakins). Patient has no c/o pain.    2-5-25 patient returns.  Since last visit she did meet with dietician. She also followed up with oncology and they rx'd augmentin for her left lower leg and had her get a doppler on 1-30-25 which was negative for dvt. I did insist today that we should look at that wound and she was agreeable.  The lle wound appears to be a hematoma, s/p debridement anterior tibia is exposed.  She is continuing with chemo with dose reduction due to her thrombocytopenia. She is also getting ivfs as needed.  She has continued to dress with dakins moist gauze.  Her malodor remains significant, the two areas now communicate. Her breast appears more firm and larger, but she does not feel like it is enlarging.  I debrided again today, will have her pack with roll gauze.  I will also touch base with dr holliday regarding any plan for surgical intervention.    2-18-25 patient returns.  I communicated with dr. Holliday and the plan is for ultimate curative radiation and surgery, but chemo needs to be done first.  The continued necrosis of the tissue is expected as the chemo addresses the cancer.  She has a f/u appointment with Dr. Holliday this Thursday.   She has continued to dress with dakins and roll gauze.  She has been dressing her lower leg wound with silver alginate.  Will run  patient for ctp for her left leg, will utilize collagen and xeroform today.  Debridement done. Patient states that after debridement malodor improves and then as it gets closer to appointment the malodor starts to increase.  The other option for malodor would be metro gel however I feel that will make the drainage unmanageable. Will continue with the dakins at this time. She can also increase the frequency of her appointments.     2-25-25 patient returns.  It has been one week since last visit. Theraskin is not approved, keracis is still pending for her leg.  Patient followed up with dr. Lopez last week and she will be continuing with chemotherapy, but plan is to get an mri of the breast prior to cycle 5-this is scheduled for march 10.  Her chemo is scheduled for 2 day from now (Thursday).  She has continued with the dakins dressings. She reports the malodor has been about the same, today it seems stronger. Will utilize crushed flagyl and see if that helps.   The lower leg wound is measuring smaller but still has a build up of necrosis in the wound bed. Will have her dress with honey to the leg, continue with dakins to the breast. No s/s of infection or c/o pain.  The breast wound does seem to be \"accumulating\" less necrosis from visit to visit.    3-6-25 patient returns she is following up every 7-10 days. She has been receiving chemo since last Friday.  Last visit I rx'd flagyl to help with the malodor, patient reports that the wound got too \"gummy\" with the build up, so she returned to the dakins gauze.  We discussed metrogel as another option.  The leg wound is not improving.  She specifically does remember when she hit her leg on the wooden stool (my concern is that could this wound be a malignancy-although initial presentation is not consistent with malignancy and non healing could also be related to her chemo). Overall her breast is less indurated, there is less necrosis and less malodor than previous  visits.     MEDICATIONS:     Current Outpatient Medications:     collagenase (SANTYL) 250 UNIT/GM External Ointment, Apply 1 Application topically daily., Disp: 60 g, Rfl: 0    metroNIDAZOLE 0.75 % External Gel, Apply 1 g topically 2 (two) times daily., Disp: 90 g, Rfl: 0    metroNIDAZOLE 500 MG Oral Tab, Crush 4 tablets and sprinkle into wound twice daily for 30 days, Disp: 240 tablet, Rfl: 0    ampicillin 500 MG Oral Cap, Take 1 capsule (500 mg total) by mouth in the morning and 1 capsule (500 mg total) before bedtime., Disp: , Rfl:     amoxicillin clavulanate 875-125 MG Oral Tab, Take 1 tablet by mouth 2 (two) times daily., Disp: 20 tablet, Rfl: 0    sodium hypochlorite 0.125 % External Solution, Moisten gauze with dakins, place onto wound, cover with baby diaper three times a day, Disp: 1000 mL, Rfl: 3    HYDROcodone-acetaminophen 5-325 MG Oral Tab, Take 1-2 tablets by mouth every 4 (four) hours as needed for Pain., Disp: 30 tablet, Rfl: 0    gabapentin 600 MG Oral Tab, TAKE 1 TABLET BY MOUTH THREE TIMES DAILY; TAKE AN EXTRA 600MG AS NEEDED FOR SEVERE PAIN, Disp: 270 tablet, Rfl: 1    Insulin Glargine-yfgn 100 UNIT/ML Subcutaneous Solution Pen-injector, Inject 20 Units into the skin nightly., Disp: 24 mL, Rfl: 0    prochlorperazine (COMPAZINE) 10 mg tablet, Take 1 tablet (10 mg total) by mouth every 6 (six) hours as needed for Nausea., Disp: 30 tablet, Rfl: 3    ondansetron (ZOFRAN) 8 MG tablet, Take 1 tablet (8 mg total) by mouth every 8 (eight) hours as needed for Nausea., Disp: 30 tablet, Rfl: 3    traMADol 50 MG Oral Tab, Take 1 tablet (50 mg total) by mouth every 6 (six) hours as needed., Disp: 20 tablet, Rfl: 0    fluticasone-salmeterol (WIXELA INHUB) 100-50 MCG/ACT Inhalation Aerosol Powder, Breath Activated, Inhale 1 puff into the lungs 2 (two) times daily., Disp: 3 each, Rfl: 0    Insulin Lispro, 1 Unit Dial, 100 UNIT/ML Subcutaneous Solution Pen-injector, Inject 10 Units into the skin in the morning,  at noon, and at bedtime., Disp: 3 mL, Rfl: 0    Budesonide-Formoterol Fumarate 160-4.5 MCG/ACT Inhalation Aerosol, Inhale 2 puffs into the lungs 2 (two) times daily. (Patient taking differently: Inhale 2 puffs into the lungs 2 (two) times daily. Pt states she would like to go back to budesonide), Disp: 3 each, Rfl: 1    albuterol (2.5 MG/3ML) 0.083% Inhalation Nebu Soln, Take 3 mL (2.5 mg total) by nebulization every 4 (four) hours as needed for Wheezing or Shortness of Breath., Disp: 90 mL, Rfl: 0    Insulin Pen Needle (BD PEN NEEDLE DANIELA U/F) 32G X 4 MM Does not apply Misc, Up to TID, Disp: 200 each, Rfl: 2    montelukast 10 MG Oral Tab, Take 1 tablet (10 mg total) by mouth nightly., Disp: 90 tablet, Rfl: 1  ALLERGIES:   Allergies[1]   REVIEW OF SYSTEMS:   This information was obtained from the patient/family and chart.    See HPI for pertinent positives, otherwise 10 pt ROS negative.    HISTORY:   Past medical, surgical, family and social history updated where appropriate.      PHYSICAL EXAM:     Vitals:    03/06/25 1445   BP: 119/69  Comment: blood sugar: 115   Pulse: 92   Resp: 18   Temp: 97.7 °F (36.5 °C)         Estimated body mass index is 23.8 kg/m² as calculated from the following:    Height as of 2/27/25: 62.44\".    Weight as of 2/27/25: 132 lb (59.9 kg).   POC Glucose   Date Value Ref Range Status   02/25/2025 119 (H) 70 - 99 mg/dL Final   02/18/2025 101 (H) 70 - 99 mg/dL Final   02/05/2025 139 (H) 70 - 99 mg/dL Final       Vital signs reviewed.Appears stated age, well groomed.    Constitutional:  Bp wnl for patient. Pulse Regular and wnl for patient. Respirations easy and unlabored. Temperature wnl. Weight normal for height. Appearance neat and clean. Appears in no acute distress. Well nourished and well developed.    Lower extremity:  dp/pt palpable left. Left lower extremity free of varicosities, no edema. Capillary refill < 3 seconds. Digits are warm, overlapping. Toenails thickned, discolored,  adequate length/hygeine. Skin is very dry. no hairgrowth on legs.    Musculoskeletal:  Gait and station stable   Integumentary:  refer to wound characteristics and images   Psychiatric:  Judgment and insight intact. Alert and oriented times 3. No evidence of depression, anxiety, or agitation. Calm, cooperative, and communicative.   DIAGNOSTICS:     Lab Results   Component Value Date    BUN 11 02/27/2025    CREATSERUM 0.84 02/27/2025    GFRCKDEPI 105.47 04/18/2018    ALB 3.7 02/27/2025    TP 6.6 02/27/2025    A1C 6.5 (H) 11/30/2024       WOUND ASSESSMENT:     Wound 01/08/25 #1 Breast Left (Active)   Date First Assessed/Time First Assessed: 01/08/25 1414    Wound Number (Wound Clinic Only): #1  Primary Wound Type: (c) Other (comment)  Location: Breast  Wound Location Orientation: Left      Assessments 1/8/2025  2:16 PM 3/6/2025  2:54 PM   Wound Image            Drainage Amount Large Copious   Drainage Description Serous;Yellow Yellow   Wound Length (cm) 17 cm 5.4 cm   Wound Width (cm) 30 cm 18 cm   Wound Surface Area (cm^2) 510 cm^2 97.2 cm^2   Wound Depth (cm) 1 cm 5.8 cm   Wound Volume (cm^3) 510 cm^3 563.76 cm^3   Wound Healing % -- -11   Margins Well-defined edges Well-defined edges   Non-staged Wound Description Full thickness Full thickness   Lora-wound Assessment Edema --   Wound Granulation Tissue Red;Pink;Spongy Pink;Spongy   Wound Bed Granulation (%) 10 % 15 %   Wound Bed Epithelium (%) 15 % 10 %   Wound Bed Slough (%) 75 % 75 %   Wound Odor Strong Strong   Shape -- clustered   Tunneling? -- No   Undermining? -- No   Sinus Tracts? -- No       Active Orders   Date Order Priority Status Authorizing Provider   03/06/25 1536 Debridement Other (comment) Left Breast Routine Active Vira Alejandro APRN       Inactive Orders   Date Order Priority Status Authorizing Provider   02/25/25 1157 Debridement Other (comment) Left Breast Routine Completed Vira Alejandro APRN   02/18/25 1009 Debridement Other (comment) Left  Breast Routine Completed Vira Alejandro, APRN   02/05/25 1427 Debridement Other (comment) Left Breast Routine Completed Vira Alejandro, APRN   01/22/25 1503 Debridement Other (comment) Left Breast Routine Completed Vira Alejandro, APRN   01/08/25 1618 Debridement Other (comment) Left Breast Routine Completed Vira Alejandro, APRN       Wound 02/05/25 #2 Left lower leg Leg Left (Active)   Date First Assessed/Time First Assessed: 02/05/25 1348    Wound Number (Wound Clinic Only): #2 Left lower leg  Primary Wound Type: Venous Ulcer  Location: Leg  Wound Location Orientation: Left      Assessments 2/5/2025  1:50 PM 3/6/2025  2:46 PM   Wound Image        Drainage Amount Scant Moderate   Drainage Description Yellow;Serous Serous;Yellow   Wound Length (cm) 1.7 cm 1.2 cm   Wound Width (cm) 1.5 cm 0.9 cm   Wound Surface Area (cm^2) 2.55 cm^2 1.08 cm^2   Wound Depth (cm) 0.1 cm 0.4 cm   Wound Volume (cm^3) 0.255 cm^3 0.432 cm^3   Wound Healing % -- -69   Margins Well-defined edges Well-defined edges   Non-staged Wound Description Full thickness Full thickness   Lora-wound Assessment -- Edema;Hemosiderin staining;Pink   Wound Granulation Tissue -- Pink;Spongy   Wound Bed Granulation (%) -- 15 %   Wound Bed Slough (%) 100 % 85 %   Wound Odor Mild None   Tunneling? No No   Undermining? No No   Sinus Tracts? No No       Inactive Orders   Date Order Priority Status Authorizing Provider   02/25/25 1156 Debridement Venous Ulcer Left Leg Routine Completed Vira Alejandro, APRN   02/05/25 1424 Debridement Venous Ulcer Left Leg Routine Completed Vira Alejandro, APRN        PROCEDURE:      This procedure was medically necessary to promote wound healing by removing nonviable tissue, decrease chance of infection, reduce malodor and return the wound to an acute state.  See rn px note    ASSESSMENT AND PLAN:    1. Malignant neoplasm of overlapping sites of left breast in female, estrogen receptor negative (HCC)    2. 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  Dispense: 60 g; Refill: 0          Risks, benefits, and alternatives of current treatment plan discussed in detail.  Questions and concerns addressed. Red flags to RTC or ED reviewed.  Patient (or parent) agrees to plan.      NOTE TO PATIENT: The 21st Century Cures Act makes clinical notes like these available to patients in the interest of transparency. Clinical notes are medical documents used by physicians and care providers to communicate with each other. These documents include medical language and terminology, abbreviations, and treatment information that may sound technical and at times possibly unfamiliar. In addition, at times, the verbiage may appear blunt or direct. These documents are one tool providers use to communicate relevant information and clinical opinions of the care providers in a way that allows common understanding of the clinical context.    I spent 45minutes with the patient. This time included:    preparing to see the patient (eg, review notes and recent diagnostics),  seeing the patient, obtaining and/or reviewing separately obtained history, performing a medically appropriate examination and/or evaluation, counseling and educating the patient, documenting in the record. Bill debridement only  DISCHARGE:      Patient Instructions   Please return:  1-1.5 weeks    Meds & Refills for this Visit:  Requested Prescriptions     Signed Prescriptions Disp Refills    collagenase (SANTYL) 250 UNIT/GM External Ointment 60 g 0     Sig: Apply 1 Application topically daily.    metroNIDAZOLE 0.75 % External Gel 90 g 0     Sig: Apply 1 g topically 2 (two) times daily.     Patient discharge and wound care instructions  Swathi Arguelles  3/6/2025       You may shower and cleanse area with mild soap and water, Dakins, dab  dry with gauze and apply your dressings as directed.     Changing your dressing:            two- three times a day    Wash your hands with soap and water.  Ensure that the old dressing is removed completely. Place it in a plastic bag and throw it in the trash.  Cleanse the wound with hypochlorous wound cleanser (ie. Anasept, vashe, pure and clean) .  It's ok to “scrub” your wound with the gauze, small amount of bleeding with cleansing is normal and ok.  Apply the following dressings:    Breast:    A.  Place gel on gauze and place in base of wound  B. Moisten gauze with DAKINS solution, place into wound, bordered zetuvit dressings    LEG:  honey (or santyl)>border gauze    Nutrition and blood sugar control:  Focus on the following:  Protein: Meats, beans, eggs, milk and yogurt particularly Greek yogurt), tofu, soy nuts, soy protein products (Follow the protein handout in your welcome folder)  Vitamin C: Citrus fruits and juices, strawberries, tomatoes, tomato juice, peppers, baked potatoes, spinach, broccoli, cauliflower, [place] sprouts, cabbage  Vitamin A: Dark green, leafy vegetables, orange or yellow vegetables, cantaloupe, fortified dairy products, liver, fortified cereals  Zinc: Fortified cereals, red meats, seafood  Consider supplementing with Maximino by [employer]. It can be purchased on amazon, [employer] website, or local pharmacy may be able to order it for you.  (These are essential branch chain amino acids that help with tissue building and wound healing).   When your blood sugar is consistently elevated greater than 180 your body can't heal or fight infection.      Concerns:  Signs of infection may include the following:  Increase in redness  Red \"streaks\" from wound  Increase in swelling  Fever  Unusual odor  Change in the amount of wound drainage     Should you experience any significant changes in your wound(s) or have any questions regarding your home care instructions please contact the wound center  [facility] @ [phone] If after regular business hours, please call your family doctor or local emergency room. The treatment plan has been discussed at length between you and your provider. Follow all instructions carefully, it is very important. If you do not follow all instructions you are at risk of your wound not healing, infection, possible loss of limb and even loss of life.    Vira Alejandro FNP-C, CWCN-AP, CFCN, CSWS, WCC, DWC  3/6/2025          [1]   Allergies  Allergen Reactions    Fish ANAPHYLAXIS and HIVES     All fish, Wheezing, short of breath    Fish Oil ANAPHYLAXIS and HIVES     All fish, Wheezing, short of breath   All fish, Wheezing, short of breath    Levemir HIVES and ITCHING    Seasonal WHEEZING and Runny nose     Ragweed, pollen

## 2025-03-06 NOTE — PROGRESS NOTES
Patient ID: Swathi Arguelles is a 60 year old female.    Debridement Other (comment) Left Breast   Wound 01/08/25 #1 Breast Left    Performed by: Vira Alejandro APRN  Authorized by: Vira Alejandro APRN      Consent   Consent obtained? verbal  Consent given by: patient    Debridement Details  Performed by: NP  Debridement type: conservative sharp    Pre-debridement measurements  Length (cm): 5.4  Width (cm): 18  Depth (cm): 5.8  Surface Area (cm^2): 97.2    Post-debridement measurements  Length (cm): 5.4  Width (cm): 18  Depth (cm): 7.2  Percent debrided: 100%  Surface Area (cm^2): 97.2  Area Debrided (cm^2): 97.2  Volume (cm^3): 699.84    Devitalized tissue debrided: biofilm, necrotic debris and slough  Instrument(s) utilized: curette and forceps  Bleeding: medium  Hemostasis obtained with: pressure  Procedural pain (0-10): 0  Post-procedural pain: 1   Response to treatment: procedure was tolerated well

## 2025-03-10 ENCOUNTER — HOSPITAL ENCOUNTER (OUTPATIENT)
Dept: MRI IMAGING | Facility: HOSPITAL | Age: 60
Discharge: HOME | End: 2025-03-10
Attending: SPECIALIST
Payer: COMMERCIAL

## 2025-03-10 DIAGNOSIS — Z15.01 BRCA2 GENETIC CARRIER: ICD-10-CM

## 2025-03-10 DIAGNOSIS — Z15.09 BRCA2 GENETIC CARRIER: ICD-10-CM

## 2025-03-10 DIAGNOSIS — C50.811 CANCER OF OVERLAPPING SITES OF RIGHT BREAST (HCC): ICD-10-CM

## 2025-03-10 PROCEDURE — A9575 INJ GADOTERATE MEGLUMI 0.1ML: HCPCS | Performed by: SPECIALIST

## 2025-03-10 PROCEDURE — 77049 MRI BREAST C-+ W/CAD BI: CPT | Performed by: SPECIALIST

## 2025-03-10 RX ORDER — GADOTERATE MEGLUMINE 376.9 MG/ML
15 INJECTION INTRAVENOUS
Status: COMPLETED | OUTPATIENT
Start: 2025-03-10 | End: 2025-03-10

## 2025-03-10 RX ADMIN — GADOTERATE MEGLUMINE 13 ML: 376.9 INJECTION INTRAVENOUS at 09:11:00

## 2025-03-13 ENCOUNTER — NURSE ONLY (OUTPATIENT)
Age: 60
End: 2025-03-13
Attending: SPECIALIST
Payer: COMMERCIAL

## 2025-03-13 NOTE — PROGRESS NOTES
Education Record    Learner:  Patient    Disease / Diagnosis: Breast cancer    Barriers / Limitations:  None   Comments:    Method:  Discussion   Comments:    General Topics:  Plan of care reviewed and Fall risk and prevention   Comments:    Outcome:  Shows understanding   Comments:    PICC dressing changed per protocol. Blood return noted from both lumens. Discharged ambulatory in stable condition.

## 2025-03-17 NOTE — PROGRESS NOTES
CHIEF COMPLAINT:     Chief Complaint   Patient presents with    Wound Care     Patients is here for a follow up. Patients stated no issue at this moment      HPI:   Information obtained from patient, daughter, chart  1-8-25 INITIAL:  Patient is a 60 yo female who was dx with right breast ca in 2013 and was lost to follow-up in 2017.  Patient next presented to the ED on 11/30/2024 with complaints of generalized weakness, poor appetite, and dyspnea with exertion. Laboratory studies showed anemia, hyponatremia, and hypochlorhydria. CTA chest was negative for pulmonary embolism or metastatic disease but did show a 22.3 x 14.6 x 16.0 cm mass arising from the left breast with areas of necrosis, possible extension into the intracostal musculature, and mildly enlarged left axillary lymph nodes. Visualization of left breast showed a large firm breast which an open area laterally with malodorous drainage. CT abdomen/pelvis w contrast on 12/01/2024 was negative for metastatic disease. Biopsy of the left breast was performed on 12/02/2024. 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 It was discussed with patient that the left breast wound will not heal and she is presenting today for assistance with management of the left breast wound.  There is significant malodor and necrotic, liquifing adipose tissue.  I was able to remove a large amount of it which will help with the drainage and malodor.  Patient has not been showering because she did not think she should get the wound wet.  The wound is not overly vascular.  We discussed utilizing dakins solution and a baby diaper.  Will order supplies for patient.     HPI PRIOR TO MARCH 2025 SEE INDIVIDUAL PROGRESS NOTES  2-18-25 patient returns.  I communicated with dr. Lopez and the plan is for ultimate curative radiation and surgery, but chemo needs to be done first.  The continued necrosis of the tissue is expected as the chemo addresses the cancer.   She has a f/u appointment with Dr. Lopez this Thursday.   She has continued to dress with dakins and roll gauze.  She has been dressing her lower leg wound with silver alginate.  Will run patient for ctp for her left leg, will utilize collagen and xeroform today.  Debridement done. Patient states that after debridement malodor improves and then as it gets closer to appointment the malodor starts to increase.  The other option for malodor would be metro gel however I feel that will make the drainage unmanageable. Will continue with the dakins at this time. She can also increase the frequency of her appointments.     2-25-25 patient returns.  It has been one week since last visit. Theraskin is not approved, keracis is still pending for her leg.  Patient followed up with dr. Lopez last week and she will be continuing with chemotherapy, but plan is to get an mri of the breast prior to cycle 5-this is scheduled for march 10.  Her chemo is scheduled for 2 day from now (Thursday).  She has continued with the dakins dressings. She reports the malodor has been about the same, today it seems stronger. Will utilize crushed flagyl and see if that helps.   The lower leg wound is measuring smaller but still has a build up of necrosis in the wound bed. Will have her dress with honey to the leg, continue with dakins to the breast. No s/s of infection or c/o pain.  The breast wound does seem to be \"accumulating\" less necrosis from visit to visit.    3-6-25 patient returns she is following up every 7-10 days. She has been receiving chemo since last Friday.  Last visit I rx'd flagyl to help with the malodor, patient reports that the wound got too \"gummy\" with the build up, so she returned to the dakins gauze.  We discussed metrogel as another option.  The leg wound is not improving.  She specifically does remember when she hit her leg on the wooden stool (my concern is that could this wound be a malignancy-although initial  presentation is not consistent with malignancy and non healing could also be related to her chemo). Overall her breast is less indurated, there is less necrosis and less malodor than previous visits.     3-18-25 patient returns.  I did order metrogel at last visit she tried this and does feel like it is helping the malodor.  I also did order santyl for her left lower leg ulceration she was able to get this, her leg wound is slightly smaller, the coagulum across the wound bed, wipes out  with a dry gauze.  Dakins refilled. Patient states she recently had an mri of the breast. Her next chemo is next Thursday.     MEDICATIONS:     Current Outpatient Medications:     sodium hypochlorite 0.125 % External Solution, Moisten gauze with dakins, place onto wound, cover with baby diaper three times a day, Disp: 1500 mL, Rfl: 3    collagenase (SANTYL) 250 UNIT/GM External Ointment, Apply 1 Application topically daily., Disp: 60 g, Rfl: 0    metroNIDAZOLE 0.75 % External Gel, Apply 1 g topically 2 (two) times daily., Disp: 90 g, Rfl: 0    metroNIDAZOLE 500 MG Oral Tab, Crush 4 tablets and sprinkle into wound twice daily for 30 days, Disp: 240 tablet, Rfl: 0    ampicillin 500 MG Oral Cap, Take 1 capsule (500 mg total) by mouth in the morning and 1 capsule (500 mg total) before bedtime., Disp: , Rfl:     amoxicillin clavulanate 875-125 MG Oral Tab, Take 1 tablet by mouth 2 (two) times daily., Disp: 20 tablet, Rfl: 0    HYDROcodone-acetaminophen 5-325 MG Oral Tab, Take 1-2 tablets by mouth every 4 (four) hours as needed for Pain., Disp: 30 tablet, Rfl: 0    gabapentin 600 MG Oral Tab, TAKE 1 TABLET BY MOUTH THREE TIMES DAILY; TAKE AN EXTRA 600MG AS NEEDED FOR SEVERE PAIN, Disp: 270 tablet, Rfl: 1    Insulin Glargine-yfgn 100 UNIT/ML Subcutaneous Solution Pen-injector, Inject 20 Units into the skin nightly., Disp: 24 mL, Rfl: 0    prochlorperazine (COMPAZINE) 10 mg tablet, Take 1 tablet (10 mg total) by mouth every 6 (six) hours as  needed for Nausea., Disp: 30 tablet, Rfl: 3    ondansetron (ZOFRAN) 8 MG tablet, Take 1 tablet (8 mg total) by mouth every 8 (eight) hours as needed for Nausea., Disp: 30 tablet, Rfl: 3    traMADol 50 MG Oral Tab, Take 1 tablet (50 mg total) by mouth every 6 (six) hours as needed., Disp: 20 tablet, Rfl: 0    fluticasone-salmeterol (WIXELA INHUB) 100-50 MCG/ACT Inhalation Aerosol Powder, Breath Activated, Inhale 1 puff into the lungs 2 (two) times daily., Disp: 3 each, Rfl: 0    Insulin Lispro, 1 Unit Dial, 100 UNIT/ML Subcutaneous Solution Pen-injector, Inject 10 Units into the skin in the morning, at noon, and at bedtime., Disp: 3 mL, Rfl: 0    Budesonide-Formoterol Fumarate 160-4.5 MCG/ACT Inhalation Aerosol, Inhale 2 puffs into the lungs 2 (two) times daily. (Patient taking differently: Inhale 2 puffs into the lungs 2 (two) times daily. Pt states she would like to go back to budesonide), Disp: 3 each, Rfl: 1    albuterol (2.5 MG/3ML) 0.083% Inhalation Nebu Soln, Take 3 mL (2.5 mg total) by nebulization every 4 (four) hours as needed for Wheezing or Shortness of Breath., Disp: 90 mL, Rfl: 0    Insulin Pen Needle (BD PEN NEEDLE DANIELA U/F) 32G X 4 MM Does not apply Misc, Up to TID, Disp: 200 each, Rfl: 2    montelukast 10 MG Oral Tab, Take 1 tablet (10 mg total) by mouth nightly., Disp: 90 tablet, Rfl: 1  ALLERGIES:   Allergies[1]   REVIEW OF SYSTEMS:   This information was obtained from the patient/family and chart.    See HPI for pertinent positives, otherwise 10 pt ROS negative.    HISTORY:   Past medical, surgical, family and social history updated where appropriate.      PHYSICAL EXAM:     Vitals:    03/18/25 0940   BP: 120/72  Comment: 119 glucose   Pulse: 79   Resp: 16   Temp: 98 °F (36.7 °C)     Estimated body mass index is 23.8 kg/m² as calculated from the following:    Height as of 2/27/25: 62.44\".    Weight as of 2/27/25: 132 lb (59.9 kg).   POC Glucose   Date Value Ref Range Status   03/18/2025 119 (H) 70  - 99 mg/dL Final   03/06/2025 115 (H) 70 - 99 mg/dL Final   02/25/2025 119 (H) 70 - 99 mg/dL Final       Vital signs reviewed.Appears stated age, well groomed.    Constitutional:  Bp wnl for patient. Pulse Regular and wnl for patient. Respirations easy and unlabored. Temperature wnl. Weight normal for height. Appearance neat and clean. Appears in no acute distress. Well nourished and well developed.    Lower extremity:  dp/pt palpable left. Left lower extremity free of varicosities, no edema. Capillary refill < 3 seconds. Digits are warm, overlapping. Toenails thickned, discolored, adequate length/hygeine. Skin is very dry. no hairgrowth on legs.    Musculoskeletal:  Gait and station stable   Integumentary:  refer to wound characteristics and images   Psychiatric:  Judgment and insight intact. Alert and oriented times 3. No evidence of depression, anxiety, or agitation. Calm, cooperative, and communicative.   DIAGNOSTICS:     Lab Results   Component Value Date    BUN 11 02/27/2025    CREATSERUM 0.84 02/27/2025    GFRCKDEPI 105.47 04/18/2018    ALB 3.7 02/27/2025    TP 6.6 02/27/2025    A1C 6.5 (H) 11/30/2024       WOUND ASSESSMENT:     Wound 01/08/25 #1 Breast Left (Active)   Date First Assessed/Time First Assessed: 01/08/25 1414    Wound Number (Wound Clinic Only): #1  Primary Wound Type: (c) Other (comment)  Location: Breast  Wound Location Orientation: Left      Assessments 1/8/2025  2:16 PM 3/18/2025  9:40 AM   Wound Image            Drainage Amount Large Copious   Drainage Description Serous;Yellow Serous;Yellow   Wound Length (cm) 17 cm 4.7 cm   Wound Width (cm) 30 cm 16 cm   Wound Surface Area (cm^2) 510 cm^2 75.2 cm^2   Wound Depth (cm) 1 cm 4.2 cm   Wound Volume (cm^3) 510 cm^3 315.84 cm^3   Wound Healing % -- 38   Margins Well-defined edges Well-defined edges   Non-staged Wound Description Full thickness Full thickness   Lora-wound Assessment Edema Edema;Induration   Wound Granulation Tissue  Red;Pink;Spongy Pink;Spongy;Red   Wound Bed Granulation (%) 10 % 40 %   Wound Bed Epithelium (%) 15 % 30 %   Wound Bed Slough (%) 75 % 30 %   Wound Odor Strong Strong   Shape -- clustered       Active Orders   Date Order Priority Status Authorizing Provider   03/18/25 1032 Debridement Other (comment) Left Breast Routine Active Jiongco, Vira, APRN       Inactive Orders   Date Order Priority Status Authorizing Provider   03/06/25 1536 Debridement Other (comment) Left Breast Routine Completed Jiongco, Vira, APRN   02/25/25 1157 Debridement Other (comment) Left Breast Routine Completed Jiongco, Vira, APRN   02/18/25 1009 Debridement Other (comment) Left Breast Routine Completed Jiongco, Vira, APRN   02/05/25 1427 Debridement Other (comment) Left Breast Routine Completed Jiongco, Vira, APRN   01/22/25 1503 Debridement Other (comment) Left Breast Routine Completed Jiongco, Vira, APRN   01/08/25 1618 Debridement Other (comment) Left Breast Routine Completed Jiongco, Vira, APRN       Wound 02/05/25 #2 Left lower leg Leg Left (Active)   Date First Assessed/Time First Assessed: 02/05/25 1348    Wound Number (Wound Clinic Only): #2 Left lower leg  Primary Wound Type: Venous Ulcer  Location: Leg  Wound Location Orientation: Left      Assessments 2/5/2025  1:50 PM 3/18/2025  9:35 AM   Wound Image         Drainage Amount Scant Small   Drainage Description Yellow;Serous Serosanguineous   Wound Length (cm) 1.7 cm 1.1 cm   Wound Width (cm) 1.5 cm 0.9 cm   Wound Surface Area (cm^2) 2.55 cm^2 0.99 cm^2   Wound Depth (cm) 0.1 cm 0.3 cm   Wound Volume (cm^3) 0.255 cm^3 0.297 cm^3   Wound Healing % -- -16   Margins Well-defined edges Well-defined edges   Non-staged Wound Description Full thickness Full thickness   Lora-wound Assessment -- Hemosiderin staining;Pink   Wound Granulation Tissue -- Pink;Firm   Wound Bed Granulation (%) -- 5 %   Wound Bed Slough (%) 100 % 95 %   Wound Odor Mild None   Tunneling? No No   Undermining? No  No   Sinus Tracts? No No       Inactive Orders   Date Order Priority Status Authorizing Provider   02/25/25 1156 Debridement Venous Ulcer Left Leg Routine Completed Vira Alejandro APRN   02/05/25 1424 Debridement Venous Ulcer Left Leg Routine Completed Vira Alejandro APRN        PROCEDURE:      This procedure was medically necessary to promote wound healing by removing nonviable tissue, decrease chance of infection, reduce malodor and return the wound to an acute state.  See rn px note    ASSESSMENT AND PLAN:    1. Malignant neoplasm of overlapping sites of left breast in female, estrogen receptor negative (HCC)  - sodium hypochlorite 0.125 % External Solution; Moisten gauze with dakins, place onto wound, cover with baby diaper three times a day  Dispense: 1500 mL; Refill: 3    2. 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  Questions and concerns addressed. Red flags to RTC or ED reviewed.  Patient (or parent) agrees to plan.      NOTE TO PATIENT: The 21st Century Cures Act makes clinical notes like these available to patients in the interest of transparency. Clinical notes are medical documents used by physicians and care providers to communicate with each other. These documents include medical language and terminology, abbreviations, and treatment information that may sound technical and at times possibly unfamiliar. In addition, at times, the verbiage may appear blunt or direct. These documents are one tool providers use to communicate relevant information and clinical opinions of the care providers in a way that allows common understanding of the clinical context.    I spent 35minutes with the patient. This time included:    preparing to see the patient (eg, review notes and recent diagnostics),  seeing the patient, obtaining and/or reviewing  separately obtained history, performing a medically appropriate examination and/or evaluation, counseling and educating the patient, documenting in the record.bill selective debridement only  DISCHARGE:      Patient Instructions   Please return:  1-1.5 weeks    Meds & Refills for this Visit:  Requested Prescriptions     Signed Prescriptions Disp Refills    sodium hypochlorite 0.125 % External Solution 1500 mL 3     Sig: Moisten gauze with dakins, place onto wound, cover with baby diaper three times a day     Patient discharge and wound care instructions  Swathi Núñezn  3/18/2025          You may shower and cleanse area with mild soap and water, Dakins, dab dry with gauze and apply your dressings as directed.     Changing your dressing:            two- three times a day    Wash your hands with soap and water.  Ensure that the old dressing is removed completely. Place it in a plastic bag and throw it in the trash.  Cleanse the wound with hypochlorous wound cleanser (ie. Anasept, vashe, pure and clean) .  It's ok to “scrub” your wound with the gauze, small amount of bleeding with cleansing is normal and ok.  Apply the following dressings:    Breast:    A.  Place gel on gauze and place in base of wound  B. Moisten gauze with DAKINS solution, place into wound, bordered zetuvit dressings    LEG:  Santyl>bordered gauze    Nutrition and blood sugar control:  Focus on the following:  Protein: Meats, beans, eggs, milk and yogurt particularly Greek yogurt), tofu, soy nuts, soy protein products (Follow the protein handout in your welcome folder)  Vitamin C: Citrus fruits and juices, strawberries, tomatoes, tomato juice, peppers, baked potatoes, spinach, broccoli, cauliflower, [place] sprouts, cabbage  Vitamin A: Dark green, leafy vegetables, orange or yellow vegetables, cantaloupe, fortified dairy products, liver, fortified cereals  Zinc: Fortified cereals, red meats, seafood  Consider supplementing with Maximino by [employer]  labs. It can be purchased on amazon, [employer] website, or local pharmacy may be able to order it for you.  (These are essential branch chain amino acids that help with tissue building and wound healing).   When your blood sugar is consistently elevated greater than 180 your body can't heal or fight infection.      Concerns:  Signs of infection may include the following:  Increase in redness  Red \"streaks\" from wound  Increase in swelling  Fever  Unusual odor  Change in the amount of wound drainage     Should you experience any significant changes in your wound(s) or have any questions regarding your home care instructions please contact the [facility] @ [phone] If after regular business hours, please call your family doctor or local emergency room. The treatment plan has been discussed at length between you and your provider. Follow all instructions carefully, it is very important. If you do not follow all instructions you are at risk of your wound not healing, infection, possible loss of limb and even loss of life.    Vira Alejandro FNP-C, CWCN-AP, CFCN, CSWS, WCC, DWC  3/18/2025          [1]   Allergies  Allergen Reactions    Fish ANAPHYLAXIS and HIVES     All fish, Wheezing, short of breath    Fish Oil ANAPHYLAXIS and HIVES     All fish, Wheezing, short of breath   All fish, Wheezing, short of breath    Levemir HIVES and ITCHING    Seasonal WHEEZING and Runny nose     Ragweed, pollen

## 2025-03-18 ENCOUNTER — OFFICE VISIT (OUTPATIENT)
Dept: WOUND CARE | Facility: HOSPITAL | Age: 60
End: 2025-03-18
Attending: NURSE PRACTITIONER
Payer: COMMERCIAL

## 2025-03-18 VITALS
HEART RATE: 79 BPM | TEMPERATURE: 98 F | RESPIRATION RATE: 16 BRPM | DIASTOLIC BLOOD PRESSURE: 72 MMHG | SYSTOLIC BLOOD PRESSURE: 120 MMHG

## 2025-03-18 DIAGNOSIS — C50.812 MALIGNANT NEOPLASM OF OVERLAPPING SITES OF LEFT BREAST IN FEMALE, ESTROGEN RECEPTOR NEGATIVE (HCC): Primary | ICD-10-CM

## 2025-03-18 DIAGNOSIS — Z17.1 MALIGNANT NEOPLASM OF OVERLAPPING SITES OF LEFT BREAST IN FEMALE, ESTROGEN RECEPTOR NEGATIVE (HCC): Primary | ICD-10-CM

## 2025-03-18 DIAGNOSIS — L97.826 NON-PRESSURE CHRONIC ULCER OF OTHER PART OF LEFT LOWER LEG WITH BONE INVOLVEMENT WITHOUT EVIDENCE OF NECROSIS (HCC): ICD-10-CM

## 2025-03-18 LAB — GLUCOSE BLD-MCNC: 119 MG/DL (ref 70–99)

## 2025-03-18 PROCEDURE — 97597 DBRDMT OPN WND 1ST 20 CM/<: CPT | Performed by: NURSE PRACTITIONER

## 2025-03-18 PROCEDURE — 97598 DBRDMT OPN WND ADDL 20CM/<: CPT | Performed by: NURSE PRACTITIONER

## 2025-03-18 RX ORDER — SODIUM HYPOCHLORITE 1.25 MG/ML
SOLUTION TOPICAL
Qty: 1500 ML | Refills: 3 | Status: SHIPPED | OUTPATIENT
Start: 2025-03-18

## 2025-03-18 NOTE — PROGRESS NOTES
.Weekly Wound Education Note    Teaching Provided To: Patient  Training Topics: Dressing, Cleasing and general instructions, Discharge instructions  Training Method: Explain/Verbal, Written  Training Response: Patient responds and understands        Notes: Wounds stable. Continue santyl, saline moist gauze, and bordered gauze to leg. Continue flagyl gel, vashe kerlix and bordered foam with baby diaper and medipore tape to breast wound.

## 2025-03-18 NOTE — PROGRESS NOTES
Patient ID: Swathi Arguelles is a 60 year old female.    Debridement Other (comment) Left Breast   Wound 01/08/25 #1 Breast Left    Performed by: Vira Alejandro APRN  Authorized by: Vira Alejandro APRN      Consent   Consent obtained? verbal  Consent given by: patient    Debridement Details  Performed by: NP  Debridement type: conservative sharp    Pre-debridement measurements  Length (cm): 4.7  Width (cm): 16  Depth (cm): 4.2  Surface Area (cm^2): 75.2    Post-debridement measurements  Length (cm): 4.7  Width (cm): 16  Depth (cm): 7.6  Percent debrided: 100%  Surface Area (cm^2): 75.2  Area Debrided (cm^2): 75.2  Volume (cm^3): 571.52    Devitalized tissue debrided: biofilm, necrotic debris and slough  Instrument(s) utilized: curette  Bleeding: medium  Hemostasis obtained with: pressure  Procedural pain (0-10): 0  Post-procedural pain: 2   Response to treatment: procedure was tolerated well

## 2025-03-18 NOTE — PATIENT INSTRUCTIONS
Please return:  1-1.5 weeks    Meds & Refills for this Visit:  Requested Prescriptions     Signed Prescriptions Disp Refills    sodium hypochlorite 0.125 % External Solution 1500 mL 3     Sig: Moisten gauze with dakins, place onto wound, cover with baby diaper three times a day     Patient discharge and wound care instructions  Swathi Arguelles  3/18/2025          You may shower and cleanse area with mild soap and water, Dakins, dab dry with gauze and apply your dressings as directed.     Changing your dressing:            two- three times a day    Wash your hands with soap and water.  Ensure that the old dressing is removed completely. Place it in a plastic bag and throw it in the trash.  Cleanse the wound with hypochlorous wound cleanser (ie. Anasept, vashe, pure and clean) .  It's ok to “scrub” your wound with the gauze, small amount of bleeding with cleansing is normal and ok.  Apply the following dressings:    Breast:    A.  Place gel on gauze and place in base of wound  B. Moisten gauze with DAKINS solution, place into wound, bordered zetuvit dressings    LEG:  Santyl>bordered gauze    Nutrition and blood sugar control:  Focus on the following:  Protein: Meats, beans, eggs, milk and yogurt particularly Greek yogurt), tofu, soy nuts, soy protein products (Follow the protein handout in your welcome folder)  Vitamin C: Citrus fruits and juices, strawberries, tomatoes, tomato juice, peppers, baked potatoes, spinach, broccoli, cauliflower, [place] sprouts, cabbage  Vitamin A: Dark green, leafy vegetables, orange or yellow vegetables, cantaloupe, fortified dairy products, liver, fortified cereals  Zinc: Fortified cereals, red meats, seafood  Consider supplementing with Maximino by [employer]. It can be purchased on amazon, [employer] website, or local pharmacy may be able to order it for you.  (These are essential branch chain amino acids that help with tissue building and wound healing).   When your blood sugar is  consistently elevated greater than 180 your body can't heal or fight infection.      Concerns:  Signs of infection may include the following:  Increase in redness  Red \"streaks\" from wound  Increase in swelling  Fever  Unusual odor  Change in the amount of wound drainage     Should you experience any significant changes in your wound(s) or have any questions regarding your home care instructions please contact the [facility] @ [phone] If after regular business hours, please call your family doctor or local emergency room. The treatment plan has been discussed at length between you and your provider. Follow all instructions carefully, it is very important. If you do not follow all instructions you are at risk of your wound not healing, infection, possible loss of limb and even loss of life.

## 2025-03-20 ENCOUNTER — OFFICE VISIT (OUTPATIENT)
Age: 60
End: 2025-03-20
Attending: RADIOLOGY
Payer: COMMERCIAL

## 2025-03-20 VITALS
HEART RATE: 91 BPM | RESPIRATION RATE: 18 BRPM | WEIGHT: 130.19 LBS | SYSTOLIC BLOOD PRESSURE: 145 MMHG | DIASTOLIC BLOOD PRESSURE: 79 MMHG | HEIGHT: 62.44 IN | TEMPERATURE: 98 F | BODY MASS INDEX: 23.36 KG/M2 | OXYGEN SATURATION: 98 %

## 2025-03-20 DIAGNOSIS — Z17.1 MALIGNANT NEOPLASM OF OVERLAPPING SITES OF LEFT BREAST IN FEMALE, ESTROGEN RECEPTOR NEGATIVE (HCC): Primary | ICD-10-CM

## 2025-03-20 DIAGNOSIS — C50.812 MALIGNANT NEOPLASM OF OVERLAPPING SITES OF LEFT BREAST IN FEMALE, ESTROGEN RECEPTOR NEGATIVE (HCC): ICD-10-CM

## 2025-03-20 DIAGNOSIS — C77.3 SECONDARY MALIGNANT NEOPLASM OF AXILLARY LYMPH NODES (HCC): Primary | ICD-10-CM

## 2025-03-20 DIAGNOSIS — C50.812 MALIGNANT NEOPLASM OF OVERLAPPING SITES OF LEFT BREAST IN FEMALE, ESTROGEN RECEPTOR NEGATIVE (HCC): Primary | ICD-10-CM

## 2025-03-20 DIAGNOSIS — Z17.1 MALIGNANT NEOPLASM OF OVERLAPPING SITES OF LEFT BREAST IN FEMALE, ESTROGEN RECEPTOR NEGATIVE (HCC): ICD-10-CM

## 2025-03-20 DIAGNOSIS — Z15.01 BRCA2 GENETIC CARRIER: ICD-10-CM

## 2025-03-20 DIAGNOSIS — Z51.11 ENCOUNTER FOR CHEMOTHERAPY MANAGEMENT: ICD-10-CM

## 2025-03-20 DIAGNOSIS — E83.42 HYPOMAGNESEMIA: ICD-10-CM

## 2025-03-20 DIAGNOSIS — Z15.09 BRCA2 GENETIC CARRIER: ICD-10-CM

## 2025-03-20 LAB
ALBUMIN SERPL-MCNC: 3.9 G/DL (ref 3.2–4.8)
ALBUMIN/GLOB SERPL: 1.4 {RATIO} (ref 1–2)
ALP LIVER SERPL-CCNC: 91 U/L
ALT SERPL-CCNC: <7 U/L
ANION GAP SERPL CALC-SCNC: 8 MMOL/L (ref 0–18)
AST SERPL-CCNC: 15 U/L (ref ?–34)
BASOPHILS # BLD AUTO: 0.02 X10(3) UL (ref 0–0.2)
BASOPHILS NFR BLD AUTO: 0.4 %
BILIRUB SERPL-MCNC: 0.3 MG/DL (ref 0.2–1.1)
BUN BLD-MCNC: 7 MG/DL (ref 9–23)
CALCIUM BLD-MCNC: 9.6 MG/DL (ref 8.7–10.6)
CHLORIDE SERPL-SCNC: 104 MMOL/L (ref 98–112)
CO2 SERPL-SCNC: 31 MMOL/L (ref 21–32)
CREAT BLD-MCNC: 0.91 MG/DL
EGFRCR SERPLBLD CKD-EPI 2021: 71 ML/MIN/1.73M2 (ref 60–?)
EOSINOPHIL # BLD AUTO: 0 X10(3) UL (ref 0–0.7)
EOSINOPHIL NFR BLD AUTO: 0 %
ERYTHROCYTE [DISTWIDTH] IN BLOOD BY AUTOMATED COUNT: 17.8 %
GLOBULIN PLAS-MCNC: 2.7 G/DL (ref 2–3.5)
GLUCOSE BLD-MCNC: 152 MG/DL (ref 70–99)
HCT VFR BLD AUTO: 27.2 %
HGB BLD-MCNC: 8.8 G/DL
IMM GRANULOCYTES # BLD AUTO: 0.01 X10(3) UL (ref 0–1)
IMM GRANULOCYTES NFR BLD: 0.2 %
LYMPHOCYTES # BLD AUTO: 1.19 X10(3) UL (ref 1–4)
LYMPHOCYTES NFR BLD AUTO: 24.9 %
MAGNESIUM SERPL-MCNC: 1.5 MG/DL (ref 1.6–2.6)
MCH RBC QN AUTO: 32.2 PG (ref 26–34)
MCHC RBC AUTO-ENTMCNC: 32.4 G/DL (ref 31–37)
MCV RBC AUTO: 99.6 FL
MONOCYTES # BLD AUTO: 0.59 X10(3) UL (ref 0.1–1)
MONOCYTES NFR BLD AUTO: 12.4 %
NEUTROPHILS # BLD AUTO: 2.96 X10 (3) UL (ref 1.5–7.7)
NEUTROPHILS # BLD AUTO: 2.96 X10(3) UL (ref 1.5–7.7)
NEUTROPHILS NFR BLD AUTO: 62.1 %
OSMOLALITY SERPL CALC.SUM OF ELEC: 297 MOSM/KG (ref 275–295)
PLATELET # BLD AUTO: 132 10(3)UL (ref 150–450)
POTASSIUM SERPL-SCNC: 3.5 MMOL/L (ref 3.5–5.1)
PROT SERPL-MCNC: 6.6 G/DL (ref 5.7–8.2)
RBC # BLD AUTO: 2.73 X10(6)UL
SODIUM SERPL-SCNC: 143 MMOL/L (ref 136–145)
WBC # BLD AUTO: 4.8 X10(3) UL (ref 4–11)

## 2025-03-20 RX ORDER — PALONOSETRON 0.05 MG/ML
0.25 INJECTION, SOLUTION INTRAVENOUS ONCE
Status: CANCELLED
Start: 2025-03-20 | End: 2025-03-20

## 2025-03-20 RX ORDER — PALONOSETRON 0.05 MG/ML
0.25 INJECTION, SOLUTION INTRAVENOUS ONCE
Status: COMPLETED | OUTPATIENT
Start: 2025-03-20 | End: 2025-03-20

## 2025-03-20 RX ADMIN — PALONOSETRON 0.25 MG: 0.05 INJECTION, SOLUTION INTRAVENOUS at 11:53:00

## 2025-03-20 NOTE — PROGRESS NOTES
Pt here for C5D1 Drug(s)TCHP.  Arrives Ambulating independently, accompanied by Self and Friend , family member    Patient was evaluated today by MD.    Oral medications included in this regimen:  no    Patient confirms comprehension of cancer treatment schedule:  yes    Pregnancy screening:  Denies possibility of pregnancy    Modifications in dose or schedule:  No    Medications appearance and physical integrity checked by RN: yes.    Chemotherapy IV pump settings verified by 2 RNs:  Yes.  Frequency of blood return and site check throughout administration: Prior to administration and Prior to each drug     Infusion/treatment outcome:  patient tolerated treatment without incident    Education Record    Learner:  Patient, Family Member, and Friend  Barriers / Limitations:  None  Method:  Brief focused and Discussion  Education / instructions given:  poc  Outcome:  Shows understanding    Discharged Home, Ambulating independently, accompanied by:Self, Family member, and Friend    Patient/family verbalized understanding of future appointments: by printed AVS    Per MD, patient needs to have echo done prior to next chemo - patient advised, verbalized understanding. Order provided to patient with number to call to sched. With central scheduling.

## 2025-03-20 NOTE — PROGRESS NOTES
[facility] Progress Note       Patient Name: Swathi Arguelles   YOB: 1965  Medical Record Number: [id number]  Attending Physician: Carl Lopez M.D.     The 21st Century Cures Act makes medical notes like these available to patients in the interest of transparency. Please be advised this is a medical document. Medical documents are intended to carry relevant information, facts as evident, and the clinical opinion of the practitioner. The medical note is intended as peer to peer communication and may appear blunt or direct. It is written in medical language and may contain abbreviations or verbiage that are unfamiliar.      Date of Visit: 3/20/2025       Chief Complaint  Invasive ductal carcinoma, left breast - follow up and treatment.     Oncologic History  Swathi Arguelles is a 60 year old post-menopausal female admitted to the hospital on 09/10/2013 with symptomatic anemia.  On physical examination revealed a malodorous, draining ulcerating right breast mass.  Upon questioning, she admitted that she first noticed the mass in approximately 01/2013.  She states that her only mammogram was approximately at age 43 years.  Biopsy showed grade 3 infiltrating ductal carcinoma.  The tumor was estrogen receptor positive, progesterone receptor negative, and Her2/alberto negative.  CT scans of the chest, abdomen, pelvis showed multiple right axillary lymph nodes, two micronodules in the lungs of unknown significance, and a left sided large cystic adnexal mass.  At initial diagnosis CA 15-3 was 48.3 U/mL,  CA 27.29 was 91.7 U/mL, and  was 171.5 U/mL.  PET/CT showed abnormal FDG uptake in the ulcerating mass of the right breast/chest wall and in retropectoral and subclavicular lymph nodes.  There was no uptake in the cystic pelvic mass or in 3 subcentimeter pulmonary nodules.  Pelvic ultrasound showed a complex multiloculated left adnexal cyst with no separate identifiable  ovarian tissue.  Noted was irregular thickening of one of the cyst walls that was worrisome for a cystic ovarian neoplasm.    Patient was premenopausal at diagnosis    On 10/04/2013 she began dose dense doxorubicin and cyclophosphamide. Treatment was complicated by neutropenic fever, anemia requiring transfusion, dehydration, and prolonged thrombocytopenia. CT scans after cycle 4 showed a marked improvement in the breast/chest wall mass and axillary adenopathy. Tumor markers markedly improved. She then received weekly paclitaxel. Treatment was complicated by peripheral neuropathy and neutropenia.     During paclitaxel therapy, she consented to BRCA1/2 testing.  Results obtained on 02/11/2014 revealed the deleterious BRCA2 mutation [id number].    On 03/18/2014 she underwent right mastectomy with axillary node dissection.  Pathology showed 1.4 cm grade 3 invasive ductal carcinoma with 12/12 lymph nodes negative for malignancy.  She also underwent diagnostic laparoscopy which showed the cystic left ovarian mass but no evidence of metastatic disease.    On 05/13/2014 she underwent bilateral salpingo-oophorectomy, bilateral pelvic lymphadenectomy, limited periaortic lymphadenectomy, omentectomy, peritoneal washings, and appendectomy.  Pathology, reviewed at the [facility], showed serous borderline tumor, typical type.  All lymph nodes and pelvic washings were negative for malignancy.    In 07/2014 she began adjuvant right chest wall/axilla radiation.    She began endocrine therapy with anastrazole in mid 08/2014.     Patient was last seen on 02/03/2017. On that day, patient was advised to continue anastrozole, continue increased surveillance with alternating mammography and breast MRI, and return for follow up in 05/2017. On that day, she expressed an openness to prophylactic left mastectomy if she was a candidate for breast reconstruction. Patient failed to return for follow up in 05/2017 as recommended. She  also failed to return a call from the breast navigator to arrange evaluation by plastic surgery. She did have a left sided mammogram as previously ordered by me in 06/2017. Patient was on anastrozole at least through 02/2017 but it is not clear when she discontinued therapy.    Patient next presented to the ED on 11/30/2024 with complaints of generalized weakness, poor appetite, and dyspnea with exertion. Laboratory studies showed anemia, hyponatremia, and hypochlorhydria. CTA chest was negative for pulmonary embolism or metastatic disease but did show a 22.3 x 14.6 x 16.0 cm mass arising from the left breast with areas of necrosis, possible extension into the intracostal musculature, and mildly enlarged left axillary lymph nodes. Visualization of left breast showed a large firm breast which an open area laterally with malodorous drainage. CT abdomen/pelvis w contrast on 12/01/2024 was negative for metastatic disease.     Patient reports that she first noticed a \"rash\" 1.5 months prior to presentation. She stated that 3 months prior to presentation, she did not notice any changes in the left breast. Notably, patient's last breast imaging prior to hospitalization was in 06/2017.    Biopsy of the left breast was performed on 12/02/2024. 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 Immunohistochemical studies were as follows: estrogen receptor 0%, progesterone receptor 0%, Her2 3+, Ki67 25%.     PET/CT scan on 02/09/2024 showed abnormal SUV uptake in the left breast mass peripherally, multiple left axillary lymph nodes, subpectoral lymph nodes, a subcentimeter mediastinal lymph node.     On 12/12/2024 she began neoadjuvant systemic therapy with TCHP. Cycle 3 was delayed for thrombocytopenia.     History of Present Illness  Patient returns for follow up and treatment. She stable peripheral neuropathy involving her feet. Stable energy level and appetite. She continues to follow with wound  clinic and undergoes regular debriding of necrotic tissue from the left breast.     Performance Status   Karnofsky 90% - Normal, only minor signs/symptoms.      Past Medical History (historical data, reviewed by physician)   Invasive ductal carcinoma, left breast (as above); invasive ductal carcinoma, right breast (as above); diabetes mellitus, asthma.     Past Surgical History (historical data, reviewed by physician)   Right mastectomy and axillary node dissection; D&C.     Family History (historical data, reviewed by physician)   A three generation pedigree was obtained. Ms. Arguelles’s father  at age 76 from an unknown cancer. He had one brother and no sisters. Ms. Arguelles’s paternal grandmother  in her late 40s or early 50s from unknown causes. Ms. Arguelles’s mother is 76 years-old and has no history of cancer. Ms. Arguelles’s maternal grandmother  at age 59 from a myocardial infarction. Ms. Arguelles’s maternal grandmother’s sister had breast cancer in her 40s and had a mastectomy. There is no other known family history of cancer. Please see the pedigree for additional family history information.     Social History (historical data, reviewed by physician)   Denies tobacco, alcohol, illicit drug use.    Current Medications    sodium hypochlorite 0.125 % External Solution Moisten gauze with dakins, place onto wound, cover with baby diaper three times a day 1500 mL 3    collagenase (SANTYL) 250 UNIT/GM External Ointment Apply 1 Application topically daily. 60 g 0    metroNIDAZOLE 0.75 % External Gel Apply 1 g topically 2 (two) times daily. 90 g 0    metroNIDAZOLE 500 MG Oral Tab Crush 4 tablets and sprinkle into wound twice daily for 30 days 240 tablet 0    ampicillin 500 MG Oral Cap Take 1 capsule (500 mg total) by mouth in the morning and 1 capsule (500 mg total) before bedtime.      HYDROcodone-acetaminophen 5-325 MG Oral Tab Take 1-2 tablets by mouth every 4 (four) hours as needed for Pain. 30 tablet 0     gabapentin 600 MG Oral Tab TAKE 1 TABLET BY MOUTH THREE TIMES DAILY; TAKE AN EXTRA 600MG AS NEEDED FOR SEVERE PAIN 270 tablet 1    Insulin Glargine-yfgn 100 UNIT/ML Subcutaneous Solution Pen-injector Inject 20 Units into the skin nightly. 24 mL 0    prochlorperazine (COMPAZINE) 10 mg tablet Take 1 tablet (10 mg total) by mouth every 6 (six) hours as needed for Nausea. 30 tablet 3    ondansetron (ZOFRAN) 8 MG tablet Take 1 tablet (8 mg total) by mouth every 8 (eight) hours as needed for Nausea. 30 tablet 3    traMADol 50 MG Oral Tab Take 1 tablet (50 mg total) by mouth every 6 (six) hours as needed. 20 tablet 0    fluticasone-salmeterol (WIXELA INHUB) 100-50 MCG/ACT Inhalation Aerosol Powder, Breath Activated Inhale 1 puff into the lungs 2 (two) times daily. 3 each 0    Insulin Lispro, 1 Unit Dial, 100 UNIT/ML Subcutaneous Solution Pen-injector Inject 10 Units into the skin in the morning, at noon, and at bedtime. 3 mL 0    Budesonide-Formoterol Fumarate 160-4.5 MCG/ACT Inhalation Aerosol Inhale 2 puffs into the lungs 2 (two) times daily. (Patient taking differently: Inhale 2 puffs into the lungs 2 (two) times daily. Pt states she would like to go back to budesonide) 3 each 1    albuterol (2.5 MG/3ML) 0.083% Inhalation Nebu Soln Take 3 mL (2.5 mg total) by nebulization every 4 (four) hours as needed for Wheezing or Shortness of Breath. 90 mL 0    Insulin Pen Needle (BD PEN NEEDLE DANIELA U/F) 32G X 4 MM Does not apply Misc Up to  each 2    montelukast 10 MG Oral Tab Take 1 tablet (10 mg total) by mouth nightly. 90 tablet 1      Allergies   Ms. Arguelles is allergic to fish, fish oil, levemir, and seasonal.    Vital Signs   Height: 158.6 cm (5' 2.44\") (03/20 0913)  Weight: 59.1 kg (130 lb 3.2 oz) (03/20 0913)  BSA (Calculated - sq m): 1.6 sq meters (03/20 0913)  Pulse: 91 (03/20 0913)  BP: 145/79 (03/20 0913)  Temp: 97.6 °F (36.4 °C) (03/20 0913)  Do Not Use - Resp Rate: --  SpO2: 98 % (03/20 0913)     Physical  Examination  Constitutional  Well developed and well nourished; in no apparent distress.  Head   Normocephalic and atraumatic.  Eyes   Conjunctiva clear; sclera anicteric.  ENMT   External nose normal; external ears normal.  Respiratory  Normal effort; no respiratory distress; clear to auscultation bilaterally.  Cardiovascular  Regular rate and rhythm; normal S1S2.  Abdomen  Not distended.   Neurologic  Motor and sensory grossly intact.  Psychiatric  Mood and affect appropriate.    Laboratory  Recent Results (from the past 72 hours)   POCT Glucose    Collection Time: 03/18/25  9:39 AM   Result Value Ref Range    POC Glucose 119 (H) 70 - 99 mg/dL   MAGNESIUM [E]    Collection Time: 03/20/25  9:07 AM   Result Value Ref Range    Magnesium 1.5 (L) 1.6 - 2.6 mg/dL   CBC W Differential W Platelet    Collection Time: 03/20/25  9:07 AM   Result Value Ref Range    WBC 4.8 4.0 - 11.0 x10(3) uL    RBC 2.73 (L) 3.80 - 5.30 x10(6)uL    HGB 8.8 (L) 12.0 - 16.0 g/dL    HCT 27.2 (L) 35.0 - 48.0 %    .0 (L) 150.0 - 450.0 10(3)uL    MCV 99.6 80.0 - 100.0 fL    MCH 32.2 26.0 - 34.0 pg    MCHC 32.4 31.0 - 37.0 g/dL    RDW 17.8 %    Neutrophil Absolute Prelim 2.96 1.50 - 7.70 x10 (3) uL    Neutrophil Absolute 2.96 1.50 - 7.70 x10(3) uL    Lymphocyte Absolute 1.19 1.00 - 4.00 x10(3) uL    Monocyte Absolute 0.59 0.10 - 1.00 x10(3) uL    Eosinophil Absolute 0.00 0.00 - 0.70 x10(3) uL    Basophil Absolute 0.02 0.00 - 0.20 x10(3) uL    Immature Granulocyte Absolute 0.01 0.00 - 1.00 x10(3) uL    Neutrophil % 62.1 %    Lymphocyte % 24.9 %    Monocyte % 12.4 %    Eosinophil % 0.0 %    Basophil % 0.4 %    Immature Granulocyte % 0.2 %   Comp Metabolic Panel (14)    Collection Time: 03/20/25  9:07 AM   Result Value Ref Range    Glucose 152 (H) 70 - 99 mg/dL    Sodium 143 136 - 145 mmol/L    Potassium 3.5 3.5 - 5.1 mmol/L    Chloride 104 98 - 112 mmol/L    CO2 31.0 21.0 - 32.0 mmol/L    Anion Gap 8 0 - 18 mmol/L    BUN 7 (L) 9 - 23 mg/dL     Creatinine 0.91 0.55 - 1.02 mg/dL    Calcium, Total 9.6 8.7 - 10.6 mg/dL    Calculated Osmolality 297 (H) 275 - 295 mOsm/kg    eGFR-Cr 71 >=60 mL/min/1.73m2    AST 15 <34 U/L    ALT <7 (L) 10 - 49 U/L    Alkaline Phosphatase 91 50 - 130 U/L    Bilirubin, Total 0.3 0.2 - 1.1 mg/dL    Total Protein 6.6 5.7 - 8.2 g/dL    Albumin 3.9 3.2 - 4.8 g/dL    Globulin  2.7 2.0 - 3.5 g/dL    A/G Ratio 1.4 1.0 - 2.0    Patient Fasting for CMP? Patient not present      Radiology  [facility]  Department of Radiology  [address] [place] [address] [address]  [place] [address] [phone]      Name: Arguelles, Swathisudeep Roe Dr: Carl Lopez MD  : 1965    Age/Sex: 60 year old Female  Pt. Phone: [phone]  Exam Date: 03/10/2025  Procedure: MRI BREAST (W+WO) W/CAD BILAT (CPT=77049)   -----------------------------------------------------------------------------------------------------------------------------------------------                  Carl Lopez MD  [address] [facility]  [facility] [address]      Interpretation   PROCEDURE:  MRI BREAST (W+WO) W/CAD BILAT (CPT=77049)     COMPARISON:  [facility] , [place], PET STANDARD BODY SCAN (ONCOLOGY) (CPT=78815), 2024, 2:47 PM.  [facility] , CT, CT ANGIOGRAPHY, CHEST (CPT=71275), 2024, 4:04 AM.  [facility] , CT, CT ABDOMEN+PELVIS(CONTRAST ONLY)(CPT=74177), 2024, 11:04 AM.  [facility] ,   US, US BIOPSY SOFT TISSUE MASS(CPT=20206/89820), 2024, 11:11 AM.     INDICATIONS:  Z15.09 BRCA2 genetic carrier Z15.01 BRCA2 genetic carrier C50.811 Cancer of overlapping sites of right breast (HCC)     TECHNIQUE:  The breasts were imaged using dynamic intravenous gadolinium infusion, thin sections, and a dedicated breast coil.  Bilateral pre-contrast axial 2D/3D T2 weighted and 3D T1 weighted VIBRANT sequences were obtained with and without fat   suppression. Post contrast VIBRANT 3D T1 weighted images after IV gadolinium  were obtained. 1.6 mm slice reconstruction of 3D data sets with additional maximum intensity projects, subtraction, graphic, and kinetic analysis were performed from source   data.     PATIENT STATED HISTORY:(As transcribed by Technologist)  Patient states follow up.      CONTRAST USED:  13 mL of Dotarem     FINDINGS:       Right breast:  Postsurgical changes right mastectomy.  There is no abnormal enhancement involving the right chest wall.  No enlarged right axillary or internal mammary adenopathy.     Left breast:  There is heterogeneous masslike enhancement replacing the entire left breast with central areas of low signal suggestive of necrosis or fluid.  There appears to be a discontinuity the dermis laterally in inferiorly extending centrally.  The   abnormal enhancement extends to the chest wall.  Overall abnormal enhancement measures at least 13.2 x 9.4 x 13.7 cm.  There are multiple enhancing prominent left axillary and retropectoral lymph nodes.  Largest of these measures 1.2 x 1.1 cm in the   left axilla.                     =====  CONCLUSION:  Large necrotic enhancing mass replacing the entire left breast extending to the chest wall measuring 13.7 x 13.2 x 9.4 cm.     Enhancing prominent left axillary and retropectoral lymph nodes.     Postsurgical changes right mastectomy.  There is no abnormal enhancement involving the right chest wall or axilla.        BI-RADS CATEGORY:  DIAGNOSTIC CATEGORY 6 - KNOWN BIOPSY-PROVEN MALIGNANCY: LEFT BREAST       RECOMMENDATIONS:    SURGICAL CONSULTATION.       PLEASE NOTE:  A NORMAL MRI DOES NOT EXCLUDE THE POSSIBILITY OF BREAST CANCER.  A CLINICALLY SUSPICIOUS PALPABLE LUMP SHOULD BE BIOPSIED.  CORRELATION WITH CLINICAL EXAM AND OTHER IMAGING STUDIES IS RECOMMENDED.        LOCATION:  [facility]        Dictated by (CST): Haritha Pressley MD on 3/10/2025 at 11:53 AM       Finalized by (CST): Haritha Pressley MD on 3/10/2025 at 12:02 PM        Impression and Plan  1.   Breast  cancer, left sided: Patient is staged as T4N3M1 (one subcentimeter mediastinal lymph node). Her disease is estrogen and progesterone receptor negative and Her2 3+.           Patient has completed 4 cycles of TCHP chemotherapy. She has had a good response but MRI shows continued involvement of the chest wall. The case and MRI were reviewed with Dr. Adam of surgical oncology. His initial impression is that surgical resection with chest wall reconstruction is possible. However, he needs to exam the patient.          If patient is a possible candidate for surgical resection, further reduction in tumor burden will be helpful. So, if Dr. Adam believes that surgery is at least possible, I will switch to Enhertu beginning in 3 weeks. Today she will proceed with C5D1 of dose reduced TCHP          Echocardiogram was previously ordered to be completed before today's scheduled therapy but patient never scheduled the exam. She is reminded to do so before her next treatment.     2.   Left breast wound: Patient is following with wound care.     3.   Hypomagnesemia: IV magnesium today.    Planned Follow up  Patient will return for follow up and treatment in 3 weeks.    Electronically Signed by:     Carl Lopez M.D.  System Medical Director, Oncology Services  [facility]

## 2025-03-20 NOTE — PROGRESS NOTES
Education Record    Learner:  Patient and Family Member    Disease / Diagnosis: Breast CA     Barriers / Limitations:  None   Comments:    Method:  Discussion   Comments:    General Topics:  Medication, Side effects and symptom management, and Plan of care reviewed   Comments:    Outcome:  Shows understanding   Comments:    Here for C5D1 TCHP. She is feeling well overall. No nausea. A little lack of appetite but eating/drinking ok. Some diarrhea intermittently but not unmanageable and not current. Neuropathy unchanged in her feet.

## 2025-03-21 ENCOUNTER — OFFICE VISIT (OUTPATIENT)
Age: 60
End: 2025-03-21
Attending: SPECIALIST
Payer: COMMERCIAL

## 2025-03-21 VITALS
SYSTOLIC BLOOD PRESSURE: 128 MMHG | OXYGEN SATURATION: 96 % | HEART RATE: 77 BPM | HEIGHT: 62.44 IN | BODY MASS INDEX: 23 KG/M2 | DIASTOLIC BLOOD PRESSURE: 95 MMHG | RESPIRATION RATE: 16 BRPM

## 2025-03-21 DIAGNOSIS — C77.3 SECONDARY MALIGNANT NEOPLASM OF AXILLARY LYMPH NODES (HCC): Primary | ICD-10-CM

## 2025-03-21 DIAGNOSIS — C50.812 MALIGNANT NEOPLASM OF OVERLAPPING SITES OF LEFT BREAST IN FEMALE, ESTROGEN RECEPTOR NEGATIVE (HCC): ICD-10-CM

## 2025-03-21 DIAGNOSIS — Z17.1 MALIGNANT NEOPLASM OF OVERLAPPING SITES OF LEFT BREAST IN FEMALE, ESTROGEN RECEPTOR NEGATIVE (HCC): ICD-10-CM

## 2025-03-23 ENCOUNTER — OFFICE VISIT (OUTPATIENT)
Age: 60
End: 2025-03-23
Attending: SPECIALIST
Payer: COMMERCIAL

## 2025-03-23 VITALS
RESPIRATION RATE: 18 BRPM | DIASTOLIC BLOOD PRESSURE: 47 MMHG | OXYGEN SATURATION: 98 % | SYSTOLIC BLOOD PRESSURE: 107 MMHG | HEART RATE: 91 BPM | TEMPERATURE: 98 F

## 2025-03-23 DIAGNOSIS — C77.3 SECONDARY MALIGNANT NEOPLASM OF AXILLARY LYMPH NODES (HCC): Primary | ICD-10-CM

## 2025-03-23 DIAGNOSIS — Z17.1 MALIGNANT NEOPLASM OF OVERLAPPING SITES OF LEFT BREAST IN FEMALE, ESTROGEN RECEPTOR NEGATIVE (HCC): ICD-10-CM

## 2025-03-23 DIAGNOSIS — C50.812 MALIGNANT NEOPLASM OF OVERLAPPING SITES OF LEFT BREAST IN FEMALE, ESTROGEN RECEPTOR NEGATIVE (HCC): ICD-10-CM

## 2025-03-23 NOTE — PROGRESS NOTES
Education Record    Learner:  Patient    Disease / Diagnosis:IVF    Barriers / Limitations:  None   Comments:    Method:  Brief focused   Comments:    General Topics:  Diet, Infection, Medication, Pain, Precautions, Procedure, Side effects and symptom management, Plan of care reviewed, and Fall risk and prevention   Comments:    Outcome:  Shows understanding   Comments:    Received 1 liter of NaCl via R PICC.

## 2025-03-25 ENCOUNTER — OFFICE VISIT (OUTPATIENT)
Age: 60
End: 2025-03-25
Attending: SPECIALIST
Payer: COMMERCIAL

## 2025-03-25 VITALS
RESPIRATION RATE: 16 BRPM | HEART RATE: 92 BPM | SYSTOLIC BLOOD PRESSURE: 118 MMHG | OXYGEN SATURATION: 98 % | TEMPERATURE: 98 F | DIASTOLIC BLOOD PRESSURE: 62 MMHG

## 2025-03-25 DIAGNOSIS — Z17.1 MALIGNANT NEOPLASM OF OVERLAPPING SITES OF LEFT BREAST IN FEMALE, ESTROGEN RECEPTOR NEGATIVE (HCC): ICD-10-CM

## 2025-03-25 DIAGNOSIS — C50.812 MALIGNANT NEOPLASM OF OVERLAPPING SITES OF LEFT BREAST IN FEMALE, ESTROGEN RECEPTOR NEGATIVE (HCC): ICD-10-CM

## 2025-03-25 DIAGNOSIS — C77.3 SECONDARY MALIGNANT NEOPLASM OF AXILLARY LYMPH NODES (HCC): Primary | ICD-10-CM

## 2025-03-25 NOTE — PROGRESS NOTES
Education Record    Learner:  Patient     Disease / Diagnosis: Breast cancer    Barriers / Limitations:  None   Comments:    Method:  Discussion   Comments:    General Topics:  Medication, Side effects and symptom management, Plan of care reviewed, and Fall risk and prevention   Comments:    Outcome:  Shows understanding   Comments:    IVF administered per order. Tolerated well. Discharged ambulatory in stable condition.

## 2025-03-27 ENCOUNTER — APPOINTMENT (OUTPATIENT)
Age: 60
End: 2025-03-27
Attending: SPECIALIST
Payer: COMMERCIAL

## 2025-03-27 ENCOUNTER — OFFICE VISIT (OUTPATIENT)
Dept: WOUND CARE | Facility: HOSPITAL | Age: 60
End: 2025-03-27
Attending: NURSE PRACTITIONER
Payer: COMMERCIAL

## 2025-03-27 ENCOUNTER — NURSE ONLY (OUTPATIENT)
Age: 60
End: 2025-03-27
Attending: SPECIALIST
Payer: COMMERCIAL

## 2025-03-27 VITALS
DIASTOLIC BLOOD PRESSURE: 67 MMHG | TEMPERATURE: 97 F | SYSTOLIC BLOOD PRESSURE: 108 MMHG | RESPIRATION RATE: 16 BRPM | HEART RATE: 94 BPM

## 2025-03-27 DIAGNOSIS — Z17.1 MALIGNANT NEOPLASM OF OVERLAPPING SITES OF LEFT BREAST IN FEMALE, ESTROGEN RECEPTOR NEGATIVE (HCC): Primary | ICD-10-CM

## 2025-03-27 DIAGNOSIS — L97.826 NON-PRESSURE CHRONIC ULCER OF OTHER PART OF LEFT LOWER LEG WITH BONE INVOLVEMENT WITHOUT EVIDENCE OF NECROSIS (HCC): ICD-10-CM

## 2025-03-27 DIAGNOSIS — C50.812 MALIGNANT NEOPLASM OF OVERLAPPING SITES OF LEFT BREAST IN FEMALE, ESTROGEN RECEPTOR NEGATIVE (HCC): Primary | ICD-10-CM

## 2025-03-27 LAB — GLUCOSE BLD-MCNC: 122 MG/DL (ref 70–99)

## 2025-03-27 PROCEDURE — 99214 OFFICE O/P EST MOD 30 MIN: CPT | Performed by: NURSE PRACTITIONER

## 2025-03-27 NOTE — PROGRESS NOTES
Education Record    Learner:  Patient    Disease / Diagnosis:PICC line dressing change    Barriers / Limitations:  None   Comments:    Method:  Brief focused   Comments:    General Topics:  Diet, Infection, Medication, Pain, Precautions, Procedure, Side effects and symptom management, Plan of care reviewed, and Fall risk and prevention   Comments:    Outcome:  Shows understanding   Comments:

## 2025-03-27 NOTE — PROGRESS NOTES
.Weekly Wound Education Note    Teaching Provided To: Patient  Training Topics: Dressing, Cleasing and general instructions, Discharge instructions  Training Method: Explain/Verbal, Written  Training Response: Patient responds and understands        Notes: Wounds improving. Continue santyl, moist gauze and bordered gauze to leg wound. Continue flagyl, vashe moist kerlix, bordered foam, baby diaper, and medipore tape. Will order supplies this visit.

## 2025-03-27 NOTE — PATIENT INSTRUCTIONS
Please return:  2 WEEKS  Patient discharge and wound care instructions  Swathi Arguelles  3/27/2025            You may shower and cleanse area with mild soap and water, Dakins, dab dry with gauze and apply your dressings as directed.     Changing your dressing:            two- three times a day    Wash your hands with soap and water.  Ensure that the old dressing is removed completely. Place it in a plastic bag and throw it in the trash.  Cleanse the wound with hypochlorous wound cleanser (ie. Anasept, vashe, pure and clean) .  It's ok to “scrub” your wound with the gauze, small amount of bleeding with cleansing is normal and ok.  Apply the following dressings:    Breast:    A.  Place gel on gauze and place in base of wound  B. Moisten gauze with DAKINS solution, place into wound, bordered zetuvit dressings    LEG:  Santyl>bordered gauze    Nutrition and blood sugar control:  Focus on the following:  Protein: Meats, beans, eggs, milk and yogurt particularly Greek yogurt), tofu, soy nuts, soy protein products (Follow the protein handout in your welcome folder)  Vitamin C: Citrus fruits and juices, strawberries, tomatoes, tomato juice, peppers, baked potatoes, spinach, broccoli, cauliflower, [place] sprouts, cabbage  Vitamin A: Dark green, leafy vegetables, orange or yellow vegetables, cantaloupe, fortified dairy products, liver, fortified cereals  Zinc: Fortified cereals, red meats, seafood  Consider supplementing with Maximino by [employer]). It can be purchased on amazon, [employer] website, or local pharmacy may be able to order it for you.  (These are essential branch chain amino acids that help with tissue building and wound healing).   When your blood sugar is consistently elevated greater than 180 your body can't heal or fight infection.      Concerns:  Signs of infection may include the following:  Increase in redness  Red \"streaks\" from wound  Increase in swelling  Fever  Unusual odor  Change in the amount of wound  drainage     Should you experience any significant changes in your wound(s) or have any questions regarding your home care instructions please contact the wound center [facility] @ [phone] If after regular business hours, please call your family doctor or local emergency room. The treatment plan has been discussed at length between you and your provider. Follow all instructions carefully, it is very important. If you do not follow all instructions you are at risk of your wound not healing, infection, possible loss of limb and even loss of life.

## 2025-03-27 NOTE — PROGRESS NOTES
CHIEF COMPLAINT:     Chief Complaint   Patient presents with    Wound Care     Patients is here for a follow up. Patients stated no issue at this moment      HPI:   Information obtained from patient, daughter, chart  1-8-25 INITIAL:  Patient is a 58 yo female who was dx with right breast ca in 2013 and was lost to follow-up in 2017.  Patient next presented to the ED on 11/30/2024 with complaints of generalized weakness, poor appetite, and dyspnea with exertion. Laboratory studies showed anemia, hyponatremia, and hypochlorhydria. CTA chest was negative for pulmonary embolism or metastatic disease but did show a 22.3 x 14.6 x 16.0 cm mass arising from the left breast with areas of necrosis, possible extension into the intracostal musculature, and mildly enlarged left axillary lymph nodes. Visualization of left breast showed a large firm breast which an open area laterally with malodorous drainage. CT abdomen/pelvis w contrast on 12/01/2024 was negative for metastatic disease. Biopsy of the left breast was performed on 12/02/2024. 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 It was discussed with patient that the left breast wound will not heal and she is presenting today for assistance with management of the left breast wound.  There is significant malodor and necrotic, liquifing adipose tissue.  I was able to remove a large amount of it which will help with the drainage and malodor.  Patient has not been showering because she did not think she should get the wound wet.  The wound is not overly vascular.  We discussed utilizing dakins solution and a baby diaper.  Will order supplies for patient.     HPI PRIOR TO MARCH 2025 SEE INDIVIDUAL PROGRESS NOTES  3-6-25 patient returns she is following up every 7-10 days. She has been receiving chemo since last Friday.  Last visit I rx'd flagyl to help with the malodor, patient reports that the wound got too \"gummy\" with the build up, so she  returned to the dakins gauze.  We discussed metrogel as another option.  The leg wound is not improving.  She specifically does remember when she hit her leg on the wooden stool (my concern is that could this wound be a malignancy-although initial presentation is not consistent with malignancy and non healing could also be related to her chemo). Overall her breast is less indurated, there is less necrosis and less malodor than previous visits.     3-18-25 patient returns.  I did order metrogel at last visit she tried this and does feel like it is helping the malodor.  I also did order santyl for her left lower leg ulceration she was able to get this, her leg wound is slightly smaller, the coagulum across the wound bed, wipes out  with a dry gauze.  Dakins refilled. Patient states she recently had an mri of the breast. Her next chemo is next Thursday.     3-27-25 patient returns.  She saw dr. Lopez on the 20th and it was noted that she has had a good rsponse to chemo therapy however the mri showed continue involvement of the chest wall.  The case was discussed with dr. Adam and she has an appointment with him on 4-2 (Wednesday).  Dependent on what Dr. Adam feels regarding chest wall reconstruction her chemo may be switched to enhertu beginning in 3 weeks.  She had her chemo 2 days ago. Patient has continued to dress with metrogel and dakins. She states the malodor has been better. There is significantly less necrosis than previously and she does have some sensitive areas of tissue that is viable.  The leg wound is . She continues to utilize santyl on that.  No s/s of infection to either area. Patient will f/u in 2 weeks.    MEDICATIONS:     Current Outpatient Medications:     sodium hypochlorite 0.125 % External Solution, Moisten gauze with dakins, place onto wound, cover with baby diaper three times a day, Disp: 1500 mL, Rfl: 3    collagenase (SANTYL) 250 UNIT/GM External Ointment, Apply 1 Application  topically daily., Disp: 60 g, Rfl: 0    metroNIDAZOLE 0.75 % External Gel, Apply 1 g topically 2 (two) times daily., Disp: 90 g, Rfl: 0    ampicillin 500 MG Oral Cap, Take 1 capsule (500 mg total) by mouth in the morning and 1 capsule (500 mg total) before bedtime., Disp: , Rfl:     amoxicillin clavulanate 875-125 MG Oral Tab, Take 1 tablet by mouth 2 (two) times daily., Disp: 20 tablet, Rfl: 0    HYDROcodone-acetaminophen 5-325 MG Oral Tab, Take 1-2 tablets by mouth every 4 (four) hours as needed for Pain., Disp: 30 tablet, Rfl: 0    gabapentin 600 MG Oral Tab, TAKE 1 TABLET BY MOUTH THREE TIMES DAILY; TAKE AN EXTRA 600MG AS NEEDED FOR SEVERE PAIN, Disp: 270 tablet, Rfl: 1    Insulin Glargine-yfgn 100 UNIT/ML Subcutaneous Solution Pen-injector, Inject 20 Units into the skin nightly., Disp: 24 mL, Rfl: 0    prochlorperazine (COMPAZINE) 10 mg tablet, Take 1 tablet (10 mg total) by mouth every 6 (six) hours as needed for Nausea., Disp: 30 tablet, Rfl: 3    ondansetron (ZOFRAN) 8 MG tablet, Take 1 tablet (8 mg total) by mouth every 8 (eight) hours as needed for Nausea., Disp: 30 tablet, Rfl: 3    traMADol 50 MG Oral Tab, Take 1 tablet (50 mg total) by mouth every 6 (six) hours as needed., Disp: 20 tablet, Rfl: 0    fluticasone-salmeterol (WIXELA INHUB) 100-50 MCG/ACT Inhalation Aerosol Powder, Breath Activated, Inhale 1 puff into the lungs 2 (two) times daily., Disp: 3 each, Rfl: 0    Insulin Lispro, 1 Unit Dial, 100 UNIT/ML Subcutaneous Solution Pen-injector, Inject 10 Units into the skin in the morning, at noon, and at bedtime., Disp: 3 mL, Rfl: 0    Budesonide-Formoterol Fumarate 160-4.5 MCG/ACT Inhalation Aerosol, Inhale 2 puffs into the lungs 2 (two) times daily. (Patient taking differently: Inhale 2 puffs into the lungs 2 (two) times daily. Pt states she would like to go back to budesonide), Disp: 3 each, Rfl: 1    albuterol (2.5 MG/3ML) 0.083% Inhalation Nebu Soln, Take 3 mL (2.5 mg total) by nebulization every  4 (four) hours as needed for Wheezing or Shortness of Breath., Disp: 90 mL, Rfl: 0    Insulin Pen Needle (BD PEN NEEDLE DANIELA U/F) 32G X 4 MM Does not apply Misc, Up to TID, Disp: 200 each, Rfl: 2    montelukast 10 MG Oral Tab, Take 1 tablet (10 mg total) by mouth nightly., Disp: 90 tablet, Rfl: 1  ALLERGIES:   Allergies[1]   REVIEW OF SYSTEMS:   This information was obtained from the patient/family and chart.    See HPI for pertinent positives, otherwise 10 pt ROS negative.    HISTORY:   Past medical, surgical, family and social history updated where appropriate.      PHYSICAL EXAM:     Vitals:    03/27/25 1422   BP: 108/67  Comment: 122 gluocse   Pulse: 94   Resp: 16   Temp: 97.3 °F (36.3 °C)       Estimated body mass index is 23.48 kg/m² as calculated from the following:    Height as of 3/21/25: 62.44\".    Weight as of 3/20/25: 130 lb 3.2 oz (59.1 kg).   POC Glucose   Date Value Ref Range Status   03/27/2025 122 (H) 70 - 99 mg/dL Final   03/18/2025 119 (H) 70 - 99 mg/dL Final   03/06/2025 115 (H) 70 - 99 mg/dL Final       Vital signs reviewed.Appears stated age, well groomed.    Constitutional:  Bp wnl for patient. Pulse Regular and wnl for patient. Respirations easy and unlabored. Temperature wnl. Weight normal for height. Appearance neat and clean. Appears in no acute distress. Well nourished and well developed.    Lower extremity:  dp/pt palpable left. Left lower extremity free of varicosities, no edema. Capillary refill < 3 seconds. Digits are warm, overlapping. Toenails thickned, discolored, adequate length/hygeine. Skin is very dry. no hairgrowth on legs.    Musculoskeletal:  Gait and station stable   Integumentary:  refer to wound characteristics and images   Psychiatric:  Judgment and insight intact. Alert and oriented times 3. No evidence of depression, anxiety, or agitation. Calm, cooperative, and communicative.   DIAGNOSTICS:     Lab Results   Component Value Date    BUN 7 (L) 03/20/2025    CREATSERUM  0.91 03/20/2025    GFRCKDEPI 105.47 04/18/2018    ALB 3.9 03/20/2025    TP 6.6 03/20/2025    A1C 6.5 (H) 11/30/2024       WOUND ASSESSMENT:     Wound 01/08/25 #1 Breast Left (Active)   Date First Assessed/Time First Assessed: 01/08/25 1414    Wound Number (Wound Clinic Only): #1  Primary Wound Type: (c) Other (comment)  Location: Breast  Wound Location Orientation: Left      Assessments 1/8/2025  2:16 PM 3/27/2025  2:29 PM   Wound Image           Drainage Amount Large Copious   Drainage Description Serous;Yellow Serous;Yellow   Wound Length (cm) 17 cm 3.5 cm   Wound Width (cm) 30 cm 14.9 cm   Wound Surface Area (cm^2) 510 cm^2 52.15 cm^2   Wound Depth (cm) 1 cm 4.4 cm   Wound Volume (cm^3) 510 cm^3 229.46 cm^3   Wound Healing % -- 55   Margins Well-defined edges Well-defined edges   Non-staged Wound Description Full thickness Full thickness   Lora-wound Assessment Edema Edema;Induration;Moist   Wound Granulation Tissue Red;Pink;Spongy Firm;Pink;Red   Wound Bed Granulation (%) 10 % 60 %   Wound Bed Epithelium (%) 15 % 20 %   Wound Bed Slough (%) 75 % 20 %   Wound Odor Strong Mild   Shape -- clustered       Inactive Orders   Date Order Priority Status Authorizing Provider   03/18/25 1032 Debridement Other (comment) Left Breast Routine Completed Vira Alejandro, APRN   03/06/25 1536 Debridement Other (comment) Left Breast Routine Completed Vira Alejandro, APRN   02/25/25 1157 Debridement Other (comment) Left Breast Routine Completed Vira Alejandro, APRN   02/18/25 1009 Debridement Other (comment) Left Breast Routine Completed Vira Alejandro, SHANA   02/05/25 1427 Debridement Other (comment) Left Breast Routine Completed Vira Alejandro, SHANA   01/22/25 1503 Debridement Other (comment) Left Breast Routine Completed Vira Alejandro, APRN   01/08/25 1618 Debridement Other (comment) Left Breast Routine Completed Vira Alejandro, SHANA       Wound 02/05/25 #2 Left lower leg Leg Left (Active)   Date First Assessed/Time First  Assessed: 02/05/25 1348    Wound Number (Wound Clinic Only): #2 Left lower leg  Primary Wound Type: Venous Ulcer  Location: Leg  Wound Location Orientation: Left      Assessments 2/5/2025  1:50 PM 3/27/2025  2:26 PM   Wound Image        Drainage Amount Scant Scant   Drainage Description Yellow;Serous Serosanguineous   Wound Length (cm) 1.7 cm 1 cm   Wound Width (cm) 1.5 cm 0.9 cm   Wound Surface Area (cm^2) 2.55 cm^2 0.9 cm^2   Wound Depth (cm) 0.1 cm 0.3 cm   Wound Volume (cm^3) 0.255 cm^3 0.27 cm^3   Wound Healing % -- -6   Margins Well-defined edges Well-defined edges   Non-staged Wound Description Full thickness Full thickness   Lora-wound Assessment -- Hemosiderin staining;Dry   Wound Granulation Tissue -- Firm;Pink   Wound Bed Granulation (%) -- 50 %   Wound Bed Slough (%) 100 % 50 %   Wound Odor Mild None   Tunneling? No --   Undermining? No --   Sinus Tracts? No --       Inactive Orders   Date Order Priority Status Authorizing Provider   02/25/25 1156 Debridement Venous Ulcer Left Leg Routine Completed Vira Alejandro APRN   02/05/25 1424 Debridement Venous Ulcer Left Leg Routine Completed Vira Alejandro, SHANA          ASSESSMENT AND PLAN:    1. Malignant neoplasm of overlapping sites of left breast in female, estrogen receptor negative (HCC)    2. 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  Questions and concerns addressed. Red flags to RTC or ED reviewed.  Patient (or parent) agrees to plan.      NOTE TO PATIENT: The 21st Century Cures Act makes clinical notes like these available to patients in the interest of transparency. Clinical notes are medical documents used by physicians and care providers to communicate with each other. These documents include medical language and terminology, abbreviations, and treatment information that may sound  technical and at times possibly unfamiliar. In addition, at times, the verbiage may appear blunt or direct. These documents are one tool providers use to communicate relevant information and clinical opinions of the care providers in a way that allows common understanding of the clinical context.    I spent 30minutes with the patient. This time included:    preparing to see the patient (eg, review notes and recent diagnostics),  seeing the patient, obtaining and/or reviewing separately obtained history, performing a medically appropriate examination and/or evaluation, counseling and educating the patient, documenting in the record.  DISCHARGE:      Patient Instructions   Please return:  2 WEEKS  Patient discharge and wound care instructions  Swathi Arguelles  3/27/2025            You may shower and cleanse area with mild soap and water, Dakins, dab dry with gauze and apply your dressings as directed.     Changing your dressing:            two- three times a day    Wash your hands with soap and water.  Ensure that the old dressing is removed completely. Place it in a plastic bag and throw it in the trash.  Cleanse the wound with hypochlorous wound cleanser (ie. Anasept, vashe, pure and clean) .  It's ok to “scrub” your wound with the gauze, small amount of bleeding with cleansing is normal and ok.  Apply the following dressings:    Breast:    A.  Place gel on gauze and place in base of wound  B. Moisten gauze with DAKINS solution, place into wound, bordered zetuvit dressings    LEG:  Santyl>bordered gauze    Nutrition and blood sugar control:  Focus on the following:  Protein: Meats, beans, eggs, milk and yogurt particularly Greek yogurt), tofu, soy nuts, soy protein products (Follow the protein handout in your welcome folder)  Vitamin C: Citrus fruits and juices, strawberries, tomatoes, tomato juice, peppers, baked potatoes, spinach, broccoli, cauliflower, [place] sprouts, cabbage  Vitamin A: Dark green, leafy  vegetables, orange or yellow vegetables, cantaloupe, fortified dairy products, liver, fortified cereals  Zinc: Fortified cereals, red meats, seafood  Consider supplementing with Maximino by [employer]. It can be purchased on amazon, [employer] website, or local pharmacy may be able to order it for you.  (These are essential branch chain amino acids that help with tissue building and wound healing).   When your blood sugar is consistently elevated greater than 180 your body can't heal or fight infection.      Concerns:  Signs of infection may include the following:  Increase in redness  Red \"streaks\" from wound  Increase in swelling  Fever  Unusual odor  Change in the amount of wound drainage     Should you experience any significant changes in your wound(s) or have any questions regarding your home care instructions please contact the [facility] @ [phone] If after regular business hours, please call your family doctor or local emergency room. The treatment plan has been discussed at length between you and your provider. Follow all instructions carefully, it is very important. If you do not follow all instructions you are at risk of your wound not healing, infection, possible loss of limb and even loss of life.    Vira Alejandro FNP-C, CWCN-AP, CFCN, CSWS, WCC, DWC  3/27/2025          [1]   Allergies  Allergen Reactions    Fish ANAPHYLAXIS and HIVES     All fish, Wheezing, short of breath    Fish Oil ANAPHYLAXIS and HIVES     All fish, Wheezing, short of breath   All fish, Wheezing, short of breath    Levemir HIVES and ITCHING    Seasonal WHEEZING and Runny nose     Ragweed, pollen

## 2025-03-31 NOTE — PROGRESS NOTES
[facility]      Patient Name:  Swathi Arguelles   YOB: 1965   Gender:  Female   Appt Date:  4/2/2025   Provider:  Jose Luis Adam MD     PATIENT PROVIDERS  Referring Provider: Carl Lopez MD    Primary Care Provider:Erendira Caceres DO   Address: [address] [place] [address]   Phone #: [phone]       CHIEF COMPLAINT  Left breast cancer       PROBLEMS  Reviewed   Patient Active Problem List   Diagnosis    Exacerbation of asthma (HCC)    Pure hypercholesterolemia    Goiter, unspecified    DM2 (diabetes mellitus, type 2) (HCC)    Family history of breast cancer    BRCA2 genetic carrier    Neuropathic pain    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    Cancer of overlapping sites of right breast (HCC)    Drug-induced peripheral neuropathy (HCC)    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)    Community acquired pneumonia    Human metapneumovirus (hMPV) pneumonia    Mild intermittent asthma without complication (HCC)    History of pneumonia    Abnormal MRI, breast    Acquired absence of right breast and nipple    Type 2 diabetes mellitus without complication, with long-term current use of insulin (HCC)    Bell's palsy    Hyponatremia    Anemia    Hyperglycemia    Community acquired pneumonia, unspecified laterality    Hypoxia    Severe asthma with status asthmaticus, unspecified whether persistent (HCC)    COVID-19    Type 2 diabetes mellitus with diabetic neuropathy (HCC)    Dyspnea, unspecified type    Elevated lactic acid level    Mass of left breast, unspecified quadrant    Dehydration    History of cancer of right breast    Iron deficiency anemia secondary to blood loss (chronic)    Normocytic anemia    Breast mass in female    Malignant neoplasm of overlapping sites of left breast in female, estrogen receptor negative (HCC)    Invasive ductal carcinoma of breast, stage 3, left (HCC)        History of Present Illness:  This is a 60  year old woman referred for surgical oncology opinion regarding chest wall resection for metastatic breast cancer    9/10/13: Admitted for anemia, found to have fungating Right breast mass. Biopsy showed grade 3 infiltrating ductal carcinoma. CT also showed large left sided cystic adnexal mass, this was negative on PET.   10/4/13: stated on doxorubicin and cyclophosphamide. Found to be positive for deleterious BRC2 mutation.   3/18/2014: Had R mastectomy and axillary node dissection w/ 12/12 negative lymph nodes. Also had diagnostic lap- no evidence of metastatic disease  5/13/14: B/L salpingo-oophorectomy, bilateral pelvic lymphadenectomy, limited periaortic lymphadenectomy, omentectomy, peritoneal washings, and appendectomy with Dr. Adam. Pathology showed serous borderline tumor, typical type. All nodes and washings negative for malignancy.  7/14: R chest wall/axilla radiation  08/2014: Started on anastrazole.   2/3/27-11/30/24: lost to follow-up  11/30/24: Presented to ED w/ weakness and anemia, found to have L breast mass w/ possible extension into the intracostal musculature .   12/1/24: CT abd/pelvis: no evidence for metastatic disease.  12/2/24: Biopsy of L breast mass showed grade 3 invasive ductal carcinoma  12/9/24: PET/CT: increased uptake in L breast mass, L axillary, subpectoral, and mediastinal nodes. Also w/ small focus on R anterior abd pelvic wall w/o CT correlate  12/12/24: Started on neoadjuvant TCHP.   3/10/24: MRI breast showed Large mass replacing entire L breast extending to chest wall w/ L axillary and retropectoral nodes.     Currently has completed 5 cycles of neoadjuvant TCHP.        Vital Signs:  Blood pressure 143/81, pulse 98, temperature 98.1 °F (36.7 °C), temperature source Temporal, resp. rate 18, height 1.6 m (5' 3\"), weight 58.1 kg (128 lb), last menstrual period 09/01/2013, SpO2 96%, not currently breastfeeding.       Medications Reviewed:    Current Outpatient Medications:      sodium hypochlorite 0.125 % External Solution, Moisten gauze with dakins, place onto wound, cover with baby diaper three times a day, Disp: 1500 mL, Rfl: 3    collagenase (SANTYL) 250 UNIT/GM External Ointment, Apply 1 Application topically daily., Disp: 60 g, Rfl: 0    metroNIDAZOLE 0.75 % External Gel, Apply 1 g topically 2 (two) times daily., Disp: 90 g, Rfl: 0    ampicillin 500 MG Oral Cap, Take 1 capsule (500 mg total) by mouth in the morning and 1 capsule (500 mg total) before bedtime., Disp: , Rfl:     amoxicillin clavulanate 875-125 MG Oral Tab, Take 1 tablet by mouth 2 (two) times daily., Disp: 20 tablet, Rfl: 0    HYDROcodone-acetaminophen 5-325 MG Oral Tab, Take 1-2 tablets by mouth every 4 (four) hours as needed for Pain., Disp: 30 tablet, Rfl: 0    gabapentin 600 MG Oral Tab, TAKE 1 TABLET BY MOUTH THREE TIMES DAILY; TAKE AN EXTRA 600MG AS NEEDED FOR SEVERE PAIN, Disp: 270 tablet, Rfl: 1    Insulin Glargine-yfgn 100 UNIT/ML Subcutaneous Solution Pen-injector, Inject 20 Units into the skin nightly., Disp: 24 mL, Rfl: 0    prochlorperazine (COMPAZINE) 10 mg tablet, Take 1 tablet (10 mg total) by mouth every 6 (six) hours as needed for Nausea., Disp: 30 tablet, Rfl: 3    ondansetron (ZOFRAN) 8 MG tablet, Take 1 tablet (8 mg total) by mouth every 8 (eight) hours as needed for Nausea., Disp: 30 tablet, Rfl: 3    traMADol 50 MG Oral Tab, Take 1 tablet (50 mg total) by mouth every 6 (six) hours as needed., Disp: 20 tablet, Rfl: 0    fluticasone-salmeterol (WIXELA INHUB) 100-50 MCG/ACT Inhalation Aerosol Powder, Breath Activated, Inhale 1 puff into the lungs 2 (two) times daily., Disp: 3 each, Rfl: 0    Insulin Lispro, 1 Unit Dial, 100 UNIT/ML Subcutaneous Solution Pen-injector, Inject 10 Units into the skin in the morning, at noon, and at bedtime., Disp: 3 mL, Rfl: 0    Budesonide-Formoterol Fumarate 160-4.5 MCG/ACT Inhalation Aerosol, Inhale 2 puffs into the lungs 2 (two) times daily. (Patient taking  differently: Inhale 2 puffs into the lungs 2 (two) times daily. Pt states she would like to go back to budesonide), Disp: 3 each, Rfl: 1    albuterol (2.5 MG/3ML) 0.083% Inhalation Nebu Soln, Take 3 mL (2.5 mg total) by nebulization every 4 (four) hours as needed for Wheezing or Shortness of Breath., Disp: 90 mL, Rfl: 0    Insulin Pen Needle (BD PEN NEEDLE DANIELA U/F) 32G X 4 MM Does not apply Misc, Up to TID, Disp: 200 each, Rfl: 2    montelukast 10 MG Oral Tab, Take 1 tablet (10 mg total) by mouth nightly., Disp: 90 tablet, Rfl: 1     Allergies Reviewed:  Allergies[1]     History:  Reviewed:  Past Medical History:    Anemia    transfusions 9/13    Asthma (HCC)    Breast cancer (HCC)    right breast 9/13    Cancer (HCC)    breast    COVID-19    Tested 11/9/20    Heart murmur    History of blood transfusion    Murmur    benign murmur, unknown what type    Neuropathy    Personal history of antineoplastic chemotherapy    last chemo treatment    Pneumonia, organism unspecified(486)    Seasonal allergies    Type II or unspecified type diabetes mellitus without mention of complication, not stated as uncontrolled    Wheezing    related seasonal and fish allergies, takes inhalers      Reviewed:  Past Surgical History:   Procedure Laterality Date    Access port      left chest port a cath    Breast biopsy  09/12/2013    Procedure: BREAST BIOPSY;  Surgeon: Yasmany Goode MD;  Location:  MAIN OR    D & c      Mastectomy modified radical  03/18/2014    Procedure: BREAST MODIFIED RADICAL MASTECTOMY;  Surgeon: Jose Luis Adam MD;  Location:  [facility] OR    Needle biopsy right      Other surgical history  04/30/2015    mediport removal    Radiation right      Total abdominal hysterectomy  05/13/2014    Procedure: ABDOMINAL HYSTERECTOMY TOTAL BSO STAGING;  Surgeon: Jose Luis Adam MD;  Location:  MAIN OR      Reviewed Social History:  Social History     Socioeconomic History    Marital status:     Number of children: 1    Occupational History    Occupation: RN , on leave of absence     Employer: [employer]   Tobacco Use    Smoking status: Never    Smokeless tobacco: Never   Vaping Use    Vaping status: Never Used   Substance and Sexual Activity    Alcohol use: No     Alcohol/week: 0.0 standard drinks of alcohol    Drug use: No   Other Topics Concern    Blood Transfusions Yes    Caffeine Concern No     Comment: occassional    Exercise Yes     Comment: bike riding, walking, active   Social History Narrative    Lives alone in [place]     Social Drivers of Health     Food Insecurity: No Food Insecurity (11/30/2024)    Food Insecurity     Food Insecurity: Never true   Transportation Needs: No Transportation Needs (11/30/2024)    Transportation Needs     Lack of Transportation: No   Housing Stability: Low Risk  (11/30/2024)    Housing Stability     Housing Instability: No      Reviewed:  Family History   Problem Relation Age of Onset    Cancer Father     Heart Attack Other         fam hx    Other (COPD) Other         fam hx    Other (CHF) Other         fam hx    Diabetes Other         fam hx    Other (leukemia) Other         fam hx    Cancer Maternal Aunt         breast cancer    Breast Cancer Self 49      Review of Systems:  Patient reports no rapid or irregular heartbeat. She reports no chest pain. She reports no nausea. She reports no vomiting. She reports no diarrhea. She reports no constipation. She reports no bright red blood per rectum. She reports no fatigue. She reports no blood in the urine hematuria, no changes in urinary habits: initiating urination requires straining, no changes in urinary habits: no hesitancy, and no change in urine appearance. She reports no headache. She reports no dizziness. She reports no easy bruising tendency. She reports no diffuse joint pains. She reports no bone pain. She reports no back pain. She reports no swollen glands. She reports no pain. She reports no numbness. She reports no  shortness of breath. She reports no change in a mole. She reports no recent change in weight, no fever, no chills, and no sweating heavily at night.       Physical Examination:  Constitutional: NAD.   Eyes: Sclera: non-icteric.   Lymph Nodes: Lymph Nodes no cervical LAD, supraclavicular LAD, right axillary LAD, or inguinal LAD. + left axillary LAD  Lungs: Auscultation: breath sounds normal.   Cardiovascular: Heart Auscultation: RRR.   Breasts: Right mastectomy site healing well.  Whole left breast involved with tumor with 2 sites of opening/fungating.  It is movable.  There is dressing with Dakin solution.  Abdomen: Inspection and Palpation: no masses, tenderness (no guarding, no rebound), or CVA tenderness and soft and non-distended. Liver: no hepatomegaly. Spleen: no splenomegaly. Hernia: none palpable.   Musculoskeletal: Extremities: no edema.   Skin: Inspection and palpation: no jaundice.      Document Review:  [facility] Breast Pathology, 12/2/24:  Final Diagnosis:   Left breast mass, ultrasound-guided needle core biopsies:  -Positive for malignancy.  -Invasive ductal carcinoma with extensive necrosis.  -Grade 3 (tubules 3, nuclei 3, mitoses 3).  -Longest length of tumor 6 millimeters (0.6 cm), approximately 25 % of submitted material.  -No in situ component.         MRI breast 3/10/25:  Impression   CONCLUSION:  Large necrotic enhancing mass replacing the entire left breast extending to the chest wall measuring 13.7 x 13.2 x 9.4 cm.     Enhancing prominent left axillary and retropectoral lymph nodes.     Postsurgical changes right mastectomy.  There is no abnormal enhancement involving the right chest wall or axilla.            Procedure(s):  none     Assessment / Plan:  Left Breast Invasive Ductal carcinoma  Findings were discussed with the patient at length.  She is a candidate for surgical resection timing of which pending completion of neoadjuvant systemic therapy with Dr. oLpez.  Given resultant defect, I asked  patient to consult with Dr. Billings from plastic surgery and with whom she met today.  The surgical treatment matter discussed.  Patient agreed and understood.    Drug-induced peripheral neuropathy   -No new issues.    Diabetes Type II  -Last HgA1c on 11/30/24 was 6.5 (7.1 in June of 2024).   -Will need perioperative glycemic control and medical clearance.                Electronically Signed by: Jose Luis Adam MD           [1]   Allergies  Allergen Reactions    Fish ANAPHYLAXIS and HIVES     All fish, Wheezing, short of breath    Fish Oil ANAPHYLAXIS and HIVES     All fish, Wheezing, short of breath   All fish, Wheezing, short of breath    Levemir HIVES and ITCHING    Levonorgestrel-Ethinyl Estrad OTHER (SEE COMMENTS)     Other reaction(s): Runny nose, WHEEZING   Ragweed, pollen   Ragweed, pollen    Seasonal WHEEZING and Runny nose     Ragweed, pollen

## 2025-04-01 ENCOUNTER — HOSPITAL ENCOUNTER (OUTPATIENT)
Dept: CV DIAGNOSTICS | Facility: HOSPITAL | Age: 60
Discharge: HOME | End: 2025-04-01
Attending: SPECIALIST
Payer: COMMERCIAL

## 2025-04-01 ENCOUNTER — APPOINTMENT (OUTPATIENT)
Age: 60
End: 2025-04-01
Payer: COMMERCIAL

## 2025-04-01 DIAGNOSIS — Z79.899 ENCOUNTER FOR MONITORING CARDIOTOXIC DRUG THERAPY: ICD-10-CM

## 2025-04-01 DIAGNOSIS — Z51.81 ENCOUNTER FOR MONITORING CARDIOTOXIC DRUG THERAPY: ICD-10-CM

## 2025-04-01 PROCEDURE — 93356 MYOCRD STRAIN IMG SPCKL TRCK: CPT | Performed by: SPECIALIST

## 2025-04-01 PROCEDURE — 93307 TTE W/O DOPPLER COMPLETE: CPT | Performed by: SPECIALIST

## 2025-04-02 ENCOUNTER — OFFICE VISIT (OUTPATIENT)
Dept: SURGERY | Facility: CLINIC | Age: 60
End: 2025-04-02
Payer: COMMERCIAL

## 2025-04-02 VITALS
WEIGHT: 128 LBS | DIASTOLIC BLOOD PRESSURE: 81 MMHG | HEIGHT: 63 IN | RESPIRATION RATE: 18 BRPM | SYSTOLIC BLOOD PRESSURE: 143 MMHG | TEMPERATURE: 98 F | OXYGEN SATURATION: 96 % | HEART RATE: 98 BPM | BODY MASS INDEX: 22.68 KG/M2

## 2025-04-02 DIAGNOSIS — C50.812 MALIGNANT NEOPLASM OF OVERLAPPING SITES OF LEFT BREAST IN FEMALE, ESTROGEN RECEPTOR NEGATIVE (HCC): Primary | ICD-10-CM

## 2025-04-02 DIAGNOSIS — E11.40 TYPE 2 DIABETES MELLITUS WITH DIABETIC NEUROPATHY, WITH LONG-TERM CURRENT USE OF INSULIN (HCC): ICD-10-CM

## 2025-04-02 DIAGNOSIS — Z79.4 TYPE 2 DIABETES MELLITUS WITH DIABETIC NEUROPATHY, WITH LONG-TERM CURRENT USE OF INSULIN (HCC): ICD-10-CM

## 2025-04-02 DIAGNOSIS — C77.3 SECONDARY MALIGNANT NEOPLASM OF AXILLARY LYMPH NODES (HCC): ICD-10-CM

## 2025-04-02 DIAGNOSIS — Z17.1 MALIGNANT NEOPLASM OF OVERLAPPING SITES OF LEFT BREAST IN FEMALE, ESTROGEN RECEPTOR NEGATIVE (HCC): Primary | ICD-10-CM

## 2025-04-02 PROBLEM — C50.912: Status: ACTIVE | Noted: 2025-04-02

## 2025-04-02 PROCEDURE — 3008F BODY MASS INDEX DOCD: CPT | Performed by: SURGERY

## 2025-04-02 PROCEDURE — 99205 OFFICE O/P NEW HI 60 MIN: CPT | Performed by: SURGERY

## 2025-04-02 PROCEDURE — 3079F DIAST BP 80-89 MM HG: CPT | Performed by: SURGERY

## 2025-04-02 PROCEDURE — 99203 OFFICE O/P NEW LOW 30 MIN: CPT | Performed by: SURGERY

## 2025-04-02 PROCEDURE — 3077F SYST BP >= 140 MM HG: CPT | Performed by: SURGERY

## 2025-04-02 NOTE — CONSULTS
New Patient Consultation    Chief Complaint: left breast invasive ductal carcinoma     History of Present Illness:   Swathi Arguelles is a 60 year old female referred by Dr. Adam for evaluation of a metastatic breast cancer to the left chest wall. The patient has a complex oncologic history dating back to 2013.    9/10/13: Admitted for anemia, found to have fungating Right breast mass. Biopsy showed grade 3 infiltrating ductal carcinoma. CT also showed large left sided cystic adnexal mass, this was negative on PET.   10/4/13: stated on doxorubicin and cyclophosphamide. Found to be positive for deleterious BRC2 mutation.   3/18/2014: Had R mastectomy and axillary node dissection w/ 12/12 negative lymph nodes. Also had diagnostic lap- no evidence of metastatic disease  5/13/14: B/L salpingo-oophorectomy, bilateral pelvic lymphadenectomy, limited periaortic lymphadenectomy, omentectomy, peritoneal washings, and appendectomy with Dr. Adam. Pathology showed serous borderline tumor, typical type. All nodes and washings negative for malignancy.  7/14: R chest wall/axilla radiation  08/2014: Started on anastrazole.   2/3/27-11/30/24: lost to follow-up  11/30/24: Presented to ED w/ weakness and anemia, found to have L breast mass w/ possible extension into the intracostal musculature .   12/1/24: CT abd/pelvis: no evidence for metastatic disease.  12/2/24: Biopsy of L breast mass showed grade 3 invasive ductal carcinoma  12/9/24: PET/CT: increased uptake in L breast mass, L axillary, subpectoral, and mediastinal nodes. Also w/ small focus on R anterior abd pelvic wall w/o CT correlate  12/12/24: Started on neoadjuvant TCHP.   3/10/24: MRI breast showed Large mass replacing entire L breast extending to chest wall w/ L axillary and retropectoral nodes.     Bilateral breast MRI from 3/10/2025:  CONCLUSION:  Large necrotic enhancing mass replacing the entire left breast extending to the chest wall measuring 13.7 x 13.2 x 9.4  cm.      Enhancing prominent left axillary and retropectoral lymph nodes.      Postsurgical changes right mastectomy.  There is no abnormal enhancement involving the right chest wall or axilla.       The patient is currently undergoing neoadjuvant chemotherapy. She met with Dr. Adam to discuss options for surgical resection. She is here today to discuss options for reconstruction.      Past Medical History:      Past Medical History:    Anemia    transfusions 9/13    Asthma (HCC)    Breast cancer (HCC)    right breast 9/13    Cancer (HCC)    breast    COVID-19    Tested 11/9/20    Heart murmur    History of blood transfusion    Murmur    benign murmur, unknown what type    Neuropathy    Personal history of antineoplastic chemotherapy    last chemo treatment    Pneumonia, organism unspecified(486)    Seasonal allergies    Type II or unspecified type diabetes mellitus without mention of complication, not stated as uncontrolled    Wheezing    related seasonal and fish allergies, takes inhalers         Past Surgical History:  Past Surgical History:   Procedure Laterality Date    Access port      left chest port a cath    Breast biopsy  09/12/2013    Procedure: BREAST BIOPSY;  Surgeon: Yasmany Goode MD;  Location:  [facility] OR    D & c      Mastectomy modified radical  03/18/2014    Procedure: BREAST MODIFIED RADICAL MASTECTOMY;  Surgeon: Jose Luis Adam MD;  Location:  [facility] OR    Needle biopsy right      Other surgical history  04/30/2015    mediport removal    Radiation right      Total abdominal hysterectomy  05/13/2014    Procedure: ABDOMINAL HYSTERECTOMY TOTAL BSO STAGING;  Surgeon: Jose Luis Adam MD;  Location:  [facility] OR         Medications:    No outpatient medications have been marked as taking for the 4/2/25 encounter (Appointment) with Bernice Billings MD.         Allergies:    Allergies[1]      Family History:   Family History   Problem Relation Age of Onset    Cancer Father     Heart Attack Other          fam hx    Other (COPD) Other         fam hx    Other (CHF) Other         fam hx    Diabetes Other         fam hx    Other (leukemia) Other         fam hx    Cancer Maternal Aunt         breast cancer    Breast Cancer Self 49         Social History:  History   Alcohol Use No       History   Smoking Status    Never   Smokeless Tobacco    Never       History   Drug Use No       Review of Systems:    A 13 point review of systems was performed on the intake sheet.  The patient reports   General:   The patient denies, fever, chills, night sweats, fatigue, generalized weakness, change in appetite, weight loss, or weight gain.  Endocrine:   There is no history of poor/slow wound healing, weight loss/gain, fertility or hormone problems, cold intolerance, heat intolerance, excessive thirst, or thyroid disease.   Allergic/Immunologic:  There is no history of hives, hay fever, angioedema or anaphylaxis.  HEENT:    The patient denies ear pain, ear drainage, hearing loss, change in vision, double vision, cataracts, glaucoma, nasal congestion, nosebleed, hoarseness, sore throat, or swollen glands  Respiratory:   The patient denies shortness of breath, cough, bloody cough, phlegm, asthma, or wheezing  Cardiovascular:  The patient denies chest pain/pressure, palpitations, irregular heartbeat, high blood pressure, stroke, or leg swelling  Breasts:  Patient denies breast masses, pain, change in the breast skin, skin dimpling, nipple discharge, or rash  Gastrointestinal:   There is no history of difficulty or pain with swallowing, reflux symptoms, nausea, vomiting, dark/ bloody stools, diarrhea, constipation,  change in bowel habits, or abdominal pain.   Genitourinary:  The patient denies frequent urination, needing to get up at night to urinate, urinary hesitancy or retaining urine, painful urination, urinary incontinence, decreased urine stream, blood in the urine, or vaginal/penile discharge.  Skin:   The patient denies rash,  itching, skin lesions, dry skin, change in skin color or change in moles, sunburns, or sunburns with blistering.   Hematologic/Lymphatic:  The patient denies easily bruising or bleeding, persistent swollen glands or lymph nodes, bleeding disorders, blood clots, or pulmonary embolism.   Gynecologic:  The patient denies irregular menses, pelvic pain, pain with intercourse, painful menses, or pregnancy  Musculoskeletal:  The patient denies muscle aches/pain, joint pain, stiff joints, neck pain, back pain or bone pain.  Neurologic:  There is no history of migraines or severe headaches, seizure/epilepsy, speech problems, coordination problems, trembling/tremors, fainting/black outs, dizziness, memory problems, loss of sensation/numbness, problems walking, weakness, tingling or burning in hands/feet.   Psychiatric:  There is no history of abusive relationship, bipolar disorder, sleep disturbance, anxiety, depression or feeling of despair.    Physical Exam:    LMP 09/01/2013 (LMP Unknown)     Constitutional: The patient is an alert, oriented and well-developed.     Neurologic: Speech patterns and movements are normal.     Psychiatric: Affect is appropriate.    Eyes: Conjunctiva are clear, non-icteric. PERRL    ENT: no obvious abnormality, no ear drainage, mucous membranes moist and pink    Integument/Skin: see breast exam     Breasts: Right breast has a well healed previous incision from previous mastectomy.   The left breast has a large open wound to the lateral left breast with fungating tumor mass.There is also another opening at the 7 o'clock position of the left nipple-areolar complex. Malodor is present.     Respiratory: Normal respiratory effort.     Cardiovascular: no cyanosis, no edema    Musculoskeletal: Extremities unremarkable, without edema, tenderness or deformities    Impression:   Swathi Arguelles  is a 60 year old with left breast invasive ductal carcinoma    Discussion and Plan:  The patient was counseled  on the different treatment options.     The patient will continue to work with wound care for the management of her left breast open wound. Patient reports that she is currently packing this area with Dakins solution and flagyl gel.     We discussed the option for left chest wall reconstruction with local flaps, possible latissimus flap, possible Integra, possible skin graft. She will need to complete chemotherapy prior to surgical intervention. She might benefit from staging with integra followed by negative margins and eventual latissimus flap if vessels are intact.   She will need to return to our office for further surgical discussion prior to surgery. We can start to look for surgery dates once we know the timeline of her oncologic treatment.     The different treatment options were discussed with the patient.  The procedures and the postoperative care was discussed in detail.  Potential risks complications benefits and alternatives were discussed.  Risks and complications including but not limited to infection, bleeding, scarring, damage to surrounding structures, such as nerves, blood vessels, muscles,  tendons, risk of hematoma, seroma, foreign body reaction, allergic reaction, wound dehiscences, delayed wound healing, graft failure, flap failure, need for further surgery.    Patient understands and wishes to proceed with the procedure, questions were answered.    45 minutes were spent with the patient, from which 35 minutes were spent counseling the patient and coordinating care.         [1]   Allergies  Allergen Reactions    Fish ANAPHYLAXIS and HIVES     All fish, Wheezing, short of breath    Fish Oil ANAPHYLAXIS and HIVES     All fish, Wheezing, short of breath   All fish, Wheezing, short of breath    Levemir HIVES and ITCHING    Levonorgestrel-Ethinyl Estrad OTHER (SEE COMMENTS)     Other reaction(s): Runny nose, WHEEZING   Ragweed, pollen   Ragweed, pollen    Seasonal WHEEZING and Runny nose      Ragweed, pollen

## 2025-04-02 NOTE — PATIENT INSTRUCTIONS
Surgeon:              Dr. Bernice Cabrera, PhD                                          Tel:         [phone]                                  Fax:        [phone]      Surgery/Procedure: Left chest wall reconstruction with local flaps, possible latissimus flap, possible Integra, possible skin graft  2.5 hours for Dr. Cabrera's portion, joint with Dr. Adam, general anesthesia, outpatient, 2 months after completion of chemotherapy, will need preop with Dr. Cabrera  And  2-3 weeks later  Left chest wall reconstruction with local flaps, possible latissimus flap, possible skin graft  2.5 hours, general anesthesia, outpatient    Dx Code: [C50.912]     Hospital:  [facility]: [address] [place] [address]           [phone]  [facility]: [address] [place] [address]               [phone]    1. Someone will need to drive you to and from the hospital if your procedure is outpatient.    2.Do not drink alcohol or smoke 24 hours prior to your procedure.    3. Bring a picture ID and your insurance card.    4. You will be contacted by the hospital the day before to confirm the procedure time and location.     5. Do not take any herbal supplements or blood thinners at least one week before your procedure/surgery. This includes NSAID's (aspirin, baby aspirin, Motrin, Ibuprofen, Aleve, Advil, Naproxen, etc), fish oil, vitamin E, turmeric, CoQ10, or green tea supplements, etc. *TYLENOL or acetaminophen is ok to take*    6. PRE-OPERATIVE TESTING: History and physical with medical clearance is REQUIRED within 30 days of the surgery date and is mandatory per Dr. Cabrera. *If this is not done, your surgery will be postponed*  MEDICAL CLEARANCE WITH DR. VILLALOBOS  CBC  CMP  EKG (within 90 days)  WILL NEED PRE-OP WITH DR. CABRERA    7. Please inform us if you start or change any medications at least one week before surgery (ex: blood thinners, weight loss medications, diabetic medications, herbal  supplements, etc)    8. Does patient have diagnosis of sleep apnea?    [   ] Yes     [ X ]  No

## 2025-04-03 ENCOUNTER — NURSE ONLY (OUTPATIENT)
Age: 60
End: 2025-04-03
Attending: SPECIALIST
Payer: COMMERCIAL

## 2025-04-03 NOTE — PROGRESS NOTES
Education Record    Learner:  Patient and Family Member    Disease / Diagnosis: here for PICC dressing change    Barriers / Limitations:  None   Comments:    Method:  Brief focused and Discussion   Comments:    General Topics:  Plan of care reviewed   Comments:    Outcome:  Shows understanding   Comments:    Dressing changed per protocol without incident. Tolerated well. Reviewed next appointment as previously scheduled. Discharged home in stable condition, no new complaints.

## 2025-04-08 ENCOUNTER — APPOINTMENT (OUTPATIENT)
Age: 60
End: 2025-04-08
Attending: SPECIALIST
Payer: COMMERCIAL

## 2025-04-10 ENCOUNTER — OFFICE VISIT (OUTPATIENT)
Age: 60
End: 2025-04-10
Attending: SPECIALIST
Payer: COMMERCIAL

## 2025-04-10 ENCOUNTER — APPOINTMENT (OUTPATIENT)
Dept: WOUND CARE | Facility: HOSPITAL | Age: 60
End: 2025-04-10
Attending: NURSE PRACTITIONER
Payer: COMMERCIAL

## 2025-04-10 VITALS
BODY MASS INDEX: 25.67 KG/M2 | TEMPERATURE: 98 F | SYSTOLIC BLOOD PRESSURE: 151 MMHG | HEIGHT: 60.71 IN | HEART RATE: 96 BPM | WEIGHT: 134.19 LBS | RESPIRATION RATE: 16 BRPM | DIASTOLIC BLOOD PRESSURE: 73 MMHG | OXYGEN SATURATION: 97 %

## 2025-04-10 DIAGNOSIS — Z51.11 ENCOUNTER FOR CHEMOTHERAPY MANAGEMENT: ICD-10-CM

## 2025-04-10 DIAGNOSIS — Z17.1 MALIGNANT NEOPLASM OF OVERLAPPING SITES OF LEFT BREAST IN FEMALE, ESTROGEN RECEPTOR NEGATIVE (HCC): Primary | ICD-10-CM

## 2025-04-10 DIAGNOSIS — D63.0 ANEMIA COMPLICATING NEOPLASTIC DISEASE: ICD-10-CM

## 2025-04-10 DIAGNOSIS — Z15.01 BRCA2 GENETIC CARRIER: ICD-10-CM

## 2025-04-10 DIAGNOSIS — C77.3 SECONDARY MALIGNANT NEOPLASM OF AXILLARY LYMPH NODES (HCC): ICD-10-CM

## 2025-04-10 DIAGNOSIS — Z15.09 BRCA2 GENETIC CARRIER: ICD-10-CM

## 2025-04-10 DIAGNOSIS — Z79.899 ENCOUNTER FOR MONITORING CARDIOTOXIC DRUG THERAPY: ICD-10-CM

## 2025-04-10 DIAGNOSIS — C50.812 MALIGNANT NEOPLASM OF OVERLAPPING SITES OF LEFT BREAST IN FEMALE, ESTROGEN RECEPTOR NEGATIVE (HCC): Primary | ICD-10-CM

## 2025-04-10 DIAGNOSIS — T45.1X5A CHEMOTHERAPY-INDUCED THROMBOCYTOPENIA: ICD-10-CM

## 2025-04-10 DIAGNOSIS — D69.59 CHEMOTHERAPY-INDUCED THROMBOCYTOPENIA: ICD-10-CM

## 2025-04-10 DIAGNOSIS — E87.6 HYPOKALEMIA: ICD-10-CM

## 2025-04-10 DIAGNOSIS — Z51.81 ENCOUNTER FOR MONITORING CARDIOTOXIC DRUG THERAPY: ICD-10-CM

## 2025-04-10 LAB
ALBUMIN SERPL-MCNC: 3.8 G/DL (ref 3.2–4.8)
ALBUMIN/GLOB SERPL: 1.5 {RATIO} (ref 1–2)
ALP LIVER SERPL-CCNC: 99 U/L (ref 50–130)
ALT SERPL-CCNC: 7 U/L (ref 10–49)
ANION GAP SERPL CALC-SCNC: 8 MMOL/L (ref 0–18)
AST SERPL-CCNC: 18 U/L (ref ?–34)
BASOPHILS # BLD AUTO: 0.02 X10(3) UL (ref 0–0.2)
BASOPHILS NFR BLD AUTO: 0.5 %
BILIRUB SERPL-MCNC: 0.3 MG/DL (ref 0.2–1.1)
BUN BLD-MCNC: 8 MG/DL (ref 9–23)
CALCIUM BLD-MCNC: 9.7 MG/DL (ref 8.7–10.6)
CHLORIDE SERPL-SCNC: 105 MMOL/L (ref 98–112)
CO2 SERPL-SCNC: 31 MMOL/L (ref 21–32)
CREAT BLD-MCNC: 0.75 MG/DL (ref 0.55–1.02)
EGFRCR SERPLBLD CKD-EPI 2021: 90 ML/MIN/1.73M2 (ref 60–?)
EOSINOPHIL # BLD AUTO: 0 X10(3) UL (ref 0–0.7)
EOSINOPHIL NFR BLD AUTO: 0 %
ERYTHROCYTE [DISTWIDTH] IN BLOOD BY AUTOMATED COUNT: 15.9 %
GLOBULIN PLAS-MCNC: 2.6 G/DL (ref 2–3.5)
GLUCOSE BLD-MCNC: 119 MG/DL (ref 70–99)
HCT VFR BLD AUTO: 26.7 % (ref 35–48)
HGB BLD-MCNC: 8.8 G/DL (ref 12–16)
IMM GRANULOCYTES # BLD AUTO: 0.01 X10(3) UL (ref 0–1)
IMM GRANULOCYTES NFR BLD: 0.3 %
LYMPHOCYTES # BLD AUTO: 1.1 X10(3) UL (ref 1–4)
LYMPHOCYTES NFR BLD AUTO: 28.2 %
MCH RBC QN AUTO: 32.4 PG (ref 26–34)
MCHC RBC AUTO-ENTMCNC: 33 G/DL (ref 31–37)
MCV RBC AUTO: 98.2 FL (ref 80–100)
MONOCYTES # BLD AUTO: 0.42 X10(3) UL (ref 0.1–1)
MONOCYTES NFR BLD AUTO: 10.8 %
NEUTROPHILS # BLD AUTO: 2.35 X10 (3) UL (ref 1.5–7.7)
NEUTROPHILS # BLD AUTO: 2.35 X10(3) UL (ref 1.5–7.7)
NEUTROPHILS NFR BLD AUTO: 60.2 %
OSMOLALITY SERPL CALC.SUM OF ELEC: 297 MOSM/KG (ref 275–295)
PLATELET # BLD AUTO: 98 10(3)UL (ref 150–450)
PLATELETS.RETICULATED NFR BLD AUTO: 1.6 % (ref 0–7)
POTASSIUM SERPL-SCNC: 3.4 MMOL/L (ref 3.5–5.1)
PROT SERPL-MCNC: 6.4 G/DL (ref 5.7–8.2)
RBC # BLD AUTO: 2.72 X10(6)UL (ref 3.8–5.3)
SODIUM SERPL-SCNC: 144 MMOL/L (ref 136–145)
WBC # BLD AUTO: 3.9 X10(3) UL (ref 4–11)

## 2025-04-10 RX ORDER — PROCHLORPERAZINE MALEATE 10 MG
10 TABLET ORAL EVERY 6 HOURS PRN
Qty: 30 TABLET | Refills: 3 | Status: SHIPPED | OUTPATIENT
Start: 2025-04-10

## 2025-04-10 RX ORDER — PALONOSETRON 0.05 MG/ML
0.25 INJECTION, SOLUTION INTRAVENOUS ONCE
Status: COMPLETED | OUTPATIENT
Start: 2025-04-10 | End: 2025-04-10

## 2025-04-10 RX ORDER — PALONOSETRON 0.05 MG/ML
0.25 INJECTION, SOLUTION INTRAVENOUS ONCE
Status: CANCELLED
Start: 2025-04-10

## 2025-04-10 RX ORDER — ONDANSETRON 8 MG/1
8 TABLET, FILM COATED ORAL EVERY 8 HOURS PRN
Qty: 30 TABLET | Refills: 3 | Status: SHIPPED | OUTPATIENT
Start: 2025-04-10

## 2025-04-10 RX ADMIN — PALONOSETRON 0.25 MG: 0.05 INJECTION, SOLUTION INTRAVENOUS at 10:44:00

## 2025-04-10 NOTE — PROGRESS NOTES
Chief Complaint   Patient presents with    Follow - Up    Chemotherapy     Pt is here for treatment - C1 D1 enhertu is expected. Eating and drinking without issue. Denies N,V,D; mild constipation. No fever or chills, no pain, fair activity, continues at wound clinic. Sleep is poor due to difficulty falling asleep.    Education Record    Learner:  Patient and Spouse    Disease / Diagnosis: breast cancer    Barriers / Limitations:  None   Comments:    Method:  Brief focused   Comments:    General Topics:  Diet, Medication, Pain, Side effects and symptom management, and Plan of care reviewed   Comments:    Outcome:  Shows understanding   Comments:

## 2025-04-10 NOTE — PROGRESS NOTES
[facility] ANP Visit Note    Patient Name: Swathi Arguelles   YOB: 1965   Medical Record Number: [id number]   Date of visit: 4/10/2025     Chief Complaint/Reason for Visit:  Chief Complaint   Patient presents with    Follow - Up    Chemotherapy      Oncologic history:     Admitted to the hospital on 09/10/2013 with symptomatic anemia.  On physical examination revealed a malodorous, draining ulcerating right breast mass.  Upon questioning, she admitted that she first noticed the mass in approximately 01/2013.  She states that her only mammogram was approximately at age 43 years.  Biopsy showed grade 3 infiltrating ductal carcinoma.  The tumor was estrogen receptor positive, progesterone receptor negative, and Her2/alberto negative.  CT scans of the chest, abdomen, pelvis showed multiple right axillary lymph nodes, two micronodules in the lungs of unknown significance, and a left sided large cystic adnexal mass.  At initial diagnosis CA 15-3 was 48.3 U/mL,  CA 27.29 was 91.7 U/mL, and  was 171.5 U/mL.  PET/CT showed abnormal FDG uptake in the ulcerating mass of the right breast/chest wall and in retropectoral and subclavicular lymph nodes.  There was no uptake in the cystic pelvic mass or in 3 subcentimeter pulmonary nodules.  Pelvic ultrasound showed a complex multiloculated left adnexal cyst with no separate identifiable ovarian tissue.  Noted was irregular thickening of one of the cyst walls that was worrisome for a cystic ovarian neoplasm.    Patient was premenopausal at diagnosis     On 10/04/2013 she began dose dense doxorubicin and cyclophosphamide. Treatment was complicated by neutropenic fever, anemia requiring transfusion, dehydration, and prolonged thrombocytopenia. CT scans after cycle 4 showed a marked improvement in the breast/chest wall mass and axillary adenopathy. Tumor markers markedly improved. She then received weekly paclitaxel. Treatment was complicated by peripheral neuropathy  and neutropenia.      During paclitaxel therapy, she consented to BRCA1/2 testing.  Results obtained on 02/11/2014 revealed the deleterious BRCA2 mutation [id number].     On 03/18/2014 she underwent right mastectomy with axillary node dissection.  Pathology showed 1.4 cm grade 3 invasive ductal carcinoma with 12/12 lymph nodes negative for malignancy.  She also underwent diagnostic laparoscopy which showed the cystic left ovarian mass but no evidence of metastatic disease.     On 05/13/2014 she underwent bilateral salpingo-oophorectomy, bilateral pelvic lymphadenectomy, limited periaortic lymphadenectomy, omentectomy, peritoneal washings, and appendectomy.  Pathology, reviewed at the [facility], showed serous borderline tumor, typical type.  All lymph nodes and pelvic washings were negative for malignancy.     In 07/2014 she began adjuvant right chest wall/axilla radiation.     She began endocrine therapy with anastrazole in mid 08/2014.      Patient was last seen on 02/03/2017. On that day, patient was advised to continue anastrozole, continue increased surveillance with alternating mammography and breast MRI, and return for follow up in 05/2017. On that day, she expressed an openness to prophylactic left mastectomy if she was a candidate for breast reconstruction. Patient failed to return for follow up in 05/2017 as recommended. She also failed to return a call from the breast navigator to arrange evaluation by plastic surgery. She did have a left sided mammogram as previously ordered by me in 06/2017. Patient was on anastrozole at least through 02/2017 but it is not clear when she discontinued therapy.     Patient next presented to the ED on 11/30/2024 with complaints of generalized weakness, poor appetite, and dyspnea with exertion. Laboratory studies showed anemia, hyponatremia, and hypochlorhydria. CTA chest was negative for pulmonary embolism or metastatic disease but did show a 22.3 x 14.6 x 16.0  cm mass arising from the left breast with areas of necrosis, possible extension into the intracostal musculature, and mildly enlarged left axillary lymph nodes. Visualization of left breast showed a large firm breast which an open area laterally with malodorous drainage. CT abdomen/pelvis w contrast on 12/01/2024 was negative for metastatic disease.      Patient reports that she first noticed a \"rash\" 1.5 months prior to presentation. She stated that 3 months prior to presentation, she did not notice any changes in the left breast. Notably, patient's last breast imaging prior to hospitalization was in 06/2017.     Biopsy of the left breast was performed on 12/02/2024. 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 Immunohistochemical studies were as follows: estrogen receptor 0%, progesterone receptor 0%, Her2 3+, Ki67 25%.      PET/CT scan on 02/09/2024 showed abnormal SUV uptake in the left breast mass peripherally, multiple left axillary lymph nodes, subpectoral lymph nodes, a subcentimeter mediastinal lymph node.      On 12/12/2024 she began neoadjuvant systemic therapy with TCHP. Cycle 3 was delayed for thrombocytopenia.     On 4/10/25, she was switched to neoadjuvant Enhertu.     History of Present Illness:     Swathi Arguelles is a 60 year old patient of Dr. Lopez with the above oncologic history who presents today for evaluation prior to treatment. Cycle 1 of T-DXd is anticipated.     She reports several weeks of trouble with sleep initiation. Has not tried any OTC sleep aids. Would like to discuss new treatment and expected side effects.     Reviewed available chart notes, labs, and imaging.    History:     Past medical, surgical, social and family history reviewed with the patient and updated as appropriate in the chart.    Allergies:  Allergies[1]    Medications:  Medications - Current[2]    Review of Systems:  A comprehensive 14 point review of systems was completed.   Pertinent positives and negatives noted in the HPI.    Performance Status: ECOG 1    Vital Signs:   4/10/2025  9:14 AM   Oncology Vitals    Height 5' 0.709\"    Height 154 cm    Weight 134 lb 3.2 oz    Weight 60.873 kg    BSA (m2) 1.59 m2    BMI 25.6 kg/m2    /73    Pulse 96    Resp 16    Temp 98.1 °F (36.7 °C)    SpO2 97 %    Pain Score 0      Physical Examination:  General: Well developed, casually dressed, appropriately groomed, alert and oriented x 3, not in acute distress.  HEENT: Anicteric, conjunctivae and sclerae clear, no oropharyngeal lesion/thrush, mucous membranes are moist.   Chest: Clear to auscultation, respirations unlabored.  Heart: Regular rate and rhythm, normal S1/2. No murmur.   Extremities: No edema.  Neurological: Grossly intact.   Skin: Warm, Dry, No erythema, No rash  Psych/Depression: mood and affect are appropriate.     Labs:     Recent Results (from the past 72 hours)   CBC W Differential W Platelet    Collection Time: 04/10/25  9:07 AM   Result Value Ref Range    WBC 3.9 (L) 4.0 - 11.0 x10(3) uL    RBC 2.72 (L) 3.80 - 5.30 x10(6)uL    HGB 8.8 (L) 12.0 - 16.0 g/dL    HCT 26.7 (L) 35.0 - 48.0 %    PLT 98.0 (L) 150.0 - 450.0 10(3)uL    Immature Platelet Fraction 1.6 0.0 - 7.0 %    MCV 98.2 80.0 - 100.0 fL    MCH 32.4 26.0 - 34.0 pg    MCHC 33.0 31.0 - 37.0 g/dL    RDW 15.9 %    Neutrophil Absolute Prelim 2.35 1.50 - 7.70 x10 (3) uL    Neutrophil Absolute 2.35 1.50 - 7.70 x10(3) uL    Lymphocyte Absolute 1.10 1.00 - 4.00 x10(3) uL    Monocyte Absolute 0.42 0.10 - 1.00 x10(3) uL    Eosinophil Absolute 0.00 0.00 - 0.70 x10(3) uL    Basophil Absolute 0.02 0.00 - 0.20 x10(3) uL    Immature Granulocyte Absolute 0.01 0.00 - 1.00 x10(3) uL    Neutrophil % 60.2 %    Lymphocyte % 28.2 %    Monocyte % 10.8 %    Eosinophil % 0.0 %    Basophil % 0.5 %    Immature Granulocyte % 0.3 %   Comp Metabolic Panel (14)    Collection Time: 04/10/25  9:07 AM   Result Value Ref Range    Glucose 119 (H) 70 - 99  mg/dL    Sodium 144 136 - 145 mmol/L    Potassium 3.4 (L) 3.5 - 5.1 mmol/L    Chloride 105 98 - 112 mmol/L    CO2 31.0 21.0 - 32.0 mmol/L    Anion Gap 8 0 - 18 mmol/L    BUN 8 (L) 9 - 23 mg/dL    Creatinine 0.75 0.55 - 1.02 mg/dL    Calcium, Total 9.7 8.7 - 10.6 mg/dL    Calculated Osmolality 297 (H) 275 - 295 mOsm/kg    eGFR-Cr 90 >=60 mL/min/1.73m2    AST 18 <34 U/L    ALT 7 (L) 10 - 49 U/L    Alkaline Phosphatase 99 50 - 130 U/L    Bilirubin, Total 0.3 0.2 - 1.1 mg/dL    Total Protein 6.4 5.7 - 8.2 g/dL    Albumin 3.8 3.2 - 4.8 g/dL    Globulin  2.6 2.0 - 3.5 g/dL    A/G Ratio 1.5 1.0 - 2.0    Patient Fasting for CMP? Patient not present    Scan slide    Collection Time: 04/10/25  9:07 AM   Result Value Ref Range    Slide Review       Slide reviewed, normal WBC, RBC, and platelet morphology.     Impression/Plan    L sided breast cancer: T4N3M1, ER/FL negative, HER2+. She has completed 5 cycles of neoadjuvant TCHP, now changing to Enhertu to help improve response in preparation for surgical resection. Reviewed expected and reportable side effects. Printed material from [employer] provided. Consent signed. Recent echo reviewed with pt, GLS + EF stable. Plan to start with reduced doses due to thrombocytopenia     Anemia + thrombocytopenia: due to chemo, stable, no intervention indicated at this time.     Difficulty sleeping: discussed importance of good sleep hygiene. Can try OTC sleep aids if necessary.     Hypokalemia: mild, increase dietary intake of potassium rich foods.     Emotional Well Being:  I have assessed the patient's emotional well-being and any concerns about anxiety or depression.  We discussed issues of distress, coping difficulties and social support systems and currently there are no active problems.    Planned Follow Up: 3 weeks for MD + labs + chemo    Risk Level: HIGH - breast cancer receiving chemotherapy with systemic effects requiring close monitoring     The 21st Century Cures Act makes  medical notes like these available to patients in the interest of transparency. Please be advised this is a medical document. Medical documents are intended to carry relevant information, facts as evident, and the clinical opinion of the practitioner. The medical note is intended as peer to peer communication and may appear blunt or direct. It is written in medical language and may contain abbreviations or verbiage that are unfamiliar.     Electronically Signed by:    Alice Lowe, JOSELO, NP-C  Nurse Practitioner  [facility]         [1]   Allergies  Allergen Reactions    Fish ANAPHYLAXIS and HIVES     All fish, Wheezing, short of breath    Fish Oil ANAPHYLAXIS and HIVES     All fish, Wheezing, short of breath   All fish, Wheezing, short of breath    Levemir HIVES and ITCHING    Levonorgestrel-Ethinyl Estrad OTHER (SEE COMMENTS)     Other reaction(s): Runny nose, WHEEZING   Ragweed, pollen   Ragweed, pollen    Seasonal WHEEZING and Runny nose     Ragweed, pollen   [2]   Current Outpatient Medications:     ondansetron (ZOFRAN) 8 MG tablet, Take 1 tablet (8 mg total) by mouth every 8 (eight) hours as needed for Nausea., Disp: 30 tablet, Rfl: 3    prochlorperazine (COMPAZINE) 10 mg tablet, Take 1 tablet (10 mg total) by mouth every 6 (six) hours as needed for Nausea., Disp: 30 tablet, Rfl: 3    sodium hypochlorite 0.125 % External Solution, Moisten gauze with dakins, place onto wound, cover with baby diaper three times a day, Disp: 1500 mL, Rfl: 3    collagenase (SANTYL) 250 UNIT/GM External Ointment, Apply 1 Application topically daily., Disp: 60 g, Rfl: 0    metroNIDAZOLE 0.75 % External Gel, Apply 1 g topically 2 (two) times daily., Disp: 90 g, Rfl: 0    HYDROcodone-acetaminophen 5-325 MG Oral Tab, Take 1-2 tablets by mouth every 4 (four) hours as needed for Pain., Disp: 30 tablet, Rfl: 0    gabapentin 600 MG Oral Tab, TAKE 1 TABLET BY MOUTH THREE TIMES DAILY; TAKE AN EXTRA 600MG AS NEEDED  FOR SEVERE PAIN, Disp: 270 tablet, Rfl: 1    Insulin Glargine-yfgn 100 UNIT/ML Subcutaneous Solution Pen-injector, Inject 20 Units into the skin nightly., Disp: 24 mL, Rfl: 0    traMADol 50 MG Oral Tab, Take 1 tablet (50 mg total) by mouth every 6 (six) hours as needed., Disp: 20 tablet, Rfl: 0    fluticasone-salmeterol (WIXELA INHUB) 100-50 MCG/ACT Inhalation Aerosol Powder, Breath Activated, Inhale 1 puff into the lungs 2 (two) times daily., Disp: 3 each, Rfl: 0    Insulin Lispro, 1 Unit Dial, 100 UNIT/ML Subcutaneous Solution Pen-injector, Inject 10 Units into the skin in the morning, at noon, and at bedtime., Disp: 3 mL, Rfl: 0    Budesonide-Formoterol Fumarate 160-4.5 MCG/ACT Inhalation Aerosol, Inhale 2 puffs into the lungs 2 (two) times daily. (Patient taking differently: Inhale 2 puffs into the lungs in the morning and 2 puffs before bedtime. Pt states she would like to go back to budesonide.), Disp: 3 each, Rfl: 1    albuterol (2.5 MG/3ML) 0.083% Inhalation Nebu Soln, Take 3 mL (2.5 mg total) by nebulization every 4 (four) hours as needed for Wheezing or Shortness of Breath., Disp: 90 mL, Rfl: 0    Insulin Pen Needle (BD PEN NEEDLE DANIELA U/F) 32G X 4 MM Does not apply Misc, Up to TID, Disp: 200 each, Rfl: 2    montelukast 10 MG Oral Tab, Take 1 tablet (10 mg total) by mouth nightly., Disp: 90 tablet, Rfl: 1

## 2025-04-10 NOTE — PROGRESS NOTES
Pt here for C1D1 Drug(s) Enhertu.  Arrives Ambulating independently, accompanied by Family member     Patient was evaluated today by ARMANDO.    Oral medications included in this regimen:  no    Patient confirms comprehension of cancer treatment schedule:  yes    Pregnancy screening:  Not applicable    Modifications in dose or schedule:  No    Medications appearance and physical integrity checked by RN: yes.    Chemotherapy IV pump settings verified by 2 RNs:  Yes.  Frequency of blood return and site check throughout administration: Prior to administration and At completion of therapy     Infusion/treatment outcome:  patient tolerated treatment without incident    Education Record    Learner:  Patient  Barriers / Limitations:  None  Method:  Discussion  Education / instructions given:  today's regime  Outcome:  Shows understanding    Discharged Home, Ambulating independently, accompanied by:Family member    Patient/family verbalized understanding of future appointments: by printed AVS

## 2025-04-10 NOTE — PROGRESS NOTES
CHIEF COMPLAINT:     Chief Complaint   Patient presents with    Wound Care     Follow up for left breast and leg wound. No complaints at this time.      HPI:   Information obtained from patient, daughter, chart  1-8-25 INITIAL:  Patient is a 60 yo female who was dx with right breast ca in 2013 and was lost to follow-up in 2017.  Patient next presented to the ED on 11/30/2024 with complaints of generalized weakness, poor appetite, and dyspnea with exertion. Laboratory studies showed anemia, hyponatremia, and hypochlorhydria. CTA chest was negative for pulmonary embolism or metastatic disease but did show a 22.3 x 14.6 x 16.0 cm mass arising from the left breast with areas of necrosis, possible extension into the intracostal musculature, and mildly enlarged left axillary lymph nodes. Visualization of left breast showed a large firm breast which an open area laterally with malodorous drainage. CT abdomen/pelvis w contrast on 12/01/2024 was negative for metastatic disease. Biopsy of the left breast was performed on 12/02/2024. 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 It was discussed with patient that the left breast wound will not heal and she is presenting today for assistance with management of the left breast wound.  There is significant malodor and necrotic, liquifing adipose tissue.  I was able to remove a large amount of it which will help with the drainage and malodor.  Patient has not been showering because she did not think she should get the wound wet.  The wound is not overly vascular.  We discussed utilizing dakins solution and a baby diaper.  Will order supplies for patient.     HPI PRIOR TO MARCH 2025 SEE INDIVIDUAL PROGRESS NOTES  3-6-25 patient returns she is following up every 7-10 days. She has been receiving chemo since last Friday.  Last visit I rx'd flagyl to help with the malodor, patient reports that the wound got too \"gummy\" with the build up, so she returned  to the dakins gauze.  We discussed metrogel as another option.  The leg wound is not improving.  She specifically does remember when she hit her leg on the wooden stool (my concern is that could this wound be a malignancy-although initial presentation is not consistent with malignancy and non healing could also be related to her chemo). Overall her breast is less indurated, there is less necrosis and less malodor than previous visits.     3-18-25 patient returns.  I did order metrogel at last visit she tried this and does feel like it is helping the malodor.  I also did order santyl for her left lower leg ulceration she was able to get this, her leg wound is slightly smaller, the coagulum across the wound bed, wipes out  with a dry gauze.  Dakins refilled. Patient states she recently had an mri of the breast. Her next chemo is next Thursday.     3-27-25 patient returns.  She saw dr. Lopez on the 20th and it was noted that she has had a good rsponse to chemo therapy however the mri showed continue involvement of the chest wall.  The case was discussed with dr. Ny and she has an appointment with him on 4-2 (Wednesday).  Dependent on what Dr. Ny feels regarding chest wall reconstruction her chemo may be switched to enhertu beginning in 3 weeks.  She had her chemo 2 days ago. Patient has continued to dress with metrogel and dakins. She states the malodor has been better. There is significantly less necrosis than previously and she does have some sensitive areas of tissue that is viable.  The leg wound is . She continues to utilize santyl on that.  No s/s of infection to either area. Patient will f/u in 2 weeks.    4-11-25 patient returns.  She saw dr ny last week and it was noted \"She is a candidate for surgical resection timing of which pending completion of neoadjuvant systemic therapy with Dr. Lopez.\"  She also met with dr. Billings and it was noted \"We discussed the option for left chest wall  reconstruction with local flaps, possible latissimus flap, possible Integra, possible skin graft. She will need to complete chemotherapy prior to surgical intervention. She might benefit from staging with integra followed by negative margins and eventual latissimus flap if vessels are intact.  We can start to look for surgery dates once we know the timeline of her oncologic treatment.\"   Her next appointment with dr holliday may 1.  She states that the plan is to reduce the tumor further with a new chemo.  She has received 1 dose and with the steroids and dextrose that was with that infusion the patient is having some difficulty managing her blood sugar.  She has continued to utilize santyl on her lower leg and metrogel and dakins to her breast. She states the malodor is improving, but still present.  No c/o pain today. She did get the refill on the dakins.    MEDICATIONS:     Current Outpatient Medications:     ondansetron (ZOFRAN) 8 MG tablet, Take 1 tablet (8 mg total) by mouth every 8 (eight) hours as needed for Nausea., Disp: 30 tablet, Rfl: 3    prochlorperazine (COMPAZINE) 10 mg tablet, Take 1 tablet (10 mg total) by mouth every 6 (six) hours as needed for Nausea., Disp: 30 tablet, Rfl: 3    sodium hypochlorite 0.125 % External Solution, Moisten gauze with dakins, place onto wound, cover with baby diaper three times a day, Disp: 1500 mL, Rfl: 3    HYDROcodone-acetaminophen 5-325 MG Oral Tab, Take 1-2 tablets by mouth every 4 (four) hours as needed for Pain., Disp: 30 tablet, Rfl: 0    gabapentin 600 MG Oral Tab, TAKE 1 TABLET BY MOUTH THREE TIMES DAILY; TAKE AN EXTRA 600MG AS NEEDED FOR SEVERE PAIN, Disp: 270 tablet, Rfl: 1    Insulin Glargine-yfgn 100 UNIT/ML Subcutaneous Solution Pen-injector, Inject 20 Units into the skin nightly., Disp: 24 mL, Rfl: 0    traMADol 50 MG Oral Tab, Take 1 tablet (50 mg total) by mouth every 6 (six) hours as needed., Disp: 20 tablet, Rfl: 0    fluticasone-salmeterol (WIXELA  INHUB) 100-50 MCG/ACT Inhalation Aerosol Powder, Breath Activated, Inhale 1 puff into the lungs 2 (two) times daily., Disp: 3 each, Rfl: 0    Insulin Lispro, 1 Unit Dial, 100 UNIT/ML Subcutaneous Solution Pen-injector, Inject 10 Units into the skin in the morning, at noon, and at bedtime., Disp: 3 mL, Rfl: 0    Budesonide-Formoterol Fumarate 160-4.5 MCG/ACT Inhalation Aerosol, Inhale 2 puffs into the lungs 2 (two) times daily. (Patient taking differently: Inhale 2 puffs into the lungs in the morning and 2 puffs before bedtime. Pt states she would like to go back to budesonide.), Disp: 3 each, Rfl: 1    albuterol (2.5 MG/3ML) 0.083% Inhalation Nebu Soln, Take 3 mL (2.5 mg total) by nebulization every 4 (four) hours as needed for Wheezing or Shortness of Breath., Disp: 90 mL, Rfl: 0    Insulin Pen Needle (BD PEN NEEDLE DANIELA U/F) 32G X 4 MM Does not apply Misc, Up to TID, Disp: 200 each, Rfl: 2    montelukast 10 MG Oral Tab, Take 1 tablet (10 mg total) by mouth nightly., Disp: 90 tablet, Rfl: 1  ALLERGIES:   [Allergies]    [Allergies]  Allergen Reactions    Fish ANAPHYLAXIS and HIVES     All fish, Wheezing, short of breath    Fish Oil ANAPHYLAXIS and HIVES     All fish, Wheezing, short of breath   All fish, Wheezing, short of breath    Levemir HIVES and ITCHING    Levonorgestrel-Ethinyl Estrad OTHER (SEE COMMENTS)     Other reaction(s): Runny nose, WHEEZING   Ragweed, pollen   Ragweed, pollen    Seasonal WHEEZING and Runny nose     Ragweed, pollen      REVIEW OF SYSTEMS:   This information was obtained from the patient/family and chart.    See HPI for pertinent positives, otherwise 10 pt ROS negative.    HISTORY:   Past medical, surgical, family and social history updated where appropriate.      PHYSICAL EXAM:     Vitals:    04/11/25 0700   BP: 145/80  Comment: 212 glucose   Pulse: 88   Resp: 14   Temp: 97.3 °F (36.3 °C)         Estimated body mass index is 25.6 kg/m² as calculated from the following:    Height as of  4/10/25: 60.71\".    Weight as of 4/10/25: 134 lb 3.2 oz (60.9 kg).   POC Glucose   Date Value Ref Range Status   04/11/2025 212 (H) 70 - 99 mg/dL Final   03/27/2025 122 (H) 70 - 99 mg/dL Final   03/18/2025 119 (H) 70 - 99 mg/dL Final       Vital signs reviewed.Appears stated age, well groomed.    Constitutional:  Bp wnl for patient. Pulse Regular and wnl for patient. Respirations easy and unlabored. Temperature wnl. Weight normal for height. Appearance neat and clean. Appears in no acute distress. Well nourished and well developed.    Lower extremity:  dp/pt palpable left. Left lower extremity free of varicosities, no edema. Capillary refill < 3 seconds. Digits are warm, overlapping. Toenails thickned, discolored, adequate length/hygeine. Skin is very dry. no hairgrowth on legs.    Musculoskeletal:  Gait and station stable   Integumentary:  refer to wound characteristics and images   Psychiatric:  Judgment and insight intact. Alert and oriented times 3. No evidence of depression, anxiety, or agitation. Calm, cooperative, and communicative.   DIAGNOSTICS:     Lab Results   Component Value Date    BUN 8 (L) 04/10/2025    CREATSERUM 0.75 04/10/2025    GFRCKDEPI 105.47 04/18/2018    ALB 3.8 04/10/2025    TP 6.4 04/10/2025    A1C 6.5 (H) 11/30/2024       WOUND ASSESSMENT:     Wound 01/08/25 #1 Breast Left (Active)   Date First Assessed/Time First Assessed: 01/08/25 1414    Wound Number (Wound Clinic Only): #1  Primary Wound Type: (c) Other (comment)  Location: Breast  Wound Location Orientation: Left      Assessments 1/8/2025  2:16 PM 4/11/2025  9:27 AM   Wound Image            Drainage Amount Large Copious   Drainage Description Serous;Yellow Serous;Yellow   Wound Length (cm) 17 cm 2.2 cm   Wound Width (cm) 30 cm 12.6 cm   Wound Surface Area (cm^2) 510 cm^2 21.77 cm^2   Wound Depth (cm) 1 cm 3.9 cm   Wound Volume (cm^3) 510 cm^3 56.605 cm^3   Wound Healing % -- 89   Margins Well-defined edges Well-defined edges    Non-staged Wound Description Full thickness Full thickness   Lora-wound Assessment Edema Edema;Induration;Moist   Wound Granulation Tissue Red;Pink;Spongy Pink;Red;Firm   Wound Bed Granulation (%) 10 % 40 %   Wound Bed Epithelium (%) 15 % 20 %   Wound Bed Slough (%) 75 % 60 %   Wound Odor Strong Mild   Shape -- clustered       Inactive Orders   Date Order Priority Status Authorizing Provider   03/18/25 1032 Debridement Other (comment) Left Breast Routine Completed Vira Alejandro, APRN   03/06/25 1536 Debridement Other (comment) Left Breast Routine Completed Jiongco, Vira, APRN   02/25/25 1157 Debridement Other (comment) Left Breast Routine Completed Jiongco, Vira, APRN   02/18/25 1009 Debridement Other (comment) Left Breast Routine Completed Jiongco, Vira, APRN   02/05/25 1427 Debridement Other (comment) Left Breast Routine Completed Romelongco, Vira, APRN   01/22/25 1503 Debridement Other (comment) Left Breast Routine Completed Vira Alejandro, APRN   01/08/25 1618 Debridement Other (comment) Left Breast Routine Completed Flavia Vira, APRN       Wound 02/05/25 #2 Left lower leg Leg Left (Active)   Date First Assessed/Time First Assessed: 02/05/25 1348    Wound Number (Wound Clinic Only): #2 Left lower leg  Primary Wound Type: Venous Ulcer  Location: Leg  Wound Location Orientation: Left      Assessments 2/5/2025  1:50 PM 4/11/2025  9:24 AM   Wound Image        Drainage Amount Scant Small   Drainage Description Yellow;Serous Serosanguineous   Wound Length (cm) 1.7 cm 1.1 cm   Wound Width (cm) 1.5 cm 0.8 cm   Wound Surface Area (cm^2) 2.55 cm^2 0.69 cm^2   Wound Depth (cm) 0.1 cm 0.1 cm   Wound Volume (cm^3) 0.255 cm^3 0.046 cm^3   Wound Healing % -- 82   Margins Well-defined edges Well-defined edges   Non-staged Wound Description Full thickness Full thickness   Lora-wound Assessment -- Hemosiderin staining;Dry   Wound Granulation Tissue -- Pink;Firm   Wound Bed Granulation (%) -- 5 %   Wound Bed Slough (%) 100  % 95 %   Wound Odor Mild None   Tunneling? No --   Undermining? No --   Sinus Tracts? No --       Inactive Orders   Date Order Priority Status Authorizing Provider   02/25/25 1156 Debridement Venous Ulcer Left Leg Routine Completed Vira Alejandro APRN   02/05/25 1424 Debridement Venous Ulcer Left Leg Routine Completed Vira Alejandro APRN        PROCEDURE:      This procedure was medically necessary to promote wound healing by removing nonviable tissue, decrease chance of infection, and return the wound to an acute state.  See rn px note.    ASSESSMENT AND PLAN:    1. Malignant neoplasm of overlapping sites of left breast in female, estrogen receptor negative (HCC)    2. 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  Questions and concerns addressed. Red flags to RTC or ED reviewed.  Patient (or parent) agrees to plan.      NOTE TO PATIENT: The 21st Century Cures Act makes clinical notes like these available to patients in the interest of transparency. Clinical notes are medical documents used by physicians and care providers to communicate with each other. These documents include medical language and terminology, abbreviations, and treatment information that may sound technical and at times possibly unfamiliar. In addition, at times, the verbiage may appear blunt or direct. These documents are one tool providers use to communicate relevant information and clinical opinions of the care providers in a way that allows common understanding of the clinical context.    I spent 30minutes with the patient. This time included:    preparing to see the patient (eg, review notes and recent diagnostics),  seeing the patient, obtaining and/or reviewing separately obtained history, performing a medically appropriate examination and/or evaluation, counseling and educating  the patient, documenting in the record. Bill selective debridement only  DISCHARGE:      Patient Instructions   Please return:  2 WEEKS   Patient discharge and wound care instructions  Swathi Arguelles  4/11/2025      You may shower and cleanse area with mild soap and water, Dakins, dab dry with gauze and apply your dressings as directed.     Changing your dressing:            two- three times a day    Wash your hands with soap and water.  Ensure that the old dressing is removed completely. Place it in a plastic bag and throw it in the trash.  Cleanse the wound with hypochlorous wound cleanser (ie. Anasept, vashe, pure and clean) .  It's ok to “scrub” your wound with the gauze, small amount of bleeding with cleansing is normal and ok.  Apply the following dressings:    Breast:    A.  Place gel on gauze and place in base of wound  B. Moisten gauze with DAKINS solution, place into wound, bordered zetuvit dressings    LEG:  Santyl>bordered gauze    Nutrition and blood sugar control:  Focus on the following:  Protein: Meats, beans, eggs, milk and yogurt particularly Greek yogurt), tofu, soy nuts, soy protein products (Follow the protein handout in your welcome folder)  Vitamin C: Citrus fruits and juices, strawberries, tomatoes, tomato juice, peppers, baked potatoes, spinach, broccoli, cauliflower, [place] sprouts, cabbage  Vitamin A: Dark green, leafy vegetables, orange or yellow vegetables, cantaloupe, fortified dairy products, liver, fortified cereals  Zinc: Fortified cereals, red meats, seafood  Consider supplementing with Maximino by [employer]. It can be purchased on amazon, [employer] website, or local pharmacy may be able to order it for you.  (These are essential branch chain amino acids that help with tissue building and wound healing).   When your blood sugar is consistently elevated greater than 180 your body can't heal or fight infection.      Concerns:  Signs of infection may include the following:  Increase in  redness  Red \"streaks\" from wound  Increase in swelling  Fever  Unusual odor  Change in the amount of wound drainage     Should you experience any significant changes in your wound(s) or have any questions regarding your home care instructions please contact the [facility] @ [phone] If after regular business hours, please call your family doctor or local emergency room. The treatment plan has been discussed at length between you and your provider. Follow all instructions carefully, it is very important. If you do not follow all instructions you are at risk of your wound not healing, infection, possible loss of limb and even loss of life.    Vira Alejandro FNP-C, CWCN-AP, CFCN, CSWS, WCC, DWC  4/11/2025

## 2025-04-11 ENCOUNTER — OFFICE VISIT (OUTPATIENT)
Dept: WOUND CARE | Facility: HOSPITAL | Age: 60
End: 2025-04-11
Attending: NURSE PRACTITIONER
Payer: COMMERCIAL

## 2025-04-11 ENCOUNTER — TELEPHONE (OUTPATIENT)
Age: 60
End: 2025-04-11

## 2025-04-11 VITALS
TEMPERATURE: 97 F | DIASTOLIC BLOOD PRESSURE: 80 MMHG | RESPIRATION RATE: 14 BRPM | SYSTOLIC BLOOD PRESSURE: 145 MMHG | HEART RATE: 88 BPM

## 2025-04-11 DIAGNOSIS — L97.826 NON-PRESSURE CHRONIC ULCER OF OTHER PART OF LEFT LOWER LEG WITH BONE INVOLVEMENT WITHOUT EVIDENCE OF NECROSIS (HCC): ICD-10-CM

## 2025-04-11 DIAGNOSIS — C50.812 MALIGNANT NEOPLASM OF OVERLAPPING SITES OF LEFT BREAST IN FEMALE, ESTROGEN RECEPTOR NEGATIVE (HCC): Primary | ICD-10-CM

## 2025-04-11 DIAGNOSIS — Z17.1 MALIGNANT NEOPLASM OF OVERLAPPING SITES OF LEFT BREAST IN FEMALE, ESTROGEN RECEPTOR NEGATIVE (HCC): Primary | ICD-10-CM

## 2025-04-11 LAB — GLUCOSE BLD-MCNC: 212 MG/DL (ref 70–99)

## 2025-04-11 PROCEDURE — 97598 DBRDMT OPN WND ADDL 20CM/<: CPT | Performed by: NURSE PRACTITIONER

## 2025-04-11 PROCEDURE — 97597 DBRDMT OPN WND 1ST 20 CM/<: CPT | Performed by: NURSE PRACTITIONER

## 2025-04-11 NOTE — PATIENT INSTRUCTIONS
Please return:  2 WEEKS   Patient discharge and wound care instructions  Swathi Arguelles  4/11/2025      You may shower and cleanse area with mild soap and water, Dakins, dab dry with gauze and apply your dressings as directed.     Changing your dressing:            two- three times a day    Wash your hands with soap and water.  Ensure that the old dressing is removed completely. Place it in a plastic bag and throw it in the trash.  Cleanse the wound with hypochlorous wound cleanser (ie. Anasept, vashe, pure and clean) .  It's ok to “scrub” your wound with the gauze, small amount of bleeding with cleansing is normal and ok.  Apply the following dressings:    Breast:    A.  Place gel on gauze and place in base of wound  B. Moisten gauze with DAKINS solution, place into wound, bordered zetuvit dressings    LEG:  Santyl>bordered gauze    Nutrition and blood sugar control:  Focus on the following:  Protein: Meats, beans, eggs, milk and yogurt particularly Greek yogurt), tofu, soy nuts, soy protein products (Follow the protein handout in your welcome folder)  Vitamin C: Citrus fruits and juices, strawberries, tomatoes, tomato juice, peppers, baked potatoes, spinach, broccoli, cauliflower, [place] sprouts, cabbage  Vitamin A: Dark green, leafy vegetables, orange or yellow vegetables, cantaloupe, fortified dairy products, liver, fortified cereals  Zinc: Fortified cereals, red meats, seafood  Consider supplementing with Maximino by [employer]. It can be purchased on amazon, [employer] website, or local pharmacy may be able to order it for you.  (These are essential branch chain amino acids that help with tissue building and wound healing).   When your blood sugar is consistently elevated greater than 180 your body can't heal or fight infection.      Concerns:  Signs of infection may include the following:  Increase in redness  Red \"streaks\" from wound  Increase in swelling  Fever  Unusual odor  Change in the amount of wound  drainage     Should you experience any significant changes in your wound(s) or have any questions regarding your home care instructions please contact the wound center [facility] @ [phone] If after regular business hours, please call your family doctor or local emergency room. The treatment plan has been discussed at length between you and your provider. Follow all instructions carefully, it is very important. If you do not follow all instructions you are at risk of your wound not healing, infection, possible loss of limb and even loss of life.

## 2025-04-11 NOTE — TELEPHONE ENCOUNTER
Toxicities: C1 D1 Fam-Trastuzumab Elisa-nxki on 4/10/2025    I attempted to reach Swathi. I left a voice mail message asking her to please return my call at her earliest convenience.

## 2025-04-11 NOTE — PROGRESS NOTES
.Weekly Wound Education Note    Teaching Provided To: Patient  Training Topics: Discharge instructions, Dressing, Cleasing and general instructions  Training Method: Explain/Verbal, Written  Training Response: Patient responds and understands            Santyl ointment, border gauze daily.  Dakins moistened conforming gauze to breast wounds, cover with border sacral foam x2, baby diaper secured with medipore tape.  Patient declined needing dressings ordered.

## 2025-04-11 NOTE — PROGRESS NOTES
Patient ID: Swathi Arguelles is a 60 year old female.    Debridement Other (comment) Left Breast   Wound 01/08/25 #1 Breast Left    Performed by: Vira Alejandro APRN  Authorized by: Vira Alejandro APRN      Consent   Consent obtained? verbal  Consent given by: patient    Debridement Details  Performed by: NP  Debridement type: conservative sharp  Pain control: lidocaine 4%  Pain control administration type: topical    Pre-debridement measurements  Length (cm): 2.2  Width (cm): 12.6  Depth (cm): 3.9  Surface Area (cm^2): 21.77    Post-debridement measurements  Length (cm): 2.2  Width (cm): 12.6  Depth (cm): 4  Percent debrided: 100%  Surface Area (cm^2): 21.77  Area Debrided (cm^2): 21.77  Volume (cm^3): 58.06    Devitalized tissue debrided: biofilm and slough  Instrument(s) utilized: curette and forceps  Bleeding: small  Hemostasis obtained with: pressure  Procedural pain (0-10): 0  Post-procedural pain: 0   Response to treatment: procedure was tolerated well

## 2025-04-17 ENCOUNTER — NURSE ONLY (OUTPATIENT)
Age: 60
End: 2025-04-17
Attending: SPECIALIST
Payer: COMMERCIAL

## 2025-04-17 DIAGNOSIS — E11.9 TYPE 2 DIABETES MELLITUS WITHOUT COMPLICATION, UNSPECIFIED WHETHER LONG TERM INSULIN USE (HCC): ICD-10-CM

## 2025-04-17 DIAGNOSIS — E86.0 DEHYDRATION: Primary | ICD-10-CM

## 2025-04-17 NOTE — PROGRESS NOTES
Education Record    Learner:  Patient    Disease / Diagnosis:PICC dressing change and IV hydration     Barriers / Limitations:  None   Comments:    Method:  Brief focused   Comments:    General Topics:  Diet, Infection, Medication, Pain, Precautions, Procedure, Side effects and symptom management, Plan of care reviewed, and Fall risk and prevention   Comments:    Outcome:  Shows understanding   Comments:  Patient requested IV hydration and it was approved by .

## 2025-04-18 RX ORDER — INSULIN GLARGINE-YFGN 100 [IU]/ML
20 INJECTION, SOLUTION SUBCUTANEOUS NIGHTLY
Qty: 15 ML | Refills: 0 | Status: SHIPPED | OUTPATIENT
Start: 2025-04-18

## 2025-04-18 RX ORDER — PEN NEEDLE, DIABETIC 32GX 5/32"
NEEDLE, DISPOSABLE MISCELLANEOUS
Qty: 200 EACH | Refills: 2 | Status: SHIPPED | OUTPATIENT
Start: 2025-04-18

## 2025-04-18 NOTE — TELEPHONE ENCOUNTER
.A refill request was received for:  Requested Prescriptions     Pending Prescriptions Disp Refills    Insulin Pen Needle (BD PEN NEEDLE DANIELA U/F) 32G X 4 MM Does not apply Misc 200 each 2     Sig: Up to TID    Insulin Glargine-yfgn 100 UNIT/ML Subcutaneous Solution Pen-injector 24 mL 0     Sig: Inject 20 Units into the skin nightly.       Last refill date:   insulin 12/13/24  Needles 12/22/23    Last office visit: 6/14/24/    Follow up due:  Future Appointments   Date Time Provider Department Center   4/24/2025  9:00 AM NP [place] RN6 NP [facility]   4/25/2025  9:00 AM Vira Alejandro APRN  Wound [facility]   5/1/2025  9:00 AM NP [place] RN6 NP [facility]   5/1/2025  9:15 AM Carl Lopez MD NP [facility]

## 2025-04-21 ENCOUNTER — TELEPHONE (OUTPATIENT)
Dept: FAMILY MEDICINE CLINIC | Facility: CLINIC | Age: 60
End: 2025-04-21

## 2025-04-21 DIAGNOSIS — Z00.00 GENERAL MEDICAL EXAM: ICD-10-CM

## 2025-04-21 DIAGNOSIS — E11.9 TYPE 2 DIABETES MELLITUS WITHOUT COMPLICATION, UNSPECIFIED WHETHER LONG TERM INSULIN USE (HCC): ICD-10-CM

## 2025-04-21 RX ORDER — PEN NEEDLE, DIABETIC 32GX 5/32"
NEEDLE, DISPOSABLE MISCELLANEOUS
Qty: 200 EACH | Refills: 2 | OUTPATIENT
Start: 2025-04-21

## 2025-04-21 NOTE — TELEPHONE ENCOUNTER
Dr. Caceres,     Pt is finding it difficult to come in for follow up, can we just put in order for DM labs and have her complete them at her next blood draw for now?

## 2025-04-24 ENCOUNTER — NURSE ONLY (OUTPATIENT)
Age: 60
End: 2025-04-24
Attending: SPECIALIST
Payer: COMMERCIAL

## 2025-04-24 NOTE — PROGRESS NOTES
Education Record    Learner:  Patient and Spouse    Disease / Diagnosis: central line dressing change    Barriers / Limitations:  None   Comments:    Method:  Brief focused and Discussion   Comments:    General Topics:  Precautions and Plan of care reviewed   Comments:    Outcome:  Shows understanding   Comments:

## 2025-04-24 NOTE — PROGRESS NOTES
CHIEF COMPLAINT:     Chief Complaint   Patient presents with    Wound Care     Follow up for left breast and leg wound. No complaints at this time.      HPI:   Information obtained from patient, daughter, chart  1-8-25 INITIAL:  Patient is a 60 yo female who was dx with right breast ca in 2013 and was lost to follow-up in 2017.  Patient next presented to the ED on 11/30/2024 with complaints of generalized weakness, poor appetite, and dyspnea with exertion. Laboratory studies showed anemia, hyponatremia, and hypochlorhydria. CTA chest was negative for pulmonary embolism or metastatic disease but did show a 22.3 x 14.6 x 16.0 cm mass arising from the left breast with areas of necrosis, possible extension into the intracostal musculature, and mildly enlarged left axillary lymph nodes. Visualization of left breast showed a large firm breast which an open area laterally with malodorous drainage. CT abdomen/pelvis w contrast on 12/01/2024 was negative for metastatic disease. Biopsy of the left breast was performed on 12/02/2024. 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 It was discussed with patient that the left breast wound will not heal and she is presenting today for assistance with management of the left breast wound.  There is significant malodor and necrotic, liquifing adipose tissue.  I was able to remove a large amount of it which will help with the drainage and malodor.  Patient has not been showering because she did not think she should get the wound wet.  The wound is not overly vascular.  We discussed utilizing dakins solution and a baby diaper.  Will order supplies for patient.     HPI PRIOR TO MARCH 2025 SEE INDIVIDUAL PROGRESS NOTES  3-6-25 patient returns she is following up every 7-10 days. She has been receiving chemo since last Friday.  Last visit I rx'd flagyl to help with the malodor, patient reports that the wound got too \"gummy\" with the build up, so she returned  to the dakins gauze.  We discussed metrogel as another option.  The leg wound is not improving.  She specifically does remember when she hit her leg on the wooden stool (my concern is that could this wound be a malignancy-although initial presentation is not consistent with malignancy and non healing could also be related to her chemo). Overall her breast is less indurated, there is less necrosis and less malodor than previous visits.     3-18-25 patient returns.  I did order metrogel at last visit she tried this and does feel like it is helping the malodor.  I also did order santyl for her left lower leg ulceration she was able to get this, her leg wound is slightly smaller, the coagulum across the wound bed, wipes out  with a dry gauze.  Dakins refilled. Patient states she recently had an mri of the breast. Her next chemo is next Thursday.     3-27-25 patient returns.  She saw dr. Lopez on the 20th and it was noted that she has had a good rsponse to chemo therapy however the mri showed continue involvement of the chest wall.  The case was discussed with dr. Ny and she has an appointment with him on 4-2 (Wednesday).  Dependent on what Dr. Ny feels regarding chest wall reconstruction her chemo may be switched to enhertu beginning in 3 weeks.  She had her chemo 2 days ago. Patient has continued to dress with metrogel and dakins. She states the malodor has been better. There is significantly less necrosis than previously and she does have some sensitive areas of tissue that is viable.  The leg wound is . She continues to utilize santyl on that.  No s/s of infection to either area. Patient will f/u in 2 weeks.    4-11-25 patient returns.  She saw dr ny last week and it was noted \"She is a candidate for surgical resection timing of which pending completion of neoadjuvant systemic therapy with Dr. Lopez.\"  She also met with dr. Billings and it was noted \"We discussed the option for left chest wall  reconstruction with local flaps, possible latissimus flap, possible Integra, possible skin graft. She will need to complete chemotherapy prior to surgical intervention. She might benefit from staging with integra followed by negative margins and eventual latissimus flap if vessels are intact.  We can start to look for surgery dates once we know the timeline of her oncologic treatment.\"   Her next appointment with dr holliday may 1.  She states that the plan is to reduce the tumor further with a new chemo.  She has received 1 dose and with the steroids and dextrose that was with that infusion the patient is having some difficulty managing her blood sugar.  She has continued to utilize santyl on her lower leg and metrogel and dakins to her breast. She states the malodor is improving, but still present.  No c/o pain today. She did get the refill on the dakins.    MEDICATIONS:     Current Outpatient Medications:     ondansetron (ZOFRAN) 8 MG tablet, Take 1 tablet (8 mg total) by mouth every 8 (eight) hours as needed for Nausea., Disp: 30 tablet, Rfl: 3    prochlorperazine (COMPAZINE) 10 mg tablet, Take 1 tablet (10 mg total) by mouth every 6 (six) hours as needed for Nausea., Disp: 30 tablet, Rfl: 3    sodium hypochlorite 0.125 % External Solution, Moisten gauze with dakins, place onto wound, cover with baby diaper three times a day, Disp: 1500 mL, Rfl: 3    HYDROcodone-acetaminophen 5-325 MG Oral Tab, Take 1-2 tablets by mouth every 4 (four) hours as needed for Pain., Disp: 30 tablet, Rfl: 0    gabapentin 600 MG Oral Tab, TAKE 1 TABLET BY MOUTH THREE TIMES DAILY; TAKE AN EXTRA 600MG AS NEEDED FOR SEVERE PAIN, Disp: 270 tablet, Rfl: 1    Insulin Glargine-yfgn 100 UNIT/ML Subcutaneous Solution Pen-injector, Inject 20 Units into the skin nightly., Disp: 24 mL, Rfl: 0    traMADol 50 MG Oral Tab, Take 1 tablet (50 mg total) by mouth every 6 (six) hours as needed., Disp: 20 tablet, Rfl: 0    fluticasone-salmeterol (WIXELA  INHUB) 100-50 MCG/ACT Inhalation Aerosol Powder, Breath Activated, Inhale 1 puff into the lungs 2 (two) times daily., Disp: 3 each, Rfl: 0    Insulin Lispro, 1 Unit Dial, 100 UNIT/ML Subcutaneous Solution Pen-injector, Inject 10 Units into the skin in the morning, at noon, and at bedtime., Disp: 3 mL, Rfl: 0    Budesonide-Formoterol Fumarate 160-4.5 MCG/ACT Inhalation Aerosol, Inhale 2 puffs into the lungs 2 (two) times daily. (Patient taking differently: Inhale 2 puffs into the lungs in the morning and 2 puffs before bedtime. Pt states she would like to go back to budesonide.), Disp: 3 each, Rfl: 1    albuterol (2.5 MG/3ML) 0.083% Inhalation Nebu Soln, Take 3 mL (2.5 mg total) by nebulization every 4 (four) hours as needed for Wheezing or Shortness of Breath., Disp: 90 mL, Rfl: 0    Insulin Pen Needle (BD PEN NEEDLE DANIELA U/F) 32G X 4 MM Does not apply Misc, Up to TID, Disp: 200 each, Rfl: 2    montelukast 10 MG Oral Tab, Take 1 tablet (10 mg total) by mouth nightly., Disp: 90 tablet, Rfl: 1  ALLERGIES:   [Allergies]    [Allergies]  Allergen Reactions    Fish ANAPHYLAXIS and HIVES     All fish, Wheezing, short of breath    Fish Oil ANAPHYLAXIS and HIVES     All fish, Wheezing, short of breath   All fish, Wheezing, short of breath    Levemir HIVES and ITCHING    Levonorgestrel-Ethinyl Estrad OTHER (SEE COMMENTS)     Other reaction(s): Runny nose, WHEEZING   Ragweed, pollen   Ragweed, pollen    Seasonal WHEEZING and Runny nose     Ragweed, pollen      REVIEW OF SYSTEMS:   This information was obtained from the patient/family and chart.    See HPI for pertinent positives, otherwise 10 pt ROS negative.    HISTORY:   Past medical, surgical, family and social history updated where appropriate.      PHYSICAL EXAM:     Vitals:    04/11/25 0700   BP: 145/80  Comment: 212 glucose   Pulse: 88   Resp: 14   Temp: 97.3 °F (36.3 °C)         Estimated body mass index is 25.6 kg/m² as calculated from the following:    Height as of  4/10/25: 60.71\".    Weight as of 4/10/25: 134 lb 3.2 oz (60.9 kg).   POC Glucose   Date Value Ref Range Status   04/11/2025 212 (H) 70 - 99 mg/dL Final   03/27/2025 122 (H) 70 - 99 mg/dL Final   03/18/2025 119 (H) 70 - 99 mg/dL Final       Vital signs reviewed.Appears stated age, well groomed.    Constitutional:  Bp wnl for patient. Pulse Regular and wnl for patient. Respirations easy and unlabored. Temperature wnl. Weight normal for height. Appearance neat and clean. Appears in no acute distress. Well nourished and well developed.    Lower extremity:  dp/pt palpable left. Left lower extremity free of varicosities, no edema. Capillary refill < 3 seconds. Digits are warm, overlapping. Toenails thickned, discolored, adequate length/hygeine. Skin is very dry. no hairgrowth on legs.    Musculoskeletal:  Gait and station stable   Integumentary:  refer to wound characteristics and images   Psychiatric:  Judgment and insight intact. Alert and oriented times 3. No evidence of depression, anxiety, or agitation. Calm, cooperative, and communicative.   DIAGNOSTICS:     Lab Results   Component Value Date    BUN 8 (L) 04/10/2025    CREATSERUM 0.75 04/10/2025    GFRCKDEPI 105.47 04/18/2018    ALB 3.8 04/10/2025    TP 6.4 04/10/2025    A1C 6.5 (H) 11/30/2024       WOUND ASSESSMENT:     Wound 01/08/25 #1 Breast Left (Active)   Date First Assessed/Time First Assessed: 01/08/25 1414    Wound Number (Wound Clinic Only): #1  Primary Wound Type: (c) Other (comment)  Location: Breast  Wound Location Orientation: Left      Assessments 1/8/2025  2:16 PM 4/11/2025  9:27 AM   Wound Image            Drainage Amount Large Copious   Drainage Description Serous;Yellow Serous;Yellow   Wound Length (cm) 17 cm 2.2 cm   Wound Width (cm) 30 cm 12.6 cm   Wound Surface Area (cm^2) 510 cm^2 21.77 cm^2   Wound Depth (cm) 1 cm 3.9 cm   Wound Volume (cm^3) 510 cm^3 56.605 cm^3   Wound Healing % -- 89   Margins Well-defined edges Well-defined edges    Non-staged Wound Description Full thickness Full thickness   Lora-wound Assessment Edema Edema;Induration;Moist   Wound Granulation Tissue Red;Pink;Spongy Pink;Red;Firm   Wound Bed Granulation (%) 10 % 40 %   Wound Bed Epithelium (%) 15 % 20 %   Wound Bed Slough (%) 75 % 60 %   Wound Odor Strong Mild   Shape -- clustered       Inactive Orders   Date Order Priority Status Authorizing Provider   03/18/25 1032 Debridement Other (comment) Left Breast Routine Completed Vira Alejandro, APRN   03/06/25 1536 Debridement Other (comment) Left Breast Routine Completed Jiongco, Vira, APRN   02/25/25 1157 Debridement Other (comment) Left Breast Routine Completed Jiongco, Vira, APRN   02/18/25 1009 Debridement Other (comment) Left Breast Routine Completed Jiongco, Vira, APRN   02/05/25 1427 Debridement Other (comment) Left Breast Routine Completed Romelongco, Vira, APRN   01/22/25 1503 Debridement Other (comment) Left Breast Routine Completed Vira Alejandro, APRN   01/08/25 1618 Debridement Other (comment) Left Breast Routine Completed Flavia Vira, APRN       Wound 02/05/25 #2 Left lower leg Leg Left (Active)   Date First Assessed/Time First Assessed: 02/05/25 1348    Wound Number (Wound Clinic Only): #2 Left lower leg  Primary Wound Type: Venous Ulcer  Location: Leg  Wound Location Orientation: Left      Assessments 2/5/2025  1:50 PM 4/11/2025  9:24 AM   Wound Image        Drainage Amount Scant Small   Drainage Description Yellow;Serous Serosanguineous   Wound Length (cm) 1.7 cm 1.1 cm   Wound Width (cm) 1.5 cm 0.8 cm   Wound Surface Area (cm^2) 2.55 cm^2 0.69 cm^2   Wound Depth (cm) 0.1 cm 0.1 cm   Wound Volume (cm^3) 0.255 cm^3 0.046 cm^3   Wound Healing % -- 82   Margins Well-defined edges Well-defined edges   Non-staged Wound Description Full thickness Full thickness   Lora-wound Assessment -- Hemosiderin staining;Dry   Wound Granulation Tissue -- Pink;Firm   Wound Bed Granulation (%) -- 5 %   Wound Bed Slough (%) 100  % 95 %   Wound Odor Mild None   Tunneling? No --   Undermining? No --   Sinus Tracts? No --       Inactive Orders   Date Order Priority Status Authorizing Provider   02/25/25 1156 Debridement Venous Ulcer Left Leg Routine Completed Vira Alejandro APRN   02/05/25 1424 Debridement Venous Ulcer Left Leg Routine Completed Vira Alejandro APRN        PROCEDURE:      This procedure was medically necessary to promote wound healing by removing nonviable tissue, decrease chance of infection, and return the wound to an acute state.  See rn px note.    ASSESSMENT AND PLAN:    1. Malignant neoplasm of overlapping sites of left breast in female, estrogen receptor negative (HCC)    2. 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  Questions and concerns addressed. Red flags to RTC or ED reviewed.  Patient (or parent) agrees to plan.      NOTE TO PATIENT: The 21st Century Cures Act makes clinical notes like these available to patients in the interest of transparency. Clinical notes are medical documents used by physicians and care providers to communicate with each other. These documents include medical language and terminology, abbreviations, and treatment information that may sound technical and at times possibly unfamiliar. In addition, at times, the verbiage may appear blunt or direct. These documents are one tool providers use to communicate relevant information and clinical opinions of the care providers in a way that allows common understanding of the clinical context.    I spent 30minutes with the patient. This time included:    preparing to see the patient (eg, review notes and recent diagnostics),  seeing the patient, obtaining and/or reviewing separately obtained history, performing a medically appropriate examination and/or evaluation, counseling and educating  the patient, documenting in the record. Bill selective debridement only  DISCHARGE:      Patient Instructions   Please return:  2 WEEKS   Patient discharge and wound care instructions  Swathi Arguelles  4/11/2025      You may shower and cleanse area with mild soap and water, Dakins, dab dry with gauze and apply your dressings as directed.     Changing your dressing:            two- three times a day    Wash your hands with soap and water.  Ensure that the old dressing is removed completely. Place it in a plastic bag and throw it in the trash.  Cleanse the wound with hypochlorous wound cleanser (ie. Anasept, vashe, pure and clean) .  It's ok to “scrub” your wound with the gauze, small amount of bleeding with cleansing is normal and ok.  Apply the following dressings:    Breast:    A.  Place gel on gauze and place in base of wound  B. Moisten gauze with DAKINS solution, place into wound, bordered zetuvit dressings    LEG:  Santyl>bordered gauze    Nutrition and blood sugar control:  Focus on the following:  Protein: Meats, beans, eggs, milk and yogurt particularly Greek yogurt), tofu, soy nuts, soy protein products (Follow the protein handout in your welcome folder)  Vitamin C: Citrus fruits and juices, strawberries, tomatoes, tomato juice, peppers, baked potatoes, spinach, broccoli, cauliflower, [place] sprouts, cabbage  Vitamin A: Dark green, leafy vegetables, orange or yellow vegetables, cantaloupe, fortified dairy products, liver, fortified cereals  Zinc: Fortified cereals, red meats, seafood  Consider supplementing with Maximino by [employer]. It can be purchased on amazon, [employer] website, or local pharmacy may be able to order it for you.  (These are essential branch chain amino acids that help with tissue building and wound healing).   When your blood sugar is consistently elevated greater than 180 your body can't heal or fight infection.      Concerns:  Signs of infection may include the following:  Increase in  redness  Red \"streaks\" from wound  Increase in swelling  Fever  Unusual odor  Change in the amount of wound drainage     Should you experience any significant changes in your wound(s) or have any questions regarding your home care instructions please contact the [facility] @ [phone] If after regular business hours, please call your family doctor or local emergency room. The treatment plan has been discussed at length between you and your provider. Follow all instructions carefully, it is very important. If you do not follow all instructions you are at risk of your wound not healing, infection, possible loss of limb and even loss of life.    Vira Alejandro FNP-C, CWCN-AP, CFCN, CSWS, WCC, DWC  4/11/2025

## 2025-04-24 NOTE — PROGRESS NOTES
CHIEF COMPLAINT:     Chief Complaint   Patient presents with    Wound Care     Arrives for follow-up. Denies new concerns. Border foam to leg.     HPI:   Information obtained from patient, daughter, chart  1-8-25 INITIAL:  Patient is a 58 yo female who was dx with right breast ca in 2013 and was lost to follow-up in 2017.  Patient next presented to the ED on 11/30/2024 with complaints of generalized weakness, poor appetite, and dyspnea with exertion. Laboratory studies showed anemia, hyponatremia, and hypochlorhydria. CTA chest was negative for pulmonary embolism or metastatic disease but did show a 22.3 x 14.6 x 16.0 cm mass arising from the left breast with areas of necrosis, possible extension into the intracostal musculature, and mildly enlarged left axillary lymph nodes. Visualization of left breast showed a large firm breast which an open area laterally with malodorous drainage. CT abdomen/pelvis w contrast on 12/01/2024 was negative for metastatic disease. Biopsy of the left breast was performed on 12/02/2024. 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 It was discussed with patient that the left breast wound will not heal and she is presenting today for assistance with management of the left breast wound.  There is significant malodor and necrotic, liquifing adipose tissue.  I was able to remove a large amount of it which will help with the drainage and malodor.  Patient has not been showering because she did not think she should get the wound wet.  The wound is not overly vascular.  We discussed utilizing dakins solution and a baby diaper.  Will order supplies for patient.     HPI PRIOR TO MARCH 2025 SEE INDIVIDUAL PROGRESS NOTES  3-6-25 patient returns she is following up every 7-10 days. She has been receiving chemo since last Friday.  Last visit I rx'd flagyl to help with the malodor, patient reports that the wound got too \"gummy\" with the build up, so she returned to the  dakins gauze.  We discussed metrogel as another option.  The leg wound is not improving.  She specifically does remember when she hit her leg on the wooden stool (my concern is that could this wound be a malignancy-although initial presentation is not consistent with malignancy and non healing could also be related to her chemo). Overall her breast is less indurated, there is less necrosis and less malodor than previous visits.     3-18-25 patient returns.  I did order metrogel at last visit she tried this and does feel like it is helping the malodor.  I also did order santyl for her left lower leg ulceration she was able to get this, her leg wound is slightly smaller, the coagulum across the wound bed, wipes out  with a dry gauze.  Dakins refilled. Patient states she recently had an mri of the breast. Her next chemo is next Thursday.     3-27-25 patient returns.  She saw dr. Lopez on the 20th and it was noted that she has had a good rsponse to chemo therapy however the mri showed continue involvement of the chest wall.  The case was discussed with dr. Ny and she has an appointment with him on 4-2 (Wednesday).  Dependent on what Dr. Ny feels regarding chest wall reconstruction her chemo may be switched to enhertu beginning in 3 weeks.  She had her chemo 2 days ago. Patient has continued to dress with metrogel and dakins. She states the malodor has been better. There is significantly less necrosis than previously and she does have some sensitive areas of tissue that is viable.  The leg wound is . She continues to utilize santyl on that.  No s/s of infection to either area. Patient will f/u in 2 weeks.    4-11-25 patient returns.  She saw dr ny last week and it was noted \"She is a candidate for surgical resection timing of which pending completion of neoadjuvant systemic therapy with Dr. Lopez.\"  She also met with dr. Billings and it was noted \"We discussed the option for left chest wall  reconstruction with local flaps, possible latissimus flap, possible Integra, possible skin graft. She will need to complete chemotherapy prior to surgical intervention. She might benefit from staging with integra followed by negative margins and eventual latissimus flap if vessels are intact.  We can start to look for surgery dates once we know the timeline of her oncologic treatment.\"   Her next appointment with dr holliday may 1.  She states that the plan is to reduce the tumor further with a new chemo.  She has received 1 dose and with the steroids and dextrose that was with that infusion the patient is having some difficulty managing her blood sugar.  She has continued to utilize santyl on her lower leg and metrogel and dakins to her breast. She states the malodor is improving, but still present.  No c/o pain today. She did get the refill on the dakins.    4-25-25 patient returns.   Patient has continued with santyl on the lower leg and metrogel and dakins to the breast. The lower leg wound is improving. She states she notes malodor if she forgets to use the metrogel, but overall improving. There is minimal necrosis today, no debridement needed. Patient feels things are improving. She will get her 2nd treatment of the new drug next week.  No acute s/s of infection or c/o pain  MEDICATIONS:     Current Outpatient Medications:     Insulin Pen Needle (BD PEN NEEDLE DANIELA U/F) 32G X 4 MM Does not apply Misc, Up to TID, Disp: 200 each, Rfl: 2    Insulin Glargine-yfgn 100 UNIT/ML Subcutaneous Solution Pen-injector, Inject 20 Units into the skin nightly., Disp: 15 mL, Rfl: 0    ondansetron (ZOFRAN) 8 MG tablet, Take 1 tablet (8 mg total) by mouth every 8 (eight) hours as needed for Nausea., Disp: 30 tablet, Rfl: 3    prochlorperazine (COMPAZINE) 10 mg tablet, Take 1 tablet (10 mg total) by mouth every 6 (six) hours as needed for Nausea., Disp: 30 tablet, Rfl: 3    sodium hypochlorite 0.125 % External Solution, Moisten  gauze with dakins, place onto wound, cover with baby diaper three times a day, Disp: 1500 mL, Rfl: 3    HYDROcodone-acetaminophen 5-325 MG Oral Tab, Take 1-2 tablets by mouth every 4 (four) hours as needed for Pain., Disp: 30 tablet, Rfl: 0    gabapentin 600 MG Oral Tab, TAKE 1 TABLET BY MOUTH THREE TIMES DAILY; TAKE AN EXTRA 600MG AS NEEDED FOR SEVERE PAIN, Disp: 270 tablet, Rfl: 1    traMADol 50 MG Oral Tab, Take 1 tablet (50 mg total) by mouth every 6 (six) hours as needed., Disp: 20 tablet, Rfl: 0    fluticasone-salmeterol (WIXELA INHUB) 100-50 MCG/ACT Inhalation Aerosol Powder, Breath Activated, Inhale 1 puff into the lungs 2 (two) times daily., Disp: 3 each, Rfl: 0    Insulin Lispro, 1 Unit Dial, 100 UNIT/ML Subcutaneous Solution Pen-injector, Inject 10 Units into the skin in the morning, at noon, and at bedtime., Disp: 3 mL, Rfl: 0    Budesonide-Formoterol Fumarate 160-4.5 MCG/ACT Inhalation Aerosol, Inhale 2 puffs into the lungs 2 (two) times daily. (Patient taking differently: Inhale 2 puffs into the lungs in the morning and 2 puffs before bedtime. Pt states she would like to go back to budesonide.), Disp: 3 each, Rfl: 1    albuterol (2.5 MG/3ML) 0.083% Inhalation Nebu Soln, Take 3 mL (2.5 mg total) by nebulization every 4 (four) hours as needed for Wheezing or Shortness of Breath., Disp: 90 mL, Rfl: 0    montelukast 10 MG Oral Tab, Take 1 tablet (10 mg total) by mouth nightly., Disp: 90 tablet, Rfl: 1  ALLERGIES:   Allergies[1]   REVIEW OF SYSTEMS:   This information was obtained from the patient/family and chart.    See HPI for pertinent positives, otherwise 10 pt ROS negative.    HISTORY:   Past medical, surgical, family and social history updated where appropriate.      PHYSICAL EXAM:     Vitals:    04/25/25 0909   BP: 147/74  Comment: blood sugar: 102   Pulse: 86   Resp: 16   Temp: 98.2 °F (36.8 °C)         Estimated body mass index is 25.6 kg/m² as calculated from the following:    Height as of  4/10/25: 60.71\".    Weight as of 4/10/25: 134 lb 3.2 oz (60.9 kg).   POC Glucose   Date Value Ref Range Status   04/11/2025 212 (H) 70 - 99 mg/dL Final   03/27/2025 122 (H) 70 - 99 mg/dL Final   03/18/2025 119 (H) 70 - 99 mg/dL Final       Vital signs reviewed.Appears stated age, well groomed.    Constitutional:  Bp wnl for patient. Pulse Regular and wnl for patient. Respirations easy and unlabored. Temperature wnl. Weight normal for height. Appearance neat and clean. Appears in no acute distress. Well nourished and well developed.    Lower extremity:  dp/pt palpable left. Left lower extremity free of varicosities, no edema. Capillary refill < 3 seconds. Digits are warm, overlapping. Toenails thickned, discolored, adequate length/hygeine. Skin is very dry. no hairgrowth on legs.    Musculoskeletal:  Gait and station stable   Integumentary:  refer to wound characteristics and images   Psychiatric:  Judgment and insight intact. Alert and oriented times 3. No evidence of depression, anxiety, or agitation. Calm, cooperative, and communicative.   DIAGNOSTICS:     Lab Results   Component Value Date    BUN 8 (L) 04/10/2025    CREATSERUM 0.75 04/10/2025    GFRCKDEPI 105.47 04/18/2018    ALB 3.8 04/10/2025    TP 6.4 04/10/2025    A1C 6.5 (H) 11/30/2024       WOUND ASSESSMENT:     Wound 01/08/25 #1 Breast Left (Active)   Date First Assessed/Time First Assessed: 01/08/25 1414    Wound Number (Wound Clinic Only): #1  Primary Wound Type: (c) Other (comment)  Location: Breast  Wound Location Orientation: Left      Assessments 1/8/2025  2:16 PM 4/25/2025  9:19 AM   Wound Image            Drainage Amount Large Large   Drainage Description Serous;Yellow Serosanguineous   Wound Length (cm) 17 cm 1.9 cm   Wound Width (cm) 30 cm 11.5 cm   Wound Surface Area (cm^2) 510 cm^2 17.16 cm^2   Wound Depth (cm) 1 cm 2.5 cm   Wound Volume (cm^3) 510 cm^3 28.602 cm^3   Wound Healing % -- 94   Margins Well-defined edges Well-defined edges    Non-staged Wound Description Full thickness Full thickness   Lora-wound Assessment Edema Induration;Edema   Wound Granulation Tissue Red;Pink;Spongy Red;Firm   Wound Bed Granulation (%) 10 % 50 %   Wound Bed Epithelium (%) 15 % 20 %   Wound Bed Slough (%) 75 % 30 %   Wound Odor Strong Mild   Shape -- clustered   Tunneling? -- No   Undermining? -- No   Sinus Tracts? -- No       Inactive Orders   Date Order Priority Status Authorizing Provider   04/11/25 1004 Debridement Other (comment) Left Breast Routine Completed Vira Alejandro, APRN   03/18/25 1032 Debridement Other (comment) Left Breast Routine Completed Romelongco, Vira, APRN   03/06/25 1536 Debridement Other (comment) Left Breast Routine Completed Romelongco, Vira, APRN   02/25/25 1157 Debridement Other (comment) Left Breast Routine Completed Jiongco, Vira, APRN   02/18/25 1009 Debridement Other (comment) Left Breast Routine Completed Romelongco, Vira, APRN   02/05/25 1427 Debridement Other (comment) Left Breast Routine Completed Romelongco, Vira, APRN   01/22/25 1503 Debridement Other (comment) Left Breast Routine Completed Romelongco, Vira, APRN   01/08/25 1618 Debridement Other (comment) Left Breast Routine Completed Vira Alejandro, APRN       Wound 02/05/25 #2 Left lower leg Leg Left (Active)   Date First Assessed/Time First Assessed: 02/05/25 1348    Wound Number (Wound Clinic Only): #2 Left lower leg  Primary Wound Type: Venous Ulcer  Location: Leg  Wound Location Orientation: Left      Assessments 2/5/2025  1:50 PM 4/25/2025  9:09 AM   Wound Image        Drainage Amount Scant Small   Drainage Description Yellow;Serous Serous;Yellow   Wound Length (cm) 1.7 cm 0.8 cm   Wound Width (cm) 1.5 cm 0.8 cm   Wound Surface Area (cm^2) 2.55 cm^2 0.5 cm^2   Wound Depth (cm) 0.1 cm 0.1 cm   Wound Volume (cm^3) 0.255 cm^3 0.034 cm^3   Wound Healing % -- 87   Margins Well-defined edges Well-defined edges   Non-staged Wound Description Full thickness Full thickness    Lora-wound Assessment -- Pink;Dry   Wound Granulation Tissue -- Pink;Firm   Wound Bed Granulation (%) -- 10 %   Wound Bed Slough (%) 100 % 90 %   Wound Odor Mild None   Tunneling? No No   Undermining? No No   Sinus Tracts? No No       Inactive Orders   Date Order Priority Status Authorizing Provider   02/25/25 1156 Debridement Venous Ulcer Left Leg Routine Completed Vira Alejandro APRN   02/05/25 1424 Debridement Venous Ulcer Left Leg Routine Completed Vira Alejandro APRN          ASSESSMENT AND PLAN:    1. Malignant neoplasm of overlapping sites of left breast in female, estrogen receptor negative (HCC)    2. 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  Questions and concerns addressed. Red flags to RTC or ED reviewed.  Patient (or parent) agrees to plan.      NOTE TO PATIENT: The 21st Century Cures Act makes clinical notes like these available to patients in the interest of transparency. Clinical notes are medical documents used by physicians and care providers to communicate with each other. These documents include medical language and terminology, abbreviations, and treatment information that may sound technical and at times possibly unfamiliar. In addition, at times, the verbiage may appear blunt or direct. These documents are one tool providers use to communicate relevant information and clinical opinions of the care providers in a way that allows common understanding of the clinical context.    I spent 30 minutes with the patient. This time included:    preparing to see the patient (eg, review notes and recent diagnostics),  seeing the patient, obtaining and/or reviewing separately obtained history, performing a medically appropriate examination and/or evaluation, counseling and educating the patient, documenting in the record.  DISCHARGE:       Patient Instructions   Please return:  2 WEEKS   Patient discharge and wound care instructions  Swathi Arguelles  4/25/2025        You may shower and cleanse area with mild soap and water, Dakins, dab dry with gauze and apply your dressings as directed.     Changing your dressing:            two- three times a day    Wash your hands with soap and water.  Ensure that the old dressing is removed completely. Place it in a plastic bag and throw it in the trash.  Cleanse the wound with hypochlorous wound cleanser (ie. Anasept, vashe, pure and clean) .  It's ok to “scrub” your wound with the gauze, small amount of bleeding with cleansing is normal and ok.  Apply the following dressings:    Breast:    A.  Place gel on gauze and place in base of wound  B. Moisten gauze with DAKINS solution, place into wound, bordered dressing    LEG:  Santyl>bordered gauze    Nutrition and blood sugar control:  Focus on the following:  Protein: Meats, beans, eggs, milk and yogurt particularly Greek yogurt), tofu, soy nuts, soy protein products (Follow the protein handout in your welcome folder)  Vitamin C: Citrus fruits and juices, strawberries, tomatoes, tomato juice, peppers, baked potatoes, spinach, broccoli, cauliflower, [place] sprouts, cabbage  Vitamin A: Dark green, leafy vegetables, orange or yellow vegetables, cantaloupe, fortified dairy products, liver, fortified cereals  Zinc: Fortified cereals, red meats, seafood  Consider supplementing with Maximino by [employer]. It can be purchased on amazon, [employer] website, or local pharmacy may be able to order it for you.  (These are essential branch chain amino acids that help with tissue building and wound healing).   When your blood sugar is consistently elevated greater than 180 your body can't heal or fight infection.      Concerns:  Signs of infection may include the following:  Increase in redness  Red \"streaks\" from wound  Increase in swelling  Fever  Unusual odor  Change in the  amount of wound drainage     Should you experience any significant changes in your wound(s) or have any questions regarding your home care instructions please contact the [facility] @ [phone] If after regular business hours, please call your family doctor or local emergency room. The treatment plan has been discussed at length between you and your provider. Follow all instructions carefully, it is very important. If you do not follow all instructions you are at risk of your wound not healing, infection, possible loss of limb and even loss of life.    Vira Alejandro FNP-C, CWCN-AP, CFCN, CSWS, WCC, DWC  4/25/2025          [1]   Allergies  Allergen Reactions    Fish ANAPHYLAXIS and HIVES     All fish, Wheezing, short of breath    Fish Oil ANAPHYLAXIS and HIVES     All fish, Wheezing, short of breath   All fish, Wheezing, short of breath    Levemir HIVES and ITCHING    Levonorgestrel-Ethinyl Estrad OTHER (SEE COMMENTS)     Other reaction(s): Runny nose, WHEEZING   Ragweed, pollen   Ragweed, pollen    Seasonal WHEEZING and Runny nose     Ragweed, pollen

## 2025-04-25 ENCOUNTER — OFFICE VISIT (OUTPATIENT)
Dept: WOUND CARE | Facility: HOSPITAL | Age: 60
End: 2025-04-25
Attending: NURSE PRACTITIONER
Payer: COMMERCIAL

## 2025-04-25 VITALS
DIASTOLIC BLOOD PRESSURE: 74 MMHG | HEART RATE: 86 BPM | TEMPERATURE: 98 F | RESPIRATION RATE: 16 BRPM | SYSTOLIC BLOOD PRESSURE: 147 MMHG

## 2025-04-25 DIAGNOSIS — L97.826 NON-PRESSURE CHRONIC ULCER OF OTHER PART OF LEFT LOWER LEG WITH BONE INVOLVEMENT WITHOUT EVIDENCE OF NECROSIS (HCC): ICD-10-CM

## 2025-04-25 DIAGNOSIS — Z17.1 MALIGNANT NEOPLASM OF OVERLAPPING SITES OF LEFT BREAST IN FEMALE, ESTROGEN RECEPTOR NEGATIVE (HCC): Primary | ICD-10-CM

## 2025-04-25 DIAGNOSIS — C50.812 MALIGNANT NEOPLASM OF OVERLAPPING SITES OF LEFT BREAST IN FEMALE, ESTROGEN RECEPTOR NEGATIVE (HCC): Primary | ICD-10-CM

## 2025-04-25 PROCEDURE — 99214 OFFICE O/P EST MOD 30 MIN: CPT | Performed by: NURSE PRACTITIONER

## 2025-04-25 NOTE — PROGRESS NOTES
.Weekly Wound Education Note    Teaching Provided To: Patient  Training Topics: Dressing, Cleasing and general instructions, Discharge instructions  Training Method: Explain/Verbal, Written  Training Response: Patient responds and understands        Notes: Wounds improving. Continue santyl, saline moist gauze, and bordered kishore to leg wound. Continue flagyl gel, dakins moist conforming gauze, bordered sacral foam x2, baby diaper and medipore tape to breast wound. extra dressings supplied, will order dressings this visit,

## 2025-04-25 NOTE — PATIENT INSTRUCTIONS
Please return:  2 WEEKS   Patient discharge and wound care instructions  Swathi Arguelles  4/25/2025        You may shower and cleanse area with mild soap and water, Dakins, dab dry with gauze and apply your dressings as directed.     Changing your dressing:            two- three times a day    Wash your hands with soap and water.  Ensure that the old dressing is removed completely. Place it in a plastic bag and throw it in the trash.  Cleanse the wound with hypochlorous wound cleanser (ie. Anasept, vashe, pure and clean) .  It's ok to “scrub” your wound with the gauze, small amount of bleeding with cleansing is normal and ok.  Apply the following dressings:    Breast:    A.  Place gel on gauze and place in base of wound  B. Moisten gauze with DAKINS solution, place into wound, bordered dressing    LEG:  Santyl>bordered gauze    Nutrition and blood sugar control:  Focus on the following:  Protein: Meats, beans, eggs, milk and yogurt particularly Greek yogurt), tofu, soy nuts, soy protein products (Follow the protein handout in your welcome folder)  Vitamin C: Citrus fruits and juices, strawberries, tomatoes, tomato juice, peppers, baked potatoes, spinach, broccoli, cauliflower, [place] sprouts, cabbage  Vitamin A: Dark green, leafy vegetables, orange or yellow vegetables, cantaloupe, fortified dairy products, liver, fortified cereals  Zinc: Fortified cereals, red meats, seafood  Consider supplementing with Maximino by [employer]. It can be purchased on amazon, [employer] website, or local pharmacy may be able to order it for you.  (These are essential branch chain amino acids that help with tissue building and wound healing).   When your blood sugar is consistently elevated greater than 180 your body can't heal or fight infection.      Concerns:  Signs of infection may include the following:  Increase in redness  Red \"streaks\" from wound  Increase in swelling  Fever  Unusual odor  Change in the amount of wound drainage      Should you experience any significant changes in your wound(s) or have any questions regarding your home care instructions please contact the wound center [facility] @ [phone] If after regular business hours, please call your family doctor or local emergency room. The treatment plan has been discussed at length between you and your provider. Follow all instructions carefully, it is very important. If you do not follow all instructions you are at risk of your wound not healing, infection, possible loss of limb and even loss of life.

## 2025-05-01 ENCOUNTER — OFFICE VISIT (OUTPATIENT)
Age: 60
End: 2025-05-01
Attending: SPECIALIST
Payer: COMMERCIAL

## 2025-05-01 VITALS
BODY MASS INDEX: 24.94 KG/M2 | HEART RATE: 85 BPM | WEIGHT: 130.38 LBS | TEMPERATURE: 98 F | RESPIRATION RATE: 16 BRPM | OXYGEN SATURATION: 96 % | SYSTOLIC BLOOD PRESSURE: 133 MMHG | DIASTOLIC BLOOD PRESSURE: 66 MMHG | HEIGHT: 60.71 IN

## 2025-05-01 DIAGNOSIS — Z51.11 ENCOUNTER FOR CHEMOTHERAPY MANAGEMENT: ICD-10-CM

## 2025-05-01 DIAGNOSIS — Z15.09 BRCA2 GENETIC CARRIER: ICD-10-CM

## 2025-05-01 DIAGNOSIS — Z15.01 BRCA2 GENETIC CARRIER: ICD-10-CM

## 2025-05-01 DIAGNOSIS — Z17.1 MALIGNANT NEOPLASM OF OVERLAPPING SITES OF LEFT BREAST IN FEMALE, ESTROGEN RECEPTOR NEGATIVE (HCC): Primary | ICD-10-CM

## 2025-05-01 DIAGNOSIS — E11.9 TYPE 2 DIABETES MELLITUS WITHOUT COMPLICATION, UNSPECIFIED WHETHER LONG TERM INSULIN USE (HCC): ICD-10-CM

## 2025-05-01 DIAGNOSIS — C77.3 SECONDARY MALIGNANT NEOPLASM OF AXILLARY LYMPH NODES (HCC): ICD-10-CM

## 2025-05-01 DIAGNOSIS — C50.812 MALIGNANT NEOPLASM OF OVERLAPPING SITES OF LEFT BREAST IN FEMALE, ESTROGEN RECEPTOR NEGATIVE (HCC): Primary | ICD-10-CM

## 2025-05-01 DIAGNOSIS — C50.811 CANCER OF OVERLAPPING SITES OF RIGHT BREAST (HCC): ICD-10-CM

## 2025-05-01 DIAGNOSIS — C77.3 SECONDARY MALIGNANT NEOPLASM OF AXILLARY LYMPH NODES (HCC): Primary | ICD-10-CM

## 2025-05-01 DIAGNOSIS — Z79.69 ON ANTINEOPLASTIC CHEMOTHERAPY: ICD-10-CM

## 2025-05-01 LAB
ALBUMIN SERPL-MCNC: 3.9 G/DL (ref 3.2–4.8)
ALBUMIN/GLOB SERPL: 1.4 {RATIO} (ref 1–2)
ALP LIVER SERPL-CCNC: 89 U/L (ref 50–130)
ALT SERPL-CCNC: <7 U/L (ref 10–49)
ANION GAP SERPL CALC-SCNC: 8 MMOL/L (ref 0–18)
AST SERPL-CCNC: 15 U/L (ref ?–34)
BASOPHILS # BLD AUTO: 0.02 X10(3) UL (ref 0–0.2)
BASOPHILS NFR BLD AUTO: 0.6 %
BILIRUB SERPL-MCNC: 0.2 MG/DL (ref 0.2–1.1)
BUN BLD-MCNC: 8 MG/DL (ref 9–23)
CALCIUM BLD-MCNC: 9.3 MG/DL (ref 8.7–10.6)
CHLORIDE SERPL-SCNC: 105 MMOL/L (ref 98–112)
CO2 SERPL-SCNC: 30 MMOL/L (ref 21–32)
CREAT BLD-MCNC: 0.78 MG/DL (ref 0.55–1.02)
EGFRCR SERPLBLD CKD-EPI 2021: 86 ML/MIN/1.73M2 (ref 60–?)
EOSINOPHIL # BLD AUTO: 0.07 X10(3) UL (ref 0–0.7)
EOSINOPHIL NFR BLD AUTO: 2.2 %
ERYTHROCYTE [DISTWIDTH] IN BLOOD BY AUTOMATED COUNT: 14.5 %
FASTING STATUS PATIENT QL REPORTED: NO
GLOBULIN PLAS-MCNC: 2.8 G/DL (ref 2–3.5)
GLUCOSE BLD-MCNC: 125 MG/DL (ref 70–99)
HCT VFR BLD AUTO: 27.3 % (ref 35–48)
HGB BLD-MCNC: 8.8 G/DL (ref 12–16)
IMM GRANULOCYTES # BLD AUTO: 0.01 X10(3) UL (ref 0–1)
IMM GRANULOCYTES NFR BLD: 0.3 %
LYMPHOCYTES # BLD AUTO: 1.05 X10(3) UL (ref 1–4)
LYMPHOCYTES NFR BLD AUTO: 32.3 %
MCH RBC QN AUTO: 31.8 PG (ref 26–34)
MCHC RBC AUTO-ENTMCNC: 32.2 G/DL (ref 31–37)
MCV RBC AUTO: 98.6 FL (ref 80–100)
MONOCYTES # BLD AUTO: 0.33 X10(3) UL (ref 0.1–1)
MONOCYTES NFR BLD AUTO: 10.2 %
NEUTROPHILS # BLD AUTO: 1.77 X10 (3) UL (ref 1.5–7.7)
NEUTROPHILS # BLD AUTO: 1.77 X10(3) UL (ref 1.5–7.7)
NEUTROPHILS NFR BLD AUTO: 54.4 %
OSMOLALITY SERPL CALC.SUM OF ELEC: 296 MOSM/KG (ref 275–295)
PLATELET # BLD AUTO: 155 10(3)UL (ref 150–450)
POTASSIUM SERPL-SCNC: 3.6 MMOL/L (ref 3.5–5.1)
PROT SERPL-MCNC: 6.7 G/DL (ref 5.7–8.2)
RBC # BLD AUTO: 2.77 X10(6)UL (ref 3.8–5.3)
SODIUM SERPL-SCNC: 143 MMOL/L (ref 136–145)
WBC # BLD AUTO: 3.3 X10(3) UL (ref 4–11)

## 2025-05-01 RX ORDER — PALONOSETRON 0.05 MG/ML
0.25 INJECTION, SOLUTION INTRAVENOUS ONCE
Status: CANCELLED
Start: 2025-05-01 | End: 2025-05-01

## 2025-05-01 RX ORDER — PALONOSETRON 0.05 MG/ML
0.25 INJECTION, SOLUTION INTRAVENOUS ONCE
Status: COMPLETED | OUTPATIENT
Start: 2025-05-01 | End: 2025-05-01

## 2025-05-01 RX ORDER — INSULIN GLARGINE-YFGN 100 [IU]/ML
20 INJECTION, SOLUTION SUBCUTANEOUS NIGHTLY
Qty: 15 ML | Refills: 0 | Status: SHIPPED | OUTPATIENT
Start: 2025-05-01

## 2025-05-01 RX ADMIN — PALONOSETRON 0.25 MG: 0.05 INJECTION, SOLUTION INTRAVENOUS at 10:16:00

## 2025-05-01 NOTE — PROGRESS NOTES
[facility] Progress Note       Patient Name: Swathi Arguelles   YOB: 1965  Medical Record Number: [id number]  Attending Physician: Carl Lopez M.D.     The 21st Century Cures Act makes medical notes like these available to patients in the interest of transparency. Please be advised this is a medical document. Medical documents are intended to carry relevant information, facts as evident, and the clinical opinion of the practitioner. The medical note is intended as peer to peer communication and may appear blunt or direct. It is written in medical language and may contain abbreviations or verbiage that are unfamiliar.      Date of Visit: 5/1/2025       Chief Complaint  Invasive ductal carcinoma, left breast - follow up and treatment.     Oncologic History  Swathi Arguelles is a 60 year old post-menopausal female admitted to the hospital on 09/10/2013 with symptomatic anemia.  On physical examination revealed a malodorous, draining ulcerating right breast mass.  Upon questioning, she admitted that she first noticed the mass in approximately 01/2013.  She states that her only mammogram was approximately at age 43 years.  Biopsy showed grade 3 infiltrating ductal carcinoma.  The tumor was estrogen receptor positive, progesterone receptor negative, and Her2/alberto negative.  CT scans of the chest, abdomen, pelvis showed multiple right axillary lymph nodes, two micronodules in the lungs of unknown significance, and a left sided large cystic adnexal mass.  At initial diagnosis CA 15-3 was 48.3 U/mL,  CA 27.29 was 91.7 U/mL, and  was 171.5 U/mL.  PET/CT showed abnormal FDG uptake in the ulcerating mass of the right breast/chest wall and in retropectoral and subclavicular lymph nodes.  There was no uptake in the cystic pelvic mass or in 3 subcentimeter pulmonary nodules.  Pelvic ultrasound showed a complex multiloculated left adnexal cyst with no separate identifiable  ovarian tissue.  Noted was irregular thickening of one of the cyst walls that was worrisome for a cystic ovarian neoplasm.    Patient was premenopausal at diagnosis    On 10/04/2013 she began dose dense doxorubicin and cyclophosphamide. Treatment was complicated by neutropenic fever, anemia requiring transfusion, dehydration, and prolonged thrombocytopenia. CT scans after cycle 4 showed a marked improvement in the breast/chest wall mass and axillary adenopathy. Tumor markers markedly improved. She then received weekly paclitaxel. Treatment was complicated by peripheral neuropathy and neutropenia.     During paclitaxel therapy, she consented to BRCA1/2 testing.  Results obtained on 02/11/2014 revealed the deleterious BRCA2 mutation [id number].    On 03/18/2014 she underwent right mastectomy with axillary node dissection.  Pathology showed 1.4 cm grade 3 invasive ductal carcinoma with 12/12 lymph nodes negative for malignancy.  She also underwent diagnostic laparoscopy which showed the cystic left ovarian mass but no evidence of metastatic disease.    On 05/13/2014 she underwent bilateral salpingo-oophorectomy, bilateral pelvic lymphadenectomy, limited periaortic lymphadenectomy, omentectomy, peritoneal washings, and appendectomy.  Pathology, reviewed at the [facility], showed serous borderline tumor, typical type.  All lymph nodes and pelvic washings were negative for malignancy.    In 07/2014 she began adjuvant right chest wall/axilla radiation.    She began endocrine therapy with anastrazole in mid 08/2014.     Patient was last seen on 02/03/2017. On that day, patient was advised to continue anastrozole, continue increased surveillance with alternating mammography and breast MRI, and return for follow up in 05/2017. On that day, she expressed an openness to prophylactic left mastectomy if she was a candidate for breast reconstruction. Patient failed to return for follow up in 05/2017 as recommended. She  also failed to return a call from the breast navigator to arrange evaluation by plastic surgery. She did have a left sided mammogram as previously ordered by me in 06/2017. Patient was on anastrozole at least through 02/2017 but it is not clear when she discontinued therapy.    Patient next presented to the ED on 11/30/2024 with complaints of generalized weakness, poor appetite, and dyspnea with exertion. Laboratory studies showed anemia, hyponatremia, and hypochlorhydria. CTA chest was negative for pulmonary embolism or metastatic disease but did show a 22.3 x 14.6 x 16.0 cm mass arising from the left breast with areas of necrosis, possible extension into the intracostal musculature, and mildly enlarged left axillary lymph nodes. Visualization of left breast showed a large firm breast which an open area laterally with malodorous drainage. CT abdomen/pelvis w contrast on 12/01/2024 was negative for metastatic disease.     Patient reports that she first noticed a \"rash\" 1.5 months prior to presentation. She stated that 3 months prior to presentation, she did not notice any changes in the left breast. Notably, patient's last breast imaging prior to hospitalization was in 06/2017.    Biopsy of the left breast was performed on 12/02/2024. 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 Immunohistochemical studies were as follows: estrogen receptor 0%, progesterone receptor 0%, Her2 3+, Ki67 25%.     PET/CT scan on 02/09/2024 showed abnormal SUV uptake in the left breast mass peripherally, multiple left axillary lymph nodes, subpectoral lymph nodes, a subcentimeter mediastinal lymph node.     On 12/12/2024 she began neoadjuvant systemic therapy with TCHP. Patient achieved a partial response but imaging studies after 4 cycles showed continued marked involvement of the chest wall. She was then seen by Dr. Adam for evaluation who felt that surgical resection with chest wall resection was  possible but would be helped with further decrease in disease. Therefore, after 5 cycles of TCHP, therapy was switched to Enhertu. She began cycle 1 of Enhertu on 04/10/2025; dose was reduced up front due to experienced myelosuppression during TCHP.     History of Present Illness  Patient returns for follow up and treatment. She stable peripheral neuropathy involving her feet. She states that her energy level has improved. She believes that her left breast mass has decreased further.     Performance Status   Karnofsky 90% - Normal, only minor signs/symptoms.      Past Medical History (historical data, reviewed by physician)   Invasive ductal carcinoma, left breast (as above); invasive ductal carcinoma, right breast (as above); diabetes mellitus, asthma.     Past Surgical History (historical data, reviewed by physician)   Right mastectomy and axillary node dissection; D&C.     Family History (historical data, reviewed by physician)   A three generation pedigree was obtained. Ms. Arguelles’s father  at age 76 from an unknown cancer. He had one brother and no sisters. Ms. Arguelles’s paternal grandmother  in her late 40s or early 50s from unknown causes. Ms. Arguelles’s mother is 76 years-old and has no history of cancer. Ms. Arguelles’s maternal grandmother  at age 59 from a myocardial infarction. Ms. Arguelles’s maternal grandmother’s sister had breast cancer in her 40s and had a mastectomy. There is no other known family history of cancer. Please see the pedigree for additional family history information.     Social History (historical data, reviewed by physician)   Denies tobacco, alcohol, illicit drug use.    Current Medications    Insulin Glargine-yfgn 100 UNIT/ML Subcutaneous Solution Pen-injector Inject 20 Units into the skin nightly. 15 mL 0    Insulin Pen Needle (BD PEN NEEDLE DANIELA U/F) 32G X 4 MM Does not apply Misc Up to  each 2    ondansetron (ZOFRAN) 8 MG tablet Take 1 tablet (8 mg total) by  mouth every 8 (eight) hours as needed for Nausea. 30 tablet 3    prochlorperazine (COMPAZINE) 10 mg tablet Take 1 tablet (10 mg total) by mouth every 6 (six) hours as needed for Nausea. 30 tablet 3    sodium hypochlorite 0.125 % External Solution Moisten gauze with dakins, place onto wound, cover with baby diaper three times a day 1500 mL 3    HYDROcodone-acetaminophen 5-325 MG Oral Tab Take 1-2 tablets by mouth every 4 (four) hours as needed for Pain. 30 tablet 0    gabapentin 600 MG Oral Tab TAKE 1 TABLET BY MOUTH THREE TIMES DAILY; TAKE AN EXTRA 600MG AS NEEDED FOR SEVERE PAIN 270 tablet 1    traMADol 50 MG Oral Tab Take 1 tablet (50 mg total) by mouth every 6 (six) hours as needed. 20 tablet 0    fluticasone-salmeterol (WIXELA INHUB) 100-50 MCG/ACT Inhalation Aerosol Powder, Breath Activated Inhale 1 puff into the lungs 2 (two) times daily. 3 each 0    Insulin Lispro, 1 Unit Dial, 100 UNIT/ML Subcutaneous Solution Pen-injector Inject 10 Units into the skin in the morning, at noon, and at bedtime. 3 mL 0    Budesonide-Formoterol Fumarate 160-4.5 MCG/ACT Inhalation Aerosol Inhale 2 puffs into the lungs 2 (two) times daily. (Patient taking differently: Inhale 2 puffs into the lungs in the morning and 2 puffs before bedtime. Pt states she would like to go back to budesonide.) 3 each 1    albuterol (2.5 MG/3ML) 0.083% Inhalation Nebu Soln Take 3 mL (2.5 mg total) by nebulization every 4 (four) hours as needed for Wheezing or Shortness of Breath. 90 mL 0    montelukast 10 MG Oral Tab Take 1 tablet (10 mg total) by mouth nightly. 90 tablet 1      Allergies   Ms. Arguelles is allergic to fish, fish oil, levemir, levonorgestrel-ethinyl estrad, and seasonal.    Vital Signs   Reviewed in electronic medical record.      Physical Examination  Constitutional  Well developed and well nourished; in no apparent distress.  Head   Normocephalic and atraumatic.  Eyes   Conjunctiva clear; sclera anicteric.  ENMT   External nose  normal; external ears normal.  Respiratory  Normal effort; no respiratory distress; clear to auscultation bilaterally.  Cardiovascular  Regular rate and rhythm; normal S1S2.  Abdomen  Not distended.   Neurologic  Motor and sensory grossly intact.  Psychiatric  Mood and affect appropriate.    Laboratory  Recent Results (from the past 72 hours)   CBC W Differential W Platelet    Collection Time: 05/01/25  9:14 AM   Result Value Ref Range    WBC 3.3 (L) 4.0 - 11.0 x10(3) uL    RBC 2.77 (L) 3.80 - 5.30 x10(6)uL    HGB 8.8 (L) 12.0 - 16.0 g/dL    HCT 27.3 (L) 35.0 - 48.0 %    .0 150.0 - 450.0 10(3)uL    MCV 98.6 80.0 - 100.0 fL    MCH 31.8 26.0 - 34.0 pg    MCHC 32.2 31.0 - 37.0 g/dL    RDW 14.5 %    Neutrophil Absolute Prelim 1.77 1.50 - 7.70 x10 (3) uL    Neutrophil Absolute 1.77 1.50 - 7.70 x10(3) uL    Lymphocyte Absolute 1.05 1.00 - 4.00 x10(3) uL    Monocyte Absolute 0.33 0.10 - 1.00 x10(3) uL    Eosinophil Absolute 0.07 0.00 - 0.70 x10(3) uL    Basophil Absolute 0.02 0.00 - 0.20 x10(3) uL    Immature Granulocyte Absolute 0.01 0.00 - 1.00 x10(3) uL    Neutrophil % 54.4 %    Lymphocyte % 32.3 %    Monocyte % 10.2 %    Eosinophil % 2.2 %    Basophil % 0.6 %    Immature Granulocyte % 0.3 %   Comp Metabolic Panel (14)    Collection Time: 05/01/25  9:14 AM   Result Value Ref Range    Glucose 125 (H) 70 - 99 mg/dL    Sodium 143 136 - 145 mmol/L    Potassium 3.6 3.5 - 5.1 mmol/L    Chloride 105 98 - 112 mmol/L    CO2 30.0 21.0 - 32.0 mmol/L    Anion Gap 8 0 - 18 mmol/L    BUN 8 (L) 9 - 23 mg/dL    Creatinine 0.78 0.55 - 1.02 mg/dL    Calcium, Total 9.3 8.7 - 10.6 mg/dL    Calculated Osmolality 296 (H) 275 - 295 mOsm/kg    eGFR-Cr 86 >=60 mL/min/1.73m2    AST 15 <34 U/L    ALT <7 (L) 10 - 49 U/L    Alkaline Phosphatase 89 50 - 130 U/L    Bilirubin, Total 0.2 0.2 - 1.1 mg/dL    Total Protein 6.7 5.7 - 8.2 g/dL    Albumin 3.9 3.2 - 4.8 g/dL    Globulin  2.8 2.0 - 3.5 g/dL    A/G Ratio 1.4 1.0 - 2.0    Patient Fasting  for CMP? No      Radiology  [address]  [place] [address] [phone]      Name: Swathi Arguelles  : 1965    Age/Sex: 60 year old Female     Exam Date: 2025  Procedure: CARD TTE STRAIN W/O DOPPLER ONCOLOGY(CPT=93307/0399T/13733)   ------------------------------------------------------------------------------------------------------------------------------------------------                  Carl Lopez MD  [address] [facility]  [facility] [address]                 Transthoracic Echocardiogram     Name:Swathi Arguelles     Date: 2025 :  1965 Ht:  (62in)  BP: 140 / 74  MRN:  [id number]    Age:  60years    Wt:  (130lb) HR: 78bpm  Loc:  [facility]       Gndr: F          BSA: 1.59m^2  Sonographer: Loraine Patton     Ordering:    Carl Lopez     Referring:   Carl Lopez     ---------------------------------------------------------------------------  -  History/Indications:  Monitoring cardiotoxic drug therapy, breast cancer.  Risk factors:  Hypertension. Diabetes mellitus. Dyslipidemia.     ---------------------------------------------------------------------------  -  Procedure information:  A transthoracic complete 2D study was performed.  Additional evaluation included M-mode, complete spectral Doppler, and   color  Doppler.  Patient status:  Outpatient.  Location:  Echo laboratory.  Comparison was made to the study of 2024.    This was a routine   study.  Transthoracic echocardiography for ventricular function evaluation and  assessment of valvular function. Image quality was adequate.     ECG rhythm:   Normal sinus  Study completion:  There were no   complications.     ---------------------------------------------------------------------------  -     Conclusions:     1. Left ventricle: The cavity size was normal. Wall thickness was normal.     Systolic function  was normal. The estimated ejection fraction was   60-65%,     by 3D assessment. No diagnostic evidence for regional wall motion     abnormalities. Left ventricular diastolic function parameters were     normal. The Global Longitudinal Strain (GLS) was -17.80%.  2. Right ventricle: The cavity size was normal. Systolic function was     normal. The RV free wall longitudinal strain was -21.40%.  3. Left atrium: The left atrial volume was normal.  4. Mitral valve: There was mild regurgitation.  5. Pulmonary arteries: Systolic pressure was within the normal range, in   the     range of 25mm Hg to 30mm Hg.  Impressions:  This study is compared with previous dated 12/1/2024: No   prior  left ventricle GLS for comparison.  *     ---------------------------------------------------------------------------  -  *  Findings:  Left ventricle:  The cavity size was normal. Wall thickness was normal.  Systolic function was normal. The estimated ejection fraction was 60-65%,   by  3D assessment. No diagnostic evidence for regional wall motion  abnormalities. The Global Longitudinal Strain (GLS) was -17.80%. Left  ventricular diastolic function parameters were normal.  Ventricular septum:   Thickness was normal.  Left atrium:  The left atrial volume was normal.  Right ventricle:  The cavity size was normal. Systolic function was   normal.  Right atrium:  The atrium was normal in size.  Mitral valve:  The valve was structurally normal. Leaflet separation was  normal.  Doppler:  Transvalvular velocity was within the normal range.   There  was no evidence for stenosis. There was mild regurgitation.  Aortic valve:  The valve was structurally normal. The valve was   trileaflet.  Cusp separation was normal.  Doppler:  Transvalvular velocity was within   the  normal range. There was no evidence for stenosis. There was no significant  regurgitation.  Tricuspid valve:  The valve is structurally normal. Leaflet separation was  normal.  Doppler:   Transvalvular velocity was within the normal range.   There  was no evidence for stenosis. There was mild regurgitation.  Pulmonic valve:   The valve is structurally normal. Cusp separation was  normal.  Doppler:  Transvalvular velocity was within the normal range.   There  was no evidence for stenosis. There was no significant regurgitation.  Pericardium:   There was no pericardial effusion.  Aorta:  Aortic root: The aortic root was normal.  Pulmonary arteries:  Systolic pressure was within the normal range, in the range of 25mm Hg to  30mm Hg.  Systemic veins:  Central venous respirophasic diameter changes are in the  normal range (>50%).  Inferior vena cava: The IVC was normal-sized.     ---------------------------------------------------------------------------  -  Measurements      Left ventricle        Value         Ref       12/01/2024   GLS, 2D               -17.80 %      --------- ----------   LV end-diastolic      164    ml     --------- ----------   volume, 3D   LV end-systolic       66     ml     --------- ----------   volume, 3D   LV ejection           60     %      54 - 74   ----------   fraction, 3D   LV end-diastolic  (H) 103    ml/m^2 <=71      ----------   volume/bsa, 3D   LV end-systolic   (H) 41     ml/m^2 <=28      ----------   volume/bsa, 3D   LV wall mass, 3D      139    g      --------- ----------   LV wall mass/bsa,     87     g/m^2  --------- ----------   3D   IVS thickness,        0.6    cm     0.6 - 0.9 1.2   ED, PLAX   LV ID, ED, PLAX       4.3    cm     3.8 - 5.2 3.5   LV ID, ES, PLAX       2.7    cm     2.2 - 3.5 2.5   LV PW thickness,      0.7    cm     0.6 - 0.9 1.2   ED, PLAX   IVS/LV PW ratio,      0.88          --------- 1.00   ED, PLAX   LV PW/LV ID           0.17          --------- 0.34   ratio, ED, PLAX   LV ejection           66     %      54 - 74   59   fraction   LV e', lateral        14.7   cm/sec >=10.0    7.3   LV E/e', lateral      9             <=13      11   LV e',  medial         9.4    cm/sec >=7.0     6.9   LV E/e', medial       14            --------- 11   LV e', average        12.0   cm/sec --------- 7.1   LV E/e', average      11            <=14      11      Aortic root           Value         Ref       12/01/2024   Aortic root ID,       2.7    cm     2.4 - 3.8 2.4   ED      Left atrium           Value         Ref       12/01/2024   LA ID, A-P, ES        2.7    cm     2.7 - 3.8 2.8   LA volume, S          25     ml     22 - 52   66   LA volume/bsa, S      16     ml/m^2 16 - 34   39   LA volume, ES,        22     ml     22 - 52   66   1-p A4C   LA volume, ES,        29     ml     22 - 52   ----------   1-p A2C   LA volume, ES,        27     ml     --------- ----------   A/L   LA volume/bsa,        17     ml/m^2 16 - 34   ----------   ES, A/L   LA/aortic root        1             --------- 1.17   ratio   LA volume, ES, 3D     39     ml     22 - 52   ----------   LA volume/bsa,        25     ml/m^2 --------- ----------   ES, 3D      Mitral valve          Value         Ref       12/01/2024   Mitral E-wave         1.3    m/sec  --------- 0.79   peak velocity   Mitral A-wave         1.25   m/sec  --------- 1.04   peak velocity   Mitral peak           7      mm Hg  --------- 2   gradient, D   Mitral E/A ratio,     1             --------- 0.8   peak      Pulmonary artery      Value         Ref       12/01/2024   PA pressure, S,       26     mm Hg  --------- ----------   DP      Tricuspid valve       Value         Ref       12/01/2024   Tricuspid regurg      2.41   m/sec  <=2.8     2.58   peak velocity   Tricuspid peak        23     mm Hg  --------- 27   RV-RA gradient      Systemic veins        Value         Ref       12/01/2024   Estimated CVP         3      mm Hg  --------- 3      Right ventricle       Value         Ref       12/01/2024   RV free wall LS,      -21.40 %      --------- ----------   2D   TAPSE, 2D             2.04   cm     >=1.70    1.98   RV pressure, S,       26      mm Hg  --------- 30   DP  Legend:  (L)  and  (H)  mike values outside specified reference range.     ---------------------------------------------------------------------------  -     Prepared and electronically signed by  Juanis Bell  04/02/2025 08:53          Signed by: Juanis Bell MD on 4/2/2025  8:53 AM     Impression and Plan  1.   Breast cancer, left sided: Patient is staged as T4N3M1 (one subcentimeter mediastinal lymph node). Her disease is estrogen and progesterone receptor negative and Her2 3+. She received 5 cycles of TCHP and then switched to single agent Enhertu when MRI showed continued significant chest wall involvement by tumor.           Proceed with C2D1 of Enhertu. Plan for imaging after cycle 3.     2.   Left breast wound: Patient is following with wound care.     Planned Follow up  Patient will return for follow up and treatment in 3 weeks.    Electronically Signed by:     Carl Lopez M.D.  System Medical Director, Oncology Services  [facility]

## 2025-05-01 NOTE — PROGRESS NOTES
Education Record    Learner:  Patient/ family member    Disease / Diagnosis: left breast cancer   OTV D1C2 Enhertu       Barriers / Limitations:  None   Comments:    Method:  Discussion   Comments:    General Topics:  Plan of care reviewed   Comments:    Outcome:  Shows understanding   Comments: pt reports that her blood sugar was high after last tx.   Otherwise, no side effects, denies N/ V constipation, diarrhea. Appetite is good.

## 2025-05-01 NOTE — PROGRESS NOTES
Pt here for C2D1 Drug(s) Enhertu.  Arrives Ambulating independently, accompanied by Spouse     Patient was evaluated today by MD.    Oral medications included in this regimen:  no    Patient confirms comprehension of cancer treatment schedule:  yes    Pregnancy screening:  Not applicable    Modifications in dose or schedule:  No    Medications appearance and physical integrity checked by RN: yes.    Chemotherapy IV pump settings verified by 2 RNs:  Yes.  Frequency of blood return and site check throughout administration: Prior to administration and At completion of therapy     Infusion/treatment outcome:  patient tolerated treatment without incident    Education Record    Learner:  Patient and Spouse  Barriers / Limitations:  None  Method:  Brief focused and Discussion  Education / instructions given:  plan of care, picc line care, side effects  Outcome:  Shows understanding    Discharged Home, Ambulating independently, accompanied by:Spouse    Patient/family verbalized understanding of future appointments: by printed AVS

## 2025-05-08 ENCOUNTER — NURSE ONLY (OUTPATIENT)
Age: 60
End: 2025-05-08
Attending: SPECIALIST
Payer: COMMERCIAL

## 2025-05-08 NOTE — PROGRESS NOTES
CHIEF COMPLAINT:     Chief Complaint   Patient presents with    Wound Care     Patient arrives for a wound care follow up appointment. Patient reports no pain. Patient is using Santyl and Flaygl to the respective wounds.      HPI:   Information obtained from patient, daughter, chart  1-8-25 INITIAL:  Patient is a 58 yo female who was dx with right breast ca in 2013 and was lost to follow-up in 2017.  Patient next presented to the ED on 11/30/2024 with complaints of generalized weakness, poor appetite, and dyspnea with exertion. Laboratory studies showed anemia, hyponatremia, and hypochlorhydria. CTA chest was negative for pulmonary embolism or metastatic disease but did show a 22.3 x 14.6 x 16.0 cm mass arising from the left breast with areas of necrosis, possible extension into the intracostal musculature, and mildly enlarged left axillary lymph nodes. Visualization of left breast showed a large firm breast which an open area laterally with malodorous drainage. CT abdomen/pelvis w contrast on 12/01/2024 was negative for metastatic disease. Biopsy of the left breast was performed on 12/02/2024. 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 It was discussed with patient that the left breast wound will not heal and she is presenting today for assistance with management of the left breast wound.  There is significant malodor and necrotic, liquifing adipose tissue.  I was able to remove a large amount of it which will help with the drainage and malodor.  Patient has not been showering because she did not think she should get the wound wet.  The wound is not overly vascular.  We discussed utilizing dakins solution and a baby diaper.  Will order supplies for patient.     HPI PRIOR TO may 2025 SEE INDIVIDUAL PROGRESS NOTES  4-11-25 patient returns.  She saw dr ny last week and it was noted \"She is a candidate for surgical resection timing of which pending completion of neoadjuvant systemic  therapy with Dr. Holliday.\"  She also met with dr. Billings and it was noted \"We discussed the option for left chest wall reconstruction with local flaps, possible latissimus flap, possible Integra, possible skin graft. She will need to complete chemotherapy prior to surgical intervention. She might benefit from staging with integra followed by negative margins and eventual latissimus flap if vessels are intact.  We can start to look for surgery dates once we know the timeline of her oncologic treatment.\"   Her next appointment with dr holliday may 1.  She states that the plan is to reduce the tumor further with a new chemo.  She has received 1 dose and with the steroids and dextrose that was with that infusion the patient is having some difficulty managing her blood sugar.  She has continued to utilize santyl on her lower leg and metrogel and dakins to her breast. She states the malodor is improving, but still present.  No c/o pain today. She did get the refill on the dakins.    4-25-25 patient returns.   Patient has continued with santyl on the lower leg and metrogel and dakins to the breast. The lower leg wound is improving. She states she notes malodor if she forgets to use the metrogel, but overall improving. There is minimal necrosis today, no debridement needed. Patient feels things are improving. She will get her 2nd treatment of the new drug next week.  No acute s/s of infection or c/o pain.    5-9-25 patient returns. She has continued with enhertu infusions and plan is for reimaging after cycle 3.  She has continued to use the flagyl gel and dakins, flagyl gel refilled. Patient states there is still malodor. Last visit there was significant decrease in necrosis, today again is less.  The wound on the leg is about the same, patient continues santyl. Will run epifix for her leg wound. No acute s/s of infection to either area.  MEDICATIONS:     Current Outpatient Medications:     metroNIDAZOLE 0.75 % External Gel,  Apply 1 g topically 2 (two) times daily., Disp: 180 g, Rfl: 2    Insulin Glargine-yfgn 100 UNIT/ML Subcutaneous Solution Pen-injector, Inject 20 Units into the skin nightly., Disp: 15 mL, Rfl: 0    Insulin Pen Needle (BD PEN NEEDLE DANIELA U/F) 32G X 4 MM Does not apply Misc, Up to TID, Disp: 200 each, Rfl: 2    ondansetron (ZOFRAN) 8 MG tablet, Take 1 tablet (8 mg total) by mouth every 8 (eight) hours as needed for Nausea., Disp: 30 tablet, Rfl: 3    prochlorperazine (COMPAZINE) 10 mg tablet, Take 1 tablet (10 mg total) by mouth every 6 (six) hours as needed for Nausea., Disp: 30 tablet, Rfl: 3    sodium hypochlorite 0.125 % External Solution, Moisten gauze with dakins, place onto wound, cover with baby diaper three times a day, Disp: 1500 mL, Rfl: 3    HYDROcodone-acetaminophen 5-325 MG Oral Tab, Take 1-2 tablets by mouth every 4 (four) hours as needed for Pain., Disp: 30 tablet, Rfl: 0    gabapentin 600 MG Oral Tab, TAKE 1 TABLET BY MOUTH THREE TIMES DAILY; TAKE AN EXTRA 600MG AS NEEDED FOR SEVERE PAIN, Disp: 270 tablet, Rfl: 1    traMADol 50 MG Oral Tab, Take 1 tablet (50 mg total) by mouth every 6 (six) hours as needed., Disp: 20 tablet, Rfl: 0    fluticasone-salmeterol (WIXELA INHUB) 100-50 MCG/ACT Inhalation Aerosol Powder, Breath Activated, Inhale 1 puff into the lungs 2 (two) times daily., Disp: 3 each, Rfl: 0    Insulin Lispro, 1 Unit Dial, 100 UNIT/ML Subcutaneous Solution Pen-injector, Inject 10 Units into the skin in the morning, at noon, and at bedtime., Disp: 3 mL, Rfl: 0    Budesonide-Formoterol Fumarate 160-4.5 MCG/ACT Inhalation Aerosol, Inhale 2 puffs into the lungs 2 (two) times daily. (Patient taking differently: Inhale 2 puffs into the lungs in the morning and 2 puffs before bedtime. Pt states she would like to go back to budesonide.), Disp: 3 each, Rfl: 1    albuterol (2.5 MG/3ML) 0.083% Inhalation Nebu Soln, Take 3 mL (2.5 mg total) by nebulization every 4 (four) hours as needed for Wheezing or  Shortness of Breath., Disp: 90 mL, Rfl: 0    montelukast 10 MG Oral Tab, Take 1 tablet (10 mg total) by mouth nightly., Disp: 90 tablet, Rfl: 1  ALLERGIES:   Allergies[1]   REVIEW OF SYSTEMS:   This information was obtained from the patient/family and chart.    See HPI for pertinent positives, otherwise 10 pt ROS negative.    HISTORY:   Past medical, surgical, family and social history updated where appropriate.      PHYSICAL EXAM:     Vitals:    05/09/25 0700   BP: 137/70  Comment: blood sugar 102 per patient   Pulse: 76   Resp: 15   Temp: 98.1 °F (36.7 °C)     Estimated body mass index is 24.88 kg/m² as calculated from the following:    Height as of 5/1/25: 60.71\".    Weight as of 5/1/25: 130 lb 6.4 oz (59.1 kg).   POC Glucose   Date Value Ref Range Status   04/11/2025 212 (H) 70 - 99 mg/dL Final   03/27/2025 122 (H) 70 - 99 mg/dL Final   03/18/2025 119 (H) 70 - 99 mg/dL Final       Vital signs reviewed.Appears stated age, well groomed.    Constitutional:  Bp wnl for patient. Pulse Regular and wnl for patient. Respirations easy and unlabored. Temperature wnl. Weight normal for height. Appearance neat and clean. Appears in no acute distress. Well nourished and well developed.    Lower extremity:   Left lower extremity free of varicosities, no edema. Capillary refill < 3 seconds. Digits are warm, overlapping. Toenails thickned, discolored, adequate length/hygeine. Skin is very dry. no hairgrowth on legs.    Musculoskeletal:  Gait and station stable   Integumentary:  refer to wound characteristics and images   Psychiatric:  Judgment and insight intact. Alert and oriented times 3. No evidence of depression, anxiety, or agitation. Calm, cooperative, and communicative.   DIAGNOSTICS:     Lab Results   Component Value Date    BUN 8 (L) 05/01/2025    CREATSERUM 0.78 05/01/2025    GFRCKDEPI 105.47 04/18/2018    ALB 3.9 05/01/2025    TP 6.7 05/01/2025    A1C 6.5 (H) 11/30/2024       WOUND ASSESSMENT:     Wound 01/08/25 #1  Breast Left (Active)   Date First Assessed/Time First Assessed: 01/08/25 1414    Wound Number (Wound Clinic Only): #1  Primary Wound Type: (c) Other (comment)  Location: Breast  Wound Location Orientation: Left      Assessments 1/8/2025  2:16 PM 5/9/2025  9:24 AM   Wound Image           Drainage Amount Large Large   Drainage Description Serous;Yellow Serous;Yellow   Wound Length (cm) 17 cm 1.3 cm   Wound Width (cm) 30 cm 10.5 cm   Wound Surface Area (cm^2) 510 cm^2 10.72 cm^2   Wound Depth (cm) 1 cm 2.5 cm   Wound Volume (cm^3) 510 cm^3 17.868 cm^3   Wound Healing % -- 96   Margins Well-defined edges Well-defined edges   Non-staged Wound Description Full thickness Full thickness   Lora-wound Assessment Edema Induration;Edema   Wound Granulation Tissue Red;Pink;Spongy Firm;Spongy;Red   Wound Bed Granulation (%) 10 % 70 %   Wound Bed Epithelium (%) 15 % 30 %   Wound Bed Slough (%) 75 % 10 %   Wound Odor Strong Mild   Shape -- clustered   Tunneling? -- No   Undermining? -- No   Sinus Tracts? -- No       Inactive Orders   Date Order Priority Status Authorizing Provider   04/11/25 1004 Debridement Other (comment) Left Breast Routine Completed Vira Alejandro, APRN   03/18/25 1032 Debridement Other (comment) Left Breast Routine Completed Vira Alejandro, APRN   03/06/25 1536 Debridement Other (comment) Left Breast Routine Completed Vira Alejandro, APRN   02/25/25 1157 Debridement Other (comment) Left Breast Routine Completed Vira Alejandro, APRN   02/18/25 1009 Debridement Other (comment) Left Breast Routine Completed RomelongVira pathak, APRN   02/05/25 1427 Debridement Other (comment) Left Breast Routine Completed Vira Alejandro, APRN   01/22/25 1503 Debridement Other (comment) Left Breast Routine Completed Vira Alejandro, APRN   01/08/25 1618 Debridement Other (comment) Left Breast Routine Completed Vira Alejandro, APRN       Wound 02/05/25 #2 Left lower leg Leg Left (Active)   Date First Assessed/Time First Assessed:  02/05/25 1348    Wound Number (Wound Clinic Only): #2 Left lower leg  Primary Wound Type: Venous Ulcer  Location: Leg  Wound Location Orientation: Left      Assessments 2/5/2025  1:50 PM 5/9/2025  9:20 AM   Wound Image        Drainage Amount Scant Small   Drainage Description Yellow;Serous Serous;Yellow   Wound Length (cm) 1.7 cm 0.9 cm   Wound Width (cm) 1.5 cm 0.7 cm   Wound Surface Area (cm^2) 2.55 cm^2 0.49 cm^2   Wound Depth (cm) 0.1 cm 0.2 cm   Wound Volume (cm^3) 0.255 cm^3 0.066 cm^3   Wound Healing % -- 74   Margins Well-defined edges Well-defined edges;Epibole (Rolled edges)   Non-staged Wound Description Full thickness Full thickness   Lora-wound Assessment -- Dry;Edema   Wound Granulation Tissue -- Pink;Pale Grey   Wound Bed Granulation (%) -- 100 %   Wound Bed Slough (%) 100 % --   Wound Odor Mild --   Tunneling? No --   Undermining? No --   Sinus Tracts? No No       Inactive Orders   Date Order Priority Status Authorizing Provider   02/25/25 1156 Debridement Venous Ulcer Left Leg Routine Completed Vira Alejandro APRN   02/05/25 1424 Debridement Venous Ulcer Left Leg Routine Completed Vira Alejandro, SHANA          ASSESSMENT AND PLAN:    1. Malignant neoplasm of overlapping sites of left breast in female, estrogen receptor negative (HCC)    2. 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  Questions and concerns addressed. Red flags to RTC or ED reviewed.  Patient (or parent) agrees to plan.      NOTE TO PATIENT: The 21st Century Cures Act makes clinical notes like these available to patients in the interest of transparency. Clinical notes are medical documents used by physicians and care providers to communicate with each other. These documents include medical language and terminology, abbreviations, and treatment information that may sound  technical and at times possibly unfamiliar. In addition, at times, the verbiage may appear blunt or direct. These documents are one tool providers use to communicate relevant information and clinical opinions of the care providers in a way that allows common understanding of the clinical context.    I spent 30 minutes with the patient. This time included:    preparing to see the patient (eg, review notes and recent diagnostics),  seeing the patient, obtaining and/or reviewing separately obtained history, performing a medically appropriate examination and/or evaluation, counseling and educating the patient, documenting in the record. Flagyl rx  DISCHARGE:      Patient Instructions   Please return:  May 30 &  June 20-call if you need a sooner appointment    Patient discharge and wound care instructions  Swathi Arguelles  5/9/2025       You may shower and cleanse area with mild soap and water, Dakins, dab dry with gauze and apply your dressings as directed.     Changing your dressing:            two- three times a day    Wash your hands with soap and water.  Ensure that the old dressing is removed completely. Place it in a plastic bag and throw it in the trash.  Cleanse the wound with hypochlorous wound cleanser (ie. Anasept, vashe, pure and clean) .  It's ok to “scrub” your wound with the gauze, small amount of bleeding with cleansing is normal and ok.  Apply the following dressings:    Breast:    A.  Place gel on gauze and place in base of wound  B. Moisten gauze with DAKINS solution, place into wound, bordered dressing    LEG:  Santyl>bordered gauze    Nutrition and blood sugar control:  Focus on the following:  Protein: Meats, beans, eggs, milk and yogurt particularly Greek yogurt), tofu, soy nuts, soy protein products (Follow the protein handout in your welcome folder)  Vitamin C: Citrus fruits and juices, strawberries, tomatoes, tomato juice, peppers, baked potatoes, spinach, broccoli, cauliflower, [place]  sprouts, cabbage  Vitamin A: Dark green, leafy vegetables, orange or yellow vegetables, cantaloupe, fortified dairy products, liver, fortified cereals  Zinc: Fortified cereals, red meats, seafood  Consider supplementing with Maximino by [employer]. It can be purchased on amazon, [employer] website, or local pharmacy may be able to order it for you.  (These are essential branch chain amino acids that help with tissue building and wound healing).   When your blood sugar is consistently elevated greater than 180 your body can't heal or fight infection.      Concerns:  Signs of infection may include the following:  Increase in redness  Red \"streaks\" from wound  Increase in swelling  Fever  Unusual odor  Change in the amount of wound drainage     Should you experience any significant changes in your wound(s) or have any questions regarding your home care instructions please contact the [facility] @ [phone] If after regular business hours, please call your family doctor or local emergency room. The treatment plan has been discussed at length between you and your provider. Follow all instructions carefully, it is very important. If you do not follow all instructions you are at risk of your wound not healing, infection, possible loss of limb and even loss of life.    Vira Alejandro FNP-C, CWCN-AP, CFCN, CSWS, WCC, DWC  5/9/2025          [1]   Allergies  Allergen Reactions    Fish ANAPHYLAXIS and HIVES     All fish, Wheezing, short of breath    Levemir HIVES and ITCHING    Levonorgestrel-Ethinyl Estrad OTHER (SEE COMMENTS)     Other reaction(s): Runny nose, WHEEZING   Ragweed, pollen   Ragweed, pollen    Seasonal WHEEZING and Runny nose     Ragweed, pollen

## 2025-05-08 NOTE — PROGRESS NOTES
Education Record    Learner:  Patient and Family Member    Disease / Diagnosis: dressing change    Barriers / Limitations:  None   Comments:    Method:  Brief focused   Comments:    General Topics:  Side effects and symptom management   Comments:    Outcome:  Shows understanding   Comments:

## 2025-05-09 ENCOUNTER — OFFICE VISIT (OUTPATIENT)
Dept: WOUND CARE | Facility: HOSPITAL | Age: 60
End: 2025-05-09
Attending: NURSE PRACTITIONER
Payer: COMMERCIAL

## 2025-05-09 VITALS
SYSTOLIC BLOOD PRESSURE: 137 MMHG | TEMPERATURE: 98 F | HEART RATE: 76 BPM | DIASTOLIC BLOOD PRESSURE: 70 MMHG | RESPIRATION RATE: 15 BRPM

## 2025-05-09 DIAGNOSIS — C50.812 MALIGNANT NEOPLASM OF OVERLAPPING SITES OF LEFT BREAST IN FEMALE, ESTROGEN RECEPTOR NEGATIVE (HCC): Primary | ICD-10-CM

## 2025-05-09 DIAGNOSIS — L97.826 NON-PRESSURE CHRONIC ULCER OF OTHER PART OF LEFT LOWER LEG WITH BONE INVOLVEMENT WITHOUT EVIDENCE OF NECROSIS (HCC): ICD-10-CM

## 2025-05-09 DIAGNOSIS — Z17.1 MALIGNANT NEOPLASM OF OVERLAPPING SITES OF LEFT BREAST IN FEMALE, ESTROGEN RECEPTOR NEGATIVE (HCC): Primary | ICD-10-CM

## 2025-05-09 PROCEDURE — 99214 OFFICE O/P EST MOD 30 MIN: CPT | Performed by: NURSE PRACTITIONER

## 2025-05-09 RX ORDER — METRONIDAZOLE TOPICAL 7.5 MG/G
1 GEL TOPICAL 2 TIMES DAILY
Qty: 180 G | Refills: 2 | Status: SHIPPED | OUTPATIENT
Start: 2025-05-09 | End: 2025-06-08

## 2025-05-09 NOTE — PATIENT INSTRUCTIONS
Please return:  May 30 &  June 20-call if you need a sooner appointment    Patient discharge and wound care instructions  Swathi Arguelles  5/9/2025       You may shower and cleanse area with mild soap and water, Dakins, dab dry with gauze and apply your dressings as directed.     Changing your dressing:            two- three times a day    Wash your hands with soap and water.  Ensure that the old dressing is removed completely. Place it in a plastic bag and throw it in the trash.  Cleanse the wound with hypochlorous wound cleanser (ie. Anasept, vashe, pure and clean) .  It's ok to “scrub” your wound with the gauze, small amount of bleeding with cleansing is normal and ok.  Apply the following dressings:    Breast:    A.  Place gel on gauze and place in base of wound  B. Moisten gauze with DAKINS solution, place into wound, bordered dressing    LEG:  Santyl>bordered gauze    Nutrition and blood sugar control:  Focus on the following:  Protein: Meats, beans, eggs, milk and yogurt particularly Greek yogurt), tofu, soy nuts, soy protein products (Follow the protein handout in your welcome folder)  Vitamin C: Citrus fruits and juices, strawberries, tomatoes, tomato juice, peppers, baked potatoes, spinach, broccoli, cauliflower, [place] sprouts, cabbage  Vitamin A: Dark green, leafy vegetables, orange or yellow vegetables, cantaloupe, fortified dairy products, liver, fortified cereals  Zinc: Fortified cereals, red meats, seafood  Consider supplementing with Maximino by [employer]. It can be purchased on amazon, [employer] website, or local pharmacy may be able to order it for you.  (These are essential branch chain amino acids that help with tissue building and wound healing).   When your blood sugar is consistently elevated greater than 180 your body can't heal or fight infection.      Concerns:  Signs of infection may include the following:  Increase in redness  Red \"streaks\" from wound  Increase in swelling  Fever  Unusual  odor  Change in the amount of wound drainage     Should you experience any significant changes in your wound(s) or have any questions regarding your home care instructions please contact the wound center [facility] @ [phone] If after regular business hours, please call your family doctor or local emergency room. The treatment plan has been discussed at length between you and your provider. Follow all instructions carefully, it is very important. If you do not follow all instructions you are at risk of your wound not healing, infection, possible loss of limb and even loss of life.

## 2025-05-09 NOTE — PROGRESS NOTES
.Weekly Wound Education Note    Teaching Provided To: Patient  Training Topics: Discharge instructions, Cleasing and general instructions, Dressing  Training Method: Explain/Verbal, Written  Training Response: Patient responds and understands            Continue Dakins wet to dry to breast wound, apply Flagyl ointment, cover with sacral border foam and baby diaper.  Continue Santyl ointment, border foam to leg wound daily.

## 2025-05-15 ENCOUNTER — NURSE ONLY (OUTPATIENT)
Age: 60
End: 2025-05-15
Attending: SPECIALIST
Payer: COMMERCIAL

## 2025-05-20 RX ORDER — PALONOSETRON 0.05 MG/ML
0.25 INJECTION, SOLUTION INTRAVENOUS ONCE
Status: CANCELLED
Start: 2025-05-22 | End: 2025-05-22

## 2025-05-22 ENCOUNTER — OFFICE VISIT (OUTPATIENT)
Age: 60
End: 2025-05-22
Attending: SPECIALIST
Payer: COMMERCIAL

## 2025-05-22 VITALS
TEMPERATURE: 98 F | HEART RATE: 79 BPM | BODY MASS INDEX: 24.79 KG/M2 | HEIGHT: 60.71 IN | SYSTOLIC BLOOD PRESSURE: 133 MMHG | DIASTOLIC BLOOD PRESSURE: 75 MMHG | WEIGHT: 129.63 LBS | RESPIRATION RATE: 16 BRPM | OXYGEN SATURATION: 95 %

## 2025-05-22 DIAGNOSIS — D64.9 NORMOCYTIC ANEMIA: ICD-10-CM

## 2025-05-22 DIAGNOSIS — C50.812 MALIGNANT NEOPLASM OF OVERLAPPING SITES OF LEFT BREAST IN FEMALE, ESTROGEN RECEPTOR NEGATIVE (HCC): Primary | ICD-10-CM

## 2025-05-22 DIAGNOSIS — Z17.1 MALIGNANT NEOPLASM OF OVERLAPPING SITES OF LEFT BREAST IN FEMALE, ESTROGEN RECEPTOR NEGATIVE (HCC): Primary | ICD-10-CM

## 2025-05-22 DIAGNOSIS — C77.3 SECONDARY MALIGNANT NEOPLASM OF AXILLARY LYMPH NODES (HCC): ICD-10-CM

## 2025-05-22 DIAGNOSIS — E87.6 HYPOKALEMIA: ICD-10-CM

## 2025-05-22 LAB
ALBUMIN SERPL-MCNC: 4.2 G/DL (ref 3.2–4.8)
ALBUMIN/GLOB SERPL: 1.5 {RATIO} (ref 1–2)
ALP LIVER SERPL-CCNC: 90 U/L (ref 50–130)
ALT SERPL-CCNC: <7 U/L (ref 10–49)
ANION GAP SERPL CALC-SCNC: 6 MMOL/L (ref 0–18)
AST SERPL-CCNC: 13 U/L (ref ?–34)
BASOPHILS # BLD AUTO: 0.01 X10(3) UL (ref 0–0.2)
BASOPHILS NFR BLD AUTO: 0.4 %
BILIRUB SERPL-MCNC: 0.3 MG/DL (ref 0.2–1.1)
BUN BLD-MCNC: 9 MG/DL (ref 9–23)
CALCIUM BLD-MCNC: 9.7 MG/DL (ref 8.7–10.6)
CHLORIDE SERPL-SCNC: 107 MMOL/L (ref 98–112)
CO2 SERPL-SCNC: 30 MMOL/L (ref 21–32)
CREAT BLD-MCNC: 0.82 MG/DL (ref 0.55–1.02)
EGFRCR SERPLBLD CKD-EPI 2021: 81 ML/MIN/1.73M2 (ref 60–?)
EOSINOPHIL # BLD AUTO: 0.04 X10(3) UL (ref 0–0.7)
EOSINOPHIL NFR BLD AUTO: 1.6 %
ERYTHROCYTE [DISTWIDTH] IN BLOOD BY AUTOMATED COUNT: 13.6 %
GLOBULIN PLAS-MCNC: 2.8 G/DL (ref 2–3.5)
GLUCOSE BLD-MCNC: 104 MG/DL (ref 70–99)
HCT VFR BLD AUTO: 28 % (ref 35–48)
HGB BLD-MCNC: 9.4 G/DL (ref 12–16)
IMM GRANULOCYTES # BLD AUTO: 0 X10(3) UL (ref 0–1)
IMM GRANULOCYTES NFR BLD: 0 %
LYMPHOCYTES # BLD AUTO: 1.12 X10(3) UL (ref 1–4)
LYMPHOCYTES NFR BLD AUTO: 44.8 %
MCH RBC QN AUTO: 31.6 PG (ref 26–34)
MCHC RBC AUTO-ENTMCNC: 33.6 G/DL (ref 31–37)
MCV RBC AUTO: 94.3 FL (ref 80–100)
MONOCYTES # BLD AUTO: 0.27 X10(3) UL (ref 0.1–1)
MONOCYTES NFR BLD AUTO: 10.8 %
NEUTROPHILS # BLD AUTO: 1.06 X10 (3) UL (ref 1.5–7.7)
NEUTROPHILS # BLD AUTO: 1.06 X10(3) UL (ref 1.5–7.7)
NEUTROPHILS NFR BLD AUTO: 42.4 %
OSMOLALITY SERPL CALC.SUM OF ELEC: 295 MOSM/KG (ref 275–295)
PLATELET # BLD AUTO: 190 10(3)UL (ref 150–450)
POTASSIUM SERPL-SCNC: 3.2 MMOL/L (ref 3.5–5.1)
PROT SERPL-MCNC: 7 G/DL (ref 5.7–8.2)
RBC # BLD AUTO: 2.97 X10(6)UL (ref 3.8–5.3)
SODIUM SERPL-SCNC: 143 MMOL/L (ref 136–145)
WBC # BLD AUTO: 2.5 X10(3) UL (ref 4–11)

## 2025-05-22 RX ORDER — POTASSIUM CHLORIDE 750 MG/1
20 TABLET, EXTENDED RELEASE ORAL ONCE
Status: CANCELLED
Start: 2025-05-22 | End: 2025-05-22

## 2025-05-22 RX ORDER — POTASSIUM CHLORIDE 750 MG/1
20 TABLET, EXTENDED RELEASE ORAL ONCE
Status: COMPLETED | OUTPATIENT
Start: 2025-05-22 | End: 2025-05-22

## 2025-05-22 RX ORDER — PALONOSETRON 0.05 MG/ML
0.25 INJECTION, SOLUTION INTRAVENOUS ONCE
Status: COMPLETED | OUTPATIENT
Start: 2025-05-22 | End: 2025-05-22

## 2025-05-22 RX ADMIN — POTASSIUM CHLORIDE 20 MEQ: 750 TABLET, EXTENDED RELEASE ORAL at 10:57:00

## 2025-05-22 RX ADMIN — PALONOSETRON 0.25 MG: 0.05 INJECTION, SOLUTION INTRAVENOUS at 11:02:00

## 2025-05-22 NOTE — PROGRESS NOTES
Chief Complaint   Patient presents with    Follow - Up    Chemotherapy     Pt is here for treatment - C3 D1 enhertu is expected. She reports she is doing well; eating and drinking without issue. Denies N,V,D,C. Energy ok, no pain; difficulty falling asleep; neuropathy in toes slightly worse affecting balance at times.     Education Record    Learner:  Patient and Spouse    Disease / Diagnosis: breast cancer    Barriers / Limitations:  None   Comments:    Method:  Brief focused   Comments:    General Topics:  Diet, Medication, Pain, Side effects and symptom management, and Plan of care reviewed   Comments:    Outcome:  Shows understanding   Comments:

## 2025-05-22 NOTE — PROGRESS NOTES
Cancer Center Progress Note    Patient Name: Swathi Arguelles   YOB: 1965   Medical Record Number: [id number]   CSN: [id number]   Date of visit: 5/22/2025  Attending Oncology Physician:  Carl Lopez    NOTE TO PATIENT:  The 21st Century Cures Act makes medical notes like these available to patients in the interest of transparency. Please be advised this is a medical document. Medical documents are intended to carry relevant information, facts as evident, and the clinical opinion of the practitioner. The medical note is intended as peer to peer communication and may appear blunt or direct. It is written in medical language and may contain abbreviations or verbiage that are unfamiliar.      Chief Complaint:  Follow up      Oncologic History:  Swathi Arguelles is a 59 year old post-menopausal female admitted to the hospital on 09/10/2013 with symptomatic anemia.  On physical examination revealed a malodorous, draining ulcerating right breast mass.  Upon questioning, she admitted that she first noticed the mass in approximately 01/2013.  She states that her only mammogram was approximately at age 43 years.  Biopsy showed grade 3 infiltrating ductal carcinoma.  The tumor was estrogen receptor positive, progesterone receptor negative, and Her2/alberto negative.  CT scans of the chest, abdomen, pelvis showed multiple right axillary lymph nodes, two micronodules in the lungs of unknown significance, and a left sided large cystic adnexal mass.  At initial diagnosis CA 15-3 was 48.3 U/mL,  CA 27.29 was 91.7 U/mL, and  was 171.5 U/mL.  PET/CT showed abnormal FDG uptake in the ulcerating mass of the right breast/chest wall and in retropectoral and subclavicular lymph nodes.  There was no uptake in the cystic pelvic mass or in 3 subcentimeter pulmonary nodules.  Pelvic ultrasound showed a complex multiloculated left adnexal cyst with no separate identifiable ovarian tissue.  Noted was irregular thickening of one  of the cyst walls that was worrisome for a cystic ovarian neoplasm.    Patient was premenopausal at diagnosis     On 10/04/2013 she began dose dense doxorubicin and cyclophosphamide. Treatment was complicated by neutropenic fever, anemia requiring transfusion, dehydration, and prolonged thrombocytopenia. CT scans after cycle 4 showed a marked improvement in the breast/chest wall mass and axillary adenopathy. Tumor markers markedly improved. She then received weekly paclitaxel. Treatment was complicated by peripheral neuropathy and neutropenia.      During paclitaxel therapy, she consented to BRCA1/2 testing.  Results obtained on 02/11/2014 revealed the deleterious BRCA2 mutation [id number].     On 03/18/2014 she underwent right mastectomy with axillary node dissection.  Pathology showed 1.4 cm grade 3 invasive ductal carcinoma with 12/12 lymph nodes negative for malignancy.  She also underwent diagnostic laparoscopy which showed the cystic left ovarian mass but no evidence of metastatic disease.     On 05/13/2014 she underwent bilateral salpingo-oophorectomy, bilateral pelvic lymphadenectomy, limited periaortic lymphadenectomy, omentectomy, peritoneal washings, and appendectomy.  Pathology, reviewed at the [facility], showed serous borderline tumor, typical type.  All lymph nodes and pelvic washings were negative for malignancy.     In 07/2014 she began adjuvant right chest wall/axilla radiation.     She began endocrine therapy with anastrazole in mid 08/2014.      Patient was last seen on 02/03/2017. On that day, patient was advised to continue anastrozole, continue increased surveillance with alternating mammography and breast MRI, and return for follow up in 05/2017. On that day, she expressed an openness to prophylactic left mastectomy if she was a candidate for breast reconstruction. Patient failed to return for follow up in 05/2017 as recommended. She also failed to return a call from the breast  navigator to arrange evaluation by plastic surgery. She did have a left sided mammogram as previously ordered by me in 06/2017. Patient was on anastrozole at least through 02/2017 but it is not clear when she discontinued therapy.     Patient next presented to the ED on 11/30/2024 with complaints of generalized weakness, poor appetite, and dyspnea with exertion. Laboratory studies showed anemia, hyponatremia, and hypochlorhydria. CTA chest was negative for pulmonary embolism or metastatic disease but did show a 22.3 x 14.6 x 16.0 cm mass arising from the left breast with areas of necrosis, possible extension into the intracostal musculature, and mildly enlarged left axillary lymph nodes. Visualization of left breast showed a large firm breast which an open area laterally with malodorous drainage. CT abdomen/pelvis w contrast on 12/01/2024 was negative for metastatic disease.      Patient reports that she first noticed a \"rash\" 1.5 months prior to presentation. She stated that 3 months prior to presentation, she did not notice any changes in the left breast. Notably, patient's last breast imaging prior to hospitalization was in 06/2017.     Biopsy of the left breast was performed on 12/02/2024. 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 Immunohistochemical studies were as follows: estrogen receptor 0%, progesterone receptor 0%, Her2 3+, Ki67 25%.      PET/CT scan on 02/09/2024 showed abnormal SUV uptake in the left breast mass peripherally, multiple left axillary lymph nodes, subpectoral lymph nodes, a subcentimeter mediastinal lymph node.      On 12/12/2024 she began neoadjuvant systemic therapy with TCHP. Patient achieved a partial response but imaging studies after 4 cycles showed continued marked involvement of the chest wall. She was then seen by Dr. Adam for evaluation who felt that surgical resection with chest wall resection was possible but would be helped with further  decrease in disease. Therefore, after 5 cycles of TCHP, therapy was switched to Enhertu. She began cycle 1 of Enhertu on 04/10/2025; dose was reduced up front due to experienced myelosuppression during TCHP.     Interval Events:   60 year old  patient of Dr. Lopez's  who presents for scheduled  evaluation and chemotherapy with Enhertu cycle 3.  She has some more notable neuropathy in the toes.  States she has some initial balance issues when sitting to standing but has not fallen.  She continues to note the breast mass is decreasing in size.  [facility] for the breast and the leg wound.  She denies pain. She has no SOB or cough.  She is eating and drinking well.  No N/V/D/C.  Energy is good.       Allergies:  Allergies[1]    Social History     Socioeconomic History    Marital status:     Number of children: 1   Occupational History    Occupation: RN , on leave of absence     Employer: [employer]   Tobacco Use    Smoking status: Never    Smokeless tobacco: Never   Vaping Use    Vaping status: Never Used   Substance and Sexual Activity    Alcohol use: No     Alcohol/week: 0.0 standard drinks of alcohol    Drug use: No   Other Topics Concern    Blood Transfusions Yes    Caffeine Concern No     Comment: occassional    Exercise Yes     Comment: bike riding, walking, active        Past Medical History:    Anemia    transfusions 9/13    Asthma (HCC)    Breast cancer (HCC)    right breast 9/13    Cancer (HCC)    breast    COVID-19    Tested 11/9/20    Heart murmur    History of blood transfusion    Murmur    benign murmur, unknown what type    Neuropathy    Personal history of antineoplastic chemotherapy    last chemo treatment    Pneumonia, organism unspecified(486)    Seasonal allergies    Type II or unspecified type diabetes mellitus without mention of complication, not stated as uncontrolled    Wheezing    related seasonal and fish allergies, takes inhalers        Past  Surgical History:   Procedure Laterality Date    Access port      left chest port a cath    Breast biopsy  09/12/2013    Procedure: BREAST BIOPSY;  Surgeon: Yasmany Goode MD;  Location:  MAIN OR    D & c      Mastectomy modified radical  03/18/2014    Procedure: BREAST MODIFIED RADICAL MASTECTOMY;  Surgeon: Jose Luis Adam MD;  Location:  MAIN OR    Needle biopsy right      Other surgical history  04/30/2015    mediport removal    Radiation right      Total abdominal hysterectomy  05/13/2014    Procedure: ABDOMINAL HYSTERECTOMY TOTAL BSO STAGING;  Surgeon: Jose Luis Adam MD;  Location:  MAIN OR          Vital Signs:  Height: 154.2 cm (5' 0.71\") (05/22 0926)  Weight: 58.8 kg (129 lb 9.6 oz) (05/22 0926)  BSA (Calculated - sq m): 1.57 sq meters (05/22 0926)  Pulse: 79 (05/22 0926)  BP: 133/75 (05/22 0926)  Temp: 97.9 °F (36.6 °C) (05/22 0926)  Do Not Use - Resp Rate: --  SpO2: 95 % (05/22 0926)        Medications:    Current Outpatient Medications:     metroNIDAZOLE 0.75 % External Gel, Apply 1 g topically 2 (two) times daily., Disp: 180 g, Rfl: 2    Insulin Glargine-yfgn 100 UNIT/ML Subcutaneous Solution Pen-injector, Inject 20 Units into the skin nightly., Disp: 15 mL, Rfl: 0    Insulin Pen Needle (BD PEN NEEDLE DANIELA U/F) 32G X 4 MM Does not apply Misc, Up to TID, Disp: 200 each, Rfl: 2    ondansetron (ZOFRAN) 8 MG tablet, Take 1 tablet (8 mg total) by mouth every 8 (eight) hours as needed for Nausea., Disp: 30 tablet, Rfl: 3    prochlorperazine (COMPAZINE) 10 mg tablet, Take 1 tablet (10 mg total) by mouth every 6 (six) hours as needed for Nausea., Disp: 30 tablet, Rfl: 3    sodium hypochlorite 0.125 % External Solution, Moisten gauze with dakins, place onto wound, cover with baby diaper three times a day, Disp: 1500 mL, Rfl: 3    HYDROcodone-acetaminophen 5-325 MG Oral Tab, Take 1-2 tablets by mouth every 4 (four) hours as needed for Pain., Disp: 30 tablet, Rfl: 0    gabapentin 600 MG Oral Tab, TAKE 1  TABLET BY MOUTH THREE TIMES DAILY; TAKE AN EXTRA 600MG AS NEEDED FOR SEVERE PAIN, Disp: 270 tablet, Rfl: 1    traMADol 50 MG Oral Tab, Take 1 tablet (50 mg total) by mouth every 6 (six) hours as needed., Disp: 20 tablet, Rfl: 0    fluticasone-salmeterol (WIXELA INHUB) 100-50 MCG/ACT Inhalation Aerosol Powder, Breath Activated, Inhale 1 puff into the lungs 2 (two) times daily., Disp: 3 each, Rfl: 0    Insulin Lispro, 1 Unit Dial, 100 UNIT/ML Subcutaneous Solution Pen-injector, Inject 10 Units into the skin in the morning, at noon, and at bedtime., Disp: 3 mL, Rfl: 0    Budesonide-Formoterol Fumarate 160-4.5 MCG/ACT Inhalation Aerosol, Inhale 2 puffs into the lungs 2 (two) times daily. (Patient taking differently: Inhale 2 puffs into the lungs in the morning and 2 puffs before bedtime. Pt states she would like to go back to budesonide.), Disp: 3 each, Rfl: 1    albuterol (2.5 MG/3ML) 0.083% Inhalation Nebu Soln, Take 3 mL (2.5 mg total) by nebulization every 4 (four) hours as needed for Wheezing or Shortness of Breath., Disp: 90 mL, Rfl: 0    montelukast 10 MG Oral Tab, Take 1 tablet (10 mg total) by mouth nightly., Disp: 90 tablet, Rfl: 1    Review of Systems:   As in HPI    Physical Examination:  General: Awake, alert, oriented x3, no acute distress.    HEENT:  Anicteric, conjunctivae and sclerae clear, no sinus tenderness, no oropharyngeal lesion/thrush, mucous membranes are moist.  Neck:  Supple, no tenderness, no masses or adenopathy  Lungs:  Clear to auscultation bilaterally  CV:  Regular rate and rhythm  Abdomen:  Non-distended, normoactive bowel sounds, soft,nontender  Extremities:  No edema, no tenderness  Neuro:  CN 2-12 intact    ECO    Labs:      Latest Reference Range & Units 25 09:21   Glucose 70 - 99 mg/dL 104 (H)   Sodium 136 - 145 mmol/L 143   Potassium 3.5 - 5.1 mmol/L 3.2 (L)   Chloride 98 - 112 mmol/L 107   Carbon Dioxide, Total 21.0 - 32.0 mmol/L 30.0   BUN 9 - 23 mg/dL 9   CREATININE  0.55 - 1.02 mg/dL 0.82   CALCIUM 8.7 - 10.6 mg/dL 9.7   EGFR >=60 mL/min/1.73m2 81   ANION GAP 0 - 18 mmol/L 6   CALCULATED OSMOLALITY 275 - 295 mOsm/kg 295   ALKALINE PHOSPHATASE 50 - 130 U/L 90   AST (SGOT) <34 U/L 13   ALT (SGPT) 10 - 49 U/L <7 (L)   Total Bilirubin 0.2 - 1.1 mg/dL 0.3   Globulin 2.0 - 3.5 g/dL 2.8   A/G Ratio 1.0 - 2.0  1.5   PROTEIN, TOTAL 5.7 - 8.2 g/dL 7.0   Albumin 3.2 - 4.8 g/dL 4.2   Patient Fasting for CMP?  Patient not present   WBC 4.0 - 11.0 x10(3) uL 2.5 (L)   Hemoglobin 12.0 - 16.0 g/dL 9.4 (L)   Hematocrit 35.0 - 48.0 % 28.0 (L)   Platelet Count 150.0 - 450.0 10(3)uL 190.0   RBC 3.80 - 5.30 x10(6)uL 2.97 (L)   MCH 26.0 - 34.0 pg 31.6   MCHC 31.0 - 37.0 g/dL 33.6   MCV 80.0 - 100.0 fL 94.3   RDW % 13.6   Prelim Neutrophil Abs 1.50 - 7.70 x10 (3) uL 1.06 (L)   Neutrophils Absolute 1.50 - 7.70 x10(3) uL 1.06 (L)   Lymphocytes Absolute 1.00 - 4.00 x10(3) uL 1.12   Monocytes Absolute 0.10 - 1.00 x10(3) uL 0.27   Eosinophils Absolute 0.00 - 0.70 x10(3) uL 0.04   Basophils Absolute 0.00 - 0.20 x10(3) uL 0.01   Immature Granulocyte Absolute 0.00 - 1.00 x10(3) uL 0.00   Neutrophils % % 42.4   Lymphocytes % % 44.8   Monocytes % % 10.8   Eosinophils % % 1.6   Basophils % % 0.4   Immature Granulocyte % % 0.0   (H): Data is abnormally high  (L): Data is abnormally low    Impression/Plan:  Breast cancer, left sided: Patient is staged as T4N3M1 (one subcentimeter mediastinal lymph node). Her disease is estrogen and progesterone receptor negative and Her2 3+.   -Proceed with C3 Enhertu   -Pt will have breast MRI after this cycle.  Pt had an appt for 6/9 but our Breast Navigator assisted with rescheduling to an earlier date so that Dr. Lopez can review her case in the multidisciplinary breast conference.    Left breast wound:  -managed by [facility] NP, recent note reviewed.  Ongoing improvement.    Hypokalemia:  Replete today 20meq KCL tablet    Anemia:  Stable.  Continue to monitor CBC prior  to each chemo.     Emotional Well Being:  I have assessed the patient's emotional well-being and any concerns about anxiety or depression.  No acute psychosocial intervention required at this time.    Patient will continue to receive longitudinal [facility] at the [facility] for the complex care required for the cancer diagnosis including the expected complications related to anticancer therapy.    Risk level:  High-breast cancer on cancer therapy, requiring intervention and close monitoring.    SHANA Tirado  [facility]  [facility] [place]         [1]   Allergies  Allergen Reactions    Fish ANAPHYLAXIS and HIVES     All fish, Wheezing, short of breath    Levemir HIVES and ITCHING    Levonorgestrel-Ethinyl Estrad OTHER (SEE COMMENTS)     Other reaction(s): Runny nose, WHEEZING   Ragweed, pollen   Ragweed, pollen    Seasonal WHEEZING and Runny nose     Ragweed, pollen

## 2025-05-22 NOTE — PROGRESS NOTES
Pt here for C3D1 Drug(s)Enhertu.  Arrives Ambulating independently, accompanied by Spouse     Patient was evaluated today by ARMANDO.    Oral medications included in this regimen:  no    Patient confirms comprehension of cancer treatment schedule:  yes    Pregnancy screening:  Denies possibility of pregnancy    Modifications in dose or schedule:  No    Medications appearance and physical integrity checked by RN: yes.    Chemotherapy IV pump settings verified by 2 RNs:  Yes.  Frequency of blood return and site check throughout administration: Prior to administration and At completion of therapy     Infusion/treatment outcome:  patient tolerated treatment without incident    Education Record    Learner:  Patient and Spouse  Barriers / Limitations:  None  Method:  Brief focused and Reinforcement  Education / instructions given:  Plan of care reviewed. Patient aware that her Breast MRI appointment has been moved at an earlier date.  Outcome:  Shows understanding    Discharged Home, Ambulating independently, accompanied by:Spouse    Patient/family verbalized understanding of future appointments: by printed AVS  K+ at3.2 - replaced with oral K+ 20 meq as ordered by DEXTER Jones.

## 2025-05-27 ENCOUNTER — HOSPITAL ENCOUNTER (OUTPATIENT)
Dept: MRI IMAGING | Facility: HOSPITAL | Age: 60
Discharge: HOME | End: 2025-05-27
Attending: SPECIALIST
Payer: COMMERCIAL

## 2025-05-27 DIAGNOSIS — C50.811 CANCER OF OVERLAPPING SITES OF RIGHT BREAST (HCC): ICD-10-CM

## 2025-05-27 DIAGNOSIS — Z15.09 BRCA2 GENETIC CARRIER: ICD-10-CM

## 2025-05-27 DIAGNOSIS — Z15.01 BRCA2 GENETIC CARRIER: ICD-10-CM

## 2025-05-27 PROCEDURE — A9575 INJ GADOTERATE MEGLUMI 0.1ML: HCPCS | Performed by: SPECIALIST

## 2025-05-27 PROCEDURE — 77049 MRI BREAST C-+ W/CAD BI: CPT | Performed by: SPECIALIST

## 2025-05-27 RX ORDER — GADOTERATE MEGLUMINE 376.9 MG/ML
15 INJECTION INTRAVENOUS
Status: COMPLETED | OUTPATIENT
Start: 2025-05-27 | End: 2025-05-27

## 2025-05-27 RX ADMIN — GADOTERATE MEGLUMINE 12 ML: 376.9 INJECTION INTRAVENOUS at 16:59:00

## 2025-05-29 ENCOUNTER — NURSE ONLY (OUTPATIENT)
Age: 60
End: 2025-05-29
Attending: SPECIALIST
Payer: COMMERCIAL

## 2025-05-29 NOTE — PROGRESS NOTES
CHIEF COMPLAINT:     Chief Complaint   Patient presents with    Wound Care     Patient arrives for a wound care follow up appointment. Patient is using Santyl on the leg wound, and is using a diaper with sacral foams to the breast wound.      HPI:   Information obtained from patient, daughter, chart  1-8-25 INITIAL:  Patient is a 60 yo female who was dx with right breast ca in 2013 and was lost to follow-up in 2017.  Patient next presented to the ED on 11/30/2024 with complaints of generalized weakness, poor appetite, and dyspnea with exertion. Laboratory studies showed anemia, hyponatremia, and hypochlorhydria. CTA chest was negative for pulmonary embolism or metastatic disease but did show a 22.3 x 14.6 x 16.0 cm mass arising from the left breast with areas of necrosis, possible extension into the intracostal musculature, and mildly enlarged left axillary lymph nodes. Visualization of left breast showed a large firm breast which an open area laterally with malodorous drainage. CT abdomen/pelvis w contrast on 12/01/2024 was negative for metastatic disease. Biopsy of the left breast was performed on 12/02/2024. 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 It was discussed with patient that the left breast wound will not heal and she is presenting today for assistance with management of the left breast wound.  There is significant malodor and necrotic, liquifing adipose tissue.  I was able to remove a large amount of it which will help with the drainage and malodor.  Patient has not been showering because she did not think she should get the wound wet.  The wound is not overly vascular.  We discussed utilizing dakins solution and a baby diaper.  Will order supplies for patient.     HPI PRIOR TO may 2025 SEE INDIVIDUAL PROGRESS NOTES  4-11-25 patient returns.  She saw dr ny last week and it was noted \"She is a candidate for surgical resection timing of which pending completion of  neoadjuvant systemic therapy with Dr. Holliday.\"  She also met with dr. Billings and it was noted \"We discussed the option for left chest wall reconstruction with local flaps, possible latissimus flap, possible Integra, possible skin graft. She will need to complete chemotherapy prior to surgical intervention. She might benefit from staging with integra followed by negative margins and eventual latissimus flap if vessels are intact.  We can start to look for surgery dates once we know the timeline of her oncologic treatment.\"   Her next appointment with dr holliday may 1.  She states that the plan is to reduce the tumor further with a new chemo.  She has received 1 dose and with the steroids and dextrose that was with that infusion the patient is having some difficulty managing her blood sugar.  She has continued to utilize santyl on her lower leg and metrogel and dakins to her breast. She states the malodor is improving, but still present.  No c/o pain today. She did get the refill on the dakins.    4-25-25 patient returns.   Patient has continued with santyl on the lower leg and metrogel and dakins to the breast. The lower leg wound is improving. She states she notes malodor if she forgets to use the metrogel, but overall improving. There is minimal necrosis today, no debridement needed. Patient feels things are improving. She will get her 2nd treatment of the new drug next week.  No acute s/s of infection or c/o pain.    5-9-25 patient returns. She has continued with enhertu infusions and plan is for reimaging after cycle 3.  She has continued to use the flagyl gel and dakins, flagyl gel refilled. Patient states there is still malodor. Last visit there was significant decrease in necrosis, today again is less.  The wound on the leg is about the same, patient continues santyl. Will run epifix for her leg wound. No acute s/s of infection to either area.    5-30-25 patient returns. She had her repeat mri of the breast  earlier this week that showed decrease in measurements of the tumor. She has not had another appointment with oncology since that mri. Suspect they may get surgery involved for updated surgical opinion. The lle wound is smaller and clean, we discussed to just keep moist with xeroform, she can stop the santyl.  The breast wound is smaller and less cavernous, she states she does still have some malodor. She continues to cleanse with the dakins and dress with the flagyl gel. No acute s/s of infection or c/o pain.  MEDICATIONS:     Current Outpatient Medications:     metroNIDAZOLE 0.75 % External Gel, Apply 1 g topically 2 (two) times daily., Disp: 180 g, Rfl: 2    Insulin Glargine-yfgn 100 UNIT/ML Subcutaneous Solution Pen-injector, Inject 20 Units into the skin nightly., Disp: 15 mL, Rfl: 0    Insulin Pen Needle (BD PEN NEEDLE DANIELA U/F) 32G X 4 MM Does not apply Misc, Up to TID, Disp: 200 each, Rfl: 2    ondansetron (ZOFRAN) 8 MG tablet, Take 1 tablet (8 mg total) by mouth every 8 (eight) hours as needed for Nausea., Disp: 30 tablet, Rfl: 3    prochlorperazine (COMPAZINE) 10 mg tablet, Take 1 tablet (10 mg total) by mouth every 6 (six) hours as needed for Nausea., Disp: 30 tablet, Rfl: 3    sodium hypochlorite 0.125 % External Solution, Moisten gauze with dakins, place onto wound, cover with baby diaper three times a day, Disp: 1500 mL, Rfl: 3    HYDROcodone-acetaminophen 5-325 MG Oral Tab, Take 1-2 tablets by mouth every 4 (four) hours as needed for Pain., Disp: 30 tablet, Rfl: 0    gabapentin 600 MG Oral Tab, TAKE 1 TABLET BY MOUTH THREE TIMES DAILY; TAKE AN EXTRA 600MG AS NEEDED FOR SEVERE PAIN, Disp: 270 tablet, Rfl: 1    traMADol 50 MG Oral Tab, Take 1 tablet (50 mg total) by mouth every 6 (six) hours as needed., Disp: 20 tablet, Rfl: 0    fluticasone-salmeterol (WIXELA INHUB) 100-50 MCG/ACT Inhalation Aerosol Powder, Breath Activated, Inhale 1 puff into the lungs 2 (two) times daily., Disp: 3 each, Rfl: 0     Insulin Lispro, 1 Unit Dial, 100 UNIT/ML Subcutaneous Solution Pen-injector, Inject 10 Units into the skin in the morning, at noon, and at bedtime., Disp: 3 mL, Rfl: 0    Budesonide-Formoterol Fumarate 160-4.5 MCG/ACT Inhalation Aerosol, Inhale 2 puffs into the lungs 2 (two) times daily. (Patient taking differently: Inhale 2 puffs into the lungs in the morning and 2 puffs before bedtime. Pt states she would like to go back to budesonide.), Disp: 3 each, Rfl: 1    albuterol (2.5 MG/3ML) 0.083% Inhalation Nebu Soln, Take 3 mL (2.5 mg total) by nebulization every 4 (four) hours as needed for Wheezing or Shortness of Breath., Disp: 90 mL, Rfl: 0    montelukast 10 MG Oral Tab, Take 1 tablet (10 mg total) by mouth nightly., Disp: 90 tablet, Rfl: 1  ALLERGIES:   Allergies[1]   REVIEW OF SYSTEMS:   This information was obtained from the patient/family and chart.    See HPI for pertinent positives, otherwise 10 pt ROS negative.    HISTORY:   Past medical, surgical, family and social history updated where appropriate.      PHYSICAL EXAM:     Vitals:    05/30/25 0929   BP: 140/67   Pulse: 83   Resp: 16   Temp: 98 °F (36.7 °C)       Estimated body mass index is 24.72 kg/m² as calculated from the following:    Height as of 5/22/25: 60.71\".    Weight as of 5/22/25: 129 lb 9.6 oz (58.8 kg).   POC Glucose   Date Value Ref Range Status   04/11/2025 212 (H) 70 - 99 mg/dL Final   03/27/2025 122 (H) 70 - 99 mg/dL Final   03/18/2025 119 (H) 70 - 99 mg/dL Final       Vital signs reviewed.Appears stated age, well groomed.    Constitutional:  Bp wnl for patient. Pulse Regular and wnl for patient. Respirations easy and unlabored. Temperature wnl. Weight normal for height. Appearance neat and clean. Appears in no acute distress. Well nourished and well developed.    Lower extremity:   Left lower extremity free of varicosities, no edema. Capillary refill < 3 seconds. Digits are warm, overlapping. Toenails thickned, discolored, adequate  length/hygeine. Skin is very dry. no hairgrowth on legs.    Musculoskeletal:  Gait and station stable   Integumentary:  refer to wound characteristics and images   Psychiatric:  Judgment and insight intact. Alert and oriented times 3. No evidence of depression, anxiety, or agitation. Calm, cooperative, and communicative.   DIAGNOSTICS:     Lab Results   Component Value Date    BUN 9 05/22/2025    CREATSERUM 0.82 05/22/2025    GFRCKDEPI 105.47 04/18/2018    ALB 4.2 05/22/2025    TP 7.0 05/22/2025    A1C 6.5 (H) 11/30/2024       WOUND ASSESSMENT:     Wound 01/08/25 #1 Breast Left (Active)   Date First Assessed/Time First Assessed: 01/08/25 1414    Wound Number (Wound Clinic Only): #1  Primary Wound Type: (c) Other (comment)  Location: Breast  Wound Location Orientation: Left      Assessments 1/8/2025  2:16 PM 5/30/2025  9:31 AM   Wound Image           Drainage Amount Large Large   Drainage Description Serous;Yellow Serosanguineous   Wound Length (cm) 17 cm 1 cm (no stretch)   Wound Width (cm) 30 cm 9.2 cm   Wound Surface Area (cm^2) 510 cm^2 7.23 cm^2   Wound Depth (cm) 1 cm 1.8 cm   Wound Volume (cm^3) 510 cm^3 8.671 cm^3   Wound Healing % -- 98   Margins Well-defined edges Well-defined edges   Non-staged Wound Description Full thickness Full thickness   Lora-wound Assessment Edema --   Wound Granulation Tissue Red;Pink;Spongy Red;Firm   Wound Bed Granulation (%) 10 % 70 %   Wound Bed Epithelium (%) 15 % 30 %   Wound Bed Slough (%) 75 % --   Wound Odor Strong Mild   Shape -- clustered   Tunneling? -- No   Undermining? -- No   Sinus Tracts? -- No       Inactive Orders   Date Order Priority Status Authorizing Provider   04/11/25 1004 Debridement Other (comment) Left Breast Routine Completed Vira Alejandro APRN   03/18/25 1032 Debridement Other (comment) Left Breast Routine Completed Vira Alejandro APRN   03/06/25 1536 Debridement Other (comment) Left Breast Routine Completed Vira Alejandro APRN   02/25/25 1157  Debridement Other (comment) Left Breast Routine Completed Vira Alejandro, SHANA   02/18/25 1009 Debridement Other (comment) Left Breast Routine Completed Vira Alejandro, APRN   02/05/25 1427 Debridement Other (comment) Left Breast Routine Completed Vira Alejandro, APRN   01/22/25 1503 Debridement Other (comment) Left Breast Routine Completed Vira Alejandro, APRN   01/08/25 1618 Debridement Other (comment) Left Breast Routine Completed Vira Alejandro, SHANA       Wound 02/05/25 #2 Left lower leg Leg Left (Active)   Date First Assessed/Time First Assessed: 02/05/25 1348    Wound Number (Wound Clinic Only): #2 Left lower leg  Primary Wound Type: Venous Ulcer  Location: Leg  Wound Location Orientation: Left      Assessments 2/5/2025  1:50 PM 5/30/2025  9:30 AM   Wound Image        Drainage Amount Scant Small   Drainage Description Yellow;Serous Serous;Yellow   Wound Length (cm) 1.7 cm 0.6 cm   Wound Width (cm) 1.5 cm 0.3 cm   Wound Surface Area (cm^2) 2.55 cm^2 0.14 cm^2   Wound Depth (cm) 0.1 cm 0.1 cm   Wound Volume (cm^3) 0.255 cm^3 0.009 cm^3   Wound Healing % -- 96   Margins Well-defined edges Well-defined edges;Epibole (Rolled edges)   Non-staged Wound Description Full thickness Full thickness   Lora-wound Assessment -- Moist   Wound Granulation Tissue -- Pink;Firm   Wound Bed Granulation (%) -- 100 %   Wound Bed Slough (%) 100 % --   Wound Odor Mild None   Tunneling? No No   Undermining? No No   Sinus Tracts? No No       Inactive Orders   Date Order Priority Status Authorizing Provider   02/25/25 1156 Debridement Venous Ulcer Left Leg Routine Completed Vira Alejandro APRN   02/05/25 1424 Debridement Venous Ulcer Left Leg Routine Completed Vira Alejandro, SHANA          ASSESSMENT AND PLAN:    1. Malignant neoplasm of overlapping sites of left breast in female, estrogen receptor negative (HCC)    2. 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  Questions and concerns addressed. Red flags to RTC or ED reviewed.  Patient (or parent) agrees to plan.      NOTE TO PATIENT: The 21st Century Cures Act makes clinical notes like these available to patients in the interest of transparency. Clinical notes are medical documents used by physicians and care providers to communicate with each other. These documents include medical language and terminology, abbreviations, and treatment information that may sound technical and at times possibly unfamiliar. In addition, at times, the verbiage may appear blunt or direct. These documents are one tool providers use to communicate relevant information and clinical opinions of the care providers in a way that allows common understanding of the clinical context.   I spent 29 minutes with the patient. This time included:    preparing to see the patient (eg, review notes and recent diagnostics),  seeing the patient, obtaining and/or reviewing separately obtained history, performing a medically appropriate examination and/or evaluation, counseling and educating the patient, documenting in the record.   DISCHARGE:      Patient Instructions   Please return:  3 weeks    Patient discharge and wound care instructions  Swathi Arguelles  5/30/2025         You may shower and cleanse area with mild soap and water, Dakins, dab dry with gauze and apply your dressings as directed.     Changing your dressing:            two- three times a day    Wash your hands with soap and water.  Ensure that the old dressing is removed completely. Place it in a plastic bag and throw it in the trash.  Cleanse the wound with hypochlorous wound cleanser (ie. Anasept, vashe, pure and clean) .  It's ok to “scrub” your wound with the gauze, small amount of bleeding with cleansing is normal and ok.  Apply the following dressings:    Breast:    A.  Place gel on gauze and  place in base of wound  B. Moisten gauze with DAKINS solution, place into wound, bordered dressing    LEG:  xeroforml>bordered gauze    Nutrition and blood sugar control:  Focus on the following:  Protein: Meats, beans, eggs, milk and yogurt particularly Greek yogurt), tofu, soy nuts, soy protein products (Follow the protein handout in your welcome folder)  Vitamin C: Citrus fruits and juices, strawberries, tomatoes, tomato juice, peppers, baked potatoes, spinach, broccoli, cauliflower, [place] sprouts, cabbage  Vitamin A: Dark green, leafy vegetables, orange or yellow vegetables, cantaloupe, fortified dairy products, liver, fortified cereals  Zinc: Fortified cereals, red meats, seafood  Consider supplementing with Maximino by [email or web address]. It can be purchased on amazon, [employer] website, or local pharmacy may be able to order it for you.  (These are essential branch chain amino acids that help with tissue building and wound healing).   When your blood sugar is consistently elevated greater than 180 your body can't heal or fight infection.      Concerns:  Signs of infection may include the following:  Increase in redness  Red \"streaks\" from wound  Increase in swelling  Fever  Unusual odor  Change in the amount of wound drainage     Should you experience any significant changes in your wound(s) or have any questions regarding your home care instructions please contact the wound center [facility] @ [phone] If after regular business hours, please call your family doctor or local emergency room. The treatment plan has been discussed at length between you and your provider. Follow all instructions carefully, it is very important. If you do not follow all instructions you are at risk of your wound not healing, infection, possible loss of limb and even loss of life.    Vira Alejandro FNP-C, CWCN-AP, CFCN, CSWS, WCC, DWC  5/30/2025          [1]   Allergies  Allergen Reactions    Fish ANAPHYLAXIS and HIVES     All  fish, Wheezing, short of breath    Levemir HIVES and ITCHING    Levonorgestrel-Ethinyl Estrad OTHER (SEE COMMENTS)     Other reaction(s): Runny nose, WHEEZING   Ragweed, pollen   Ragweed, pollen    Seasonal WHEEZING and Runny nose     Ragweed, pollen

## 2025-05-29 NOTE — PROGRESS NOTES
Education Record    Learner:  Patient    Disease / Diagnosis: Breast CA    Barriers / Limitations:  None   Comments:    Method:  Discussion   Comments:    General Topics:  Plan of care reviewed and Fall risk and prevention   Comments:    Outcome:  Shows understanding   Comments:    PICC dressing changed per protocol. Blood return noted from both lumens. Pt discharged ambulatory in stable condition.

## 2025-05-30 ENCOUNTER — OFFICE VISIT (OUTPATIENT)
Dept: WOUND CARE | Facility: HOSPITAL | Age: 60
End: 2025-05-30
Attending: NURSE PRACTITIONER
Payer: COMMERCIAL

## 2025-05-30 VITALS
HEART RATE: 83 BPM | TEMPERATURE: 98 F | DIASTOLIC BLOOD PRESSURE: 67 MMHG | SYSTOLIC BLOOD PRESSURE: 140 MMHG | RESPIRATION RATE: 16 BRPM

## 2025-05-30 DIAGNOSIS — L97.826 NON-PRESSURE CHRONIC ULCER OF OTHER PART OF LEFT LOWER LEG WITH BONE INVOLVEMENT WITHOUT EVIDENCE OF NECROSIS (HCC): ICD-10-CM

## 2025-05-30 DIAGNOSIS — Z17.1 MALIGNANT NEOPLASM OF OVERLAPPING SITES OF LEFT BREAST IN FEMALE, ESTROGEN RECEPTOR NEGATIVE (HCC): Primary | ICD-10-CM

## 2025-05-30 DIAGNOSIS — C50.812 MALIGNANT NEOPLASM OF OVERLAPPING SITES OF LEFT BREAST IN FEMALE, ESTROGEN RECEPTOR NEGATIVE (HCC): Primary | ICD-10-CM

## 2025-05-30 PROCEDURE — 99213 OFFICE O/P EST LOW 20 MIN: CPT | Performed by: NURSE PRACTITIONER

## 2025-05-30 NOTE — PATIENT INSTRUCTIONS
Please return:  3 weeks    Patient discharge and wound care instructions  Swathi Arguelles  5/30/2025         You may shower and cleanse area with mild soap and water, Dakins, dab dry with gauze and apply your dressings as directed.     Changing your dressing:            two- three times a day    Wash your hands with soap and water.  Ensure that the old dressing is removed completely. Place it in a plastic bag and throw it in the trash.  Cleanse the wound with hypochlorous wound cleanser (ie. Anasept, vashe, pure and clean) .  It's ok to “scrub” your wound with the gauze, small amount of bleeding with cleansing is normal and ok.  Apply the following dressings:    Breast:    A.  Place gel on gauze and place in base of wound  B. Moisten gauze with DAKINS solution, place into wound, bordered dressing    LEG:  xeroforml>bordered gauze    Nutrition and blood sugar control:  Focus on the following:  Protein: Meats, beans, eggs, milk and yogurt particularly Greek yogurt), tofu, soy nuts, soy protein products (Follow the protein handout in your welcome folder)  Vitamin C: Citrus fruits and juices, strawberries, tomatoes, tomato juice, peppers, baked potatoes, spinach, broccoli, cauliflower, [place] sprouts, cabbage  Vitamin A: Dark green, leafy vegetables, orange or yellow vegetables, cantaloupe, fortified dairy products, liver, fortified cereals  Zinc: Fortified cereals, red meats, seafood  Consider supplementing with Maximino by [employer]. It can be purchased on amazon, [employer] website, or local pharmacy may be able to order it for you.  (These are essential branch chain amino acids that help with tissue building and wound healing).   When your blood sugar is consistently elevated greater than 180 your body can't heal or fight infection.      Concerns:  Signs of infection may include the following:  Increase in redness  Red \"streaks\" from wound  Increase in swelling  Fever  Unusual odor  Change in the amount of wound  drainage     Should you experience any significant changes in your wound(s) or have any questions regarding your home care instructions please contact the wound center [facility] @ [phone] If after regular business hours, please call your family doctor or local emergency room. The treatment plan has been discussed at length between you and your provider. Follow all instructions carefully, it is very important. If you do not follow all instructions you are at risk of your wound not healing, infection, possible loss of limb and even loss of life.

## 2025-06-05 ENCOUNTER — NURSE ONLY (OUTPATIENT)
Age: 60
End: 2025-06-05
Attending: SPECIALIST
Payer: COMMERCIAL

## 2025-06-05 NOTE — PROGRESS NOTES
Education Record    Learner:  Patient    Disease / Diagnosis:Breast cancer    Barriers / Limitations:  None   Comments:    Method:  Brief focused   Comments:    General Topics:  Plan of care reviewed   Comments:    Outcome:  Shows understanding   Comments:

## 2025-06-11 RX ORDER — PALONOSETRON 0.05 MG/ML
0.25 INJECTION, SOLUTION INTRAVENOUS ONCE
Status: CANCELLED
Start: 2025-06-12 | End: 2025-06-12

## 2025-06-11 NOTE — PROGRESS NOTES
[facility]ematology Oncology Group Progress Note       Patient Name: Swathi Arguelles   YOB: 1965  Medical Record Number: [id number]  Attending Physician: Carl Lopez M.D.     The 21st Century Cures Act makes medical notes like these available to patients in the interest of transparency. Please be advised this is a medical document. Medical documents are intended to carry relevant information, facts as evident, and the clinical opinion of the practitioner. The medical note is intended as peer to peer communication and may appear blunt or direct. It is written in medical language and may contain abbreviations or verbiage that are unfamiliar.      Date of Visit: 6/12/2025       Chief Complaint  Invasive ductal carcinoma, left breast - follow up and treatment.     Oncologic History  Swathi Arguelles is a 60 year old post-menopausal female admitted to the hospital on 09/10/2013 with symptomatic anemia.  On physical examination revealed a malodorous, draining ulcerating right breast mass.  Upon questioning, she admitted that she first noticed the mass in approximately 01/2013.  She states that her only mammogram was approximately at age 43 years.  Biopsy showed grade 3 infiltrating ductal carcinoma.  The tumor was estrogen receptor positive, progesterone receptor negative, and Her2/alberto negative.  CT scans of the chest, abdomen, pelvis showed multiple right axillary lymph nodes, two micronodules in the lungs of unknown significance, and a left sided large cystic adnexal mass.  At initial diagnosis CA 15-3 was 48.3 U/mL,  CA 27.29 was 91.7 U/mL, and  was 171.5 U/mL.  PET/CT showed abnormal FDG uptake in the ulcerating mass of the right breast/chest wall and in retropectoral and subclavicular lymph nodes.  There was no uptake in the cystic pelvic mass or in 3 subcentimeter pulmonary nodules.  Pelvic ultrasound showed a complex multiloculated left adnexal cyst with no separate identifiable  ovarian tissue.  Noted was irregular thickening of one of the cyst walls that was worrisome for a cystic ovarian neoplasm.    Patient was premenopausal at diagnosis    On 10/04/2013 she began dose dense doxorubicin and cyclophosphamide. Treatment was complicated by neutropenic fever, anemia requiring transfusion, dehydration, and prolonged thrombocytopenia. CT scans after cycle 4 showed a marked improvement in the breast/chest wall mass and axillary adenopathy. Tumor markers markedly improved. She then received weekly paclitaxel. Treatment was complicated by peripheral neuropathy and neutropenia.     During paclitaxel therapy, she consented to BRCA1/2 testing.  Results obtained on 02/11/2014 revealed the deleterious BRCA2 mutation [id number].    On 03/18/2014 she underwent right mastectomy with axillary node dissection.  Pathology showed 1.4 cm grade 3 invasive ductal carcinoma with 12/12 lymph nodes negative for malignancy.  She also underwent diagnostic laparoscopy which showed the cystic left ovarian mass but no evidence of metastatic disease.    On 05/13/2014 she underwent bilateral salpingo-oophorectomy, bilateral pelvic lymphadenectomy, limited periaortic lymphadenectomy, omentectomy, peritoneal washings, and appendectomy.  Pathology, reviewed at the [facility], showed serous borderline tumor, typical type.  All lymph nodes and pelvic washings were negative for malignancy.    In 07/2014 she began adjuvant right chest wall/axilla radiation.    She began endocrine therapy with anastrazole in mid 08/2014.     Patient was last seen on 02/03/2017. On that day, patient was advised to continue anastrozole, continue increased surveillance with alternating mammography and breast MRI, and return for follow up in 05/2017. On that day, she expressed an openness to prophylactic left mastectomy if she was a candidate for breast reconstruction. Patient failed to return for follow up in 05/2017 as recommended. She  also failed to return a call from the breast navigator to arrange evaluation by plastic surgery. She did have a left sided mammogram as previously ordered by me in 06/2017. Patient was on anastrozole at least through 02/2017 but it is not clear when she discontinued therapy.    Patient next presented to the ED on 11/30/2024 with complaints of generalized weakness, poor appetite, and dyspnea with exertion. Laboratory studies showed anemia, hyponatremia, and hypochlorhydria. CTA chest was negative for pulmonary embolism or metastatic disease but did show a 22.3 x 14.6 x 16.0 cm mass arising from the left breast with areas of necrosis, possible extension into the intracostal musculature, and mildly enlarged left axillary lymph nodes. Visualization of left breast showed a large firm breast which an open area laterally with malodorous drainage. CT abdomen/pelvis w contrast on 12/01/2024 was negative for metastatic disease.     Patient reports that she first noticed a \"rash\" 1.5 months prior to presentation. She stated that 3 months prior to presentation, she did not notice any changes in the left breast. Notably, patient's last breast imaging prior to hospitalization was in 06/2017.    Biopsy of the left breast was performed on 12/02/2024. 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 Immunohistochemical studies were as follows: estrogen receptor 0%, progesterone receptor 0%, Her2 3+, Ki67 25%.     PET/CT scan on 02/09/2024 showed abnormal SUV uptake in the left breast mass peripherally, multiple left axillary lymph nodes, subpectoral lymph nodes, a subcentimeter mediastinal lymph node.     On 12/12/2024 she began neoadjuvant systemic therapy with TCHP. Patient achieved a partial response but imaging studies after 4 cycles showed continued marked involvement of the chest wall. She was then seen by Dr. Adam for evaluation who felt that surgical resection with chest wall resection was  possible but would be helped with further decrease in disease. Therefore, after 5 cycles of TCHP, therapy was switched to Enhertu.     She began cycle 1 of Enhertu on 04/10/2025; dose was reduced up front due to experienced myelosuppression during TCHP. MRI after 3 cycles showed marked improvement in her disease.     History of Present Illness  Patient returns for follow up and treatment. She stable peripheral neuropathy involving her feet. She states that her energy level has improved. She believes that her left breast mass has decreased further.     Performance Status   Karnofsky 90% - Normal, only minor signs/symptoms.      Past Medical History (historical data, reviewed by physician)   Invasive ductal carcinoma, left breast (as above); invasive ductal carcinoma, right breast (as above); diabetes mellitus, asthma.     Past Surgical History (historical data, reviewed by physician)   Right mastectomy and axillary node dissection; D&C.     Family History (historical data, reviewed by physician)   A three generation pedigree was obtained. Ms. Arguelles’s father  at age 76 from an unknown cancer. He had one brother and no sisters. Ms. Arguelles’s paternal grandmother  in her late 40s or early 50s from unknown causes. Ms. Arguelles’s mother is 76 years-old and has no history of cancer. Ms. Arguelles’s maternal grandmother  at age 59 from a myocardial infarction. Ms. Arguelles’s maternal grandmother’s sister had breast cancer in her 40s and had a mastectomy. There is no other known family history of cancer. Please see the pedigree for additional family history information.     Social History (historical data, reviewed by physician)   Denies tobacco, alcohol, illicit drug use.    Current Medications    Insulin Glargine-yfgn 100 UNIT/ML Subcutaneous Solution Pen-injector Inject 20 Units into the skin nightly. 15 mL 0    Insulin Pen Needle (BD PEN NEEDLE DANIELA U/F) 32G X 4 MM Does not apply Misc Up to  each 2     ondansetron (ZOFRAN) 8 MG tablet Take 1 tablet (8 mg total) by mouth every 8 (eight) hours as needed for Nausea. 30 tablet 3    prochlorperazine (COMPAZINE) 10 mg tablet Take 1 tablet (10 mg total) by mouth every 6 (six) hours as needed for Nausea. 30 tablet 3    sodium hypochlorite 0.125 % External Solution Moisten gauze with dakins, place onto wound, cover with baby diaper three times a day 1500 mL 3    HYDROcodone-acetaminophen 5-325 MG Oral Tab Take 1-2 tablets by mouth every 4 (four) hours as needed for Pain. 30 tablet 0    gabapentin 600 MG Oral Tab TAKE 1 TABLET BY MOUTH THREE TIMES DAILY; TAKE AN EXTRA 600MG AS NEEDED FOR SEVERE PAIN 270 tablet 1    traMADol 50 MG Oral Tab Take 1 tablet (50 mg total) by mouth every 6 (six) hours as needed. 20 tablet 0    fluticasone-salmeterol (WIXELA INHUB) 100-50 MCG/ACT Inhalation Aerosol Powder, Breath Activated Inhale 1 puff into the lungs 2 (two) times daily. 3 each 0    Insulin Lispro, 1 Unit Dial, 100 UNIT/ML Subcutaneous Solution Pen-injector Inject 10 Units into the skin in the morning, at noon, and at bedtime. 3 mL 0    Budesonide-Formoterol Fumarate 160-4.5 MCG/ACT Inhalation Aerosol Inhale 2 puffs into the lungs 2 (two) times daily. (Patient taking differently: Inhale 2 puffs into the lungs in the morning and 2 puffs before bedtime. Pt states she would like to go back to budesonide.) 3 each 1    albuterol (2.5 MG/3ML) 0.083% Inhalation Nebu Soln Take 3 mL (2.5 mg total) by nebulization every 4 (four) hours as needed for Wheezing or Shortness of Breath. 90 mL 0    montelukast 10 MG Oral Tab Take 1 tablet (10 mg total) by mouth nightly. 90 tablet 1      Allergies   Ms. Arguelles is allergic to fish, levemir, levonorgestrel-ethinyl estrad, and seasonal.    Vital Signs   Reviewed in electronic medical record.      Physical Examination  Constitutional  Well developed and well nourished; in no apparent distress.  Head   Normocephalic and atraumatic.  Eyes   Conjunctiva  clear; sclera anicteric.  ENMT   External nose normal; external ears normal.  Respiratory  Normal effort; no respiratory distress; clear to auscultation bilaterally.  Cardiovascular  Regular rate and rhythm; normal S1S2.  Abdomen  Not distended.   Neurologic  Motor and sensory grossly intact.  Psychiatric  Mood and affect appropriate.    Laboratory  Recent Results (from the past 72 hours)   CBC W Differential W Platelet    Collection Time: 06/12/25  9:42 AM   Result Value Ref Range    WBC 2.6 (L) 4.0 - 11.0 x10(3) uL    RBC 2.96 (L) 3.80 - 5.30 x10(6)uL    HGB 9.3 (L) 12.0 - 16.0 g/dL    HCT 28.4 (L) 35.0 - 48.0 %    .0 150.0 - 450.0 10(3)uL    MCV 95.9 80.0 - 100.0 fL    MCH 31.4 26.0 - 34.0 pg    MCHC 32.7 31.0 - 37.0 g/dL    RDW 14.2 %    Neutrophil Absolute Prelim 1.16 (L) 1.50 - 7.70 x10 (3) uL    Neutrophil Absolute 1.16 (L) 1.50 - 7.70 x10(3) uL    Lymphocyte Absolute 1.08 1.00 - 4.00 x10(3) uL    Monocyte Absolute 0.28 0.10 - 1.00 x10(3) uL    Eosinophil Absolute 0.08 0.00 - 0.70 x10(3) uL    Basophil Absolute 0.02 0.00 - 0.20 x10(3) uL    Immature Granulocyte Absolute 0.00 0.00 - 1.00 x10(3) uL    Neutrophil % 44.2 %    Lymphocyte % 41.2 %    Monocyte % 10.7 %    Eosinophil % 3.1 %    Basophil % 0.8 %    Immature Granulocyte % 0.0 %   Comp Metabolic Panel (14)    Collection Time: 06/12/25  9:42 AM   Result Value Ref Range    Glucose 92 70 - 99 mg/dL    Sodium 142 136 - 145 mmol/L    Potassium 3.7 3.5 - 5.1 mmol/L    Chloride 105 98 - 112 mmol/L    CO2 30.0 21.0 - 32.0 mmol/L    Anion Gap 7 0 - 18 mmol/L    BUN 10 9 - 23 mg/dL    Creatinine 0.91 0.55 - 1.02 mg/dL    Calcium, Total 9.2 8.7 - 10.6 mg/dL    Calculated Osmolality 293 275 - 295 mOsm/kg    eGFR-Cr 71 >=60 mL/min/1.73m2    AST 16 <34 U/L    ALT <7 (L) 10 - 49 U/L    Alkaline Phosphatase 98 50 - 130 U/L    Bilirubin, Total 0.2 0.2 - 1.1 mg/dL    Total Protein 6.9 5.7 - 8.2 g/dL    Albumin 4.0 3.2 - 4.8 g/dL    Globulin  2.9 2.0 - 3.5 g/dL     A/G Ratio 1.4 1.0 - 2.0    Patient Fasting for CMP? No      Radiology  [facility]  Department of Radiology  [address]  [place] [address] [phone]      Name: Stalin Arguellestte  Bhupinder Dr: Carl Lopez MD  : 1965    Age/Sex: 60 year old Female  Pt. Phone: [phone]  Exam Date: 2025  Procedure: MRI BREAST (W+WO) W/CAD BILAT (CPT=77049)   -----------------------------------------------------------------------------------------------------------------------------------------------                  Carl Lopez MD  [address] [place] [address]  [address] [facility] [address]      Interpretation   PROCEDURE:  MRI BREAST (W+WO) W/CAD BILAT (CPT=77049)     COMPARISON:  MR SUBHASH, MRI BREAST (W+WO) W/CAD BILAT (CPT=77049), 3/10/2025, 8:57 AM.     INDICATIONS:  Z15.09 BRCA2 genetic carrier Z15.01 BRCA2 genetic carrier C50.811 Cancer of overlapping sites of right breast (HCC)     60-year-old woman with left breast malignancy undergoing neoadjuvant therapy.     History of right breast malignancy status post mastectomy in 2014.      TECHNIQUE:  The breasts were imaged using dynamic intravenous gadolinium infusion, thin sections, and a dedicated breast coil.  Bilateral pre-contrast axial 2D/3D T2 weighted and 3D T1 weighted VIBRANT sequences were obtained with and without fat   suppression. Post contrast VIBRANT 3D T1 weighted images after IV gadolinium were obtained. 1.6 mm slice reconstruction of 3D data sets with additional maximum intensity projects, subtraction, graphic, and kinetic analysis were performed from source   data.     PATIENT STATED HISTORY:(As transcribed by Technologist)  Patient is being evaluated for Cancer of overlapping sites of right breast      CONTRAST USED:  12 mL of Dotarem     FINDINGS:    RIGHT BREAST:  The patient is status post right breast mastectomy.  There is no abnormal enhancement  involving the right chest wall.  There is no internal mammary or axillary adenopathy.     LEFT BREAST:  There is a necrotic enhancing mass involving a large portion of the left breast, with approximate measurements of 11.4 x 4.4 x 9.2 cm (15734/161, 74027/83).  This previously measured 13.7 x 9.4 x 13.2 cm respectively in the study from 3/10/2025.       There is left breast dermal enhancement.  The mass posteriorly abuts and appears to involve the pectoralis muscle.     In the left axilla the previously measured lymph node that previously measured 1.2 x 1.1 cm measures 1.1 x 0.8 cm.  This is the largest lymph node seen in the left axilla.  There is no internal mammary adenopathy.                     =====  CONCLUSION:    The left breast mass still involves a large portion of the left breast but has decreased in volume, with current measurements of 11.4 x 4.4 x 9.2 cm with previous measurements of 13.7 x 9.4 x 13.2 cm.  There is left breast skin enhancement and the mass   appears to involve the pectoralis muscle posteriorly.  Continued surgical/oncological management recommended.     No MRI findings suspicious for malignancy involving the right chest wall.     BI-RADS CATEGORY:  DIAGNOSTIC CATEGORY 6 - KNOWN BIOPSY-PROVEN MALIGNANCY: LEFT BREAST       RECOMMENDATIONS:    SURGICAL CONSULTATION.       PLEASE NOTE:  A NORMAL MRI DOES NOT EXCLUDE THE POSSIBILITY OF BREAST CANCER.  A CLINICALLY SUSPICIOUS PALPABLE LUMP SHOULD BE BIOPSIED.  CORRELATION WITH CLINICAL EXAM AND OTHER IMAGING STUDIES IS RECOMMENDED.        LOCATION:  [facility]        Dictated by (CST): Cherri Plascencia MD on 5/28/2025 at 10:01 AM       Finalized by (CST): Cherri Plascencia MD on 5/28/2025 at 10:26 AM        Impression and Plan  1.   Breast cancer, left sided: Patient is staged as T4N3M1 (one subcentimeter mediastinal lymph node). Her disease is estrogen and progesterone receptor negative and Her2 3+. She received 5 cycles of TCHP and then switched to  single agent Enhertu when MRI showed continued significant chest wall involvement by tumor.           There is significant improvement in disease over the course of Enhertu. Continue therapy without modification. Proceed with C4D1.    2.   Left breast wound: Patient is following with wound care.     Planned Follow up  Patient will return for follow up and treatment in 3 weeks.    Electronically Signed by:     Carl Lopez M.D.  System Medical Director, Oncology Services  [facility]

## 2025-06-12 ENCOUNTER — OFFICE VISIT (OUTPATIENT)
Age: 60
End: 2025-06-12
Attending: SPECIALIST
Payer: COMMERCIAL

## 2025-06-12 VITALS
HEART RATE: 76 BPM | DIASTOLIC BLOOD PRESSURE: 63 MMHG | TEMPERATURE: 97 F | WEIGHT: 131.81 LBS | RESPIRATION RATE: 16 BRPM | SYSTOLIC BLOOD PRESSURE: 142 MMHG | HEIGHT: 60.71 IN | OXYGEN SATURATION: 97 % | BODY MASS INDEX: 25.21 KG/M2

## 2025-06-12 DIAGNOSIS — Z51.11 ENCOUNTER FOR CHEMOTHERAPY MANAGEMENT: ICD-10-CM

## 2025-06-12 DIAGNOSIS — Z51.81 ENCOUNTER FOR MONITORING CARDIOTOXIC DRUG THERAPY: ICD-10-CM

## 2025-06-12 DIAGNOSIS — C50.812 MALIGNANT NEOPLASM OF OVERLAPPING SITES OF LEFT BREAST IN FEMALE, ESTROGEN RECEPTOR NEGATIVE (HCC): Primary | ICD-10-CM

## 2025-06-12 DIAGNOSIS — C77.3 SECONDARY MALIGNANT NEOPLASM OF AXILLARY LYMPH NODES (HCC): ICD-10-CM

## 2025-06-12 DIAGNOSIS — Z79.69 ON ANTINEOPLASTIC CHEMOTHERAPY: ICD-10-CM

## 2025-06-12 DIAGNOSIS — Z17.1 MALIGNANT NEOPLASM OF OVERLAPPING SITES OF LEFT BREAST IN FEMALE, ESTROGEN RECEPTOR NEGATIVE (HCC): Primary | ICD-10-CM

## 2025-06-12 DIAGNOSIS — Z79.899 ENCOUNTER FOR MONITORING CARDIOTOXIC DRUG THERAPY: ICD-10-CM

## 2025-06-12 LAB
ALBUMIN SERPL-MCNC: 4 G/DL (ref 3.2–4.8)
ALBUMIN/GLOB SERPL: 1.4 {RATIO} (ref 1–2)
ALP LIVER SERPL-CCNC: 98 U/L (ref 50–130)
ALT SERPL-CCNC: <7 U/L (ref 10–49)
ANION GAP SERPL CALC-SCNC: 7 MMOL/L (ref 0–18)
AST SERPL-CCNC: 16 U/L (ref ?–34)
BASOPHILS # BLD AUTO: 0.02 X10(3) UL (ref 0–0.2)
BASOPHILS NFR BLD AUTO: 0.8 %
BILIRUB SERPL-MCNC: 0.2 MG/DL (ref 0.2–1.1)
BUN BLD-MCNC: 10 MG/DL (ref 9–23)
CALCIUM BLD-MCNC: 9.2 MG/DL (ref 8.7–10.6)
CHLORIDE SERPL-SCNC: 105 MMOL/L (ref 98–112)
CO2 SERPL-SCNC: 30 MMOL/L (ref 21–32)
CREAT BLD-MCNC: 0.91 MG/DL (ref 0.55–1.02)
EGFRCR SERPLBLD CKD-EPI 2021: 71 ML/MIN/1.73M2 (ref 60–?)
EOSINOPHIL # BLD AUTO: 0.08 X10(3) UL (ref 0–0.7)
EOSINOPHIL NFR BLD AUTO: 3.1 %
ERYTHROCYTE [DISTWIDTH] IN BLOOD BY AUTOMATED COUNT: 14.2 %
FASTING STATUS PATIENT QL REPORTED: NO
GLOBULIN PLAS-MCNC: 2.9 G/DL (ref 2–3.5)
GLUCOSE BLD-MCNC: 92 MG/DL (ref 70–99)
HCT VFR BLD AUTO: 28.4 % (ref 35–48)
HGB BLD-MCNC: 9.3 G/DL (ref 12–16)
IMM GRANULOCYTES # BLD AUTO: 0 X10(3) UL (ref 0–1)
IMM GRANULOCYTES NFR BLD: 0 %
LYMPHOCYTES # BLD AUTO: 1.08 X10(3) UL (ref 1–4)
LYMPHOCYTES NFR BLD AUTO: 41.2 %
MCH RBC QN AUTO: 31.4 PG (ref 26–34)
MCHC RBC AUTO-ENTMCNC: 32.7 G/DL (ref 31–37)
MCV RBC AUTO: 95.9 FL (ref 80–100)
MONOCYTES # BLD AUTO: 0.28 X10(3) UL (ref 0.1–1)
MONOCYTES NFR BLD AUTO: 10.7 %
NEUTROPHILS # BLD AUTO: 1.16 X10 (3) UL (ref 1.5–7.7)
NEUTROPHILS # BLD AUTO: 1.16 X10(3) UL (ref 1.5–7.7)
NEUTROPHILS NFR BLD AUTO: 44.2 %
OSMOLALITY SERPL CALC.SUM OF ELEC: 293 MOSM/KG (ref 275–295)
PLATELET # BLD AUTO: 193 10(3)UL (ref 150–450)
POTASSIUM SERPL-SCNC: 3.7 MMOL/L (ref 3.5–5.1)
PROT SERPL-MCNC: 6.9 G/DL (ref 5.7–8.2)
RBC # BLD AUTO: 2.96 X10(6)UL (ref 3.8–5.3)
SODIUM SERPL-SCNC: 142 MMOL/L (ref 136–145)
WBC # BLD AUTO: 2.6 X10(3) UL (ref 4–11)

## 2025-06-12 RX ORDER — PALONOSETRON 0.05 MG/ML
0.25 INJECTION, SOLUTION INTRAVENOUS ONCE
Status: COMPLETED | OUTPATIENT
Start: 2025-06-12 | End: 2025-06-12

## 2025-06-12 RX ADMIN — PALONOSETRON 0.25 MG: 0.05 INJECTION, SOLUTION INTRAVENOUS at 10:31:00

## 2025-06-12 NOTE — PROGRESS NOTES
Pt here for C4D1 Drug(s)Enhertu.  Arrives Ambulating independently, accompanied by Family member     Patient was evaluated today by MD.    Oral medications included in this regimen:  no    Patient confirms comprehension of cancer treatment schedule:  yes    Pregnancy screening:  Not applicable    Modifications in dose or schedule:  No    Medications appearance and physical integrity checked by RN: yes.    Chemotherapy IV pump settings verified by 2 RNs:  Yes.  Frequency of blood return and site check throughout administration: Prior to administration and At completion of therapy     Infusion/treatment outcome:  patient tolerated treatment without incident    Education Record    Learner:  Patient and Family Member  Barriers / Limitations:  None  Method:  Brief focused  Education / instructions given:  schedule reviewed  Outcome:  Shows understanding    Discharged Home, Ambulating independently, accompanied by:Family member    Patient/family verbalized understanding of future appointments: by printed AVS

## 2025-06-12 NOTE — PROGRESS NOTES
Education Record    Learner:  Patient  Family member  Disease / Diagnosis: left breast cancer   OTV D1C4 Enhertu     Barriers / Limitations:  None   Comments:    Method:  Discussion   Comments:    General Topics:  Plan of care reviewed   Comments:    Outcome:  Shows understanding   Comments:   Pt feeling well.   Reports good appetite, energy level is ok.   Neuropathy to hands and feet is stable.   Denies constipation/ diarrhea, cough, SOB, pain.

## 2025-06-19 ENCOUNTER — NURSE ONLY (OUTPATIENT)
Age: 60
End: 2025-06-19
Attending: SPECIALIST
Payer: COMMERCIAL

## 2025-06-19 NOTE — PROGRESS NOTES
CHIEF COMPLAINT:     No chief complaint on file.    HPI:   Information obtained from patient, daughter, chart  1-8-25 INITIAL:  Patient is a 58 yo female who was dx with right breast ca in 2013 and was lost to follow-up in 2017.  Patient next presented to the ED on 11/30/2024 with complaints of generalized weakness, poor appetite, and dyspnea with exertion. Laboratory studies showed anemia, hyponatremia, and hypochlorhydria. CTA chest was negative for pulmonary embolism or metastatic disease but did show a 22.3 x 14.6 x 16.0 cm mass arising from the left breast with areas of necrosis, possible extension into the intracostal musculature, and mildly enlarged left axillary lymph nodes. Visualization of left breast showed a large firm breast which an open area laterally with malodorous drainage. CT abdomen/pelvis w contrast on 12/01/2024 was negative for metastatic disease. Biopsy of the left breast was performed on 12/02/2024. 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 It was discussed with patient that the left breast wound will not heal and she is presenting today for assistance with management of the left breast wound.  There is significant malodor and necrotic, liquifing adipose tissue.  I was able to remove a large amount of it which will help with the drainage and malodor.  Patient has not been showering because she did not think she should get the wound wet.  The wound is not overly vascular.  We discussed utilizing dakins solution and a baby diaper.  Will order supplies for patient.     HPI PRIOR TO may 2025 SEE INDIVIDUAL PROGRESS NOTES  4-11-25 patient returns.  She saw dr ny last week and it was noted \"She is a candidate for surgical resection timing of which pending completion of neoadjuvant systemic therapy with Dr. Lopez.\"  She also met with dr. Billings and it was noted \"We discussed the option for left chest wall reconstruction with local flaps, possible latissimus  flap, possible Integra, possible skin graft. She will need to complete chemotherapy prior to surgical intervention. She might benefit from staging with integra followed by negative margins and eventual latissimus flap if vessels are intact.  We can start to look for surgery dates once we know the timeline of her oncologic treatment.\"   Her next appointment with dr holliday may 1.  She states that the plan is to reduce the tumor further with a new chemo.  She has received 1 dose and with the steroids and dextrose that was with that infusion the patient is having some difficulty managing her blood sugar.  She has continued to utilize santyl on her lower leg and metrogel and dakins to her breast. She states the malodor is improving, but still present.  No c/o pain today. She did get the refill on the dakins.    4-25-25 patient returns.   Patient has continued with santyl on the lower leg and metrogel and dakins to the breast. The lower leg wound is improving. She states she notes malodor if she forgets to use the metrogel, but overall improving. There is minimal necrosis today, no debridement needed. Patient feels things are improving. She will get her 2nd treatment of the new drug next week.  No acute s/s of infection or c/o pain.    5-9-25 patient returns. She has continued with enhertu infusions and plan is for reimaging after cycle 3.  She has continued to use the flagyl gel and dakins, flagyl gel refilled. Patient states there is still malodor. Last visit there was significant decrease in necrosis, today again is less.  The wound on the leg is about the same, patient continues santyl. Will run epifix for her leg wound. No acute s/s of infection to either area.    5-30-25 patient returns. She had her repeat mri of the breast earlier this week that showed decrease in measurements of the tumor. She has not had another appointment with oncology since that mri. Suspect they may get surgery involved for updated surgical  opinion. The lle wound is smaller and clean, we discussed to just keep moist with xeroform, she can stop the santyl.  The breast wound is smaller and less cavernous, she states she does still have some malodor. She continues to cleanse with the dakins and dress with the flagyl gel. No acute s/s of infection or c/o pain.    6-20-25 patient returns.  she saw dr holliday and with the significant improvement with the course of enhertu, she is to proceed with c4d1.  Plan for surgical intervention?*** symptoms from the breast?***  MEDICATIONS:     Current Outpatient Medications:     Insulin Glargine-yfgn 100 UNIT/ML Subcutaneous Solution Pen-injector, Inject 20 Units into the skin nightly., Disp: 15 mL, Rfl: 0    Insulin Pen Needle (BD PEN NEEDLE DANIELA U/F) 32G X 4 MM Does not apply Misc, Up to TID, Disp: 200 each, Rfl: 2    ondansetron (ZOFRAN) 8 MG tablet, Take 1 tablet (8 mg total) by mouth every 8 (eight) hours as needed for Nausea., Disp: 30 tablet, Rfl: 3    prochlorperazine (COMPAZINE) 10 mg tablet, Take 1 tablet (10 mg total) by mouth every 6 (six) hours as needed for Nausea., Disp: 30 tablet, Rfl: 3    sodium hypochlorite 0.125 % External Solution, Moisten gauze with dakins, place onto wound, cover with baby diaper three times a day, Disp: 1500 mL, Rfl: 3    HYDROcodone-acetaminophen 5-325 MG Oral Tab, Take 1-2 tablets by mouth every 4 (four) hours as needed for Pain., Disp: 30 tablet, Rfl: 0    gabapentin 600 MG Oral Tab, TAKE 1 TABLET BY MOUTH THREE TIMES DAILY; TAKE AN EXTRA 600MG AS NEEDED FOR SEVERE PAIN, Disp: 270 tablet, Rfl: 1    traMADol 50 MG Oral Tab, Take 1 tablet (50 mg total) by mouth every 6 (six) hours as needed., Disp: 20 tablet, Rfl: 0    fluticasone-salmeterol (WIXELA INHUB) 100-50 MCG/ACT Inhalation Aerosol Powder, Breath Activated, Inhale 1 puff into the lungs 2 (two) times daily., Disp: 3 each, Rfl: 0    Insulin Lispro, 1 Unit Dial, 100 UNIT/ML Subcutaneous Solution Pen-injector, Inject 10  Units into the skin in the morning, at noon, and at bedtime., Disp: 3 mL, Rfl: 0    Budesonide-Formoterol Fumarate 160-4.5 MCG/ACT Inhalation Aerosol, Inhale 2 puffs into the lungs 2 (two) times daily. (Patient taking differently: Inhale 2 puffs into the lungs in the morning and 2 puffs before bedtime. Pt states she would like to go back to budesonide.), Disp: 3 each, Rfl: 1    albuterol (2.5 MG/3ML) 0.083% Inhalation Nebu Soln, Take 3 mL (2.5 mg total) by nebulization every 4 (four) hours as needed for Wheezing or Shortness of Breath., Disp: 90 mL, Rfl: 0    montelukast 10 MG Oral Tab, Take 1 tablet (10 mg total) by mouth nightly., Disp: 90 tablet, Rfl: 1  ALLERGIES:   Allergies[1]   REVIEW OF SYSTEMS:   This information was obtained from the patient/family and chart.    See HPI for pertinent positives, otherwise 10 pt ROS negative.    HISTORY:   Past medical, surgical, family and social history updated where appropriate.      PHYSICAL EXAM:     There were no vitals filed for this visit.      Estimated body mass index is 25.14 kg/m² as calculated from the following:    Height as of 6/12/25: 60.71\".    Weight as of 6/12/25: 131 lb 12.8 oz (59.8 kg).   POC Glucose   Date Value Ref Range Status   04/11/2025 212 (H) 70 - 99 mg/dL Final   03/27/2025 122 (H) 70 - 99 mg/dL Final   03/18/2025 119 (H) 70 - 99 mg/dL Final       Vital signs reviewed.Appears stated age, well groomed.    Constitutional:  Bp*** wnl for patient. Pulse Regular and wnl for patient. Respirations easy and unlabored. Temperature wnl. Weight normal for height. Appearance neat and clean. Appears in no acute distress. Well nourished and well developed.    Lower extremity:   Left lower extremity free of varicosities, ***no edema. Capillary refill < 3 seconds. Digits are warm, overlapping. Toenails thickned, discolored, adequate length/hygeine. Skin is very dry. no hairgrowth on legs.    Musculoskeletal:  Gait and station stable   Integumentary:  refer to  wound characteristics and images   Psychiatric:  Judgment and insight intact. Alert and oriented times 3. No evidence of depression, anxiety, or agitation. Calm, cooperative, and communicative.   DIAGNOSTICS:     Lab Results   Component Value Date    BUN 10 06/12/2025    CREATSERUM 0.91 06/12/2025    GFRCKDEPI 105.47 04/18/2018    ALB 4.0 06/12/2025    TP 6.9 06/12/2025    A1C 6.5 (H) 11/30/2024       WOUND ASSESSMENT:     Wound 01/08/25 #1 Breast Left (Active)   Date First Assessed/Time First Assessed: 01/08/25 1414    Wound Number (Wound Clinic Only): #1  Primary Wound Type: (c) Other (comment)  Location: Breast  Wound Location Orientation: Left      Assessments 1/8/2025  2:16 PM 5/30/2025  9:31 AM   Wound Image           Drainage Amount Large Large   Drainage Description Serous;Yellow Serosanguineous   Wound Length (cm) 17 cm 1 cm (no stretch)   Wound Width (cm) 30 cm 9.2 cm   Wound Surface Area (cm^2) 510 cm^2 7.23 cm^2   Wound Depth (cm) 1 cm 1.8 cm   Wound Volume (cm^3) 510 cm^3 8.671 cm^3   Wound Healing % -- 98   Margins Well-defined edges Well-defined edges   Non-staged Wound Description Full thickness Full thickness   Lora-wound Assessment Edema --   Wound Granulation Tissue Red;Pink;Spongy Red;Firm   Wound Bed Granulation (%) 10 % 70 %   Wound Bed Epithelium (%) 15 % 30 %   Wound Bed Slough (%) 75 % --   Wound Odor Strong Mild   Shape -- clustered   Tunneling? -- No   Undermining? -- No   Sinus Tracts? -- No       Inactive Orders   Date Order Priority Status Authorizing Provider   04/11/25 1004 Debridement Other (comment) Left Breast Routine Completed Vira Alejandro, APRBISI   03/18/25 1032 Debridement Other (comment) Left Breast Routine Completed Vira Alejandro APRN   03/06/25 1536 Debridement Other (comment) Left Breast Routine Completed Vira Alejandro, APRN   02/25/25 1157 Debridement Other (comment) Left Breast Routine Completed Vira Alejandro APRN   02/18/25 1009 Debridement Other (comment) Left  Breast Routine Completed Vira Alejandro, APRN   02/05/25 1427 Debridement Other (comment) Left Breast Routine Completed Vira Alejandro, APRN   01/22/25 1503 Debridement Other (comment) Left Breast Routine Completed Vira Alejandro, APRN   01/08/25 1618 Debridement Other (comment) Left Breast Routine Completed Vira Alejandro, APRN       Wound 02/05/25 #2 Left lower leg Leg Left (Active)   Date First Assessed/Time First Assessed: 02/05/25 1348    Wound Number (Wound Clinic Only): #2 Left lower leg  Primary Wound Type: Venous Ulcer  Location: Leg  Wound Location Orientation: Left      Assessments 2/5/2025  1:50 PM 5/30/2025  9:30 AM   Wound Image        Drainage Amount Scant Small   Drainage Description Yellow;Serous Serous;Yellow   Wound Length (cm) 1.7 cm 0.6 cm   Wound Width (cm) 1.5 cm 0.3 cm   Wound Surface Area (cm^2) 2.55 cm^2 0.14 cm^2   Wound Depth (cm) 0.1 cm 0.1 cm   Wound Volume (cm^3) 0.255 cm^3 0.009 cm^3   Wound Healing % -- 96   Margins Well-defined edges Well-defined edges;Epibole (Rolled edges)   Non-staged Wound Description Full thickness Full thickness   Lora-wound Assessment -- Moist   Wound Granulation Tissue -- Pink;Firm   Wound Bed Granulation (%) -- 100 %   Wound Bed Slough (%) 100 % --   Wound Odor Mild None   Tunneling? No No   Undermining? No No   Sinus Tracts? No No       Inactive Orders   Date Order Priority Status Authorizing Provider   02/25/25 1156 Debridement Venous Ulcer Left Leg Routine Completed Vira Alejandro, APRN   02/05/25 1424 Debridement Venous Ulcer Left Leg Routine Completed Vira Alejandro, APRN          ASSESSMENT AND PLAN:    There are no diagnoses linked to this encounter.        Risks, benefits, and alternatives of current treatment plan discussed in detail.  Questions and concerns addressed. Red flags to RTC or ED reviewed.  Patient (or parent) agrees to plan.      NOTE TO PATIENT: The 21st Century Cures Act makes clinical notes like these available to patients in the  interest of transparency. Clinical notes are medical documents used by physicians and care providers to communicate with each other. These documents include medical language and terminology, abbreviations, and treatment information that may sound technical and at times possibly unfamiliar. In addition, at times, the verbiage may appear blunt or direct. These documents are one tool providers use to communicate relevant information and clinical opinions of the care providers in a way that allows common understanding of the clinical context.   I spent *** minutes with the patient. This time included:    preparing to see the patient (eg, review notes and recent diagnostics),  seeing the patient, obtaining and/or reviewing separately obtained history, performing a medically appropriate examination and/or evaluation, counseling and educating the patient, documenting in the record.   DISCHARGE:      There are no Patient Instructions on file for this visit.   Vira Alejandro FNP-C, CWCN-AP, CFCN, CSWS, WCC, DWC  6/20/2025          [1]   Allergies  Allergen Reactions    Fish ANAPHYLAXIS and HIVES     All fish, Wheezing, short of breath    Levemir HIVES and ITCHING    Levonorgestrel-Ethinyl Estrad OTHER (SEE COMMENTS)     Other reaction(s): Runny nose, WHEEZING   Ragweed, pollen   Ragweed, pollen    Seasonal WHEEZING and Runny nose     Ragweed, pollen

## 2025-06-19 NOTE — PROGRESS NOTES
Education Record    Learner:  Patient    Disease / Diagnosis: Breast CA. Here for PICC line dressing change    Barriers / Limitations:  None   Comments:    Method:  Discussion   Comments:    General Topics:  Procedure and Plan of care reviewed   Comments:    Outcome:  Shows understanding   Comments:    PICC line dressing changed. Both lumens flushing easily and have good blood return. Both lumens saline locked with alcohol caps in place.      Patient discharged in stable condition.

## 2025-06-20 ENCOUNTER — APPOINTMENT (OUTPATIENT)
Dept: WOUND CARE | Facility: HOSPITAL | Age: 60
End: 2025-06-20
Attending: NURSE PRACTITIONER
Payer: COMMERCIAL

## 2025-06-26 ENCOUNTER — NURSE ONLY (OUTPATIENT)
Age: 60
End: 2025-06-26
Attending: SPECIALIST
Payer: COMMERCIAL

## 2025-06-26 NOTE — PROGRESS NOTES
Education Record    Learner:  Patient    Disease / Diagnosis:GRICELDA     Barriers / Limitations:  None   Comments:    Method:  Brief focused   Comments:    General Topics:  Diet, Infection, Medication, Pain, Precautions, Procedure, Side effects and symptom management, Plan of care reviewed, and Fall risk and prevention   Comments:    Outcome:  Shows understanding   Comments:    GRICELDA reinforced and PICC rotated little bit to make patient more comfortable. Great blood return

## 2025-07-02 RX ORDER — PALONOSETRON 0.05 MG/ML
0.25 INJECTION, SOLUTION INTRAVENOUS ONCE
Status: CANCELLED
Start: 2025-07-03 | End: 2025-07-03

## 2025-07-02 NOTE — PROGRESS NOTES
[facility] Progress Note       Patient Name: Swathi Arguelles   YOB: 1965  Medical Record Number: [id number]  Attending Physician: Carl Lopez M.D.     The 21st Century Cures Act makes medical notes like these available to patients in the interest of transparency. Please be advised this is a medical document. Medical documents are intended to carry relevant information, facts as evident, and the clinical opinion of the practitioner. The medical note is intended as peer to peer communication and may appear blunt or direct. It is written in medical language and may contain abbreviations or verbiage that are unfamiliar.      Date of Visit: 7/3/2025       Chief Complaint  Invasive ductal carcinoma, left breast - follow up and treatment.     Oncologic History  Swathi Arguelles is a 60 year old post-menopausal female admitted to the hospital on 09/10/2013 with symptomatic anemia.  On physical examination revealed a malodorous, draining ulcerating right breast mass.  Upon questioning, she admitted that she first noticed the mass in approximately 01/2013.  She states that her only mammogram was approximately at age 43 years.  Biopsy showed grade 3 infiltrating ductal carcinoma.  The tumor was estrogen receptor positive, progesterone receptor negative, and Her2/alberto negative.  CT scans of the chest, abdomen, pelvis showed multiple right axillary lymph nodes, two micronodules in the lungs of unknown significance, and a left sided large cystic adnexal mass.  At initial diagnosis CA 15-3 was 48.3 U/mL,  CA 27.29 was 91.7 U/mL, and  was 171.5 U/mL.  PET/CT showed abnormal FDG uptake in the ulcerating mass of the right breast/chest wall and in retropectoral and subclavicular lymph nodes.  There was no uptake in the cystic pelvic mass or in 3 subcentimeter pulmonary nodules.  Pelvic ultrasound showed a complex multiloculated left adnexal cyst with no separate identifiable  ovarian tissue.  Noted was irregular thickening of one of the cyst walls that was worrisome for a cystic ovarian neoplasm.    Patient was premenopausal at diagnosis    On 10/04/2013 she began dose dense doxorubicin and cyclophosphamide. Treatment was complicated by neutropenic fever, anemia requiring transfusion, dehydration, and prolonged thrombocytopenia. CT scans after cycle 4 showed a marked improvement in the breast/chest wall mass and axillary adenopathy. Tumor markers markedly improved. She then received weekly paclitaxel. Treatment was complicated by peripheral neuropathy and neutropenia.     During paclitaxel therapy, she consented to BRCA1/2 testing.  Results obtained on 02/11/2014 revealed the deleterious BRCA2 mutation [id number].    On 03/18/2014 she underwent right mastectomy with axillary node dissection.  Pathology showed 1.4 cm grade 3 invasive ductal carcinoma with 12/12 lymph nodes negative for malignancy.  She also underwent diagnostic laparoscopy which showed the cystic left ovarian mass but no evidence of metastatic disease.    On 05/13/2014 she underwent bilateral salpingo-oophorectomy, bilateral pelvic lymphadenectomy, limited periaortic lymphadenectomy, omentectomy, peritoneal washings, and appendectomy.  Pathology, reviewed at the [facility], showed serous borderline tumor, typical type.  All lymph nodes and pelvic washings were negative for malignancy.    In 07/2014 she began adjuvant right chest wall/axilla radiation.    She began endocrine therapy with anastrazole in mid 08/2014.     Patient was last seen on 02/03/2017. On that day, patient was advised to continue anastrozole, continue increased surveillance with alternating mammography and breast MRI, and return for follow up in 05/2017. On that day, she expressed an openness to prophylactic left mastectomy if she was a candidate for breast reconstruction. Patient failed to return for follow up in 05/2017 as recommended. She  also failed to return a call from the breast navigator to arrange evaluation by plastic surgery. She did have a left sided mammogram as previously ordered by me in 06/2017. Patient was on anastrozole at least through 02/2017 but it is not clear when she discontinued therapy.    Patient next presented to the ED on 11/30/2024 with complaints of generalized weakness, poor appetite, and dyspnea with exertion. Laboratory studies showed anemia, hyponatremia, and hypochlorhydria. CTA chest was negative for pulmonary embolism or metastatic disease but did show a 22.3 x 14.6 x 16.0 cm mass arising from the left breast with areas of necrosis, possible extension into the intracostal musculature, and mildly enlarged left axillary lymph nodes. Visualization of left breast showed a large firm breast which an open area laterally with malodorous drainage. CT abdomen/pelvis w contrast on 12/01/2024 was negative for metastatic disease.     Patient reports that she first noticed a \"rash\" 1.5 months prior to presentation. She stated that 3 months prior to presentation, she did not notice any changes in the left breast. Notably, patient's last breast imaging prior to hospitalization was in 06/2017.    Biopsy of the left breast was performed on 12/02/2024. 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 Immunohistochemical studies were as follows: estrogen receptor 0%, progesterone receptor 0%, Her2 3+, Ki67 25%.     PET/CT scan on 02/09/2024 showed abnormal SUV uptake in the left breast mass peripherally, multiple left axillary lymph nodes, subpectoral lymph nodes, a subcentimeter mediastinal lymph node.     On 12/12/2024 she began neoadjuvant systemic therapy with TCHP. Patient achieved a partial response but imaging studies after 4 cycles showed continued marked involvement of the chest wall. She was then seen by Dr. Adam for evaluation who felt that surgical resection with chest wall resection was  possible but would be helped with further decrease in disease. Therefore, after 5 cycles of TCHP, therapy was switched to Enhertu.     She began cycle 1 of Enhertu on 04/10/2025; dose was reduced up front due to experienced myelosuppression during TCHP. MRI after 3 cycles showed marked improvement in her disease.     History of Present Illness  Patient returns for follow up and treatment. She stable peripheral neuropathy involving her feet. She states that her energy level is good. She believes that her left breast mass has decreased further.     Performance Status   Karnofsky 90% - Normal, only minor signs/symptoms.      Past Medical History (historical data, reviewed by physician)   Invasive ductal carcinoma, left breast (as above); invasive ductal carcinoma, right breast (as above); diabetes mellitus, asthma.     Past Surgical History (historical data, reviewed by physician)   Right mastectomy and axillary node dissection; D&C.     Family History (historical data, reviewed by physician)   A three generation pedigree was obtained. Ms. Arguelles’s father  at age 76 from an unknown cancer. He had one brother and no sisters. Ms. Arguelles’s paternal grandmother  in her late 40s or early 50s from unknown causes. Ms. Arguelles’s mother is 76 years-old and has no history of cancer. Ms. Arguelles’s maternal grandmother  at age 59 from a myocardial infarction. Ms. Arguelles’s maternal grandmother’s sister had breast cancer in her 40s and had a mastectomy. There is no other known family history of cancer. Please see the pedigree for additional family history information.     Social History (historical data, reviewed by physician)   Denies tobacco, alcohol, illicit drug use.    Current Medications    Insulin Glargine-yfgn 100 UNIT/ML Subcutaneous Solution Pen-injector Inject 20 Units into the skin nightly. 15 mL 0    Insulin Pen Needle (BD PEN NEEDLE DANIELA U/F) 32G X 4 MM Does not apply Misc Up to  each 2     ondansetron (ZOFRAN) 8 MG tablet Take 1 tablet (8 mg total) by mouth every 8 (eight) hours as needed for Nausea. 30 tablet 3    prochlorperazine (COMPAZINE) 10 mg tablet Take 1 tablet (10 mg total) by mouth every 6 (six) hours as needed for Nausea. 30 tablet 3    sodium hypochlorite 0.125 % External Solution Moisten gauze with dakins, place onto wound, cover with baby diaper three times a day 1500 mL 3    HYDROcodone-acetaminophen 5-325 MG Oral Tab Take 1-2 tablets by mouth every 4 (four) hours as needed for Pain. 30 tablet 0    gabapentin 600 MG Oral Tab TAKE 1 TABLET BY MOUTH THREE TIMES DAILY; TAKE AN EXTRA 600MG AS NEEDED FOR SEVERE PAIN 270 tablet 1    traMADol 50 MG Oral Tab Take 1 tablet (50 mg total) by mouth every 6 (six) hours as needed. 20 tablet 0    fluticasone-salmeterol (WIXELA INHUB) 100-50 MCG/ACT Inhalation Aerosol Powder, Breath Activated Inhale 1 puff into the lungs 2 (two) times daily. 3 each 0    Insulin Lispro, 1 Unit Dial, 100 UNIT/ML Subcutaneous Solution Pen-injector Inject 10 Units into the skin in the morning, at noon, and at bedtime. 3 mL 0    Budesonide-Formoterol Fumarate 160-4.5 MCG/ACT Inhalation Aerosol Inhale 2 puffs into the lungs 2 (two) times daily. (Patient taking differently: Inhale 2 puffs into the lungs in the morning and 2 puffs before bedtime. Pt states she would like to go back to budesonide.) 3 each 1    albuterol (2.5 MG/3ML) 0.083% Inhalation Nebu Soln Take 3 mL (2.5 mg total) by nebulization every 4 (four) hours as needed for Wheezing or Shortness of Breath. 90 mL 0    montelukast 10 MG Oral Tab Take 1 tablet (10 mg total) by mouth nightly. 90 tablet 1      Allergies   Ms. Arguelles is allergic to fish, levemir, levonorgestrel-ethinyl estrad, and seasonal.    Vital Signs   Height: 154.2 cm (5' 0.71\") (07/03 0929)  Weight: 60.1 kg (132 lb 9.6 oz) (07/03 0929)  BSA (Calculated - sq m): 1.58 sq meters (07/03 0929)  Pulse: 100 (07/03 0929)  BP: 139/78 (07/03 0929)  Temp:  97.6 °F (36.4 °C) (07/03 0929)  Do Not Use - Resp Rate: --  SpO2: 96 % (07/03 0929)     Physical Examination  Constitutional  Well developed and well nourished; in no apparent distress.  Head   Normocephalic and atraumatic.  Eyes   Conjunctiva clear; sclera anicteric.  ENMT   External nose normal; external ears normal.  Respiratory  Normal effort; no respiratory distress; clear to auscultation bilaterally.  Cardiovascular  Regular rate and rhythm; normal S1S2.  Abdomen  Not distended.   Neurologic  Motor and sensory grossly intact.  Psychiatric  Mood and affect appropriate.    Laboratory  Recent Results (from the past 72 hours)   MAGNESIUM [E]    Collection Time: 07/03/25  9:10 AM   Result Value Ref Range    Magnesium 1.8 1.6 - 2.6 mg/dL   CBC W Differential W Platelet    Collection Time: 07/03/25  9:10 AM   Result Value Ref Range    WBC 3.1 (L) 4.0 - 11.0 x10(3) uL    RBC 3.30 (L) 3.80 - 5.30 x10(6)uL    HGB 10.1 (L) 12.0 - 16.0 g/dL    HCT 30.6 (L) 35.0 - 48.0 %    .0 150.0 - 450.0 10(3)uL    MCV 92.7 80.0 - 100.0 fL    MCH 30.6 26.0 - 34.0 pg    MCHC 33.0 31.0 - 37.0 g/dL    RDW 14.5 %    Neutrophil Absolute Prelim 1.44 (L) 1.50 - 7.70 x10 (3) uL    Neutrophil Absolute 1.44 (L) 1.50 - 7.70 x10(3) uL    Lymphocyte Absolute 1.21 1.00 - 4.00 x10(3) uL    Monocyte Absolute 0.36 0.10 - 1.00 x10(3) uL    Eosinophil Absolute 0.07 0.00 - 0.70 x10(3) uL    Basophil Absolute 0.02 0.00 - 0.20 x10(3) uL    Immature Granulocyte Absolute 0.00 0.00 - 1.00 x10(3) uL    Neutrophil % 46.5 %    Lymphocyte % 39.0 %    Monocyte % 11.6 %    Eosinophil % 2.3 %    Basophil % 0.6 %    Immature Granulocyte % 0.0 %   Comp Metabolic Panel (14)    Collection Time: 07/03/25  9:10 AM   Result Value Ref Range    Glucose 100 (H) 70 - 99 mg/dL    Sodium 144 136 - 145 mmol/L    Potassium 3.6 3.5 - 5.1 mmol/L    Chloride 106 98 - 112 mmol/L    CO2 30.0 21.0 - 32.0 mmol/L    Anion Gap 8 0 - 18 mmol/L    BUN 11 9 - 23 mg/dL    Creatinine 0.94  0.55 - 1.02 mg/dL    Calcium, Total 9.7 8.7 - 10.6 mg/dL    Calculated Osmolality 297 (H) 275 - 295 mOsm/kg    eGFR-Cr 69 >=60 mL/min/1.73m2    AST 20 <34 U/L    ALT 9 (L) 10 - 49 U/L    Alkaline Phosphatase 101 50 - 130 U/L    Bilirubin, Total 0.3 0.2 - 1.1 mg/dL    Total Protein 7.3 5.7 - 8.2 g/dL    Albumin 4.2 3.2 - 4.8 g/dL    Globulin  3.1 2.0 - 3.5 g/dL    A/G Ratio 1.4 1.0 - 2.0    Patient Fasting for CMP? Patient not present      Impression and Plan  1.   Breast cancer, left sided: Patient is staged as T4N3M1 (one subcentimeter mediastinal lymph node). Her disease is estrogen and progesterone receptor negative and Her2 3+. She received 5 cycles of TCHP and then switched to single agent Enhertu when MRI showed continued significant chest wall involvement by tumor.           There is significant improvement in disease over the course of Enhertu. Continue therapy without modification. Proceed with C5D1.    2.   Left breast wound: Patient is following with wound care.     Planned Follow up  Patient will return for follow up and treatment in 3 weeks.    Electronically Signed by:     Carl Lopez M.D.  System Medical Director, Oncology Services  [facility]

## 2025-07-03 ENCOUNTER — SOCIAL WORK SERVICES (OUTPATIENT)
Age: 60
End: 2025-07-03

## 2025-07-03 ENCOUNTER — OFFICE VISIT (OUTPATIENT)
Age: 60
End: 2025-07-03
Attending: SPECIALIST
Payer: COMMERCIAL

## 2025-07-03 VITALS
OXYGEN SATURATION: 96 % | HEART RATE: 100 BPM | WEIGHT: 132.63 LBS | HEIGHT: 60.71 IN | SYSTOLIC BLOOD PRESSURE: 139 MMHG | BODY MASS INDEX: 25.37 KG/M2 | RESPIRATION RATE: 18 BRPM | DIASTOLIC BLOOD PRESSURE: 78 MMHG | TEMPERATURE: 98 F

## 2025-07-03 DIAGNOSIS — Z17.1 MALIGNANT NEOPLASM OF OVERLAPPING SITES OF LEFT BREAST IN FEMALE, ESTROGEN RECEPTOR NEGATIVE (HCC): Primary | ICD-10-CM

## 2025-07-03 DIAGNOSIS — C77.3 SECONDARY MALIGNANT NEOPLASM OF AXILLARY LYMPH NODES (HCC): ICD-10-CM

## 2025-07-03 DIAGNOSIS — Z79.69 ON ANTINEOPLASTIC CHEMOTHERAPY: ICD-10-CM

## 2025-07-03 DIAGNOSIS — C50.812 MALIGNANT NEOPLASM OF OVERLAPPING SITES OF LEFT BREAST IN FEMALE, ESTROGEN RECEPTOR NEGATIVE (HCC): Primary | ICD-10-CM

## 2025-07-03 LAB
ALBUMIN SERPL-MCNC: 4.2 G/DL (ref 3.2–4.8)
ALBUMIN/GLOB SERPL: 1.4 {RATIO} (ref 1–2)
ALP LIVER SERPL-CCNC: 101 U/L (ref 50–130)
ALT SERPL-CCNC: 9 U/L (ref 10–49)
ANION GAP SERPL CALC-SCNC: 8 MMOL/L (ref 0–18)
AST SERPL-CCNC: 20 U/L (ref ?–34)
BASOPHILS # BLD AUTO: 0.02 X10(3) UL (ref 0–0.2)
BASOPHILS NFR BLD AUTO: 0.6 %
BILIRUB SERPL-MCNC: 0.3 MG/DL (ref 0.2–1.1)
BUN BLD-MCNC: 11 MG/DL (ref 9–23)
CALCIUM BLD-MCNC: 9.7 MG/DL (ref 8.7–10.6)
CHLORIDE SERPL-SCNC: 106 MMOL/L (ref 98–112)
CO2 SERPL-SCNC: 30 MMOL/L (ref 21–32)
CREAT BLD-MCNC: 0.94 MG/DL (ref 0.55–1.02)
EGFRCR SERPLBLD CKD-EPI 2021: 69 ML/MIN/1.73M2 (ref 60–?)
EOSINOPHIL # BLD AUTO: 0.07 X10(3) UL (ref 0–0.7)
EOSINOPHIL NFR BLD AUTO: 2.3 %
ERYTHROCYTE [DISTWIDTH] IN BLOOD BY AUTOMATED COUNT: 14.5 %
GLOBULIN PLAS-MCNC: 3.1 G/DL (ref 2–3.5)
GLUCOSE BLD-MCNC: 100 MG/DL (ref 70–99)
HCT VFR BLD AUTO: 30.6 % (ref 35–48)
HGB BLD-MCNC: 10.1 G/DL (ref 12–16)
IMM GRANULOCYTES # BLD AUTO: 0 X10(3) UL (ref 0–1)
IMM GRANULOCYTES NFR BLD: 0 %
LYMPHOCYTES # BLD AUTO: 1.21 X10(3) UL (ref 1–4)
LYMPHOCYTES NFR BLD AUTO: 39 %
MAGNESIUM SERPL-MCNC: 1.8 MG/DL (ref 1.6–2.6)
MCH RBC QN AUTO: 30.6 PG (ref 26–34)
MCHC RBC AUTO-ENTMCNC: 33 G/DL (ref 31–37)
MCV RBC AUTO: 92.7 FL (ref 80–100)
MONOCYTES # BLD AUTO: 0.36 X10(3) UL (ref 0.1–1)
MONOCYTES NFR BLD AUTO: 11.6 %
NEUTROPHILS # BLD AUTO: 1.44 X10 (3) UL (ref 1.5–7.7)
NEUTROPHILS # BLD AUTO: 1.44 X10(3) UL (ref 1.5–7.7)
NEUTROPHILS NFR BLD AUTO: 46.5 %
OSMOLALITY SERPL CALC.SUM OF ELEC: 297 MOSM/KG (ref 275–295)
PLATELET # BLD AUTO: 202 10(3)UL (ref 150–450)
POTASSIUM SERPL-SCNC: 3.6 MMOL/L (ref 3.5–5.1)
PROT SERPL-MCNC: 7.3 G/DL (ref 5.7–8.2)
RBC # BLD AUTO: 3.3 X10(6)UL (ref 3.8–5.3)
SODIUM SERPL-SCNC: 144 MMOL/L (ref 136–145)
WBC # BLD AUTO: 3.1 X10(3) UL (ref 4–11)

## 2025-07-03 RX ORDER — PALONOSETRON 0.05 MG/ML
0.25 INJECTION, SOLUTION INTRAVENOUS ONCE
Status: COMPLETED | OUTPATIENT
Start: 2025-07-03 | End: 2025-07-03

## 2025-07-03 RX ADMIN — PALONOSETRON 0.25 MG: 0.05 INJECTION, SOLUTION INTRAVENOUS at 10:01:00

## 2025-07-03 NOTE — PROGRESS NOTES
BRITTANY spoke with pt.  She is requesting a return to work letter update. SW assisted with letter and letter sent to pt via [employer] message.  RN to print for pt also.    Letter:    To Whom it may concern:    Swathi Arguelles ( 1965) is a patient under my care.  She is not medically clear to return to work until after October 3, 2025.  At that time, I will re-evaluate her ability to return.    If you have any further questions, please don't hesitate to contact my office.

## 2025-07-03 NOTE — PROGRESS NOTES
Pt here for C5D1 Drug(s)enhertu.  Arrives Ambulating independently, accompanied by Self     Patient was evaluated today by MD.    Oral medications included in this regimen:  no    Patient confirms comprehension of cancer treatment schedule:  yes    Pregnancy screening:  Not applicable    Modifications in dose or schedule:  No    Medications appearance and physical integrity checked by RN: yes.    Chemotherapy IV pump settings verified by 2 RNs:  Yes.  Frequency of blood return and site check throughout administration: Prior to administration     Infusion/treatment outcome:  patient tolerated treatment without incident    Education Record    Learner:  Patient  Barriers / Limitations:  None  Method:  Discussion  Education / instructions given:  AVS  Outcome:  Shows understanding    Discharged Home, Ambulating independently, accompanied by:Self    Patient/family verbalized understanding of future appointments: by [employer] messaging    Here for labs, MD, and chemo. Feeling well. Picc dressing changed. Weekly dressing changes scheduled as well as next cycle with MD.

## 2025-07-10 ENCOUNTER — NURSE ONLY (OUTPATIENT)
Age: 60
End: 2025-07-10
Attending: SPECIALIST
Payer: COMMERCIAL

## 2025-07-14 ENCOUNTER — HOSPITAL ENCOUNTER (OUTPATIENT)
Dept: CV DIAGNOSTICS | Facility: HOSPITAL | Age: 60
Discharge: HOME | End: 2025-07-14
Attending: SPECIALIST
Payer: COMMERCIAL

## 2025-07-14 DIAGNOSIS — Z79.899 ENCOUNTER FOR MONITORING CARDIOTOXIC DRUG THERAPY: ICD-10-CM

## 2025-07-14 DIAGNOSIS — Z51.81 ENCOUNTER FOR MONITORING CARDIOTOXIC DRUG THERAPY: ICD-10-CM

## 2025-07-14 PROCEDURE — 93307 TTE W/O DOPPLER COMPLETE: CPT | Performed by: SPECIALIST

## 2025-07-14 PROCEDURE — 93356 MYOCRD STRAIN IMG SPCKL TRCK: CPT | Performed by: SPECIALIST

## 2025-07-17 ENCOUNTER — NURSE ONLY (OUTPATIENT)
Age: 60
End: 2025-07-17
Attending: SPECIALIST
Payer: COMMERCIAL

## 2025-07-17 NOTE — PROGRESS NOTES
Education Record    Learner:  Patient    Disease / Diagnosis: breast cancer    Barriers / Limitations:  None   Comments:    Method:  Brief focused   Comments:    General Topics:  Plan of care reviewed   Comments:    Outcome:  Shows understanding   Comments:

## 2025-07-24 ENCOUNTER — OFFICE VISIT (OUTPATIENT)
Facility: LOCATION | Age: 60
End: 2025-07-24
Attending: SPECIALIST
Payer: COMMERCIAL

## 2025-07-24 VITALS
OXYGEN SATURATION: 96 % | HEIGHT: 60.71 IN | BODY MASS INDEX: 24.9 KG/M2 | TEMPERATURE: 98 F | SYSTOLIC BLOOD PRESSURE: 149 MMHG | DIASTOLIC BLOOD PRESSURE: 86 MMHG | WEIGHT: 130.19 LBS | RESPIRATION RATE: 18 BRPM | HEART RATE: 88 BPM

## 2025-07-24 DIAGNOSIS — Z15.09 BRCA2 GENETIC CARRIER: ICD-10-CM

## 2025-07-24 DIAGNOSIS — Z79.69 ON ANTINEOPLASTIC CHEMOTHERAPY: ICD-10-CM

## 2025-07-24 DIAGNOSIS — G62.9 PERIPHERAL POLYNEUROPATHY: ICD-10-CM

## 2025-07-24 DIAGNOSIS — C50.812 MALIGNANT NEOPLASM OF OVERLAPPING SITES OF LEFT BREAST IN FEMALE, ESTROGEN RECEPTOR NEGATIVE (HCC): ICD-10-CM

## 2025-07-24 DIAGNOSIS — C77.3 SECONDARY MALIGNANT NEOPLASM OF AXILLARY LYMPH NODES (HCC): Primary | ICD-10-CM

## 2025-07-24 DIAGNOSIS — C77.3 SECONDARY MALIGNANT NEOPLASM OF AXILLARY LYMPH NODES (HCC): ICD-10-CM

## 2025-07-24 DIAGNOSIS — C50.812 MALIGNANT NEOPLASM OF OVERLAPPING SITES OF LEFT BREAST IN FEMALE, ESTROGEN RECEPTOR NEGATIVE (HCC): Primary | ICD-10-CM

## 2025-07-24 DIAGNOSIS — T45.1X5A CHEMOTHERAPY INDUCED NEUTROPENIA: ICD-10-CM

## 2025-07-24 DIAGNOSIS — Z17.1 MALIGNANT NEOPLASM OF OVERLAPPING SITES OF LEFT BREAST IN FEMALE, ESTROGEN RECEPTOR NEGATIVE (HCC): Primary | ICD-10-CM

## 2025-07-24 DIAGNOSIS — Z17.1 MALIGNANT NEOPLASM OF OVERLAPPING SITES OF LEFT BREAST IN FEMALE, ESTROGEN RECEPTOR NEGATIVE (HCC): ICD-10-CM

## 2025-07-24 DIAGNOSIS — Z15.01 BRCA2 GENETIC CARRIER: ICD-10-CM

## 2025-07-24 DIAGNOSIS — C50.811 CANCER OF OVERLAPPING SITES OF RIGHT BREAST (HCC): ICD-10-CM

## 2025-07-24 DIAGNOSIS — D70.1 CHEMOTHERAPY INDUCED NEUTROPENIA: ICD-10-CM

## 2025-07-24 LAB
ALBUMIN SERPL-MCNC: 4.1 G/DL (ref 3.2–4.8)
ALBUMIN/GLOB SERPL: 1.3 {RATIO} (ref 1–2)
ALP LIVER SERPL-CCNC: 92 U/L (ref 50–130)
ALT SERPL-CCNC: 8 U/L (ref 10–49)
ANION GAP SERPL CALC-SCNC: 7 MMOL/L (ref 0–18)
AST SERPL-CCNC: 19 U/L (ref ?–34)
BASOPHILS # BLD AUTO: 0.02 X10(3) UL (ref 0–0.2)
BASOPHILS NFR BLD AUTO: 0.9 %
BILIRUB SERPL-MCNC: 0.2 MG/DL (ref 0.2–1.1)
BUN BLD-MCNC: 13 MG/DL (ref 9–23)
CALCIUM BLD-MCNC: 9.6 MG/DL (ref 8.7–10.6)
CHLORIDE SERPL-SCNC: 104 MMOL/L (ref 98–112)
CO2 SERPL-SCNC: 32 MMOL/L (ref 21–32)
CREAT BLD-MCNC: 0.99 MG/DL (ref 0.55–1.02)
EGFRCR SERPLBLD CKD-EPI 2021: 64 ML/MIN/1.73M2 (ref 60–?)
EOSINOPHIL # BLD AUTO: 0.06 X10(3) UL (ref 0–0.7)
EOSINOPHIL NFR BLD AUTO: 2.6 %
ERYTHROCYTE [DISTWIDTH] IN BLOOD BY AUTOMATED COUNT: 14.5 %
FASTING STATUS PATIENT QL REPORTED: NO
GLOBULIN PLAS-MCNC: 3.1 G/DL (ref 2–3.5)
GLUCOSE BLD-MCNC: 90 MG/DL (ref 70–99)
HCT VFR BLD AUTO: 30.1 % (ref 35–48)
HGB BLD-MCNC: 9.8 G/DL (ref 12–16)
IMM GRANULOCYTES # BLD AUTO: 0 X10(3) UL (ref 0–1)
IMM GRANULOCYTES NFR BLD: 0 %
LYMPHOCYTES # BLD AUTO: 1.03 X10(3) UL (ref 1–4)
LYMPHOCYTES NFR BLD AUTO: 44 %
MCH RBC QN AUTO: 30.1 PG (ref 26–34)
MCHC RBC AUTO-ENTMCNC: 32.6 G/DL (ref 31–37)
MCV RBC AUTO: 92.3 FL (ref 80–100)
MONOCYTES # BLD AUTO: 0.3 X10(3) UL (ref 0.1–1)
MONOCYTES NFR BLD AUTO: 12.8 %
NEUTROPHILS # BLD AUTO: 0.93 X10 (3) UL (ref 1.5–7.7)
NEUTROPHILS # BLD AUTO: 0.93 X10(3) UL (ref 1.5–7.7)
NEUTROPHILS NFR BLD AUTO: 39.7 %
OSMOLALITY SERPL CALC.SUM OF ELEC: 296 MOSM/KG (ref 275–295)
PLATELET # BLD AUTO: 187 10(3)UL (ref 150–450)
POTASSIUM SERPL-SCNC: 3.6 MMOL/L (ref 3.5–5.1)
PROT SERPL-MCNC: 7.2 G/DL (ref 5.7–8.2)
RBC # BLD AUTO: 3.26 X10(6)UL (ref 3.8–5.3)
SODIUM SERPL-SCNC: 143 MMOL/L (ref 136–145)
WBC # BLD AUTO: 2.3 X10(3) UL (ref 4–11)

## 2025-07-24 RX ORDER — PALONOSETRON 0.05 MG/ML
0.25 INJECTION, SOLUTION INTRAVENOUS ONCE
Start: 2025-07-31 | End: 2025-07-24

## 2025-07-24 NOTE — PROGRESS NOTES
Treatment held today due to low anc.  Dressing changed and patient will return next week for repeat labs and possible treatment.    Education Record    Learner:  Patient    Disease / Diagnosis: Breast Cancer    Barriers / Limitations:  None   Comments:    Method:  Brief focused   Comments:    General Topics:  Plan of care reviewed   Comments:    Outcome:  Shows understanding   Comments:

## 2025-07-24 NOTE — PROGRESS NOTES
[facility]ematology Oncology Group Progress Note       Patient Name: Swathi Arguelles   YOB: 1965  Medical Record Number: [id number]  Attending Physician: Carl Lopez M.D.     The 21st Century Cures Act makes medical notes like these available to patients in the interest of transparency. Please be advised this is a medical document. Medical documents are intended to carry relevant information, facts as evident, and the clinical opinion of the practitioner. The medical note is intended as peer to peer communication and may appear blunt or direct. It is written in medical language and may contain abbreviations or verbiage that are unfamiliar.      Date of Visit: 7/24/2025       Chief Complaint  Invasive ductal carcinoma, left breast - follow up and treatment.     Oncologic History  Swathi Arguelles is a 60 year old post-menopausal female admitted to the hospital on 09/10/2013 with symptomatic anemia.  On physical examination revealed a malodorous, draining ulcerating right breast mass.  Upon questioning, she admitted that she first noticed the mass in approximately 01/2013.  She states that her only mammogram was approximately at age 43 years.  Biopsy showed grade 3 infiltrating ductal carcinoma.  The tumor was estrogen receptor positive, progesterone receptor negative, and Her2/alberto negative.  CT scans of the chest, abdomen, pelvis showed multiple right axillary lymph nodes, two micronodules in the lungs of unknown significance, and a left sided large cystic adnexal mass.  At initial diagnosis CA 15-3 was 48.3 U/mL,  CA 27.29 was 91.7 U/mL, and  was 171.5 U/mL.  PET/CT showed abnormal FDG uptake in the ulcerating mass of the right breast/chest wall and in retropectoral and subclavicular lymph nodes.  There was no uptake in the cystic pelvic mass or in 3 subcentimeter pulmonary nodules.  Pelvic ultrasound showed a complex multiloculated left adnexal cyst with no separate identifiable  ovarian tissue.  Noted was irregular thickening of one of the cyst walls that was worrisome for a cystic ovarian neoplasm.    Patient was premenopausal at diagnosis    On 10/04/2013 she began dose dense doxorubicin and cyclophosphamide. Treatment was complicated by neutropenic fever, anemia requiring transfusion, dehydration, and prolonged thrombocytopenia. CT scans after cycle 4 showed a marked improvement in the breast/chest wall mass and axillary adenopathy. Tumor markers markedly improved. She then received weekly paclitaxel. Treatment was complicated by peripheral neuropathy and neutropenia.     During paclitaxel therapy, she consented to BRCA1/2 testing.  Results obtained on 02/11/2014 revealed the deleterious BRCA2 mutation [id number].    On 03/18/2014 she underwent right mastectomy with axillary node dissection.  Pathology showed 1.4 cm grade 3 invasive ductal carcinoma with 12/12 lymph nodes negative for malignancy.  She also underwent diagnostic laparoscopy which showed the cystic left ovarian mass but no evidence of metastatic disease.    On 05/13/2014 she underwent bilateral salpingo-oophorectomy, bilateral pelvic lymphadenectomy, limited periaortic lymphadenectomy, omentectomy, peritoneal washings, and appendectomy.  Pathology, reviewed at the [facility], showed serous borderline tumor, typical type.  All lymph nodes and pelvic washings were negative for malignancy.    In 07/2014 she began adjuvant right chest wall/axilla radiation.    She began endocrine therapy with anastrazole in mid 08/2014.     Patient was last seen on 02/03/2017. On that day, patient was advised to continue anastrozole, continue increased surveillance with alternating mammography and breast MRI, and return for follow up in 05/2017. On that day, she expressed an openness to prophylactic left mastectomy if she was a candidate for breast reconstruction. Patient failed to return for follow up in 05/2017 as recommended. She  also failed to return a call from the breast navigator to arrange evaluation by plastic surgery. She did have a left sided mammogram as previously ordered by me in 06/2017. Patient was on anastrozole at least through 02/2017 but it is not clear when she discontinued therapy.    Patient next presented to the ED on 11/30/2024 with complaints of generalized weakness, poor appetite, and dyspnea with exertion. Laboratory studies showed anemia, hyponatremia, and hypochlorhydria. CTA chest was negative for pulmonary embolism or metastatic disease but did show a 22.3 x 14.6 x 16.0 cm mass arising from the left breast with areas of necrosis, possible extension into the intracostal musculature, and mildly enlarged left axillary lymph nodes. Visualization of left breast showed a large firm breast which an open area laterally with malodorous drainage. CT abdomen/pelvis w contrast on 12/01/2024 was negative for metastatic disease.     Patient reports that she first noticed a \"rash\" 1.5 months prior to presentation. She stated that 3 months prior to presentation, she did not notice any changes in the left breast. Notably, patient's last breast imaging prior to hospitalization was in 06/2017.    Biopsy of the left breast was performed on 12/02/2024. 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 Immunohistochemical studies were as follows: estrogen receptor 0%, progesterone receptor 0%, Her2 3+, Ki67 25%.     PET/CT scan on 02/09/2024 showed abnormal SUV uptake in the left breast mass peripherally, multiple left axillary lymph nodes, subpectoral lymph nodes, a subcentimeter mediastinal lymph node.     On 12/12/2024 she began neoadjuvant systemic therapy with TCHP. Patient achieved a partial response but imaging studies after 4 cycles showed continued marked involvement of the chest wall. She was then seen by Dr. Adam for evaluation who felt that surgical resection with chest wall resection was  possible but would be helped with further decrease in disease. Therefore, after 5 cycles of TCHP, therapy was switched to Enhertu.     She began cycle 1 of Enhertu on 04/10/2025; dose was reduced up front due to experienced myelosuppression during TCHP. MRI after 3 cycles showed marked improvement in her disease.     History of Present Illness  Patient returns for follow up and treatment. She states that over the last 3 weeks, she has noticed some mild increase in peripheral neuropathy involving her feet. It is not persistent and not apparent currently. She states that her energy level is good. She believes that her left breast mass has decreased further.     Performance Status   Karnofsky 90% - Normal, only minor signs/symptoms.      Past Medical History (historical data, reviewed by physician)   Invasive ductal carcinoma, left breast (as above); invasive ductal carcinoma, right breast (as above); diabetes mellitus, asthma.     Past Surgical History (historical data, reviewed by physician)   Right mastectomy and axillary node dissection; D&C.     Family History (historical data, reviewed by physician)   A three generation pedigree was obtained. Ms. Arguelles’s father  at age 76 from an unknown cancer. He had one brother and no sisters. Ms. Arguelles’s paternal grandmother  in her late 40s or early 50s from unknown causes. Ms. Arguelles’s mother is 76 years-old and has no history of cancer. Ms. Arguelles’s maternal grandmother  at age 59 from a myocardial infarction. Ms. Arguelles’s maternal grandmother’s sister had breast cancer in her 40s and had a mastectomy. There is no other known family history of cancer. Please see the pedigree for additional family history information.     Social History (historical data, reviewed by physician)   Denies tobacco, alcohol, illicit drug use.    Current Medications    Insulin Glargine-yfgn 100 UNIT/ML Subcutaneous Solution Pen-injector Inject 20 Units into the skin nightly. 15  mL 0    Insulin Pen Needle (BD PEN NEEDLE DANIELA U/F) 32G X 4 MM Does not apply Misc Up to  each 2    ondansetron (ZOFRAN) 8 MG tablet Take 1 tablet (8 mg total) by mouth every 8 (eight) hours as needed for Nausea. 30 tablet 3    prochlorperazine (COMPAZINE) 10 mg tablet Take 1 tablet (10 mg total) by mouth every 6 (six) hours as needed for Nausea. 30 tablet 3    sodium hypochlorite 0.125 % External Solution Moisten gauze with dakins, place onto wound, cover with baby diaper three times a day 1500 mL 3    HYDROcodone-acetaminophen 5-325 MG Oral Tab Take 1-2 tablets by mouth every 4 (four) hours as needed for Pain. 30 tablet 0    gabapentin 600 MG Oral Tab TAKE 1 TABLET BY MOUTH THREE TIMES DAILY; TAKE AN EXTRA 600MG AS NEEDED FOR SEVERE PAIN 270 tablet 1    traMADol 50 MG Oral Tab Take 1 tablet (50 mg total) by mouth every 6 (six) hours as needed. 20 tablet 0    fluticasone-salmeterol (WIXELA INHUB) 100-50 MCG/ACT Inhalation Aerosol Powder, Breath Activated Inhale 1 puff into the lungs 2 (two) times daily. 3 each 0    Insulin Lispro, 1 Unit Dial, 100 UNIT/ML Subcutaneous Solution Pen-injector Inject 10 Units into the skin in the morning, at noon, and at bedtime. 3 mL 0    Budesonide-Formoterol Fumarate 160-4.5 MCG/ACT Inhalation Aerosol Inhale 2 puffs into the lungs 2 (two) times daily. (Patient taking differently: Inhale 2 puffs into the lungs in the morning and 2 puffs before bedtime. Pt states she would like to go back to budesonide.) 3 each 1    albuterol (2.5 MG/3ML) 0.083% Inhalation Nebu Soln Take 3 mL (2.5 mg total) by nebulization every 4 (four) hours as needed for Wheezing or Shortness of Breath. 90 mL 0    montelukast 10 MG Oral Tab Take 1 tablet (10 mg total) by mouth nightly. 90 tablet 1      Allergies   Ms. Arguelles is allergic to fish, levemir, levonorgestrel-ethinyl estrad, and seasonal.    Vital Signs   Height: 154.2 cm (5' 0.71\") (07/24 0947)  Weight: 59.1 kg (130 lb 3.2 oz) (07/24 0947)  BSA  (Calculated - sq m): 1.57 sq meters (07/24 0947)  Pulse: 88 (07/24 0947)  BP: 149/86 (07/24 0947)  Temp: 97.8 °F (36.6 °C) (07/24 0947)  Do Not Use - Resp Rate: --  SpO2: 96 % (07/24 0947)     Physical Examination  Constitutional  Well developed and well nourished; in no apparent distress.  Head   Normocephalic and atraumatic.  Eyes   Conjunctiva clear; sclera anicteric.  ENMT   External nose normal; external ears normal.  Respiratory  Normal effort; no respiratory distress; clear to auscultation bilaterally.  Cardiovascular  Regular rate and rhythm; normal S1S2.  Abdomen  Not distended.   Extremities  No lower extremity edema.  Neurologic  Motor and sensory grossly intact.  Psychiatric  Mood and affect appropriate.    Laboratory  Recent Results (from the past 72 hours)   CBC W Differential W Platelet    Collection Time: 07/24/25  9:52 AM   Result Value Ref Range    WBC 2.3 (L) 4.0 - 11.0 x10(3) uL    RBC 3.26 (L) 3.80 - 5.30 x10(6)uL    HGB 9.8 (L) 12.0 - 16.0 g/dL    HCT 30.1 (L) 35.0 - 48.0 %    .0 150.0 - 450.0 10(3)uL    MCV 92.3 80.0 - 100.0 fL    MCH 30.1 26.0 - 34.0 pg    MCHC 32.6 31.0 - 37.0 g/dL    RDW 14.5 %    Neutrophil Absolute Prelim 0.93 (L) 1.50 - 7.70 x10 (3) uL    Neutrophil Absolute 0.93 (L) 1.50 - 7.70 x10(3) uL    Lymphocyte Absolute 1.03 1.00 - 4.00 x10(3) uL    Monocyte Absolute 0.30 0.10 - 1.00 x10(3) uL    Eosinophil Absolute 0.06 0.00 - 0.70 x10(3) uL    Basophil Absolute 0.02 0.00 - 0.20 x10(3) uL    Immature Granulocyte Absolute 0.00 0.00 - 1.00 x10(3) uL    Neutrophil % 39.7 %    Lymphocyte % 44.0 %    Monocyte % 12.8 %    Eosinophil % 2.6 %    Basophil % 0.9 %    Immature Granulocyte % 0.0 %   Comp Metabolic Panel (14)    Collection Time: 07/24/25  9:52 AM   Result Value Ref Range    Glucose 90 70 - 99 mg/dL    Sodium 143 136 - 145 mmol/L    Potassium 3.6 3.5 - 5.1 mmol/L    Chloride 104 98 - 112 mmol/L    CO2 32.0 21.0 - 32.0 mmol/L    Anion Gap 7 0 - 18 mmol/L    BUN 13 9 -  23 mg/dL    Creatinine 0.99 0.55 - 1.02 mg/dL    Calcium, Total 9.6 8.7 - 10.6 mg/dL    Calculated Osmolality 296 (H) 275 - 295 mOsm/kg    eGFR-Cr 64 >=60 mL/min/1.73m2    AST 19 <34 U/L    ALT 8 (L) 10 - 49 U/L    Alkaline Phosphatase 92 50 - 130 U/L    Bilirubin, Total 0.2 0.2 - 1.1 mg/dL    Total Protein 7.2 5.7 - 8.2 g/dL    Albumin 4.1 3.2 - 4.8 g/dL    Globulin  3.1 2.0 - 3.5 g/dL    A/G Ratio 1.3 1.0 - 2.0    Patient Fasting for CMP? No      Impression and Plan  1.   Breast cancer, left sided: Patient is staged as T4N3M1 (one subcentimeter mediastinal lymph node). Her disease is estrogen and progesterone receptor negative and Her2 3+. She received 5 cycles of TCHP and then switched to single agent Enhertu when MRI showed continued significant chest wall involvement by tumor.           There is significant improvement in disease over the course of Enhertu. Hold therapy today for neutropenia; she will return next week for treatment. In light of her reports of ?increased peripheral neuropathy, will obtain breast MRI prior to next cycle. If neuropathy is indeed worsening, will discuss pursuing surgical resection with surgical oncology.     2.   Left breast wound: Patient is following with wound care.     Planned Follow up  Patient will return for treatment in 1 week and for start of next cycle in 4 weeks.    Electronically Signed by:     Carl Lopez M.D.  System Medical Director, Oncology Services  [facility]

## 2025-07-31 ENCOUNTER — OFFICE VISIT (OUTPATIENT)
Facility: LOCATION | Age: 60
End: 2025-07-31
Attending: SPECIALIST
Payer: COMMERCIAL

## 2025-07-31 VITALS
WEIGHT: 129.63 LBS | RESPIRATION RATE: 18 BRPM | SYSTOLIC BLOOD PRESSURE: 146 MMHG | HEIGHT: 60.71 IN | BODY MASS INDEX: 24.79 KG/M2 | DIASTOLIC BLOOD PRESSURE: 82 MMHG | HEART RATE: 77 BPM | OXYGEN SATURATION: 96 % | TEMPERATURE: 98 F

## 2025-07-31 DIAGNOSIS — Z17.1 MALIGNANT NEOPLASM OF OVERLAPPING SITES OF LEFT BREAST IN FEMALE, ESTROGEN RECEPTOR NEGATIVE (HCC): Primary | ICD-10-CM

## 2025-07-31 DIAGNOSIS — C50.812 MALIGNANT NEOPLASM OF OVERLAPPING SITES OF LEFT BREAST IN FEMALE, ESTROGEN RECEPTOR NEGATIVE (HCC): Primary | ICD-10-CM

## 2025-07-31 DIAGNOSIS — C77.3 SECONDARY MALIGNANT NEOPLASM OF AXILLARY LYMPH NODES (HCC): ICD-10-CM

## 2025-07-31 LAB
ALBUMIN SERPL-MCNC: 4.1 G/DL (ref 3.2–4.8)
ALBUMIN/GLOB SERPL: 1.3 (ref 1–2)
ALP LIVER SERPL-CCNC: 91 U/L (ref 50–130)
ALT SERPL-CCNC: 10 U/L (ref 10–49)
ANION GAP SERPL CALC-SCNC: 9 MMOL/L (ref 0–18)
AST SERPL-CCNC: 20 U/L (ref ?–34)
BASOPHILS # BLD AUTO: 0.01 X10(3) UL (ref 0–0.2)
BASOPHILS NFR BLD AUTO: 0.3 %
BILIRUB SERPL-MCNC: 0.3 MG/DL (ref 0.2–1.1)
BUN BLD-MCNC: 17 MG/DL (ref 9–23)
CALCIUM BLD-MCNC: 9.6 MG/DL (ref 8.7–10.6)
CHLORIDE SERPL-SCNC: 103 MMOL/L (ref 98–112)
CO2 SERPL-SCNC: 30 MMOL/L (ref 21–32)
CREAT BLD-MCNC: 0.98 MG/DL (ref 0.55–1.02)
EGFRCR SERPLBLD CKD-EPI 2021: 65 ML/MIN/1.73M2 (ref 60–?)
EOSINOPHIL # BLD AUTO: 0.05 X10(3) UL (ref 0–0.7)
EOSINOPHIL NFR BLD AUTO: 1.7 %
ERYTHROCYTE [DISTWIDTH] IN BLOOD BY AUTOMATED COUNT: 14.2 %
FASTING STATUS PATIENT QL REPORTED: NO
GLOBULIN PLAS-MCNC: 3.1 G/DL (ref 2–3.5)
GLUCOSE BLD-MCNC: 126 MG/DL (ref 70–99)
HCT VFR BLD AUTO: 30.6 % (ref 35–48)
HGB BLD-MCNC: 10 G/DL (ref 12–16)
IMM GRANULOCYTES # BLD AUTO: 0 X10(3) UL (ref 0–1)
IMM GRANULOCYTES NFR BLD: 0 %
LYMPHOCYTES # BLD AUTO: 0.88 X10(3) UL (ref 1–4)
LYMPHOCYTES NFR BLD AUTO: 30.2 %
MCH RBC QN AUTO: 31 PG (ref 26–34)
MCHC RBC AUTO-ENTMCNC: 32.7 G/DL (ref 31–37)
MCV RBC AUTO: 94.7 FL (ref 80–100)
MONOCYTES # BLD AUTO: 0.25 X10(3) UL (ref 0.1–1)
MONOCYTES NFR BLD AUTO: 8.6 %
NEUTROPHILS # BLD AUTO: 1.72 X10 (3) UL (ref 1.5–7.7)
NEUTROPHILS # BLD AUTO: 1.72 X10(3) UL (ref 1.5–7.7)
NEUTROPHILS NFR BLD AUTO: 59.2 %
OSMOLALITY SERPL CALC.SUM OF ELEC: 297 MOSM/KG (ref 275–295)
PLATELET # BLD AUTO: 185 10(3)UL (ref 150–450)
POTASSIUM SERPL-SCNC: 4 MMOL/L (ref 3.5–5.1)
PROT SERPL-MCNC: 7.2 G/DL (ref 5.7–8.2)
RBC # BLD AUTO: 3.23 X10(6)UL (ref 3.8–5.3)
SODIUM SERPL-SCNC: 142 MMOL/L (ref 136–145)
WBC # BLD AUTO: 2.9 X10(3) UL (ref 4–11)

## 2025-07-31 RX ORDER — PALONOSETRON 0.05 MG/ML
0.25 INJECTION, SOLUTION INTRAVENOUS ONCE
Status: COMPLETED | OUTPATIENT
Start: 2025-07-31 | End: 2025-07-31

## 2025-07-31 RX ADMIN — PALONOSETRON 0.25 MG: 0.05 INJECTION, SOLUTION INTRAVENOUS at 15:16:00

## 2025-08-07 ENCOUNTER — NURSE ONLY (OUTPATIENT)
Facility: LOCATION | Age: 60
End: 2025-08-07
Attending: SPECIALIST

## 2025-08-14 ENCOUNTER — NURSE ONLY (OUTPATIENT)
Facility: LOCATION | Age: 60
End: 2025-08-14
Attending: SPECIALIST

## 2025-08-16 ENCOUNTER — HOSPITAL ENCOUNTER (OUTPATIENT)
Dept: MRI IMAGING | Facility: HOSPITAL | Age: 60
Discharge: HOME | End: 2025-08-16
Attending: SPECIALIST

## 2025-08-16 DIAGNOSIS — C50.811 CANCER OF OVERLAPPING SITES OF RIGHT BREAST (HCC): ICD-10-CM

## 2025-08-16 DIAGNOSIS — Z15.09 BRCA2 GENETIC CARRIER: ICD-10-CM

## 2025-08-16 DIAGNOSIS — Z15.01 BRCA2 GENETIC CARRIER: ICD-10-CM

## 2025-08-16 PROCEDURE — 77049 MRI BREAST C-+ W/CAD BI: CPT | Performed by: SPECIALIST

## 2025-08-16 PROCEDURE — A9575 INJ GADOTERATE MEGLUMI 0.1ML: HCPCS | Performed by: SPECIALIST

## 2025-08-16 RX ORDER — GADOTERATE MEGLUMINE 376.9 MG/ML
15 INJECTION INTRAVENOUS
Status: COMPLETED | OUTPATIENT
Start: 2025-08-16 | End: 2025-08-16

## 2025-08-16 RX ADMIN — GADOTERATE MEGLUMINE 11 ML: 376.9 INJECTION INTRAVENOUS at 11:20:00

## 2025-08-21 ENCOUNTER — OFFICE VISIT (OUTPATIENT)
Facility: LOCATION | Age: 60
End: 2025-08-21
Attending: SPECIALIST

## 2025-08-21 VITALS
HEIGHT: 60.71 IN | BODY MASS INDEX: 24.87 KG/M2 | SYSTOLIC BLOOD PRESSURE: 148 MMHG | RESPIRATION RATE: 16 BRPM | DIASTOLIC BLOOD PRESSURE: 78 MMHG | TEMPERATURE: 98 F | HEART RATE: 88 BPM | OXYGEN SATURATION: 93 % | WEIGHT: 130 LBS

## 2025-08-21 DIAGNOSIS — Z17.1 MALIGNANT NEOPLASM OF OVERLAPPING SITES OF LEFT BREAST IN FEMALE, ESTROGEN RECEPTOR NEGATIVE (HCC): Primary | ICD-10-CM

## 2025-08-21 DIAGNOSIS — M79.2 NEUROPATHIC PAIN: ICD-10-CM

## 2025-08-21 DIAGNOSIS — C77.3 SECONDARY MALIGNANT NEOPLASM OF AXILLARY LYMPH NODES (HCC): ICD-10-CM

## 2025-08-21 DIAGNOSIS — T45.1X5A NEUROPATHY DUE TO CHEMOTHERAPEUTIC DRUG (HCC): ICD-10-CM

## 2025-08-21 DIAGNOSIS — G62.0 NEUROPATHY DUE TO CHEMOTHERAPEUTIC DRUG (HCC): ICD-10-CM

## 2025-08-21 DIAGNOSIS — G62.0 DRUG-INDUCED PERIPHERAL NEUROPATHY (HCC): ICD-10-CM

## 2025-08-21 DIAGNOSIS — C50.812 MALIGNANT NEOPLASM OF OVERLAPPING SITES OF LEFT BREAST IN FEMALE, ESTROGEN RECEPTOR NEGATIVE (HCC): Primary | ICD-10-CM

## 2025-08-21 DIAGNOSIS — D64.9 NORMOCYTIC ANEMIA: ICD-10-CM

## 2025-08-21 DIAGNOSIS — E11.9 TYPE 2 DIABETES MELLITUS WITHOUT COMPLICATION, UNSPECIFIED WHETHER LONG TERM INSULIN USE (HCC): ICD-10-CM

## 2025-08-21 DIAGNOSIS — Z00.00 GENERAL MEDICAL EXAM: ICD-10-CM

## 2025-08-21 LAB
ALBUMIN SERPL-MCNC: 4.2 G/DL (ref 3.2–4.8)
ALBUMIN SERPL-MCNC: 4.3 G/DL (ref 3.2–4.8)
ALBUMIN/GLOB SERPL: 1.4 (ref 1–2)
ALBUMIN/GLOB SERPL: 1.5 (ref 1–2)
ALP LIVER SERPL-CCNC: 106 U/L (ref 50–130)
ALP LIVER SERPL-CCNC: 110 U/L (ref 50–130)
ALT SERPL-CCNC: 7 U/L (ref 10–49)
ALT SERPL-CCNC: 8 U/L (ref 10–49)
ANION GAP SERPL CALC-SCNC: 8 MMOL/L (ref 0–18)
ANION GAP SERPL CALC-SCNC: 8 MMOL/L (ref 0–18)
AST SERPL-CCNC: 14 U/L (ref ?–34)
AST SERPL-CCNC: 15 U/L (ref ?–34)
BASOPHILS # BLD AUTO: 0.02 X10(3) UL (ref 0–0.2)
BASOPHILS NFR BLD AUTO: 0.8 %
BILIRUB SERPL-MCNC: 0.4 MG/DL (ref 0.2–1.1)
BILIRUB SERPL-MCNC: 0.4 MG/DL (ref 0.2–1.1)
BUN BLD-MCNC: 12 MG/DL (ref 9–23)
BUN BLD-MCNC: 14 MG/DL (ref 9–23)
CALCIUM BLD-MCNC: 10 MG/DL (ref 8.7–10.6)
CALCIUM BLD-MCNC: 9.9 MG/DL (ref 8.7–10.6)
CHLORIDE SERPL-SCNC: 104 MMOL/L (ref 98–112)
CHLORIDE SERPL-SCNC: 104 MMOL/L (ref 98–112)
CHOLEST SERPL-MCNC: 191 MG/DL (ref ?–200)
CO2 SERPL-SCNC: 28 MMOL/L (ref 21–32)
CO2 SERPL-SCNC: 28 MMOL/L (ref 21–32)
CREAT BLD-MCNC: 0.89 MG/DL (ref 0.55–1.02)
CREAT BLD-MCNC: 0.95 MG/DL (ref 0.55–1.02)
CREAT UR-SCNC: 82.3 MG/DL
EGFRCR SERPLBLD CKD-EPI 2021: 68 ML/MIN/1.73M2 (ref 60–?)
EGFRCR SERPLBLD CKD-EPI 2021: 73 ML/MIN/1.73M2 (ref 60–?)
EOSINOPHIL # BLD AUTO: 0.05 X10(3) UL (ref 0–0.7)
EOSINOPHIL NFR BLD AUTO: 2.1 %
ERYTHROCYTE [DISTWIDTH] IN BLOOD BY AUTOMATED COUNT: 14.2 %
EST. AVERAGE GLUCOSE BLD GHB EST-MCNC: 120 MG/DL (ref 68–126)
FASTING PATIENT LIPID ANSWER: YES
FASTING STATUS PATIENT QL REPORTED: NO
FASTING STATUS PATIENT QL REPORTED: YES
GLOBULIN PLAS-MCNC: 2.9 G/DL (ref 2–3.5)
GLOBULIN PLAS-MCNC: 2.9 G/DL (ref 2–3.5)
GLUCOSE BLD-MCNC: 104 MG/DL (ref 70–99)
GLUCOSE BLD-MCNC: 128 MG/DL (ref 70–99)
HBA1C MFR BLD: 5.8 % (ref ?–5.7)
HCT VFR BLD AUTO: 31.1 % (ref 35–48)
HDLC SERPL-MCNC: 59 MG/DL (ref 40–59)
HGB BLD-MCNC: 10.3 G/DL (ref 12–16)
IMM GRANULOCYTES # BLD AUTO: 0 X10(3) UL (ref 0–1)
IMM GRANULOCYTES NFR BLD: 0 %
LDLC SERPL CALC-MCNC: 117 MG/DL (ref ?–100)
LYMPHOCYTES # BLD AUTO: 0.8 X10(3) UL (ref 1–4)
LYMPHOCYTES NFR BLD AUTO: 33.6 %
MCH RBC QN AUTO: 30.1 PG (ref 26–34)
MCHC RBC AUTO-ENTMCNC: 33.1 G/DL (ref 31–37)
MCV RBC AUTO: 90.9 FL (ref 80–100)
MICROALBUMIN UR-MCNC: 0.3 MG/DL
MICROALBUMIN/CREAT 24H UR-RTO: 3.6 UG/MG (ref ?–30)
MONOCYTES # BLD AUTO: 0.24 X10(3) UL (ref 0.1–1)
MONOCYTES NFR BLD AUTO: 10.1 %
NEUTROPHILS # BLD AUTO: 1.27 X10 (3) UL (ref 1.5–7.7)
NEUTROPHILS # BLD AUTO: 1.27 X10(3) UL (ref 1.5–7.7)
NEUTROPHILS NFR BLD AUTO: 53.4 %
NONHDLC SERPL-MCNC: 132 MG/DL (ref ?–130)
OSMOLALITY SERPL CALC.SUM OF ELEC: 290 MOSM/KG (ref 275–295)
OSMOLALITY SERPL CALC.SUM OF ELEC: 292 MOSM/KG (ref 275–295)
PLATELET # BLD AUTO: 195 10(3)UL (ref 150–450)
POTASSIUM SERPL-SCNC: 3.6 MMOL/L (ref 3.5–5.1)
POTASSIUM SERPL-SCNC: 3.6 MMOL/L (ref 3.5–5.1)
PROT SERPL-MCNC: 7.1 G/DL (ref 5.7–8.2)
PROT SERPL-MCNC: 7.2 G/DL (ref 5.7–8.2)
RBC # BLD AUTO: 3.42 X10(6)UL (ref 3.8–5.3)
SODIUM SERPL-SCNC: 140 MMOL/L (ref 136–145)
SODIUM SERPL-SCNC: 140 MMOL/L (ref 136–145)
TRIGL SERPL-MCNC: 82 MG/DL (ref 30–149)
VLDLC SERPL CALC-MCNC: 14 MG/DL (ref 0–30)
WBC # BLD AUTO: 2.4 X10(3) UL (ref 4–11)

## 2025-08-21 RX ORDER — PALONOSETRON 0.05 MG/ML
0.25 INJECTION, SOLUTION INTRAVENOUS ONCE
Status: CANCELLED
Start: 2025-08-21 | End: 2025-08-21

## 2025-08-21 RX ORDER — PALONOSETRON 0.05 MG/ML
0.25 INJECTION, SOLUTION INTRAVENOUS ONCE
Status: COMPLETED | OUTPATIENT
Start: 2025-08-21 | End: 2025-08-21

## 2025-08-21 RX ADMIN — PALONOSETRON 0.25 MG: 0.05 INJECTION, SOLUTION INTRAVENOUS at 14:50:00

## 2025-08-22 RX ORDER — GABAPENTIN 600 MG/1
TABLET ORAL
Qty: 270 TABLET | Refills: 1 | Status: SHIPPED | OUTPATIENT
Start: 2025-08-22

## 2025-08-28 ENCOUNTER — NURSE ONLY (OUTPATIENT)
Facility: LOCATION | Age: 60
End: 2025-08-28
Attending: SPECIALIST

## (undated) DIAGNOSIS — J45.20 MILD INTERMITTENT ASTHMA WITHOUT COMPLICATION: ICD-10-CM

## (undated) NOTE — LETTER
June 8, 2018        [address] [facility] [address]      Dear Liana Zabala:    I am the Nurse Care Manager with Betty Rutledge, DO office. Just reaching out to see how you are doing since your discharge home.    When you have a minute woul

## (undated) NOTE — LETTER
12/29/20        [address] [facility]      Dear Rj Hutchinson,    Our records indicate that you have outstanding lab work and or testing that was ordered for you and has not yet been completed:  Orders Placed This Encounter

## (undated) NOTE — LETTER
12/03/24    Swathi Arguelles      To Whom It May Concern:    This letter has been written at the patient's request. The above patient was seen at [facility] for treatment of a medical condition from 11/30/24-12/03/24.    The patient may return to work/school on 12/12/24.      Sincerely,      Lorenzo Kelly MD   12/03/24, 4:34 PM

## (undated) NOTE — LETTER
Date & Time: 4/11/2018, 11:51 AM  Patient: Jerzy Connors  Encounter Provider(s):    Lizzie Mcfadden MD       To Whom It May Concern:    Jerzy Connors was seen and treated in our department on 4/11/2018.  She may return to work on Monday April 16,201

## (undated) NOTE — LETTER
04/24/19        Swathi Arguelles  [address] [place] [address]      Dear Kaushal Mar,    Our records indicate that you have outstanding lab work and or testing that was ordered for you and has not yet been completed:  Orders Placed This Encounter

## (undated) NOTE — LETTER
Date: 2/5/2025  Patient name: Swathi Arguelles  YOB: 1965  Medical Record Number: [id number]  Primary Coverage: Payor: [facility] PPO / Plan: BLUE CROSS [facility] PPO / Product Type: PPO /   Secondary Coverage:   Insurance ID: [id number]  Patient Address: [address] [place] [id number]  Telephone Information:   Home Phone [phone]   Mobile [phone]       Encounter Date: 2/5/2025  Provider: SHANA Hummel  Diagnosis: No diagnosis found.      Wound 01/08/25 #1 Breast Left (Active)   Date First Assessed/Time First Assessed: 01/08/25 1414    Wound Number (Wound Clinic Only): #1  Primary Wound Type: (c) Other (comment)  Location: Breast  Wound Location Orientation: Left      Assessments 1/8/2025  2:16 PM 2/5/2025  1:33 PM   Wound Image             Drainage Amount Large Large   Drainage Description Serous;Yellow Serosanguineous   Wound Length (cm) 17 cm 12.8 cm   Wound Width (cm) 30 cm 22.5 cm   Wound Surface Area (cm^2) 510 cm^2 288 cm^2   Wound Depth (cm) 1 cm 5.5 cm   Wound Volume (cm^3) 510 cm^3 1584 cm^3   Wound Healing % -- -211   Margins Well-defined edges Well-defined edges   Non-staged Wound Description Full thickness Full thickness   Lora-wound Assessment Edema Dry;Edema;Maceration;Moist   Wound Granulation Tissue Red;Pink;Spongy --   Wound Bed Granulation (%) 10 % 10 %   Wound Bed Epithelium (%) 15 % 40 %   Wound Bed Slough (%) 75 % --   Wound Odor Strong Strong   Shape -- Clustered - 50% necrotic tissue       Active Orders   Date Order Priority Status Authorizing Provider   02/05/25 1427 Debridement Other (comment) Left Breast Routine Active Vira Alejandro APRN       Inactive Orders   Date Order Priority Status Authorizing Provider   01/22/25 1503 Debridement Other (comment) Left Breast Routine Completed Vira Alejandro APRN   01/08/25 1618 Debridement Other (comment) Left Breast Routine Completed Vira Alejandro APRN       Wound 02/05/25 #2 Left lower leg Leg Left (Active)   Date First  Assessed/Time First Assessed: 02/05/25 1348    Wound Number (Wound Clinic Only): #2 Left lower leg  Primary Wound Type: Venous Ulcer  Location: Leg  Wound Location Orientation: Left      Assessments 2/5/2025  1:50 PM   Wound Image      Drainage Amount Scant   Drainage Description Yellow;Serous   Wound Length (cm) 1.7 cm   Wound Width (cm) 1.5 cm   Wound Surface Area (cm^2) 2.55 cm^2   Wound Depth (cm) 0.1 cm   Wound Volume (cm^3) 0.255 cm^3   Margins Well-defined edges   Non-staged Wound Description Full thickness   Wound Bed Slough (%) 100 %   Wound Odor Mild   Tunneling? No   Undermining? No   Sinus Tracts? No       Active Orders   Date Order Priority Status Authorizing Provider   02/05/25 1424 Debridement Venous Ulcer Left Leg Routine Active Vira Alejandro APRN               Wound Cleaning and Dressings:  Showering directions: May shower with protection  Wound cleansing:  Cleanse with Dakins solution  Wound product: Other Silver alginate to leg wound, cover with border gauze.  VASHE wet to dry with conforming gauze roll and cover with border foam twice daily.  Dressing change frequency:  Change dressing twice daily  Enzymatic agent:  Not applicable      Follow Up:  No follow-ups on file.      Additional Notes:  9x9 Zetuvit silicone border sacral.  Dispense as written.

## (undated) NOTE — LETTER
[facility]  [address] [facility] [address]  [phone]    Blood Transfusion Consent    In the course of your treatment, it may become necessary to administer a transfusion of blood or blood components. This form provides basic information concerning this procedure and, if signed by you, authorizes its administration. By signing this form, you agree that all of your questions about the administration of blood or blood products have been answered by the ordering medical professional or designee.    Description of Procedure  Blood is introduced into one of your veins, commonly in the arm, using a sterilized disposable needle. The amount of blood transfused, and whether the transfusion will be of blood or blood components is a judgement the physician will make based on your particular needs.    Risks  The transfusion is a common procedure of low risk.  MINOR AND TEMPORARY REACTIONS ARE NOT UNCOMMON, including a slight bruise, swelling or local reaction in the area where the needle pierces your skin, or a nonserious reaction to the transfused material itself, including headache, fever or mild skin reaction, such as rash.  Serious reactions are possible, though very unlikely, and include severe allergic reaction (shock) and destruction (hemolysis) of transfused blood cells.  Infectious diseases which are known to be transmitted by blood transfusion include certain types of viral Hepatitis(liver infection from a virus), Human Immunodeficiency Virus (HIV-1,2) infection, a viral infection known to cause Acquired Immunodeficiency Syndrome (AIDS), as well as certain other bacterial, viral, and parasitic diseases. While a minimal risk of acquiring an infectious disease from transfused blood exists, in accordance with the Federal and State law, all due care has been taken in donor selection and testing to avoid transmission of disease.    Alternatives  If loss of blood poses serious threats during your  treatment, THERE IS NO EFFECTIVE ALTERNATIVE TO BLOOD TRANSFUSION. However, if you have any further questions on this matter, your provider will fully explain the alternatives to you if it has not already been done.    I, ______________________________, have read/had read to me the above. I understand the matters bearing on the decision whether or not to authorize a transfusion of blood or blood components. I have no questions which have not been answered to my full satisfaction. I hereby consent to such transfusion as my physician may deem necessary or advisable in the course of my treatment.    ______________________________________________                    ___________________________  (Signature of Patient or Responsible party in case of minor,                 (Printed Name of Patient or incompetent, or unconscious patient)              Responsible Party)    ___________________________               _____________________  (Relationship to Patient if not self)                                    (Date and Time)    __________________________                                                           ______________________              (Signature of Witness)               (Printed Name of Witness)     Language line ()    Telephone/Verbal/Video Consent    __________________________                     ____________________  (Signature of 2nd Witness           (Printed Name of 2nd  Telephone/Verbal/Video Consent)           Witness)    Patient Name: Swathi Arguelles     : 1965                 Printed: 2024     Medical Record #: [id number]      Rev: 2023

## (undated) NOTE — LETTER
Date: 2/25/2025  Patient name: Swathi Arguelles  YOB: 1965  Medical Record Number: [id number]  Primary Coverage: Payor: [facility] PPO / Plan: BLUE CROSS [facility] PPO / Product Type: PPO /   Secondary Coverage:   Insurance ID: [id number]  Patient Address: [address] [place] [id number]  Telephone Information:   Home Phone [phone]   Mobile [phone]         Encounter Date: 2/25/2025  Provider: SHANA Hummel  Diagnosis:     ICD-10-CM   1. Malignant neoplasm of overlapping sites of left breast in female, estrogen receptor negative (HCC)  C50.812    Z17.1   2. 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 Patient reports no pain to the wounds.      HPI:   Information obtained from patient, daughter, chart  1-8-25 INITIAL:  Patient is a 58 yo female who was dx with right breast ca in 2013 and was lost to follow-up in 2017.  Patient next presented to the ED on 11/30/2024 with complaints of generalized weakness, poor appetite, and dyspnea with exertion. Laboratory studies showed anemia, hyponatremia, and hypochlorhydria. CTA chest was negative for pulmonary embolism or metastatic disease but did show a 22.3 x 14.6 x 16.0 cm mass arising from the left breast with areas of necrosis, possible extension into the intracostal musculature, and mildly enlarged left axillary lymph nodes. Visualization of left breast showed a large firm breast which an open area laterally with malodorous drainage. CT abdomen/pelvis w contrast on 12/01/2024 was negative for metastatic disease. Biopsy of the left breast was performed on 12/02/2024. 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 It was discussed with patient that the left breast wound will not heal  and she is presenting today for assistance with management of the left breast wound.  There is significant malodor and necrotic, liquifing adipose tissue.  I was able to remove a large amount of it which will help with the drainage and malodor.  Patient has not been showering because she did not think she should get the wound wet.  The wound is not overly vascular.  We discussed utilizing dakins solution and a baby diaper.  Will order supplies for patient.     1-22-25 patient returns.  Since last visit patient was seen at cancer clinic for ivfs for dehydration as she has not been able to hydrate adequately.  patient's care plan of her breast has included: dressing with dakins and using large bordered foam over twice daily. She states with the baby diaper she felt as though she was always getting wet and the camisole and the border dressings helps prevent leaking. Will order patient some border zetuvit dressings. She states she did not get any delievery. Rn will f/u with carmen. Today the lesion to the most anterior is now open with necrosis leaking out.  She states the malodor improved for a couple days after last appointment but has been slowly returning.  I again was able to remove a good amount of necrotic tissue which should help with the malodor.  We discussed that should the drainage start to slow, we can transition to metrogel (instead of the dakins). Patient has no c/o pain.    2-5-25 patient returns.  Since last visit she did meet with dietician. She also followed up with oncology and they rx'd augmentin for her left lower leg and had her get a doppler on 1-30-25 which was negative for dvt. I did insist today that we should look at that wound and she was agreeable.  The lle wound appears to be a hematoma, s/p debridement anterior tibia is exposed.  She is continuing with chemo with dose reduction due to her thrombocytopenia. She is also getting ivfs as needed.  She has continued to dress with dakins moist  gauze.  Her malodor remains significant, the two areas now communicate. Her breast appears more firm and larger, but she does not feel like it is enlarging.  I debrided again today, will have her pack with roll gauze.  I will also touch base with dr holliday regarding any plan for surgical intervention.    2-18-25 patient returns.  I communicated with dr. Holliday and the plan is for ultimate curative radiation and surgery, but chemo needs to be done first.  The continued necrosis of the tissue is expected as the chemo addresses the cancer.  She has a f/u appointment with Dr. Holliday this Thursday.   She has continued to dress with dakins and roll gauze.  She has been dressing her lower leg wound with silver alginate.  Will run patient for ctp for her left leg, will utilize collagen and xeroform today.  Debridement done. Patient states that after debridement malodor improves and then as it gets closer to appointment the malodor starts to increase.  The other option for malodor would be metro gel however I feel that will make the drainage unmanageable. Will continue with the dakins at this time. She can also increase the frequency of her appointments.     2-25-25 patient returns.  It has been one week since last visit. Theraskin is not approved, keracis is still pending for her leg.  Patient followed up with dr. Holliday last week and she will be continuing with chemotherapy, but plan is to get an mri of the breast prior to cycle 5-this is scheduled for march 10.  Her chemo is scheduled for 2 day from now (Thursday).  She has continued with the dakins dressings. She reports the malodor has been about the same, today it seems stronger. Will utilize crushed flagyl and see if that helps.   The lower leg wound is measuring smaller but still has a build up of necrosis in the wound bed. Will have her dress with honey to the leg, continue with dakins to the breast. No s/s of infection or c/o pain.  The breast wound does seem  to be \"accumulating\" less necrosis from visit to visit.    MEDICATIONS:     Current Outpatient Medications:     metroNIDAZOLE 500 MG Oral Tab, Crush 4 tablets and sprinkle into wound twice daily for 30 days, Disp: 240 tablet, Rfl: 0    ampicillin 500 MG Oral Cap, Take 1 capsule (500 mg total) by mouth in the morning and 1 capsule (500 mg total) before bedtime., Disp: , Rfl:     amoxicillin clavulanate 875-125 MG Oral Tab, Take 1 tablet by mouth 2 (two) times daily., Disp: 20 tablet, Rfl: 0    sodium hypochlorite 0.125 % External Solution, Moisten gauze with dakins, place onto wound, cover with baby diaper three times a day, Disp: 1000 mL, Rfl: 3    HYDROcodone-acetaminophen 5-325 MG Oral Tab, Take 1-2 tablets by mouth every 4 (four) hours as needed for Pain., Disp: 30 tablet, Rfl: 0    gabapentin 600 MG Oral Tab, TAKE 1 TABLET BY MOUTH THREE TIMES DAILY; TAKE AN EXTRA 600MG AS NEEDED FOR SEVERE PAIN, Disp: 270 tablet, Rfl: 1    Insulin Glargine-yfgn 100 UNIT/ML Subcutaneous Solution Pen-injector, Inject 20 Units into the skin nightly., Disp: 24 mL, Rfl: 0    prochlorperazine (COMPAZINE) 10 mg tablet, Take 1 tablet (10 mg total) by mouth every 6 (six) hours as needed for Nausea., Disp: 30 tablet, Rfl: 3    ondansetron (ZOFRAN) 8 MG tablet, Take 1 tablet (8 mg total) by mouth every 8 (eight) hours as needed for Nausea., Disp: 30 tablet, Rfl: 3    traMADol 50 MG Oral Tab, Take 1 tablet (50 mg total) by mouth every 6 (six) hours as needed., Disp: 20 tablet, Rfl: 0    fluticasone-salmeterol (WIXELA INHUB) 100-50 MCG/ACT Inhalation Aerosol Powder, Breath Activated, Inhale 1 puff into the lungs 2 (two) times daily., Disp: 3 each, Rfl: 0    Insulin Lispro, 1 Unit Dial, 100 UNIT/ML Subcutaneous Solution Pen-injector, Inject 10 Units into the skin in the morning, at noon, and at bedtime., Disp: 3 mL, Rfl: 0    Budesonide-Formoterol Fumarate 160-4.5 MCG/ACT Inhalation Aerosol, Inhale 2 puffs into the lungs 2 (two) times daily.  (Patient taking differently: Inhale 2 puffs into the lungs 2 (two) times daily. Pt states she would like to go back to budesonide), Disp: 3 each, Rfl: 1    albuterol (2.5 MG/3ML) 0.083% Inhalation Nebu Soln, Take 3 mL (2.5 mg total) by nebulization every 4 (four) hours as needed for Wheezing or Shortness of Breath., Disp: 90 mL, Rfl: 0    Insulin Pen Needle (BD PEN NEEDLE DANIELA U/F) 32G X 4 MM Does not apply Misc, Up to TID, Disp: 200 each, Rfl: 2    montelukast 10 MG Oral Tab, Take 1 tablet (10 mg total) by mouth nightly., Disp: 90 tablet, Rfl: 1  ALLERGIES:   Allergies[1]   REVIEW OF SYSTEMS:   This information was obtained from the patient/family and chart.    See HPI for pertinent positives, otherwise 10 pt ROS negative.    HISTORY:   Past medical, surgical, family and social history updated where appropriate.      PHYSICAL EXAM:     Vitals:    02/25/25 1124   BP: 114/73  Comment: blood sugar: 119   Pulse: 97   Resp: 16   Temp: 98.8 °F (37.1 °C)       Estimated body mass index is 23.98 kg/m² as calculated from the following:    Height as of 2/20/25: 62.44\".    Weight as of 2/20/25: 133 lb (60.3 kg).   POC Glucose   Date Value Ref Range Status   02/25/2025 119 (H) 70 - 99 mg/dL Final   02/18/2025 101 (H) 70 - 99 mg/dL Final   02/05/2025 139 (H) 70 - 99 mg/dL Final       Vital signs reviewed.Appears stated age, well groomed.    Constitutional:  Bp wnl for patient. Pulse Regular and wnl for patient. Respirations easy and unlabored. Temperature wnl. Weight normal for height. Appearance neat and clean. Appears in no acute distress. Well nourished and well developed.    Lower extremity:  dp/pt palpable left. Left lower extremity free of varicosities, no edema. Capillary refill < 3 seconds. Digits are warm, overlapping. Toenails thickned, discolored, adequate length/hygeine. Skin is very dry. no hairgrowth on legs.    Musculoskeletal:  Gait and station stable   Integumentary:  refer to wound characteristics and images    Psychiatric:  Judgment and insight intact. Alert and oriented times 3. No evidence of depression, anxiety, or agitation. Calm, cooperative, and communicative.   DIAGNOSTICS:     Lab Results   Component Value Date    BUN 8 (L) 02/20/2025    CREATSERUM 0.77 02/20/2025    GFRCKDEPI 105.47 04/18/2018    ALB 3.7 02/20/2025    TP 6.2 02/20/2025    A1C 6.5 (H) 11/30/2024       WOUND ASSESSMENT:     Wound 01/08/25 #1 Breast Left (Active)   Date First Assessed/Time First Assessed: 01/08/25 1414    Wound Number (Wound Clinic Only): #1  Primary Wound Type: (c) Other (comment)  Location: Breast  Wound Location Orientation: Left      Assessments 1/8/2025  2:16 PM 2/25/2025 11:27 AM   Wound Image             Drainage Amount Large Copious   Drainage Description Serous;Yellow Serosanguineous   Wound Length (cm) 17 cm 7.9 cm   Wound Width (cm) 30 cm 19.5 cm   Wound Surface Area (cm^2) 510 cm^2 154.05 cm^2   Wound Depth (cm) 1 cm 6.1 cm (measure bigger wound for depth)   Wound Volume (cm^3) 510 cm^3 939.705 cm^3   Wound Healing % -- -84   Margins Well-defined edges Well-defined edges   Non-staged Wound Description Full thickness Full thickness   Lora-wound Assessment Edema Edema;Induration   Wound Granulation Tissue Red;Pink;Spongy Spongy;Red   Wound Bed Granulation (%) 10 % 20 %   Wound Bed Epithelium (%) 15 % 20 %   Wound Bed Slough (%) 75 % 60 %   Wound Odor Strong Strong       Active Orders   Date Order Priority Status Authorizing Provider   02/25/25 1157 Debridement Other (comment) Left Breast Routine Active Vira Alejandro, APRBISI       Inactive Orders   Date Order Priority Status Authorizing Provider   02/18/25 1009 Debridement Other (comment) Left Breast Routine Completed Vira Alejandro, SHANA   02/05/25 1427 Debridement Other (comment) Left Breast Routine Completed Vira Alejandro, APRN   01/22/25 1503 Debridement Other (comment) Left Breast Routine Completed Vira Alejandro, APRN   01/08/25 1618 Debridement Other (comment) Left  Breast Routine Completed Vira Alejandro APRN       Wound 02/05/25 #2 Left lower leg Leg Left (Active)   Date First Assessed/Time First Assessed: 02/05/25 1348    Wound Number (Wound Clinic Only): #2 Left lower leg  Primary Wound Type: Venous Ulcer  Location: Leg  Wound Location Orientation: Left      Assessments 2/5/2025  1:50 PM 2/25/2025 11:32 AM   Wound Image         Drainage Amount Scant Small   Drainage Description Yellow;Serous Serosanguineous   Wound Length (cm) 1.7 cm 1.2 cm   Wound Width (cm) 1.5 cm 0.9 cm   Wound Surface Area (cm^2) 2.55 cm^2 1.08 cm^2   Wound Depth (cm) 0.1 cm 0.4 cm   Wound Volume (cm^3) 0.255 cm^3 0.432 cm^3   Wound Healing % -- -69   Margins Well-defined edges Well-defined edges   Non-staged Wound Description Full thickness Full thickness   Lora-wound Assessment -- Hemosiderin staining;Moist;Maceration;Edema   Wound Granulation Tissue -- Spongy;Pink   Wound Bed Granulation (%) -- 10 %   Wound Bed Slough (%) 100 % 90 %   Wound Odor Mild None   Tunneling? No No   Undermining? No No   Sinus Tracts? No No       Active Orders   Date Order Priority Status Authorizing Provider   02/25/25 1156 Debridement Venous Ulcer Left Leg Routine Active Vira Alejandro APRN       Inactive Orders   Date Order Priority Status Authorizing Provider   02/05/25 1424 Debridement Venous Ulcer Left Leg Routine Completed Vira Alejandro APRN        PROCEDURE:      This procedure was medically necessary to promote wound healing by removing nonviable tissue, decrease chance of infection, reduce malodor and return the wound to an acute state.  See rn px note    ASSESSMENT AND PLAN:    1. Malignant neoplasm of overlapping sites of left breast in female, estrogen receptor negative (HCC)    2. 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  Questions and concerns  addressed. Red flags to RTC or ED reviewed.  Patient (or parent) agrees to plan.      NOTE TO PATIENT: The 21st Century Cures Act makes clinical notes like these available to patients in the interest of transparency. Clinical notes are medical documents used by physicians and care providers to communicate with each other. These documents include medical language and terminology, abbreviations, and treatment information that may sound technical and at times possibly unfamiliar. In addition, at times, the verbiage may appear blunt or direct. These documents are one tool providers use to communicate relevant information and clinical opinions of the care providers in a way that allows common understanding of the clinical context.    I spent 45 minutes with the patient. This time included:    preparing to see the patient (eg, review notes and recent diagnostics),  seeing the patient, obtaining and/or reviewing separately obtained history, performing a medically appropriate examination and/or evaluation, counseling and educating the patient, documenting in the record. Bill debridement only  DISCHARGE:      Patient Instructions   Please return:  Thursday March 6    Patient discharge and wound care instructions  Swathi Blane  2/25/2025     You may shower and cleanse area with mild soap and water, Dakins, dab dry with gauze and apply your dressings as directed.     Changing your dressing:            two- three times a day    Wash your hands with soap and water.  Ensure that the old dressing is removed completely. Place it in a plastic bag and throw it in the trash.  Cleanse the wound with hypochlorous wound cleanser (ie. Anasept, vashe, pure and clean) .  It's ok to “scrub” your wound with the gauze, small amount of bleeding with cleansing is normal and ok.  Apply the following dressings:    Breast:    A.  Crush 4 metronidazole tablets and sprinkle into breast wound  B. Moisten gauze with DAKINS solution, place into  wound, bordered zetuvit dressings    LEG:  honey>border gauze    Nutrition and blood sugar control:  Focus on the following:  Protein: Meats, beans, eggs, milk and yogurt particularly Greek yogurt), tofu, soy nuts, soy protein products (Follow the protein handout in your welcome folder)  Vitamin C: Citrus fruits and juices, strawberries, tomatoes, tomato juice, peppers, baked potatoes, spinach, broccoli, cauliflower, [place] sprouts, cabbage  Vitamin A: Dark green, leafy vegetables, orange or yellow vegetables, cantaloupe, fortified dairy products, liver, fortified cereals  Zinc: Fortified cereals, red meats, seafood  Consider supplementing with Maximino by [employer]. It can be purchased on amazon, [employer] website, or local pharmacy may be able to order it for you.  (These are essential branch chain amino acids that help with tissue building and wound healing).   When your blood sugar is consistently elevated greater than 180 your body can't heal or fight infection.      Concerns:  Signs of infection may include the following:  Increase in redness  Red \"streaks\" from wound  Increase in swelling  Fever  Unusual odor  Change in the amount of wound drainage     Should you experience any significant changes in your wound(s) or have any questions regarding your home care instructions please contact the [facility] @ [phone] If after regular business hours, please call your family doctor or local emergency room. The treatment plan has been discussed at length between you and your provider. Follow all instructions carefully, it is very important. If you do not follow all instructions you are at risk of your wound not healing, infection, possible loss of limb and even loss of life.    Vira Alejandro FNP-C, CWCN-AP, CFCN, CSWS, WCC, DWC  2/25/2025          [1]   Allergies  Allergen Reactions    Fish ANAPHYLAXIS and HIVES     All fish, Wheezing, short of breath    Fish Oil ANAPHYLAXIS and HIVES     All  fish, Wheezing, short of breath   All fish, Wheezing, short of breath    Levemir HIVES and ITCHING    Seasonal WHEEZING and Runny nose     Ragweed, pollen       Patient ID: Swathi Arguelles is a 60 year old female.    Debridement Venous Ulcer Left Leg   Wound 02/05/25 #2 Left lower leg Leg Left    Performed by: Vira Alejandro APRN  Authorized by: Vira Alejandro APRN      Consent   Consent obtained? verbal  Consent given by: patient    Debridement Details  Performed by: NP  Debridement type: conservative sharp    Pre-debridement measurements  Length (cm): 1.2  Width (cm): 0.9  Depth (cm): 0.4  Surface Area (cm^2): 1.08    Post-debridement measurements  Length (cm): 1.2  Width (cm): 0.9  Depth (cm): 0.5  Percent debrided: 100%  Surface Area (cm^2): 1.08  Area Debrided (cm^2): 1.08  Volume (cm^3): 0.54    Devitalized tissue debrided: biofilm and slough  Instrument(s) utilized: forceps  Bleeding: small  Hemostasis obtained with: pressure  Procedural pain (0-10): 1  Post-procedural pain: 2   Response to treatment: procedure was tolerated well                  Patient ID: Swathi Arguelles is a 60 year old female.    Debridement Other (comment) Left Breast   Wound 01/08/25 #1 Breast Left    Performed by: Vira Alejandro APRN  Authorized by: Vira Alejandro APRN      Consent   Consent obtained? verbal  Consent given by: patient    Debridement Details  Performed by: NP  Debridement type: conservative sharp    Pre-debridement measurements  Length (cm): 7.9  Width (cm): 19.5  Depth (cm): 6.1 (measure bigger wound for depth)  Surface Area (cm^2): 154.05    Post-debridement measurements  Length (cm): 7.9  Width (cm): 19.5  Depth (cm): 7.8  Percent debrided: 100%  Surface Area (cm^2): 154.05  Area Debrided (cm^2): 154.05  Volume (cm^3): 1201.59    Devitalized tissue debrided: biofilm, necrotic debris and slough  Instrument(s) utilized: curette, forceps and scissors  Bleeding: small  Hemostasis obtained with: pressure  Procedural  pain (0-10): 0  Post-procedural pain: 1   Response to treatment: procedure was tolerated well                        .Weekly Wound Education Note    Teaching Provided To: Patient  Training Topics: Dressing;Cleasing and general instructions;Discharge instructions  Training Method: Explain/Verbal;Written  Training Response: Patient responds and understands        Notes: Wounds stable. Dressing changed to honey gel and bordered gauze to left leg wound. Continue vashe wet to dry with kerlix, bordered foam, baby diaper, and medipore tape to breast wound. Holly ordered this visit for pt to applied to wound base.        Wound Treatment Orders:  No orders of the defined types were placed in this encounter.        Wound Information/Order:  Wound Number: Left Breast  Product:9x9 Zetuvit silicone border sacral   Dispense: 30 days  Dressing Frequency:Change dressing 3x per week    Was a Debridement performed: Yes, Debridement type: selective    Compression Stockings ordered: No    Notes: Dispense as written. 9x9 Zetuvit silicone border sacral     Additional wound: No

## (undated) NOTE — LETTER
Date & Time: 12/13/2018, 9:15 PM  Patient: Jake Laws  Encounter Provider(s):    Shaheed Marin MD       To Whom It May Concern:    Jake Laws was seen and treated in our department on 12/13/2018.  She should not return to work until Sunday [employer]

## (undated) NOTE — LETTER
Date: 12/22/2023    Patient Name: Dalbert Merlin          To Whom it may concern: This letter has been written at the patient's request. The above patient was seen at the [facility] for treatment of a medical condition. Swathi needs preferential parking close to work entrance due to a chronic medical condition.          Sincerely,      Trip Choe, DO

## (undated) NOTE — Clinical Note
05/01/2017        Swathi Arguelles  [address] [facility]      Dear Kinjal Mijares,    Our records indicate that you have outstanding lab work and or testing that was ordered for you and has not yet been completed:      HGB A1C  To provide you with

## (undated) NOTE — ED AVS SNAPSHOT
Aidan Fernandez   MRN: [id number]    Department:  [facility] Emergency Department   Date of Visit:  4/11/2018           Disclosure     Insurance plans vary and the physician(s) referred by the ER may not be covered by your plan.  Please contact you tell this physician (or your personal doctor if your instructions are to return to your personal doctor) about any new or lasting problems. The primary care or specialist physician will see patients referred from the [facility] Emergency Department.  Gonzalez Coffey

## (undated) NOTE — LETTER
Date & Time: 5/20/2024, 6:35 PM  Patient: Swathi Arguelles  Encounter Provider(s):    Naldo Medrano APRN       To Whom It May Concern:    Swathi Arguelles was seen and treated in our department on 5/20/2024. She should not return to work until May 23, or sooner if feeling better. .    If you have any questions or concerns, please do not hesitate to call.        _____________________________  Physician/APC Signature

## (undated) NOTE — LETTER
11/23/20    RE: Paola Class        To Whom It May Concern:      Paola Class is cleared to return to work today without restrictions.       Feel free to call with any questions,            Violeta Anne  Family Nurse Practitioner  [employer]

## (undated) NOTE — LETTER
[employer] for Misa Fritz     : 1965     Date: 5/3/2018  Provider:  Gabo Martin DO  Phone for doctor or clinic: [facility], [facility], [address] [place] [address] [place]

## (undated) NOTE — Clinical Note
December 3, 2024    Patient: Swathi Arguelles   Date of Visit: 11/30/2024       To Whom It May Concern:    Swathi Arguelles was seen and treated in our emergency department on 11/30/2024. She {Return to school/sport/work:[id number]}.    If you have any questions or concerns, please don't hesitate to call.       Encounter Provider(s):    Christopher Resendiz MD Schiazza, Sarah, DO Tailor, Pranav, MD Ragothaman, Arun, MD

## (undated) NOTE — LETTER
ASTHMA ACTION PLAN for Lexi Linn     : 1965     Date: 23  Doctor:  Soha Gonzáles DO  Phone for doctor or clinic: [facility], [address] [place] [address], [place] [address]  [phone]      ACT Score: 25    ACT Goal: 20 or greater    Call your provider if you require your rescue/quick reliever medication more than 2-3 times in a 24 hour period. If you require your rescue inhaler/medication more than 2-3 times weekly, your asthma may not be under proper control and you should seek medical attention. *Quick Relievers are Xopenex and Albuterol*    You can use the colors of a traffic light to help learn about your asthma medicines. Year Round       1. Green - Go! % of Personal Best Peak Flow   Use controller medicine. Breathing is good  No cough or wheeze  Can work and play Medicine How much to take When to take it    Medications       Leukotriene Modulators Instructions     montelukast 10 MG Oral Tab Take 1 tablet (10 mg total) by mouth nightly. Sympathomimetics Instructions     albuterol (2.5 MG/3ML) 0.083% Inhalation Nebu Soln Take 3 mL (2.5 mg total) by nebulization every 4 (four) hours as needed for Wheezing or Shortness of Breath. albuterol (PROAIR HFA) 108 (90 Base) MCG/ACT Inhalation Aero Soln Inhale 1 puff into the lungs every 4 (four) hours as needed for Wheezing. Budesonide-Formoterol Fumarate 160-4.5 MCG/ACT Inhalation Aerosol Inhale 2 puffs into the lungs 2 (two) times daily. 2. Yellow - Caution. 50-79% Personal Best Peak Flow  Use reliever medicine to keep an asthma attack from getting bad.    Cough  Quick Relievers  Wheezing  Tight Chest  Wake up at night Medicine How much to take When to take it    If symptoms are not improving in 24-48 hrs, call office for further instructions  Medications       Leukotriene Modulators Instructions     montelukast 10 MG Oral Tab Take 1 tablet (10 mg total) by mouth nightly. Sympathomimetics Instructions     albuterol (2.5 MG/3ML) 0.083% Inhalation Nebu Soln Take 3 mL (2.5 mg total) by nebulization every 4 (four) hours as needed for Wheezing or Shortness of Breath. albuterol (PROAIR HFA) 108 (90 Base) MCG/ACT Inhalation Aero Soln Inhale 1 puff into the lungs every 4 (four) hours as needed for Wheezing. Budesonide-Formoterol Fumarate 160-4.5 MCG/ACT Inhalation Aerosol Inhale 2 puffs into the lungs 2 (two) times daily. 3. Red - Stop! Danger! <50% Personal Best Peak Flow  Continue Controller Medications But ADD:   Medicine not helping  Breathing is hard and fast  Nose opens wide  Can't walk  Ribs show  Can't talk well Medicine How much to take When to take it    If your symptoms do not improve in ONE hour -  go to the emergency room or call 911 immediately! If symptoms improve, call office for appointment immediately. Albuterol inhaler 2 puffs every 20 minutes for three treatments       Don't forget:  Rinse mouth after using inhaler  Use spacer for inhaler  Remember to get your Flu vaccine every fall! [x] Asthma Action Plan reviewed with the caregiver and patient, and a copy of the plan was given to the patient/caregiver. [] Asthma Action Plan reviewed with the caregiver and patient on the phone, and copy mailed to patient/caregiver or sent via [address]      Signatures:   Provider  Abhijit Canela DO Patient  [address] [place] [address]

## (undated) NOTE — Clinical Note
Date & Time: 12/3/2024, 4:33 PM  Patient: Swathi Arguelles  Encounter Provider(s):    Christopher Resendiz MD Schiazza, Sarah, DO Tailor, Pranav, MD Ragothaman, Arun, MD       To Whom It May Concern:    Swathi Arguelles was seen and treated in our department on 11/30/2024. She {Return to school/sport/work:[id number]}.    If you have any questions or concerns, please do not hesitate to call.        _____________________________  Physician/APC Signature

## (undated) NOTE — LETTER
01/15/20        [address] [facility]      Dear Jose Lee,    Our records indicate that you have outstanding lab work and or testing that was ordered for you and has not yet been completed:  Orders Placed This Encounter

## (undated) NOTE — Clinical Note
ASTHMA ACTION PLAN for Jason Wong     : 1965          Date: 2017    Betty Rutledge, [facility], [address] [place] [address], [address] [place] [address] 77 1.   GREEN - GO!  % Personal Best P Asthma Action Plan reviewed with patient (and caregiver if necessary) on the phone and mailed copy to patient.          Physician Patient Caretaker (if necessary)   Trang Larkin, [address] [place] [address]

## (undated) NOTE — MR AVS SNAPSHOT
[address]  [address] [place] [address]  [phone]               Thank you for choosing us for your health care visit with Arsenio Marr DO.   We are glad to serve you and happy to provide you with this beltran * BD PEN NEEDLE SHORT U/F 31G X 8 MM Misc   Generic drug:  Insulin Pen Needle   USE AT BEDTIME           * Insulin Pen Needle 32G X 4 MM Misc   Use a new pen needle with each injection as directed by your doctor   Commonly known as:  BD PEN NEEDLE DANIELA U/ Call [phone] for help. Duog[employer]hart is NOT to be used for urgent needs. For medical emergencies, dial 911.         Educational Information     Healthy Diet and Regular Exercise  [employer] for making healthy food choices  -

## (undated) NOTE — LETTER
Date: 4/25/2025  Patient name: Swathi Arguelles  YOB: 1965  Medical Record Number: [id number]  Primary Coverage: Payor: [facility] PPO / Plan: BLUE CROSS FEP PPO / Product Type: PPO /   Secondary Coverage:   Insurance ID: [id number]  Patient Address: [address] [place] [address]  Telephone Information:   Home Phone [phone]   Mobile [phone]         Encounter Date: 4/25/2025  Provider: SHANA Hummel  Diagnosis:     ICD-10-CM   1. Malignant neoplasm of overlapping sites of left breast in female, estrogen receptor negative (HCC)  C50.812    Z17.1   2. 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 Denies new concerns. Border foam to leg.     HPI:   Information obtained from patient, daughter, chart  1-8-25 INITIAL:  Patient is a 60 yo female who was dx with right breast ca in 2013 and was lost to follow-up in 2017.  Patient next presented to the ED on 11/30/2024 with complaints of generalized weakness, poor appetite, and dyspnea with exertion. Laboratory studies showed anemia, hyponatremia, and hypochlorhydria. CTA chest was negative for pulmonary embolism or metastatic disease but did show a 22.3 x 14.6 x 16.0 cm mass arising from the left breast with areas of necrosis, possible extension into the intracostal musculature, and mildly enlarged left axillary lymph nodes. Visualization of left breast showed a large firm breast which an open area laterally with malodorous drainage. CT abdomen/pelvis w contrast on 12/01/2024 was negative for metastatic disease. Biopsy of the left breast was performed on 12/02/2024. 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 It was discussed with patient that the left breast wound will not heal and she is presenting today for  assistance with management of the left breast wound.  There is significant malodor and necrotic, liquifing adipose tissue.  I was able to remove a large amount of it which will help with the drainage and malodor.  Patient has not been showering because she did not think she should get the wound wet.  The wound is not overly vascular.  We discussed utilizing dakins solution and a baby diaper.  Will order supplies for patient.     HPI PRIOR TO MARCH 2025 SEE INDIVIDUAL PROGRESS NOTES  3-6-25 patient returns she is following up every 7-10 days. She has been receiving chemo since last Friday.  Last visit I rx'd flagyl to help with the malodor, patient reports that the wound got too \"gummy\" with the build up, so she returned to the dakins gauze.  We discussed metrogel as another option.  The leg wound is not improving.  She specifically does remember when she hit her leg on the wooden stool (my concern is that could this wound be a malignancy-although initial presentation is not consistent with malignancy and non healing could also be related to her chemo). Overall her breast is less indurated, there is less necrosis and less malodor than previous visits.     3-18-25 patient returns.  I did order metrogel at last visit she tried this and does feel like it is helping the malodor.  I also did order santyl for her left lower leg ulceration she was able to get this, her leg wound is slightly smaller, the coagulum across the wound bed, wipes out  with a dry gauze.  Dakins refilled. Patient states she recently had an mri of the breast. Her next chemo is next Thursday.     3-27-25 patient returns.  She saw dr. Lopez on the 20th and it was noted that she has had a good rsponse to chemo therapy however the mri showed continue involvement of the chest wall.  The case was discussed with dr. Adam and she has an appointment with him on 4-2 (Wednesday).  Dependent on what Dr. Adam feels regarding chest wall reconstruction her  chemo may be switched to enhertu beginning in 3 weeks.  She had her chemo 2 days ago. Patient has continued to dress with metrogel and dakins. She states the malodor has been better. There is significantly less necrosis than previously and she does have some sensitive areas of tissue that is viable.  The leg wound is . She continues to utilize santyl on that.  No s/s of infection to either area. Patient will f/u in 2 weeks.    4-11-25 patient returns.  She saw dr ny last week and it was noted \"She is a candidate for surgical resection timing of which pending completion of neoadjuvant systemic therapy with Dr. Holliday.\"  She also met with dr. Billings and it was noted \"We discussed the option for left chest wall reconstruction with local flaps, possible latissimus flap, possible Integra, possible skin graft. She will need to complete chemotherapy prior to surgical intervention. She might benefit from staging with integra followed by negative margins and eventual latissimus flap if vessels are intact.  We can start to look for surgery dates once we know the timeline of her oncologic treatment.\"   Her next appointment with dr holliday may 1.  She states that the plan is to reduce the tumor further with a new chemo.  She has received 1 dose and with the steroids and dextrose that was with that infusion the patient is having some difficulty managing her blood sugar.  She has continued to utilize santyl on her lower leg and metrogel and dakins to her breast. She states the malodor is improving, but still present.  No c/o pain today. She did get the refill on the dakins.    4-25-25 patient returns.   Patient has continued with santyl on the lower leg and metrogel and dakins to the breast. The lower leg wound is improving. She states she notes malodor if she forgets to use the metrogel, but overall improving. There is minimal necrosis today, no debridement needed. Patient feels things are improving. She will get her  2nd treatment of the new drug next week.  No acute s/s of infection or c/o pain  MEDICATIONS:     Current Outpatient Medications:     Insulin Pen Needle (BD PEN NEEDLE DANIELA U/F) 32G X 4 MM Does not apply Misc, Up to TID, Disp: 200 each, Rfl: 2    Insulin Glargine-yfgn 100 UNIT/ML Subcutaneous Solution Pen-injector, Inject 20 Units into the skin nightly., Disp: 15 mL, Rfl: 0    ondansetron (ZOFRAN) 8 MG tablet, Take 1 tablet (8 mg total) by mouth every 8 (eight) hours as needed for Nausea., Disp: 30 tablet, Rfl: 3    prochlorperazine (COMPAZINE) 10 mg tablet, Take 1 tablet (10 mg total) by mouth every 6 (six) hours as needed for Nausea., Disp: 30 tablet, Rfl: 3    sodium hypochlorite 0.125 % External Solution, Moisten gauze with dakins, place onto wound, cover with baby diaper three times a day, Disp: 1500 mL, Rfl: 3    HYDROcodone-acetaminophen 5-325 MG Oral Tab, Take 1-2 tablets by mouth every 4 (four) hours as needed for Pain., Disp: 30 tablet, Rfl: 0    gabapentin 600 MG Oral Tab, TAKE 1 TABLET BY MOUTH THREE TIMES DAILY; TAKE AN EXTRA 600MG AS NEEDED FOR SEVERE PAIN, Disp: 270 tablet, Rfl: 1    traMADol 50 MG Oral Tab, Take 1 tablet (50 mg total) by mouth every 6 (six) hours as needed., Disp: 20 tablet, Rfl: 0    fluticasone-salmeterol (WIXELA INHUB) 100-50 MCG/ACT Inhalation Aerosol Powder, Breath Activated, Inhale 1 puff into the lungs 2 (two) times daily., Disp: 3 each, Rfl: 0    Insulin Lispro, 1 Unit Dial, 100 UNIT/ML Subcutaneous Solution Pen-injector, Inject 10 Units into the skin in the morning, at noon, and at bedtime., Disp: 3 mL, Rfl: 0    Budesonide-Formoterol Fumarate 160-4.5 MCG/ACT Inhalation Aerosol, Inhale 2 puffs into the lungs 2 (two) times daily. (Patient taking differently: Inhale 2 puffs into the lungs in the morning and 2 puffs before bedtime. Pt states she would like to go back to budesonide.), Disp: 3 each, Rfl: 1    albuterol (2.5 MG/3ML) 0.083% Inhalation Nebu Soln, Take 3 mL (2.5 mg  total) by nebulization every 4 (four) hours as needed for Wheezing or Shortness of Breath., Disp: 90 mL, Rfl: 0    montelukast 10 MG Oral Tab, Take 1 tablet (10 mg total) by mouth nightly., Disp: 90 tablet, Rfl: 1  ALLERGIES:   Allergies[1]   REVIEW OF SYSTEMS:   This information was obtained from the patient/family and chart.    See HPI for pertinent positives, otherwise 10 pt ROS negative.    HISTORY:   Past medical, surgical, family and social history updated where appropriate.      PHYSICAL EXAM:     Vitals:    04/25/25 0909   BP: 147/74  Comment: blood sugar: 102   Pulse: 86   Resp: 16   Temp: 98.2 °F (36.8 °C)         Estimated body mass index is 25.6 kg/m² as calculated from the following:    Height as of 4/10/25: 60.71\".    Weight as of 4/10/25: 134 lb 3.2 oz (60.9 kg).   POC Glucose   Date Value Ref Range Status   04/11/2025 212 (H) 70 - 99 mg/dL Final   03/27/2025 122 (H) 70 - 99 mg/dL Final   03/18/2025 119 (H) 70 - 99 mg/dL Final       Vital signs reviewed.Appears stated age, well groomed.    Constitutional:  Bp wnl for patient. Pulse Regular and wnl for patient. Respirations easy and unlabored. Temperature wnl. Weight normal for height. Appearance neat and clean. Appears in no acute distress. Well nourished and well developed.    Lower extremity:  dp/pt palpable left. Left lower extremity free of varicosities, no edema. Capillary refill < 3 seconds. Digits are warm, overlapping. Toenails thickned, discolored, adequate length/hygeine. Skin is very dry. no hairgrowth on legs.    Musculoskeletal:  Gait and station stable   Integumentary:  refer to wound characteristics and images   Psychiatric:  Judgment and insight intact. Alert and oriented times 3. No evidence of depression, anxiety, or agitation. Calm, cooperative, and communicative.   DIAGNOSTICS:     Lab Results   Component Value Date    BUN 8 (L) 04/10/2025    CREATSERUM 0.75 04/10/2025    GFRCKDEPI 105.47 04/18/2018    ALB 3.8 04/10/2025    TP 6.4  04/10/2025    A1C 6.5 (H) 11/30/2024       WOUND ASSESSMENT:     Wound 01/08/25 #1 Breast Left (Active)   Date First Assessed/Time First Assessed: 01/08/25 1414    Wound Number (Wound Clinic Only): #1  Primary Wound Type: (c) Other (comment)  Location: Breast  Wound Location Orientation: Left      Assessments 1/8/2025  2:16 PM 4/25/2025  9:19 AM   Wound Image            Drainage Amount Large Large   Drainage Description Serous;Yellow Serosanguineous   Wound Length (cm) 17 cm 1.9 cm   Wound Width (cm) 30 cm 11.5 cm   Wound Surface Area (cm^2) 510 cm^2 17.16 cm^2   Wound Depth (cm) 1 cm 2.5 cm   Wound Volume (cm^3) 510 cm^3 28.602 cm^3   Wound Healing % -- 94   Margins Well-defined edges Well-defined edges   Non-staged Wound Description Full thickness Full thickness   Lora-wound Assessment Edema Induration;Edema   Wound Granulation Tissue Red;Pink;Spongy Red;Firm   Wound Bed Granulation (%) 10 % 50 %   Wound Bed Epithelium (%) 15 % 20 %   Wound Bed Slough (%) 75 % 30 %   Wound Odor Strong Mild   Shape -- clustered   Tunneling? -- No   Undermining? -- No   Sinus Tracts? -- No       Inactive Orders   Date Order Priority Status Authorizing Provider   04/11/25 1004 Debridement Other (comment) Left Breast Routine Completed Vira Alejandro, APRN   03/18/25 1032 Debridement Other (comment) Left Breast Routine Completed Vira Alejandro, APRN   03/06/25 1536 Debridement Other (comment) Left Breast Routine Completed Vira Alejandro, APRN   02/25/25 1157 Debridement Other (comment) Left Breast Routine Completed Vira Alejandro, APRN   02/18/25 1009 Debridement Other (comment) Left Breast Routine Completed Vira Alejandro, APRN   02/05/25 1427 Debridement Other (comment) Left Breast Routine Completed Vira Alejandro, APRN   01/22/25 1503 Debridement Other (comment) Left Breast Routine Completed Vira Alejandro, APRN   01/08/25 1618 Debridement Other (comment) Left Breast Routine Completed Vira Alejandro, SHANA       Wound 02/05/25 #2  Left lower leg Leg Left (Active)   Date First Assessed/Time First Assessed: 02/05/25 1348    Wound Number (Wound Clinic Only): #2 Left lower leg  Primary Wound Type: Venous Ulcer  Location: Leg  Wound Location Orientation: Left      Assessments 2/5/2025  1:50 PM 4/25/2025  9:09 AM   Wound Image        Drainage Amount Scant Small   Drainage Description Yellow;Serous Serous;Yellow   Wound Length (cm) 1.7 cm 0.8 cm   Wound Width (cm) 1.5 cm 0.8 cm   Wound Surface Area (cm^2) 2.55 cm^2 0.5 cm^2   Wound Depth (cm) 0.1 cm 0.1 cm   Wound Volume (cm^3) 0.255 cm^3 0.034 cm^3   Wound Healing % -- 87   Margins Well-defined edges Well-defined edges   Non-staged Wound Description Full thickness Full thickness   Lora-wound Assessment -- Pink;Dry   Wound Granulation Tissue -- Pink;Firm   Wound Bed Granulation (%) -- 10 %   Wound Bed Slough (%) 100 % 90 %   Wound Odor Mild None   Tunneling? No No   Undermining? No No   Sinus Tracts? No No       Inactive Orders   Date Order Priority Status Authorizing Provider   02/25/25 1156 Debridement Venous Ulcer Left Leg Routine Completed Vira Alejandro, SHANA   02/05/25 1424 Debridement Venous Ulcer Left Leg Routine Completed Vira Alejandro, SHANA          ASSESSMENT AND PLAN:    1. Malignant neoplasm of overlapping sites of left breast in female, estrogen receptor negative (HCC)    2. 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  Questions and concerns addressed. Red flags to RTC or ED reviewed.  Patient (or parent) agrees to plan.      NOTE TO PATIENT: The 21st Century Cures Act makes clinical notes like these available to patients in the interest of transparency. Clinical notes are medical documents used by physicians and care providers to communicate with each other. These documents include medical language and terminology,  abbreviations, and treatment information that may sound technical and at times possibly unfamiliar. In addition, at times, the verbiage may appear blunt or direct. These documents are one tool providers use to communicate relevant information and clinical opinions of the care providers in a way that allows common understanding of the clinical context.    I spent 30 minutes with the patient. This time included:    preparing to see the patient (eg, review notes and recent diagnostics),  seeing the patient, obtaining and/or reviewing separately obtained history, performing a medically appropriate examination and/or evaluation, counseling and educating the patient, documenting in the record.  DISCHARGE:      Patient Instructions   Please return:  2 WEEKS   Patient discharge and wound care instructions  Swathi Arguelles  4/25/2025        You may shower and cleanse area with mild soap and water, Dakins, dab dry with gauze and apply your dressings as directed.     Changing your dressing:            two- three times a day    Wash your hands with soap and water.  Ensure that the old dressing is removed completely. Place it in a plastic bag and throw it in the trash.  Cleanse the wound with hypochlorous wound cleanser (ie. Anasept, vashe, pure and clean) .  It's ok to “scrub” your wound with the gauze, small amount of bleeding with cleansing is normal and ok.  Apply the following dressings:    Breast:    A.  Place gel on gauze and place in base of wound  B. Moisten gauze with DAKINS solution, place into wound, bordered dressing    LEG:  Santyl>bordered gauze    Nutrition and blood sugar control:  Focus on the following:  Protein: Meats, beans, eggs, milk and yogurt particularly Greek yogurt), tofu, soy nuts, soy protein products (Follow the protein handout in your welcome folder)  Vitamin C: Citrus fruits and juices, strawberries, tomatoes, tomato juice, peppers, baked potatoes, spinach, broccoli, cauliflower, [place]  sprouts, cabbage  Vitamin A: Dark green, leafy vegetables, orange or yellow vegetables, cantaloupe, fortified dairy products, liver, fortified cereals  Zinc: Fortified cereals, red meats, seafood  Consider supplementing with Maximino by [employer]. It can be purchased on amazon, [employer] website, or local pharmacy may be able to order it for you.  (These are essential branch chain amino acids that help with tissue building and wound healing).   When your blood sugar is consistently elevated greater than 180 your body can't heal or fight infection.      Concerns:  Signs of infection may include the following:  Increase in redness  Red \"streaks\" from wound  Increase in swelling  Fever  Unusual odor  Change in the amount of wound drainage     Should you experience any significant changes in your wound(s) or have any questions regarding your home care instructions please contact the [facility] @ [phone] If after regular business hours, please call your family doctor or local emergency room. The treatment plan has been discussed at length between you and your provider. Follow all instructions carefully, it is very important. If you do not follow all instructions you are at risk of your wound not healing, infection, possible loss of limb and even loss of life.    Vira Alejandro FNP-C, CWCN-AP, CFCN, CSWS, WCC, DWC  4/25/2025          [1]   Allergies  Allergen Reactions    Fish ANAPHYLAXIS and HIVES     All fish, Wheezing, short of breath    Fish Oil ANAPHYLAXIS and HIVES     All fish, Wheezing, short of breath   All fish, Wheezing, short of breath    Levemir HIVES and ITCHING    Levonorgestrel-Ethinyl Estrad OTHER (SEE COMMENTS)     Other reaction(s): Runny nose, WHEEZING   Ragweed, pollen   Ragweed, pollen    Seasonal WHEEZING and Runny nose     Ragweed, pollen       .Weekly Wound Education Note    Teaching Provided To: Patient  Training Topics: Dressing, Cleasing and general instructions, Discharge  instructions  Training Method: Explain/Verbal, Written  Training Response: Patient responds and understands        Notes: Wounds improving. Continue santyl, saline moist gauze, and bordered kishore to leg wound. Continue flagyl gel, dakins moist conforming gauze, bordered sacral foam x2, baby diaper and medipore tape to breast wound. extra dressings supplied, will order dressings this visit,                  Wound Information/Order:  Wound Number: Left Breast  Product: Kerlix roll gauze ,9x9 Zetuvit silicone border sacral (primary dressing)  Dispense: 30 days  Dressing Frequency:Change dressing Daily     Was a Debridement performed: Yes, Debridement type: Mechanical     Compression Stockings ordered: No     Notes: Dispense as written. Kerlix roll gauze, 9x9 Zetuvit silicone border sacral      Additional wound: No

## (undated) NOTE — LETTER
Date: 3/27/2025  Patient name: Swathi Arguelles  YOB: 1965  Medical Record Number: [id number]  Primary Coverage: Payor: [facility] PPO / Plan: BLUE CROSS FEP PPO / Product Type: PPO /   Secondary Coverage:   Insurance ID: [id number]  Patient Address: [address] [place] [id number]  Telephone Information:   Home Phone [phone]   Mobile [phone]         Encounter Date: 3/27/2025  Provider: SHANA Hummel  Diagnosis:     ICD-10-CM   1. Malignant neoplasm of overlapping sites of left breast in female, estrogen receptor negative (HCC)  C50.812    Z17.1   2. 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 Patients stated no issue at this moment      HPI:   Information obtained from patient, daughter, chart  1-8-25 INITIAL:  Patient is a 60 yo female who was dx with right breast ca in 2013 and was lost to follow-up in 2017.  Patient next presented to the ED on 11/30/2024 with complaints of generalized weakness, poor appetite, and dyspnea with exertion. Laboratory studies showed anemia, hyponatremia, and hypochlorhydria. CTA chest was negative for pulmonary embolism or metastatic disease but did show a 22.3 x 14.6 x 16.0 cm mass arising from the left breast with areas of necrosis, possible extension into the intracostal musculature, and mildly enlarged left axillary lymph nodes. Visualization of left breast showed a large firm breast which an open area laterally with malodorous drainage. CT abdomen/pelvis w contrast on 12/01/2024 was negative for metastatic disease. Biopsy of the left breast was performed on 12/02/2024. 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 It was discussed with patient that the left breast wound will not heal and she is presenting  today for assistance with management of the left breast wound.  There is significant malodor and necrotic, liquifing adipose tissue.  I was able to remove a large amount of it which will help with the drainage and malodor.  Patient has not been showering because she did not think she should get the wound wet.  The wound is not overly vascular.  We discussed utilizing dakins solution and a baby diaper.  Will order supplies for patient.     HPI PRIOR TO MARCH 2025 SEE INDIVIDUAL PROGRESS NOTES  3-6-25 patient returns she is following up every 7-10 days. She has been receiving chemo since last Friday.  Last visit I rx'd flagyl to help with the malodor, patient reports that the wound got too \"gummy\" with the build up, so she returned to the dakins gauze.  We discussed metrogel as another option.  The leg wound is not improving.  She specifically does remember when she hit her leg on the wooden stool (my concern is that could this wound be a malignancy-although initial presentation is not consistent with malignancy and non healing could also be related to her chemo). Overall her breast is less indurated, there is less necrosis and less malodor than previous visits.     3-18-25 patient returns.  I did order metrogel at last visit she tried this and does feel like it is helping the malodor.  I also did order santyl for her left lower leg ulceration she was able to get this, her leg wound is slightly smaller, the coagulum across the wound bed, wipes out  with a dry gauze.  Dakins refilled. Patient states she recently had an mri of the breast. Her next chemo is next Thursday.     3-27-25 patient returns.  She saw dr. Lopez on the 20th and it was noted that she has had a good rsponse to chemo therapy however the mri showed continue involvement of the chest wall.  The case was discussed with dr. Adam and she has an appointment with him on 4-2 (Wednesday).  Dependent on what Dr. Adam feels regarding chest wall  reconstruction her chemo may be switched to enhertu beginning in 3 weeks.  She had her chemo 2 days ago. Patient has continued to dress with metrogel and dakins. She states the malodor has been better. There is significantly less necrosis than previously and she does have some sensitive areas of tissue that is viable.  The leg wound is . She continues to utilize santyl on that.  No s/s of infection to either area. Patient will f/u in 2 weeks.    MEDICATIONS:     Current Outpatient Medications:     sodium hypochlorite 0.125 % External Solution, Moisten gauze with dakins, place onto wound, cover with baby diaper three times a day, Disp: 1500 mL, Rfl: 3    collagenase (SANTYL) 250 UNIT/GM External Ointment, Apply 1 Application topically daily., Disp: 60 g, Rfl: 0    metroNIDAZOLE 0.75 % External Gel, Apply 1 g topically 2 (two) times daily., Disp: 90 g, Rfl: 0    ampicillin 500 MG Oral Cap, Take 1 capsule (500 mg total) by mouth in the morning and 1 capsule (500 mg total) before bedtime., Disp: , Rfl:     amoxicillin clavulanate 875-125 MG Oral Tab, Take 1 tablet by mouth 2 (two) times daily., Disp: 20 tablet, Rfl: 0    HYDROcodone-acetaminophen 5-325 MG Oral Tab, Take 1-2 tablets by mouth every 4 (four) hours as needed for Pain., Disp: 30 tablet, Rfl: 0    gabapentin 600 MG Oral Tab, TAKE 1 TABLET BY MOUTH THREE TIMES DAILY; TAKE AN EXTRA 600MG AS NEEDED FOR SEVERE PAIN, Disp: 270 tablet, Rfl: 1    Insulin Glargine-yfgn 100 UNIT/ML Subcutaneous Solution Pen-injector, Inject 20 Units into the skin nightly., Disp: 24 mL, Rfl: 0    prochlorperazine (COMPAZINE) 10 mg tablet, Take 1 tablet (10 mg total) by mouth every 6 (six) hours as needed for Nausea., Disp: 30 tablet, Rfl: 3    ondansetron (ZOFRAN) 8 MG tablet, Take 1 tablet (8 mg total) by mouth every 8 (eight) hours as needed for Nausea., Disp: 30 tablet, Rfl: 3    traMADol 50 MG Oral Tab, Take 1 tablet (50 mg total) by mouth every 6 (six) hours as needed.,  Disp: 20 tablet, Rfl: 0    fluticasone-salmeterol (WIXELA INHUB) 100-50 MCG/ACT Inhalation Aerosol Powder, Breath Activated, Inhale 1 puff into the lungs 2 (two) times daily., Disp: 3 each, Rfl: 0    Insulin Lispro, 1 Unit Dial, 100 UNIT/ML Subcutaneous Solution Pen-injector, Inject 10 Units into the skin in the morning, at noon, and at bedtime., Disp: 3 mL, Rfl: 0    Budesonide-Formoterol Fumarate 160-4.5 MCG/ACT Inhalation Aerosol, Inhale 2 puffs into the lungs 2 (two) times daily. (Patient taking differently: Inhale 2 puffs into the lungs 2 (two) times daily. Pt states she would like to go back to budesonide), Disp: 3 each, Rfl: 1    albuterol (2.5 MG/3ML) 0.083% Inhalation Nebu Soln, Take 3 mL (2.5 mg total) by nebulization every 4 (four) hours as needed for Wheezing or Shortness of Breath., Disp: 90 mL, Rfl: 0    Insulin Pen Needle (BD PEN NEEDLE DANIELA U/F) 32G X 4 MM Does not apply Misc, Up to TID, Disp: 200 each, Rfl: 2    montelukast 10 MG Oral Tab, Take 1 tablet (10 mg total) by mouth nightly., Disp: 90 tablet, Rfl: 1  ALLERGIES:   Allergies[1]   REVIEW OF SYSTEMS:   This information was obtained from the patient/family and chart.    See HPI for pertinent positives, otherwise 10 pt ROS negative.    HISTORY:   Past medical, surgical, family and social history updated where appropriate.      PHYSICAL EXAM:     Vitals:    03/27/25 1422   BP: 108/67  Comment: 122 gluocse   Pulse: 94   Resp: 16   Temp: 97.3 °F (36.3 °C)       Estimated body mass index is 23.48 kg/m² as calculated from the following:    Height as of 3/21/25: 62.44\".    Weight as of 3/20/25: 130 lb 3.2 oz (59.1 kg).   POC Glucose   Date Value Ref Range Status   03/27/2025 122 (H) 70 - 99 mg/dL Final   03/18/2025 119 (H) 70 - 99 mg/dL Final   03/06/2025 115 (H) 70 - 99 mg/dL Final       Vital signs reviewed.Appears stated age, well groomed.    Constitutional:  Bp wnl for patient. Pulse Regular and wnl for patient. Respirations easy and unlabored.  Temperature wnl. Weight normal for height. Appearance neat and clean. Appears in no acute distress. Well nourished and well developed.    Lower extremity:  dp/pt palpable left. Left lower extremity free of varicosities, no edema. Capillary refill < 3 seconds. Digits are warm, overlapping. Toenails thickned, discolored, adequate length/hygeine. Skin is very dry. no hairgrowth on legs.    Musculoskeletal:  Gait and station stable   Integumentary:  refer to wound characteristics and images   Psychiatric:  Judgment and insight intact. Alert and oriented times 3. No evidence of depression, anxiety, or agitation. Calm, cooperative, and communicative.   DIAGNOSTICS:     Lab Results   Component Value Date    BUN 7 (L) 03/20/2025    CREATSERUM 0.91 03/20/2025    GFRCKDEPI 105.47 04/18/2018    ALB 3.9 03/20/2025    TP 6.6 03/20/2025    A1C 6.5 (H) 11/30/2024       WOUND ASSESSMENT:     Wound 01/08/25 #1 Breast Left (Active)   Date First Assessed/Time First Assessed: 01/08/25 1414    Wound Number (Wound Clinic Only): #1  Primary Wound Type: (c) Other (comment)  Location: Breast  Wound Location Orientation: Left      Assessments 1/8/2025  2:16 PM 3/27/2025  2:29 PM   Wound Image           Drainage Amount Large Copious   Drainage Description Serous;Yellow Serous;Yellow   Wound Length (cm) 17 cm 3.5 cm   Wound Width (cm) 30 cm 14.9 cm   Wound Surface Area (cm^2) 510 cm^2 52.15 cm^2   Wound Depth (cm) 1 cm 4.4 cm   Wound Volume (cm^3) 510 cm^3 229.46 cm^3   Wound Healing % -- 55   Margins Well-defined edges Well-defined edges   Non-staged Wound Description Full thickness Full thickness   Lora-wound Assessment Edema Edema;Induration;Moist   Wound Granulation Tissue Red;Pink;Spongy Firm;Pink;Red   Wound Bed Granulation (%) 10 % 60 %   Wound Bed Epithelium (%) 15 % 20 %   Wound Bed Slough (%) 75 % 20 %   Wound Odor Strong Mild   Shape -- clustered       Inactive Orders   Date Order Priority Status Authorizing Provider   03/18/25 1032  Debridement Other (comment) Left Breast Routine Completed Vira Alejandro, APRN   03/06/25 1536 Debridement Other (comment) Left Breast Routine Completed Vira Alejandro, APRN   02/25/25 1157 Debridement Other (comment) Left Breast Routine Completed Vira Alejandro, APRN   02/18/25 1009 Debridement Other (comment) Left Breast Routine Completed Vira Alejandro, APRN   02/05/25 1427 Debridement Other (comment) Left Breast Routine Completed Vira Alejandro, APRN   01/22/25 1503 Debridement Other (comment) Left Breast Routine Completed Vira Alejandro, APRN   01/08/25 1618 Debridement Other (comment) Left Breast Routine Completed Vira Alejandro, SHANA       Wound 02/05/25 #2 Left lower leg Leg Left (Active)   Date First Assessed/Time First Assessed: 02/05/25 1348    Wound Number (Wound Clinic Only): #2 Left lower leg  Primary Wound Type: Venous Ulcer  Location: Leg  Wound Location Orientation: Left      Assessments 2/5/2025  1:50 PM 3/27/2025  2:26 PM   Wound Image        Drainage Amount Scant Scant   Drainage Description Yellow;Serous Serosanguineous   Wound Length (cm) 1.7 cm 1 cm   Wound Width (cm) 1.5 cm 0.9 cm   Wound Surface Area (cm^2) 2.55 cm^2 0.9 cm^2   Wound Depth (cm) 0.1 cm 0.3 cm   Wound Volume (cm^3) 0.255 cm^3 0.27 cm^3   Wound Healing % -- -6   Margins Well-defined edges Well-defined edges   Non-staged Wound Description Full thickness Full thickness   Lora-wound Assessment -- Hemosiderin staining;Dry   Wound Granulation Tissue -- Firm;Pink   Wound Bed Granulation (%) -- 50 %   Wound Bed Slough (%) 100 % 50 %   Wound Odor Mild None   Tunneling? No --   Undermining? No --   Sinus Tracts? No --       Inactive Orders   Date Order Priority Status Authorizing Provider   02/25/25 1156 Debridement Venous Ulcer Left Leg Routine Completed Vira Alejandro, APRN   02/05/25 1424 Debridement Venous Ulcer Left Leg Routine Completed Vira Alejandro, APRN          ASSESSMENT AND PLAN:    1. Malignant neoplasm of overlapping  sites of left breast in female, estrogen receptor negative (HCC)    2. 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  Questions and concerns addressed. Red flags to RTC or ED reviewed.  Patient (or parent) agrees to plan.      NOTE TO PATIENT: The 21st Century Cures Act makes clinical notes like these available to patients in the interest of transparency. Clinical notes are medical documents used by physicians and care providers to communicate with each other. These documents include medical language and terminology, abbreviations, and treatment information that may sound technical and at times possibly unfamiliar. In addition, at times, the verbiage may appear blunt or direct. These documents are one tool providers use to communicate relevant information and clinical opinions of the care providers in a way that allows common understanding of the clinical context.    I spent 30minutes with the patient. This time included:    preparing to see the patient (eg, review notes and recent diagnostics),  seeing the patient, obtaining and/or reviewing separately obtained history, performing a medically appropriate examination and/or evaluation, counseling and educating the patient, documenting in the record.  DISCHARGE:      Patient Instructions   Please return:  2 WEEKS  Patient discharge and wound care instructions  Swathi Núñezn  3/27/2025            You may shower and cleanse area with mild soap and water, Dakins, dab dry with gauze and apply your dressings as directed.     Changing your dressing:            two- three times a day    Wash your hands with soap and water.  Ensure that the old dressing is removed completely. Place it in a plastic bag and throw it in the trash.  Cleanse the wound with hypochlorous wound cleanser (ie. Anasept, vashe, pure and clean) .   It's ok to “scrub” your wound with the gauze, small amount of bleeding with cleansing is normal and ok.  Apply the following dressings:    Breast:    A.  Place gel on gauze and place in base of wound  B. Moisten gauze with DAKINS solution, place into wound, bordered zetuvit dressings    LEG:  Santyl>bordered gauze    Nutrition and blood sugar control:  Focus on the following:  Protein: Meats, beans, eggs, milk and yogurt particularly Greek yogurt), tofu, soy nuts, soy protein products (Follow the protein handout in your welcome folder)  Vitamin C: Citrus fruits and juices, strawberries, tomatoes, tomato juice, peppers, baked potatoes, spinach, broccoli, cauliflower, Delco sprouts, cabbage  Vitamin A: Dark green, leafy vegetables, orange or yellow vegetables, cantaloupe, fortified dairy products, liver, fortified cereals  Zinc: Fortified cereals, red meats, seafood  Consider supplementing with Maximino by Known. It can be purchased on amazon, [employer] website, or local pharmacy may be able to order it for you.  (These are essential branch chain amino acids that help with tissue building and wound healing).   When your blood sugar is consistently elevated greater than 180 your body can't heal or fight infection.      Concerns:  Signs of infection may include the following:  Increase in redness  Red \"streaks\" from wound  Increase in swelling  Fever  Unusual odor  Change in the amount of wound drainage     Should you experience any significant changes in your wound(s) or have any questions regarding your home care instructions please contact the [facility] @ [phone] If after regular business hours, please call your family doctor or local emergency room. The treatment plan has been discussed at length between you and your provider. Follow all instructions carefully, it is very important. If you do not follow all instructions you are at risk of your wound not healing, infection, possible loss of  limb and even loss of life.    Vria BILLY Alejandro FNP-C, CWCN-AP, CFCN, CSWS, WCC, DWC  3/27/2025          [1]   Allergies  Allergen Reactions    Fish ANAPHYLAXIS and HIVES     All fish, Wheezing, short of breath    Fish Oil ANAPHYLAXIS and HIVES     All fish, Wheezing, short of breath   All fish, Wheezing, short of breath    Levemir HIVES and ITCHING    Seasonal WHEEZING and Runny nose     Ragweed, pollen       .Weekly Wound Education Note    Teaching Provided To: Patient  Training Topics: Dressing, Cleasing and general instructions, Discharge instructions  Training Method: Explain/Verbal, Written  Training Response: Patient responds and understands        Notes: Wounds improving. Continue santyl, moist gauze and bordered gauze to leg wound. Continue flagyl, vashe moist kerlix, bordered foam, baby diaper, and medipore tape. Will order supplies this visit.        Wound Treatment Orders:  No orders of the defined types were placed in this encounter.          Wound Information/Order:  Wound Number: Left Breast  Product: Kerlix roll gauze ,9x9 Zetuvit silicone border sacral (primary dressing)  Dispense: 30 days  Dressing Frequency:Change dressing 3x per week     Was a Debridement performed: Yes, Debridement type: selective     Compression Stockings ordered: No     Notes: Dispense as written. Kerlix roll gauze, 9x9 Zetuvit silicone border sacral      Additional wound: No

## (undated) NOTE — LETTER
ASTHMA ACTION PLAN for Alireza Pruitt     : 1965     Date: 3/26/2019  Provider:  Eliezer Morris DO  Phone for doctor or clinic: [facility] 2, [address]  [phone]    ACT [] Asthma Action Plan reviewed with patient (and caregiver if necessary) on the phone and mailed copy to patient or submitted via [address]      Signatures:  Provider  Dinah Aviles DO   Patient Caretaker

## (undated) NOTE — LETTER
Date & Time: 4/11/2018, 11:51 AM  Patient: Paola Class  Encounter Provider(s):    Nina Ibrahim MD       To Whom It May Concern:    Paola Trivedi was seen and treated in our department on 4/11/2018. She {Return to school/sport/work:[id number]}.

## (undated) NOTE — LETTER
04/29/21        Swathi Arguelles  [address] [facility]      Dear Annabelle Coleman,    Our records indicate that you have outstanding lab work and or testing that was ordered for you and has not yet been completed:  Orders Placed This Encounter

## (undated) NOTE — MR AVS SNAPSHOT
After Visit Summary   2/3/2017    Betsy Villagran    MRN: [id number]           Diagnoses this Visit     Left ovarian tumor of borderline malignancy    -  Primary     Malignant neoplasm of overlapping sites of right female breast ([facility] [address]         [facility] Respiratory Therapy Supplies (NEBULIZER/ADULT MASK) Does not apply Kit Use with nebulizer machine as directed    Albuterol Sulfate (VENTOLIN) (2.5 MG/3ML) 0.083% Inhalation Nebu Soln Take 3 mL by nebulization every 4 (four) hours as needed for Wheezing.

## (undated) NOTE — LETTER
Date: 12/22/2023    Patient Name: Yazan Hidalgo          To Whom it may concern: This letter has been written at the patient's request. The above patient was seen at the [facility] for treatment of a medical condition. Vikki Luna has a chronic medical condition that necessitates reasonable accommodation for employee parking.        Sincerely,      John Vega, DO

## (undated) NOTE — MR AVS SNAPSHOT
[address], [place] [address]               Thank you for choosing us for your health care visit with Sugar Guajardo MD.  We are glad to serve you and happy to provide you with this ? EFFECTIVE April 1, 2017 PATIENTS MUST  THEIR OWN NARCOTIC PRESCRIPTIONS. ? Written prescriptions must be picked up in office. ? Please allow the office 48-72 hours to fill the prescription. ? Patient must present photo ID at time of . ADVAIR DISKUS 250-50 MCG/DOSE Aepb   Generic drug:  fluticasone-salmeterol   INHALE 1 PUFF INTO THE LUNGS 2 (TWO) TIMES DAILY           * albuterol sulfate (2.5 MG/3ML) 0.083% Nebu   Take 3 mL by nebulization every 4 (four) hours as needed for Wheezing. These medications were sent to [address] [place] [address], [address] [phone], [phone] [address] [address], [address] [facility] [address]     Phone:  [phone]    - gabapentin 600 MG Tabs            Tarshat

## (undated) NOTE — LETTER
[address] , [facility] [address]  [address] [place] [id number]  [phone]         May 12, 2020        [address]      Dear Chucky Escobar:     Our records indicate that you have outs

## (undated) NOTE — LETTER
06/26/20        Swathi Arguelles  [address] [facility]      Dear Estela Chou,    Our records indicate that you have outstanding lab work and or testing that was ordered for you and has not yet been completed:  Orders Placed This Encounter

## (undated) NOTE — LETTER
Date: 1/22/2025  Patient name: Swathi Arguelles  YOB: 1965  Medical Record Number: [id number]  Primary Coverage: Payor: [facility] PPO / Plan: BLUE CROSS FEP PPO / Product Type: PPO /   Secondary Coverage:   Insurance ID: [id number]  Patient Address: [address] [place] [id number]  Telephone Information:   Home Phone [phone]   Mobile [phone]         Encounter Date: 1/22/2025  Provider: SHANA Hummel  Diagnosis:     ICD-10-CM   1. Malignant neoplasm of overlapping sites of left breast in female, estrogen receptor negative (HCC)  C50.812    Z17.1       Progress Note:  CHIEF COMPLAINT:     Chief Complaint   Patient presents with    Wound Care     Follow up for left breast wound. Pt stated that she was having trouble keeping dressing in place. Pt had to tape in place and use foam dressings to bottom of wound due to drainage.      HPI:   Information obtained from patient, daughter, chart  1-8-25 INITIAL:  Patient is a 60 yo female who was dx with right breast ca in 2013 and was lost to follow-up in 2017.  Patient next presented to the ED on 11/30/2024 with complaints of generalized weakness, poor appetite, and dyspnea with exertion. Laboratory studies showed anemia, hyponatremia, and hypochlorhydria. CTA chest was negative for pulmonary embolism or metastatic disease but did show a 22.3 x 14.6 x 16.0 cm mass arising from the left breast with areas of necrosis, possible extension into the intracostal musculature, and mildly enlarged left axillary lymph nodes. Visualization of left breast showed a large firm breast which an open area laterally with malodorous drainage. CT abdomen/pelvis w contrast on 12/01/2024 was negative for metastatic disease. Biopsy of the left breast was performed on 12/02/2024. 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 It was discussed with patient that the left breast wound will not heal and she is presenting today for assistance  with management of the left breast wound.  There is significant malodor and necrotic, liquifing adipose tissue.  I was able to remove a large amount of it which will help with the drainage and malodor.  Patient has not been showering because she did not think she should get the wound wet.  The wound is not overly vascular.  We discussed utilizing dakins solution and a baby diaper.  Will order supplies for patient.     1-22-25 patient returns.  Since last visit patient was seen at cancer clinic for ivfs for dehydration as she has not been able to hydrate adequately.  patient's care plan of her breast has included: dressing with dakins and using large bordered foam over twice daily. She states with the baby diaper she felt as though she was always getting wet and the camisole and the border dressings helps prevent leaking. Will order patient some border zetuvit dressings. She states she did not get any delievery. Rn will f/u with carmen. Today the lesion to the most anterior is now open with necrosis leaking out.  She states the malodor improved for a couple days after last appointment but has been slowly returning.  I again was able to remove a good amount of necrotic tissue which should help with the malodor.  We discussed that should the drainage start to slow, we can transition to metrogel (instead of the dakins). Patient has no c/o pain.    MEDICATIONS:     Current Outpatient Medications:     sodium hypochlorite 0.125 % External Solution, Moisten gauze with dakins, place onto wound, cover with baby diaper three times a day, Disp: 1000 mL, Rfl: 3    HYDROcodone-acetaminophen 5-325 MG Oral Tab, Take 1-2 tablets by mouth every 4 (four) hours as needed for Pain., Disp: 30 tablet, Rfl: 0    gabapentin 600 MG Oral Tab, TAKE 1 TABLET BY MOUTH THREE TIMES DAILY; TAKE AN EXTRA 600MG AS NEEDED FOR SEVERE PAIN, Disp: 270 tablet, Rfl: 1    Insulin Glargine-yfgn 100 UNIT/ML Subcutaneous Solution Pen-injector, Inject 20 Units  into the skin nightly., Disp: 24 mL, Rfl: 0    prochlorperazine (COMPAZINE) 10 mg tablet, Take 1 tablet (10 mg total) by mouth every 6 (six) hours as needed for Nausea., Disp: 30 tablet, Rfl: 3    ondansetron (ZOFRAN) 8 MG tablet, Take 1 tablet (8 mg total) by mouth every 8 (eight) hours as needed for Nausea., Disp: 30 tablet, Rfl: 3    traMADol 50 MG Oral Tab, Take 1 tablet (50 mg total) by mouth every 6 (six) hours as needed., Disp: 20 tablet, Rfl: 0    fluticasone-salmeterol (WIXELA INHUB) 100-50 MCG/ACT Inhalation Aerosol Powder, Breath Activated, Inhale 1 puff into the lungs 2 (two) times daily., Disp: 3 each, Rfl: 0    Insulin Lispro, 1 Unit Dial, 100 UNIT/ML Subcutaneous Solution Pen-injector, Inject 10 Units into the skin in the morning, at noon, and at bedtime., Disp: 3 mL, Rfl: 0    Budesonide-Formoterol Fumarate 160-4.5 MCG/ACT Inhalation Aerosol, Inhale 2 puffs into the lungs 2 (two) times daily. (Patient taking differently: Inhale 2 puffs into the lungs 2 (two) times daily. Pt states she would like to go back to budesonide), Disp: 3 each, Rfl: 1    albuterol (2.5 MG/3ML) 0.083% Inhalation Nebu Soln, Take 3 mL (2.5 mg total) by nebulization every 4 (four) hours as needed for Wheezing or Shortness of Breath., Disp: 90 mL, Rfl: 0    Insulin Pen Needle (BD PEN NEEDLE DANIELA U/F) 32G X 4 MM Does not apply Misc, Up to TID, Disp: 200 each, Rfl: 2    montelukast 10 MG Oral Tab, Take 1 tablet (10 mg total) by mouth nightly., Disp: 90 tablet, Rfl: 1  ALLERGIES:   Allergies[1]   REVIEW OF SYSTEMS:   This information was obtained from the patient/family and chart.    See HPI for pertinent positives, otherwise 10 pt ROS negative.    HISTORY:   Past medical, surgical, family and social history updated where appropriate.      PHYSICAL EXAM:     Vitals:    01/22/25 1303   BP: 120/62   Pulse: 107   Resp: 14   Temp: 97.6 °F (36.4 °C)        Estimated body mass index is 24.24 kg/m² as calculated from the following:     Height as of 1/14/25: 62.44\".    Weight as of 1/14/25: 134 lb 6.4 oz (61 kg).   POC Glucose   Date Value Ref Range Status   12/09/2024 95 70 - 99 mg/dL Final   12/03/2024 250 (H) 70 - 99 mg/dL Final   12/03/2024 136 (H) 70 - 99 mg/dL Final       Vital signs reviewed.Appears stated age, well groomed.    Constitutional:  Bp wnl for patient. Pulse Regular and wnl for patient. Respirations easy and unlabored. Temperature wnl. Weight normal for height. Appearance neat and clean. Appears in no acute distress. Well nourished and well developed.    Musculoskeletal:  Gait and station stable   Integumentary:  refer to wound characteristics and images   Psychiatric:  Judgment and insight intact. Alert and oriented times 3. No evidence of depression, anxiety, or agitation. Calm, cooperative, and communicative.   DIAGNOSTICS:     Lab Results   Component Value Date    BUN <5 (L) 01/14/2025    CREATSERUM 0.74 01/14/2025    GFRCKDEPI 105.47 04/18/2018    ALB 3.2 12/31/2024    TP 6.4 12/31/2024    A1C 6.5 (H) 11/30/2024       WOUND ASSESSMENT:     Wound 01/08/25 #1 Breast Left (Active)   Date First Assessed/Time First Assessed: 01/08/25 1414    Wound Number (Wound Clinic Only): #1  Primary Wound Type: (c) Other (comment)  Location: Breast  Wound Location Orientation: Left      Assessments 1/8/2025  2:16 PM 1/22/2025  1:06 PM   Wound Image           Drainage Amount Large Moderate   Drainage Description Serous;Yellow Serosanguineous   Wound Length (cm) 17 cm 15.2 cm   Wound Width (cm) 30 cm 26 cm   Wound Surface Area (cm^2) 510 cm^2 395.2 cm^2   Wound Depth (cm) 1 cm 2 cm   Wound Volume (cm^3) 510 cm^3 790.4 cm^3   Wound Healing % -- -55   Margins Well-defined edges Well-defined edges   Non-staged Wound Description Full thickness Full thickness   Lora-wound Assessment Edema Edema   Wound Granulation Tissue Red;Pink;Spongy Red;Spongy   Wound Bed Granulation (%) 10 % 30 %   Wound Bed Epithelium (%) 15 % 30 %   Wound Bed Slough (%) 75 %  40 %   Wound Odor Strong Mild   Shape -- Clustered       Inactive Orders   Date Order Priority Status Authorizing Provider   01/08/25 1618 Debridement Other (comment) Left Breast Routine Completed Vira Alejandro APRN        PROCEDURE:      This procedure was medically necessary to promote wound healing by removing nonviable tissue, decrease chance of infection, and return the wound to an acute state.  See rn px note        ASSESSMENT AND PLAN:    1. Malignant neoplasm of overlapping sites of left breast in female, estrogen receptor negative (HCC)        Risks, benefits, and alternatives of current treatment plan discussed in detail.  Questions and concerns addressed. Red flags to RTC or ED reviewed.  Patient (or parent) agrees to plan.      NOTE TO PATIENT: The 21st Century Cures Act makes clinical notes like these available to patients in the interest of transparency. Clinical notes are medical documents used by physicians and care providers to communicate with each other. These documents include medical language and terminology, abbreviations, and treatment information that may sound technical and at times possibly unfamiliar. In addition, at times, the verbiage may appear blunt or direct. These documents are one tool providers use to communicate relevant information and clinical opinions of the care providers in a way that allows common understanding of the clinical context.    I spent  40 minutes with the patient. This time included:    preparing to see the patient (eg, review notes and recent diagnostics),  seeing the patient, obtaining and/or reviewing separately obtained history, performing a medically appropriate examination and/or evaluation, counseling and educating the patient, documenting in the record.  Bill debridement only  DISCHARGE:      Patient Instructions   Please return:  1.5 weeks   Patient discharge and wound care instructions  Swathi Arguelles  1/22/2025       You may shower and cleanse area  with mild soap and water, Dakins, dab dry with gauze and apply your dressings as directed.     Changing your dressing:            two- three times a day    Wash your hands with soap and water.  Ensure that the old dressing is removed completely. Place it in a plastic bag and throw it in the trash.  Cleanse the wound with hypochlorous wound cleanser (ie. Anasept, vashe, pure and clean) .  It's ok to “scrub” your wound with the gauze, small amount of bleeding with cleansing is normal and ok.  Apply the following dressings:     Moisten gauze with DAKINS solution, place into wound, cover with baby diaper or bordered zetuvit dressings    Nutrition and blood sugar control:  Focus on the following:  Protein: Meats, beans, eggs, milk and yogurt particularly Greek yogurt), tofu, soy nuts, soy protein products (Follow the protein handout in your welcome folder)  Vitamin C: Citrus fruits and juices, strawberries, tomatoes, tomato juice, peppers, baked potatoes, spinach, broccoli, cauliflower, [place] sprouts, cabbage  Vitamin A: Dark green, leafy vegetables, orange or yellow vegetables, cantaloupe, fortified dairy products, liver, fortified cereals  Zinc: Fortified cereals, red meats, seafood  Consider supplementing with Maximino by [employer]. It can be purchased on amazon, [employer] website, or local pharmacy may be able to order it for you.  (These are essential branch chain amino acids that help with tissue building and wound healing).   When your blood sugar is consistently elevated greater than 180 your body can't heal or fight infection.      Concerns:  Signs of infection may include the following:  Increase in redness  Red \"streaks\" from wound  Increase in swelling  Fever  Unusual odor  Change in the amount of wound drainage     Should you experience any significant changes in your wound(s) or have any questions regarding your home care instructions please contact the [facility] @ [phone] If after  regular business hours, please call your family doctor or local emergency room. The treatment plan has been discussed at length between you and your provider. Follow all instructions carefully, it is very important. If you do not follow all instructions you are at risk of your wound not healing, infection, possible loss of limb and even loss of life.    Vira Alejandro FNP-C, CWCN-AP, CFCN, CSWS, WCC, DWC  1/22/2025          [1]   Allergies  Allergen Reactions    Fish ANAPHYLAXIS and HIVES     All fish, Wheezing, short of breath    Fish Oil ANAPHYLAXIS and HIVES     All fish, Wheezing, short of breath   All fish, Wheezing, short of breath    Levemir HIVES and ITCHING    Seasonal WHEEZING and Runny nose     Ragweed, pollen       Patient ID: Swathi Arguelles is a 60 year old female.    Debridement Other (comment) Left Breast   Wound 01/08/25 #1 Breast Left    Performed by: Vira Alejandro APRN  Authorized by: Vira Alejandro APRN      Consent   Consent obtained? verbal  Consent given by: patient    Debridement Details  Performed by: NP  Debridement type: conservative sharp  Pain control: lidocaine 4%  Pain control administration type: topical    Pre-debridement measurements  Length (cm): 15.2  Width (cm): 26  Depth (cm): 2  Surface Area (cm^2): 395.2    Post-debridement measurements  Length (cm): 15.2  Width (cm): 26  Depth (cm): 7  Percent debrided: 70%  Surface Area (cm^2): 395.2  Area Debrided (cm^2): 276.64  Volume (cm^3): 2766.4    Devitalized tissue debrided: biofilm, fibrin, necrotic debris, slough and necrotic adipose  Instrument(s) utilized: forceps and curette  Bleeding: small  Hemostasis obtained with: not applicable  Procedural pain (0-10): 0  Post-procedural pain: 0   Response to treatment: procedure was tolerated well      Photo unavailable            .Weekly Wound Education Note    Teaching Provided To: Patient  Training Topics: Discharge instructions;Dressing;Cleasing and general instructions  Training  Method: Explain/Verbal;Written  Training Response: Patient responds and understands            Continue VASHE moistened gauze to all areas open, pack loosely one 4x4 gauze into most anterior portion of wound.  Cover with border foam, baby diaper, secure with medipore tape if needed.  Change dressings twice daily.  Supplies ordered.       Wound Treatment Orders:  No orders of the defined types were placed in this encounter.          Wound Information/Order:  Product:Dry gauze and Other Zetuvit border sacrum large, saline   Dispense: 30 days  Dressing Frequency:Change dressing daily and/or PRN    Was a Debridement performed: Yes, Debridement type: selective    Compression Stockings ordered: No    Notes: 3p7arrsp, saline, Zetuvit border sacrum large.  Dispense as written.

## (undated) NOTE — Clinical Note
December 3, 2024    Patient: Swathi Arguelles   Date of Visit: 11/30/2024       To Whom It May Concern:    Swathi Arguelles was seen and treated in our emergency department on 11/30/2024. She {Return to school/sport/work:[id number]}.    If you have any questions or concerns, please don't hesitate to call.       Encounter Provider(s):    Christopher Resendiz MD Schiazza, Sarah, DO Tailor, Pranav, MD Ragothaman, Arun, MD